# Patient Record
Sex: FEMALE | Race: WHITE | Employment: OTHER | URBAN - NONMETROPOLITAN AREA
[De-identification: names, ages, dates, MRNs, and addresses within clinical notes are randomized per-mention and may not be internally consistent; named-entity substitution may affect disease eponyms.]

---

## 2017-02-01 ENCOUNTER — TRANSFERRED RECORDS (OUTPATIENT)
Dept: HEALTH INFORMATION MANAGEMENT | Facility: OTHER | Age: 67
End: 2017-02-01

## 2017-11-16 ENCOUNTER — TRANSFERRED RECORDS (OUTPATIENT)
Dept: HEALTH INFORMATION MANAGEMENT | Facility: OTHER | Age: 67
End: 2017-11-16

## 2018-10-31 ENCOUNTER — TRANSFERRED RECORDS (OUTPATIENT)
Dept: HEALTH INFORMATION MANAGEMENT | Facility: OTHER | Age: 68
End: 2018-10-31

## 2018-11-12 ENCOUNTER — TRANSFERRED RECORDS (OUTPATIENT)
Dept: HEALTH INFORMATION MANAGEMENT | Facility: CLINIC | Age: 68
End: 2018-11-12

## 2018-11-12 LAB
ALT SERPL-CCNC: 35 U/L (ref 19–47)
AST SERPL-CCNC: 29 U/L (ref 9–34)
CREAT SERPL-MCNC: 0.9 MG/DL (ref 0.7–1.4)
GFR SERPL CREATININE-BSD FRML MDRD: 66 ML/MIN/1.73M2
GLUCOSE SERPL-MCNC: 105 MG/DL (ref 64–112)
POTASSIUM SERPL-SCNC: 4.5 MEQ/L (ref 3.5–5.3)
TSH SERPL-ACNC: 5.18 UIU/ML (ref 0.4–4)

## 2018-11-15 ENCOUNTER — HOSPITAL ENCOUNTER (OUTPATIENT)
Dept: MRI IMAGING | Facility: HOSPITAL | Age: 68
Discharge: HOME | End: 2018-11-15
Attending: PHYSICIAN ASSISTANT | Admitting: PHYSICIAN ASSISTANT
Payer: MEDICARE

## 2018-11-15 ENCOUNTER — TRANSFERRED RECORDS (OUTPATIENT)
Dept: HEALTH INFORMATION MANAGEMENT | Facility: OTHER | Age: 68
End: 2018-11-15

## 2018-11-15 DIAGNOSIS — K76.9 HEPATIC LESION: ICD-10-CM

## 2018-11-15 PROCEDURE — A9585 GADOBUTROL INJECTION: HCPCS | Performed by: RADIOLOGY

## 2018-11-15 PROCEDURE — 74183 MRI ABD W/O CNTR FLWD CNTR: CPT | Mod: TC

## 2018-11-15 PROCEDURE — 25500064 ZZH RX 255 OP 636: Performed by: RADIOLOGY

## 2018-11-15 RX ORDER — GADOBUTROL 604.72 MG/ML
7.5 INJECTION INTRAVENOUS ONCE
Status: COMPLETED | OUTPATIENT
Start: 2018-11-15 | End: 2018-11-15

## 2018-11-15 RX ADMIN — GADOBUTROL 7.5 ML: 604.72 INJECTION INTRAVENOUS at 18:15

## 2018-11-28 ENCOUNTER — TRANSFERRED RECORDS (OUTPATIENT)
Dept: HEALTH INFORMATION MANAGEMENT | Facility: OTHER | Age: 68
End: 2018-11-28

## 2018-12-07 ENCOUNTER — TRANSFERRED RECORDS (OUTPATIENT)
Dept: HEALTH INFORMATION MANAGEMENT | Facility: OTHER | Age: 68
End: 2018-12-07

## 2018-12-14 ENCOUNTER — TRANSFERRED RECORDS (OUTPATIENT)
Dept: HEALTH INFORMATION MANAGEMENT | Facility: OTHER | Age: 68
End: 2018-12-14

## 2018-12-14 ENCOUNTER — HOSPITAL ENCOUNTER (INPATIENT)
Facility: OTHER | Age: 68
LOS: 8 days | Discharge: HOME | DRG: 331 | End: 2018-12-22
Attending: INTERNAL MEDICINE | Admitting: SURGERY
Payer: MEDICARE

## 2018-12-14 DIAGNOSIS — Z98.890 POSTOPERATIVE STATE: Primary | ICD-10-CM

## 2018-12-14 PROBLEM — K56.609 SMALL BOWEL OBSTRUCTION (H): Status: ACTIVE | Noted: 2018-12-14

## 2018-12-14 PROCEDURE — 12000000 ZZH R&B MED SURG/OB

## 2018-12-14 PROCEDURE — 99223 1ST HOSP IP/OBS HIGH 75: CPT | Performed by: SURGERY

## 2018-12-14 PROCEDURE — 25800025 ZZH RX 258: Performed by: SURGERY

## 2018-12-14 PROCEDURE — 25000128 H RX IP 250 OP 636: Performed by: SURGERY

## 2018-12-14 RX ORDER — DEXTROSE MONOHYDRATE, SODIUM CHLORIDE, AND POTASSIUM CHLORIDE 50; 1.49; 4.5 G/1000ML; G/1000ML; G/1000ML
INJECTION, SOLUTION INTRAVENOUS CONTINUOUS
Status: DISCONTINUED | OUTPATIENT
Start: 2018-12-14 | End: 2018-12-19

## 2018-12-14 RX ORDER — ONDANSETRON 4 MG/1
4 TABLET, ORALLY DISINTEGRATING ORAL EVERY 6 HOURS PRN
Status: DISCONTINUED | OUTPATIENT
Start: 2018-12-14 | End: 2018-12-22

## 2018-12-14 RX ORDER — HYDROMORPHONE HYDROCHLORIDE 1 MG/ML
0.2 INJECTION, SOLUTION INTRAMUSCULAR; INTRAVENOUS; SUBCUTANEOUS
Status: DISCONTINUED | OUTPATIENT
Start: 2018-12-14 | End: 2018-12-22

## 2018-12-14 RX ORDER — NALOXONE HYDROCHLORIDE 0.4 MG/ML
.1-.4 INJECTION, SOLUTION INTRAMUSCULAR; INTRAVENOUS; SUBCUTANEOUS
Status: DISCONTINUED | OUTPATIENT
Start: 2018-12-14 | End: 2018-12-18

## 2018-12-14 RX ORDER — ONDANSETRON 2 MG/ML
4 INJECTION INTRAMUSCULAR; INTRAVENOUS EVERY 6 HOURS PRN
Status: DISCONTINUED | OUTPATIENT
Start: 2018-12-14 | End: 2018-12-22

## 2018-12-14 RX ADMIN — FAMOTIDINE 20 MG: 20 INJECTION, SOLUTION INTRAVENOUS at 20:08

## 2018-12-14 RX ADMIN — HYDROMORPHONE HYDROCHLORIDE 0.2 MG: 1 INJECTION, SOLUTION INTRAMUSCULAR; INTRAVENOUS; SUBCUTANEOUS at 23:00

## 2018-12-14 RX ADMIN — DEXTROSE MONOHYDRATE, SODIUM CHLORIDE, AND POTASSIUM CHLORIDE: 50; 4.5; 1.49 INJECTION, SOLUTION INTRAVENOUS at 20:08

## 2018-12-14 ASSESSMENT — MIFFLIN-ST. JEOR: SCORE: 1089.88

## 2018-12-14 ASSESSMENT — ACTIVITIES OF DAILY LIVING (ADL): ADLS_ACUITY_SCORE: 11

## 2018-12-15 ENCOUNTER — APPOINTMENT (OUTPATIENT)
Dept: GENERAL RADIOLOGY | Facility: OTHER | Age: 68
DRG: 331 | End: 2018-12-15
Attending: SURGERY
Payer: MEDICARE

## 2018-12-15 LAB
ALBUMIN SERPL-MCNC: 3.4 G/DL (ref 3.5–5.7)
ALP SERPL-CCNC: 45 U/L (ref 34–104)
ALT SERPL W P-5'-P-CCNC: 41 U/L (ref 7–52)
ANION GAP SERPL CALCULATED.3IONS-SCNC: 6 MMOL/L (ref 3–14)
AST SERPL W P-5'-P-CCNC: 25 U/L (ref 13–39)
BILIRUB SERPL-MCNC: 0.5 MG/DL (ref 0.3–1)
BUN SERPL-MCNC: 11 MG/DL (ref 7–25)
CALCIUM SERPL-MCNC: 9.1 MG/DL (ref 8.6–10.3)
CHLORIDE SERPL-SCNC: 107 MMOL/L (ref 98–107)
CO2 SERPL-SCNC: 25 MMOL/L (ref 21–31)
CREAT SERPL-MCNC: 0.79 MG/DL (ref 0.6–1.2)
ERYTHROCYTE [DISTWIDTH] IN BLOOD BY AUTOMATED COUNT: 12.8 % (ref 10–15)
GFR SERPL CREATININE-BSD FRML MDRD: 72 ML/MIN/1.7M2
GLUCOSE SERPL-MCNC: 121 MG/DL (ref 70–105)
HCT VFR BLD AUTO: 37.8 % (ref 35–47)
HGB BLD-MCNC: 12.5 G/DL (ref 11.7–15.7)
MCH RBC QN AUTO: 30 PG (ref 26.5–33)
MCHC RBC AUTO-ENTMCNC: 33.1 G/DL (ref 31.5–36.5)
MCV RBC AUTO: 91 FL (ref 78–100)
PLATELET # BLD AUTO: 354 10E9/L (ref 150–450)
POTASSIUM SERPL-SCNC: 3.9 MMOL/L (ref 3.5–5.1)
PROT SERPL-MCNC: 5.8 G/DL (ref 6.4–8.9)
RBC # BLD AUTO: 4.16 10E12/L (ref 3.8–5.2)
SODIUM SERPL-SCNC: 138 MMOL/L (ref 134–144)
WBC # BLD AUTO: 8.2 10E9/L (ref 4–11)

## 2018-12-15 PROCEDURE — 80053 COMPREHEN METABOLIC PANEL: CPT | Performed by: SURGERY

## 2018-12-15 PROCEDURE — 85027 COMPLETE CBC AUTOMATED: CPT | Performed by: SURGERY

## 2018-12-15 PROCEDURE — 25000131 ZZH RX MED GY IP 250 OP 636 PS 637: Mod: GY | Performed by: SURGERY

## 2018-12-15 PROCEDURE — 25800025 ZZH RX 258: Performed by: SURGERY

## 2018-12-15 PROCEDURE — 74019 RADEX ABDOMEN 2 VIEWS: CPT

## 2018-12-15 PROCEDURE — 12000000 ZZH R&B MED SURG/OB

## 2018-12-15 PROCEDURE — 25000128 H RX IP 250 OP 636: Performed by: SURGERY

## 2018-12-15 PROCEDURE — 36415 COLL VENOUS BLD VENIPUNCTURE: CPT | Performed by: SURGERY

## 2018-12-15 PROCEDURE — 99231 SBSQ HOSP IP/OBS SF/LOW 25: CPT | Performed by: SURGERY

## 2018-12-15 RX ADMIN — HYDROMORPHONE HYDROCHLORIDE 0.2 MG: 1 INJECTION, SOLUTION INTRAMUSCULAR; INTRAVENOUS; SUBCUTANEOUS at 05:16

## 2018-12-15 RX ADMIN — FAMOTIDINE 20 MG: 20 INJECTION, SOLUTION INTRAVENOUS at 07:36

## 2018-12-15 RX ADMIN — HYDROMORPHONE HYDROCHLORIDE 0.2 MG: 1 INJECTION, SOLUTION INTRAMUSCULAR; INTRAVENOUS; SUBCUTANEOUS at 16:47

## 2018-12-15 RX ADMIN — DEXTROSE MONOHYDRATE, SODIUM CHLORIDE, AND POTASSIUM CHLORIDE: 50; 4.5; 1.49 INJECTION, SOLUTION INTRAVENOUS at 06:49

## 2018-12-15 RX ADMIN — ONDANSETRON 4 MG: 4 TABLET, ORALLY DISINTEGRATING ORAL at 14:53

## 2018-12-15 RX ADMIN — HYDROMORPHONE HYDROCHLORIDE 0.2 MG: 1 INJECTION, SOLUTION INTRAMUSCULAR; INTRAVENOUS; SUBCUTANEOUS at 11:24

## 2018-12-15 RX ADMIN — FAMOTIDINE 20 MG: 20 INJECTION, SOLUTION INTRAVENOUS at 19:24

## 2018-12-15 RX ADMIN — DEXTROSE MONOHYDRATE, SODIUM CHLORIDE, AND POTASSIUM CHLORIDE: 50; 4.5; 1.49 INJECTION, SOLUTION INTRAVENOUS at 16:45

## 2018-12-15 RX ADMIN — HYDROMORPHONE HYDROCHLORIDE 0.2 MG: 1 INJECTION, SOLUTION INTRAMUSCULAR; INTRAVENOUS; SUBCUTANEOUS at 22:54

## 2018-12-15 RX ADMIN — HYDROMORPHONE HYDROCHLORIDE 0.2 MG: 1 INJECTION, SOLUTION INTRAMUSCULAR; INTRAVENOUS; SUBCUTANEOUS at 14:53

## 2018-12-15 ASSESSMENT — ACTIVITIES OF DAILY LIVING (ADL)
ADLS_ACUITY_SCORE: 11

## 2018-12-15 ASSESSMENT — MIFFLIN-ST. JEOR: SCORE: 1096.06

## 2018-12-15 NOTE — PROGRESS NOTES
"[place] [facility] ADMISSION NOTE    Patient admitted to room 314 at approximately 1820 via cart from transfer from [facility]. Patient was accompanied by other:ambulance crew.     Verbal SBAR report received from [facility] nurse prior to patient arrival.     Patient ambulated to bed with stand-by assist. Patient alert and oriented X 3. The patient is not having any pain.  . Admission vital signs: Blood pressure 127/69, pulse 83, temperature 98.6  F (37  C), temperature source Tympanic, resp. rate 16, height 1.651 m (5' 5\"), weight 55.9 kg (123 lb 3.8 oz), SpO2 95 %. Patient was oriented to plan of care, call light, bed controls, tv, telephone, bathroom and visiting hours.     Risk Assessment    The following safety risks were identified during admission: none. Yellow risk band applied: NO.     Skin Initial Assessment    This writer admitted this patient and completed a full skin assessment and Miko score in the Adult PCS flowsheet. Appropriate interventions initiated as needed.     Secondary skin check completed by Marleen.    Skin  Inspection of bony prominences: Full         Sylvia Celestin"

## 2018-12-15 NOTE — H&P
[facility]    Surgical Admission    Date of Admission:  12/14/2018    Assessment & Plan   Nadya Flanagan is a 68 year old female who was admitted on 12/14/2018. I was asked to see the patient by Dr. Dent in [place] for abdominal pain.    Active Problems:    Small bowel obstruction (H)    Assessment: partial    Plan: nasogastric decompression,       DVT Prophylaxis: Pneumatic Compression Devices  Code Status: Full Code    Tresa Evangelista    Primary Care Physician   Isaura Allison    Chief Complaint   Vomiting and abdominal pain    History is obtained from the patient and chart review    History of Present Illness   aNdya Flanagan is a 68 year old female who presents with vomiting and abdominal pain. This all started October 12. She has been having good and bad days as far as the vomiting and pain but usually only one or two days of good then bad again. She has been having bowel movements. She hasn't noted blood or black stools. She has been bloated on and off. She had an EGD by Dr. Gaytan that showed some gastritis. She hasn't had a colonoscopy before. She has lost about 28 pounds since this started. She finally was having horrible cramping pain today so went to the ED in [place].  She was noted on CT to have a small bowel obstruction. She had a mildly elevated WBC. She has had improvement in her pain with minimal Dilaudid. She hasn't had vomiting since the nasogastric tube went in.  No fevers during this illness.  Past Medical History    I have reviewed this patient's medical history and updated it with pertinent information if needed.   Past Medical History:   Diagnosis Date     Thyroid disease        Past Surgical History   I have reviewed this patient's surgical history and updated it with pertinent information if needed.  Past Surgical History:   Procedure Laterality Date     ABDOMEN SURGERY-tubal ligation         Prior to Admission Medications   None     Allergies   Allergies   Allergen  Reactions     Contrast Dye      Had contrast dye. Patient reports she woke up in ICU.       Social History   I have reviewed this patient's social history and updated it with pertinent information if needed. Nadya Flanagan  Non-smoker. .    Family History   I have reviewed this patient's family history and updated it with pertinent information if needed.   History reviewed. No pertinent family history.    REVIEW OF SYSTEMS  GENERAL: No fevers or chills. Denies fatigue, has had recent weight loss more than 20 pounds  HEENT: No sinus drainage. No changes with vision or hearing. No difficulty swallowing.   LYMPHATICS:  No swollen nodes in axilla, neck or groin.  CARDIOVASCULAR: Denies chest pain, palpitations and dyspnea on exertion.  PULMONARY: No shortness of breath or cough. No increase in sputum production.  GI:Denies melena, bright red blood in stools. No hematemesis. No constipation or diarrhea.  : No dysuria or hematuria.  SKIN: No recent rashes or ulcers.   HEMATOLOGY:  No history of easy bruising or bleeding.  ENDOCRINE:  No history of diabetes.  NEUROLOGY:  No history of seizures or headaches. No motor or sensory changes.    Physical Exam   Temp: 98.6  F (37  C) Temp src: Tympanic BP: 127/69 Pulse: 83   Resp: 16 SpO2: 95 % O2 Device: None (Room air)    Vital Signs with Ranges  Temp:  [98.6  F (37  C)] 98.6  F (37  C)  Pulse:  [83] 83  Resp:  [16] 16  BP: (127)/(69) 127/69  SpO2:  [95 %] 95 %  123 lbs 3.79 oz    Constitutional: NAD, pleasant and cooperative  HEENT: normocephalic, no icterus, no nasal drainage, no oral lesions, mucus membranes pale and moist  Respiratory: lungs clear to ausculation bilaterally, no respiratory distress  Cardiovascular: heart regular rate and rhythm, no murmur  Abdomen: bowel sounds +, soft and distended, mild tenderness, no peritoneal signs  Lymph/Hematologic: No axillary or cervical adenopathy  Skin: pale, warm and dry. No rash or  ulceration  Musculoskeletal: calves soft and non-tender  Neurologic: grossly intact, AAO x 3  Psychiatric: appropriate affect    Data   -Data reviewed today: All pertinent laboratory and imaging results from this encounter were reviewed. I personally reviewed the abdominal CT image(s) showing SBO with right sided apparent transition point-no free air.  Labs from [place] reviewed- wbc 11. 9, hgb 15.4, plt 360, ast and alt are slightly elevated crp < 0.5  No lab results found in last 7 days.    No results found for this or any previous visit (from the past 24 hour(s)).

## 2018-12-15 NOTE — PROGRESS NOTES
[facility]  Surgical Progress Note    Assessment & Plan   Nadya Flanagan is a 68 year old female who was admitted on 12/14/2018.     Active Problems:    Small bowel obstruction (H)    Assessment: stable    Plan: continue nasogastric tube, advance 6 inches, showed tip at GEJ on XR. Improved labs. Pain controlled. Encourage ambulation. Will continue to monitor. Discussed with patient. She is motivated to be up and moving. Labs and XR in am.     # Pain Assessment:  Current Pain Score 12/15/2018   Patient currently in pain? yes   Pain score (0-10) 2   Pain location Abdomen   - Nadya is experiencing pain due to sbo. Pain management was discussed and the plan was created in a collaborative fashion.  Nadya's response to the current recommendations: engaged  - Opioid regimen: dialudid  - Response to opioid medications: Reduction of symptoms   - Bowel regimen: not needed        DVT Prophylaxis: Pneumatic Compression Devices  Code Status: Full Code    Tresa Evangelista    Interval History   Pain controlled. No nausea, no flatus or bm. Has been up and walking.   -Data reviewed today: I reviewed all new labs and imaging results over the last 24 hours.    Physical Exam   Temp: 98.3  F (36.8  C) Temp src: Tympanic BP: 119/64 Pulse: 67   Resp: 16 SpO2: 97 % O2 Device: None (Room air)    Vitals:    12/14/18 1851 12/15/18 0600   Weight: 55.9 kg (123 lb 3.8 oz) 56.5 kg (124 lb 9.6 oz)     Vital Signs with Ranges  Temp:  [98.3  F (36.8  C)-98.8  F (37.1  C)] 98.3  F (36.8  C)  Pulse:  [67-83] 67  Resp:  [14-18] 16  BP: (119-135)/(64-80) 119/64  SpO2:  [94 %-98 %] 97 %  I/O last 3 completed shifts:  In: 1208 [I.V.:1118; NG/GT:90]  Out: 880 [Urine:620; Emesis/NG output:260]    Constitutional: No acute distress, pleasant and cooperative  Respiratory: Clear lungs, no respiratory distress  Cardiovascular: regular rate and rhythm, no murmur  GI: abdomen soft, mild lower abdomen tenderness, hypo bowel  sounds  Skin/Integument: pink, warm and dry, no rash    Medications     dextrose 5% and 0.45% NaCl + KCl 20 mEq/L 100 mL/hr at 12/15/18 0649       famotidine  20 mg Intravenous Q12H       Data   Recent Labs   Lab 12/15/18  0433   WBC 8.2   HGB 12.5   MCV 91         POTASSIUM 3.9   CHLORIDE 107   CO2 25   BUN 11   CR 0.79   ANIONGAP 6   STELLA 9.1   *   ALBUMIN 3.4*   PROTTOTAL 5.8*   BILITOTAL 0.5   ALKPHOS 45   ALT 41   AST 25       Recent Results (from the past 24 hour(s))   XR Abdomen 2 Views    Narrative    PROCEDURE: XR ABDOMEN 2 VW 12/15/2018 6:35 AM    HISTORY: sbo    COMPARISONS: None.    TECHNIQUE: Supine and upright views.    FINDINGS: There is a nasogastric tube. Tip is probably in the gastric  fundus. It should be advanced densities near the EG junction.    No free air is seen. There are multiple gas-filled small bowel loops  with gas fluid levels. The bowel loops are mildly dilated. Findings  are consistent with a small bowel obstruction. There are punctate  radiodensities throughout the abdomen and pelvis. It is uncertain if  this relates to bowel contents or if there is another etiology. There  aren't 3 screws transfixing the proximal right femur. No mass is seen  and there are no suspicious calcifications.         Impression    IMPRESSION: Abnormal bowel gas pattern with findings consistent with  small bowel obstruction. Nasogastric tube tip near the EG junction.    OH SILVEIRA MD

## 2018-12-15 NOTE — PLAN OF CARE
VSS, patient able to get some sleep. Walked in hallway. NG tube flushed but unable to aspirate - patient repositioned, suction reconnected and draining dark green fluid with clumps. No complaints of nausea, PRN Dilaudid given once for abdominal pain. Patient voiding adequately. Gordon Kim RN on 12/15/2018 at 3:23 AM    NG tube and IV placed at [place] - LDA's added to flowsheets by writer but unsure of exact time of placement. NG tube has no cm markings on it - exterior portion of tube measure, approximately 71cm to nares. Gordon Kim RN on 12/15/2018 at 3:29 AM

## 2018-12-15 NOTE — PROGRESS NOTES
COLUMBIA-SUICIDE SEVERITY RATING SCALE   Screen with Triage Points for Emergency Department      Ask questions that are bolded and underlined.   Past  month   Ask Questions 1 and 2 YES NO   1)  Have you wished you were dead or wished you could go to sleep and not wake up?   no   2)  Have you actually had any thoughts of killing yourself?   no   If YES to 2, ask questions 3, 4, 5, and 6.  If NO to 2, go directly to question 6.   no     4)  Have you had these thoughts and had some intention of acting on them?   As opposed to  I have the thoughts but I definitely will not do anything about them.       5)  Have you started to work out or worked out the details of how to kill yourself? Do you intend to carry out this plan?      6)  Have you ever done anything, started to do anything, or prepared to do anything to end your life?  Examples: Collected pills, obtained a gun, gave away valuables, wrote a will or suicide note, took out pills but didn t swallow any, held a gun but changed your mind or it was grabbed from your hand, went to the roof but didn t jump; or actually took pills, tried to shoot yourself, cut yourself, tried to hang yourself, etc.    If YES, ask: Was this within the past three months?  Lifetime         Past 3 Months        Item 1:  Behavioral Health Referral at Discharge  Item 2:  Behavioral Health Referral at Discharge   Item 3:  Behavioral Health Consult (Psychiatric Nurse/) and consider Patient Safety Precautions  Item 4:  Immediate Notification of Physician and/or Behavioral Health and Patient Safety Precautions   Item 5:  Immediate Notification of Physician and/or Behavioral Health and Patient Safety Precautions  Item 6:  Over 3 months ago: Behavioral Health Consult (Psychiatric Nurse/) and consider Patient Safety Precautions  OR  Item 6:  3 months ago or less: Immediate Notification of Physician and/or Behavioral Health and Patient Safety Precautions

## 2018-12-16 ENCOUNTER — APPOINTMENT (OUTPATIENT)
Dept: GENERAL RADIOLOGY | Facility: OTHER | Age: 68
DRG: 331 | End: 2018-12-16
Attending: SURGERY
Payer: MEDICARE

## 2018-12-16 LAB
ALBUMIN SERPL-MCNC: 3.4 G/DL (ref 3.5–5.7)
ALP SERPL-CCNC: 43 U/L (ref 34–104)
ALT SERPL W P-5'-P-CCNC: 32 U/L (ref 7–52)
ANION GAP SERPL CALCULATED.3IONS-SCNC: 3 MMOL/L (ref 3–14)
AST SERPL W P-5'-P-CCNC: 21 U/L (ref 13–39)
BILIRUB SERPL-MCNC: 0.5 MG/DL (ref 0.3–1)
BUN SERPL-MCNC: 3 MG/DL (ref 7–25)
CALCIUM SERPL-MCNC: 9.1 MG/DL (ref 8.6–10.3)
CHLORIDE SERPL-SCNC: 106 MMOL/L (ref 98–107)
CO2 SERPL-SCNC: 29 MMOL/L (ref 21–31)
CREAT SERPL-MCNC: 0.7 MG/DL (ref 0.6–1.2)
ERYTHROCYTE [DISTWIDTH] IN BLOOD BY AUTOMATED COUNT: 12.8 % (ref 10–15)
GFR SERPL CREATININE-BSD FRML MDRD: 83 ML/MIN/1.7M2
GLUCOSE SERPL-MCNC: 132 MG/DL (ref 70–105)
HCT VFR BLD AUTO: 37.4 % (ref 35–47)
HGB BLD-MCNC: 12.3 G/DL (ref 11.7–15.7)
LIPASE SERPL-CCNC: 21 U/L (ref 11–82)
MCH RBC QN AUTO: 29.9 PG (ref 26.5–33)
MCHC RBC AUTO-ENTMCNC: 32.9 G/DL (ref 31.5–36.5)
MCV RBC AUTO: 91 FL (ref 78–100)
PLATELET # BLD AUTO: 320 10E9/L (ref 150–450)
POTASSIUM SERPL-SCNC: 4.2 MMOL/L (ref 3.5–5.1)
PROT SERPL-MCNC: 5.9 G/DL (ref 6.4–8.9)
RBC # BLD AUTO: 4.12 10E12/L (ref 3.8–5.2)
SODIUM SERPL-SCNC: 138 MMOL/L (ref 134–144)
WBC # BLD AUTO: 7.5 10E9/L (ref 4–11)

## 2018-12-16 PROCEDURE — 12000000 ZZH R&B MED SURG/OB

## 2018-12-16 PROCEDURE — 74019 RADEX ABDOMEN 2 VIEWS: CPT

## 2018-12-16 PROCEDURE — 36415 COLL VENOUS BLD VENIPUNCTURE: CPT | Performed by: SURGERY

## 2018-12-16 PROCEDURE — 99232 SBSQ HOSP IP/OBS MODERATE 35: CPT | Performed by: SURGERY

## 2018-12-16 PROCEDURE — 85027 COMPLETE CBC AUTOMATED: CPT | Performed by: SURGERY

## 2018-12-16 PROCEDURE — 83690 ASSAY OF LIPASE: CPT | Performed by: SURGERY

## 2018-12-16 PROCEDURE — 25000128 H RX IP 250 OP 636: Performed by: SURGERY

## 2018-12-16 PROCEDURE — 80053 COMPREHEN METABOLIC PANEL: CPT | Performed by: SURGERY

## 2018-12-16 PROCEDURE — 25800025 ZZH RX 258: Performed by: SURGERY

## 2018-12-16 RX ORDER — METRONIDAZOLE 250 MG/1
250 TABLET ORAL 4 TIMES DAILY
Status: ON HOLD | COMMUNITY
End: 2018-12-22

## 2018-12-16 RX ORDER — IBUPROFEN 200 MG
200 TABLET ORAL EVERY 4 HOURS PRN
Status: ON HOLD | COMMUNITY
End: 2018-12-22

## 2018-12-16 RX ORDER — DOXYCYCLINE HYCLATE 100 MG
100 TABLET ORAL 2 TIMES DAILY
Status: ON HOLD | COMMUNITY
End: 2018-12-22

## 2018-12-16 RX ORDER — FAMOTIDINE 20 MG/1
10 TABLET, FILM COATED ORAL
Status: ON HOLD | COMMUNITY
End: 2018-12-22

## 2018-12-16 RX ORDER — PANTOPRAZOLE SODIUM 20 MG/1
40 TABLET, DELAYED RELEASE ORAL 2 TIMES DAILY
Status: ON HOLD | COMMUNITY
End: 2018-12-22

## 2018-12-16 RX ADMIN — DEXTROSE MONOHYDRATE, SODIUM CHLORIDE, AND POTASSIUM CHLORIDE: 50; 4.5; 1.49 INJECTION, SOLUTION INTRAVENOUS at 03:20

## 2018-12-16 RX ADMIN — HYDROMORPHONE HYDROCHLORIDE 0.2 MG: 1 INJECTION, SOLUTION INTRAMUSCULAR; INTRAVENOUS; SUBCUTANEOUS at 14:24

## 2018-12-16 RX ADMIN — DEXTROSE MONOHYDRATE, SODIUM CHLORIDE, AND POTASSIUM CHLORIDE: 50; 4.5; 1.49 INJECTION, SOLUTION INTRAVENOUS at 14:23

## 2018-12-16 RX ADMIN — FAMOTIDINE 20 MG: 20 INJECTION, SOLUTION INTRAVENOUS at 21:00

## 2018-12-16 RX ADMIN — FAMOTIDINE 20 MG: 20 INJECTION, SOLUTION INTRAVENOUS at 08:58

## 2018-12-16 ASSESSMENT — ACTIVITIES OF DAILY LIVING (ADL)
ADLS_ACUITY_SCORE: 11

## 2018-12-16 ASSESSMENT — MIFFLIN-ST. JEOR: SCORE: 1130.88

## 2018-12-16 NOTE — PHARMACY-ADMISSION MEDICATION HISTORY
Pharmacy -- Admission Medication Reconciliation IN PROGRESS    Prior to admission (PTA) medications were reviewed and the patient's PTA medication list was updated.    Sources Consulted: [facility] (nothing found), Care Everywhere  Faxed [place], [facility]    Will attempt to complete in AM.    Selin Beatty [facility], 12/15/2018,  6:51 PM

## 2018-12-16 NOTE — PROGRESS NOTES
[facility]  Surgical Progress Note    Assessment & Plan   Nadya Flanagan is a 68 year old female who was admitted on 12/14/2018.     Active Problems:    Small bowel obstruction (H)    Assessment: stable    Plan: WBC normal, continue with nasogastric tube and bowel rest, continue with ambulation. Continue with dilaudid as needed for pain. Improving XR with gas in colon but still with air fluid levels. Discussed if not improved tomorrow will plan on surgery on Tuesday. If worsens acutely -surgery sooner.    # Pain Assessment:  Current Pain Score 12/16/2018   Patient currently in pain? yes   Pain score (0-10) 1   Pain location Abdomen   - Nadya is experiencing pain due to sbo. Pain management was discussed and the plan was created in a collaborative fashion.  Nadya's response to the current recommendations: engaged  - Please see the plan for pain management as documented above    DVT Prophylaxis: Pneumatic Compression Devices  Code Status: Full Code    Tresa Evangelista    Interval History   Still doing ok. Pain is less. Not passing flatus. No bm, voiding ok.  -Data reviewed today: I reviewed all new labs and imaging results over the last 24 hours.    Physical Exam   Temp: 97.8  F (36.6  C) Temp src: Tympanic BP: 132/68 Pulse: 74   Resp: 12 SpO2: 95 % O2 Device: None (Room air)    Vitals:    12/14/18 1851 12/15/18 0600 12/16/18 0531   Weight: 55.9 kg (123 lb 3.8 oz) 56.5 kg (124 lb 9.6 oz) 60 kg (132 lb 4.4 oz)     Vital Signs with Ranges  Temp:  [97.8  F (36.6  C)-99.1  F (37.3  C)] 97.8  F (36.6  C)  Pulse:  [72-81] 74  Resp:  [12-16] 12  BP: (114-132)/(64-78) 132/68  SpO2:  [94 %-97 %] 95 %  I/O last 3 completed shifts:  In: 2623 [P.O.:100; I.V.:2478; NG/GT:45]  Out: 2375 [Urine:2175; Emesis/NG output:200]    Constitutional: No acute distress, pleasant and cooperative  Respiratory: Clear lungs, no respiratory distress  Cardiovascular: regular rate and rhythm, no murmur  GI: abdomen soft,  minimal lower abdomen tenderness, positive bowel sounds  Skin/Integument: pink warm and dry, no rash    Medications     dextrose 5% and 0.45% NaCl + KCl 20 mEq/L 100 mL/hr at 12/16/18 0620       famotidine  20 mg Intravenous Q12H       Data   Recent Labs   Lab 12/16/18  0540 12/15/18  0433   WBC 7.5 8.2   HGB 12.3 12.5   MCV 91 91    354    138   POTASSIUM 4.2 3.9   CHLORIDE 106 107   CO2 29 25   BUN 3* 11   CR 0.70 0.79   ANIONGAP 3 6   STELLA 9.1 9.1   * 121*   ALBUMIN 3.4* 3.4*   PROTTOTAL 5.9* 5.8*   BILITOTAL 0.5 0.5   ALKPHOS 43 45   ALT 32 41   AST 21 25   LIPASE 21  --        Recent Results (from the past 24 hour(s))   XR Abdomen 2 Views    Narrative    XR ABDOMEN 2 VW   12/16/2018 6:32 AM    History:Female,age  68 years, sbo    Comparison: 12/15/2018    FINDINGS: Two views are submitted.   Nasogastric tube. Numerous dilated loops of small bowel are similar in  appearance with numerous air-fluid levels.. No evidence of free air.       Impression    IMPRESSION:  1.   Nasogastric tube is satisfactorily positioned.     2.  Bowel gas pattern is similar in appearance consistent with small  bowel obstruction.    NORMA CASTELLON MD

## 2018-12-16 NOTE — PLAN OF CARE
Patient uneventful this shift. VSS. NG tube draining green/chunky fluid, ~100ml out, able to irrigate and aspirate. Heat packs in use for abdominal discomfort. Gordon Kim RN on 12/16/2018 at 3:44 AM

## 2018-12-16 NOTE — PLAN OF CARE
NG still pulling thick green secretions. Pt up to walk indep in halls. NG irrigated several times to restore patency after disconnected for walking. Pt states some discomfort and increased distention when off suction for a while.

## 2018-12-16 NOTE — PHARMACY-ADMISSION MEDICATION HISTORY
Pharmacy -- Admission Medication Reconciliation    Prior to admission (PTA) medications were reviewed and the patient's PTA medication list was updated.    Sources Consulted: [facility], [facility] [place], Care Everywhere    The reliability of this Medication Reconciliation is: Reliability: Borderline reliable    The following significant changes were made:  Last taken 12/13/18 1700:  Added metronidazole  Added doxycycline  Added pantoprazole  Added pepto bismol    Last taken in fall:  Added famotidine    Last taken 10/23/18 PM:  Added ibuprofen    Patient reports taking probiotic daily for 5 or 6 years and stopping this summer. She reports weaning off by September.    In addition, the patient's allergies were reviewed with the patient and updated as follows:   Allergies: Contrast dye    The pharmacist has reviewed with the patient that all personal medications should be removed from the building or locked in the belongings safe.  Patient shall only take medications ordered by the physician and administered by the nursing staff.       Medication barriers identified: multiple medication changes, stopped most medications 10/23/18   Medication adherence concerns: concerns related to cost, prefers [facility] but due to costs uses [facility] on occassion   Understanding of emergency medications: n/a    Selin Beatty [facility], 12/16/2018,  10:13 AM

## 2018-12-17 ENCOUNTER — APPOINTMENT (OUTPATIENT)
Dept: GENERAL RADIOLOGY | Facility: OTHER | Age: 68
DRG: 331 | End: 2018-12-17
Attending: SURGERY
Payer: MEDICARE

## 2018-12-17 PROCEDURE — 12000000 ZZH R&B MED SURG/OB

## 2018-12-17 PROCEDURE — 74019 RADEX ABDOMEN 2 VIEWS: CPT

## 2018-12-17 PROCEDURE — 25800025 ZZH RX 258: Performed by: SURGERY

## 2018-12-17 PROCEDURE — 25000128 H RX IP 250 OP 636: Performed by: SURGERY

## 2018-12-17 PROCEDURE — 99232 SBSQ HOSP IP/OBS MODERATE 35: CPT | Mod: 24 | Performed by: SURGERY

## 2018-12-17 RX ADMIN — DEXTROSE MONOHYDRATE, SODIUM CHLORIDE, AND POTASSIUM CHLORIDE: 50; 4.5; 1.49 INJECTION, SOLUTION INTRAVENOUS at 00:37

## 2018-12-17 RX ADMIN — HYDROMORPHONE HYDROCHLORIDE 0.2 MG: 1 INJECTION, SOLUTION INTRAMUSCULAR; INTRAVENOUS; SUBCUTANEOUS at 23:34

## 2018-12-17 RX ADMIN — DEXTROSE MONOHYDRATE, SODIUM CHLORIDE, AND POTASSIUM CHLORIDE: 50; 4.5; 1.49 INJECTION, SOLUTION INTRAVENOUS at 12:58

## 2018-12-17 RX ADMIN — FAMOTIDINE 20 MG: 20 INJECTION, SOLUTION INTRAVENOUS at 07:44

## 2018-12-17 RX ADMIN — DEXTROSE MONOHYDRATE, SODIUM CHLORIDE, AND POTASSIUM CHLORIDE: 50; 4.5; 1.49 INJECTION, SOLUTION INTRAVENOUS at 23:19

## 2018-12-17 RX ADMIN — FAMOTIDINE 20 MG: 20 INJECTION, SOLUTION INTRAVENOUS at 20:49

## 2018-12-17 RX ADMIN — HYDROMORPHONE HYDROCHLORIDE 0.2 MG: 1 INJECTION, SOLUTION INTRAMUSCULAR; INTRAVENOUS; SUBCUTANEOUS at 16:32

## 2018-12-17 RX ADMIN — HYDROMORPHONE HYDROCHLORIDE 0.2 MG: 1 INJECTION, SOLUTION INTRAMUSCULAR; INTRAVENOUS; SUBCUTANEOUS at 07:14

## 2018-12-17 ASSESSMENT — ACTIVITIES OF DAILY LIVING (ADL)
ADLS_ACUITY_SCORE: 11

## 2018-12-17 ASSESSMENT — MIFFLIN-ST. JEOR: SCORE: 1112.39

## 2018-12-17 NOTE — PROGRESS NOTES
"Clinical Nutrition / Initial Assessment     Reason for Assessment:  Screened at nutritional risk due to:  Recent weight loss without trying    Assessment:   Client History:  Pt with NG in. Appetite has been poor since mid October. She has had minimal intakes over past few weeks, not able to keep food down usually on the 3rd day. Would be ok for 2 days and violent vomiting followed by a bowel movement on the 3rd day. For several years, she has followed a carb controlled diet and has improved her A1C from 6.2% to 5.8%. She is concerned with the foods she will be allowed to eat when her bowel obstruction either passes or has surgery tomorrow am. Will visit with again on Wednesday to discuss the plan. Provided with phone number incase she discharges prior to Wednesday.   Diet Order:  NPO  Oral Intake:  Minimal over past several weeks  Weight:   Wt Readings from Last 10 Encounters:   12/17/18 58.2 kg (128 lb 3.2 oz)   reported ~15# unintentional wt loss in this time frame.   Height: 5' 5\"  BMI: Body mass index is 21.33 kg/m .    Estimated nutritional needs based on:  current body weight  58 kg / 128 lbs   Estimated energy needs:  1022-6262 kcal/day (30-35 kcal/kg)  Estimated protein needs:  58-70 gm/day (1-1.2 g/kg)  Estimated fluid needs:  1947-5671 ml/day (1 ml/kcal)    Malnutrition Criteria:  (Need to have 2 indicators to qualify recommendation)  Energy Intake:  Chronic Severe: < 75% of estimated energy requirement for >/= 1 month  Interpretation of Weight Loss:  Acute Severe:   > 7.5% in 3 months  Physical Findings:  Chronic Body Fat Loss:  mild and Chronic Muscle Mass Loss:  mild  Reduced  Strength:  Not Measured    Recommended Nutrition Diagnosis:   Severe Malnutrition in the context of acute illness or injury - based on AND/ASPEN Clinical Characterstics of Malnutrition May 2012      Nutrition Education: Nutrition education will be provided as appropriate.    Nutrition Diagnosis: Weight:  weight loss related to " decreased oral intakes as evidenced by vomiting after 2 days and reported wt loss with poor intakes since mid October.    Intervention:  Nutrition Prescription: NPO    Nutrition Intervention(s): will address when able to tolerate PO, currently NPO for bowel rest    Nutrition Goal(s):  1. Will not have unplanned wt loss during hospitalization.   2. Will tolerate diet advancement per MD orders    Monitoring and Evaluation:   Diet advancement  Weight  Labs     Discharge Recommendation:   Nutrition Discharge Planning  Will address closer to discharge    RD will reassess in within 1-5 days or sooner.    Barbi Lopez RD on 12/17/2018 at 4:27 PM

## 2018-12-17 NOTE — PLAN OF CARE
Patient has had 100 mL out via NG, patient has denied any nausea.  Patient up ambulating in halls 4 times today.  Had dilaudid for pain twice for abdominal cramping/discomfort which was relieved with medication and warm pack.  Patient has not passed any flatus, has active bowel sounds.  VSS.

## 2018-12-17 NOTE — PROGRESS NOTES
[facility]  Surgical Progress Note    Assessment & Plan   Nadya Flanagan is a 68 year old female who was admitted on 12/14/2018.     Active Problems:    Small bowel obstruction (H)    Assessment: slowly improving    Plan: continue with nasogastric decompression-XR slowly improving with gas into left colon today. Continue with ivf and pain medications as needed. Encourage ambulation. Check XR in am. Discussed with patient.    # Pain Assessment:  Current Pain Score 12/17/2018   Patient currently in pain? yes   Pain score (0-10) 2   Pain location Abdomen   - Nadya is experiencing pain due to sbo. Pain management was discussed and the plan was created in a collaborative fashion.  Nadya's response to the current recommendations: engaged  - Please see the plan for pain management as documented above    DVT Prophylaxis: Pneumatic Compression Devices  Code Status: Full Code    Tresa Evangelista    Interval History   Still doing ok, no flatus but having more of a feeling of rumbling. Pain is controlled. Walking a lot.   -Data reviewed today: I reviewed all new labs and imaging results over the last 24 hours.    Physical Exam   Temp: 97.9  F (36.6  C) Temp src: Tympanic BP: 114/70 Pulse: 67   Resp: 16 SpO2: 94 % O2 Device: None (Room air)    Vitals:    12/15/18 0600 12/16/18 0531 12/17/18 0500   Weight: 56.5 kg (124 lb 9.6 oz) 60 kg (132 lb 4.4 oz) 58.2 kg (128 lb 3.2 oz)     Vital Signs with Ranges  Temp:  [97.9  F (36.6  C)-98.7  F (37.1  C)] 97.9  F (36.6  C)  Pulse:  [67-82] 67  Resp:  [12-18] 16  BP: (114-135)/(70-83) 114/70  SpO2:  [94 %-97 %] 94 %  I/O last 3 completed shifts:  In: 2462 [I.V.:2342; NG/GT:120]  Out: 1780 [Urine:1400; Emesis/NG output:380]    Constitutional: No acute distress, pleasant and cooperative  Respiratory: Clear lungs, no respiratory distress  Cardiovascular: regular rate and rhythm, no murmur  GI: abdomen soft, minimal tenderness, positive bowel sounds but  hypoactive  Skin/Integument: pink warm and dry, no rash    Medications     dextrose 5% and 0.45% NaCl + KCl 20 mEq/L 100 mL/hr at 12/17/18 0621       famotidine  20 mg Intravenous Q12H       Data   Recent Labs   Lab 12/16/18  0540 12/15/18  0433   WBC 7.5 8.2   HGB 12.3 12.5   MCV 91 91    354    138   POTASSIUM 4.2 3.9   CHLORIDE 106 107   CO2 29 25   BUN 3* 11   CR 0.70 0.79   ANIONGAP 3 6   STELLA 9.1 9.1   * 121*   ALBUMIN 3.4* 3.4*   PROTTOTAL 5.9* 5.8*   BILITOTAL 0.5 0.5   ALKPHOS 43 45   ALT 32 41   AST 21 25   LIPASE 21  --        Recent Results (from the past 24 hour(s))   XR Abdomen 2 Views    Narrative    PROCEDURE:  XR ABDOMEN 2 VW    HISTORY:  Small bowel obstruction.    TECHNIQUE:  AP and supine radiographs of the abdomen.    COMPARISON:  12/16/2018, 12/15/2018, 11/12/2018    FINDINGS:     The lung bases are clear. No subdiaphragmatic air is seen.    An enteric tube is coiled within the stomach. Multiple dilated loops  of small bowel with scattered flocculent oral contrast are  redemonstrated. The degree of bowel distention is slightly improved  when compared to 12/16/2018.      Impression    IMPRESSION:    Persistent but slightly improved distention of numerous small bowel  loops compatible with a small bowel obstruction.    BRYANNA DIXON MD

## 2018-12-17 NOTE — PLAN OF CARE
Patient ambulated quiroz. NG draining green liquid with particles, no complaints of nausea, minimal pain - heat pack applied. Bowel sounds hypoactive. VSS. Pleasant and attentive to care. Gordon Kim RN on 12/17/2018 at 2:57 AM

## 2018-12-18 ENCOUNTER — TRANSFERRED RECORDS (OUTPATIENT)
Dept: HEALTH INFORMATION MANAGEMENT | Facility: OTHER | Age: 68
End: 2018-12-18

## 2018-12-18 ENCOUNTER — APPOINTMENT (OUTPATIENT)
Dept: GENERAL RADIOLOGY | Facility: OTHER | Age: 68
DRG: 331 | End: 2018-12-18
Attending: SURGERY
Payer: MEDICARE

## 2018-12-18 ENCOUNTER — ANESTHESIA (OUTPATIENT)
Dept: SURGERY | Facility: OTHER | Age: 68
DRG: 331 | End: 2018-12-18
Payer: MEDICARE

## 2018-12-18 ENCOUNTER — ANESTHESIA EVENT (OUTPATIENT)
Dept: SURGERY | Facility: OTHER | Age: 68
DRG: 331 | End: 2018-12-18
Payer: MEDICARE

## 2018-12-18 PROBLEM — Z90.49 S/P RIGHT COLECTOMY: Status: ACTIVE | Noted: 2018-12-18

## 2018-12-18 PROCEDURE — 25000128 H RX IP 250 OP 636: Performed by: NURSE ANESTHETIST, CERTIFIED REGISTERED

## 2018-12-18 PROCEDURE — 25800025 ZZH RX 258: Performed by: SURGERY

## 2018-12-18 PROCEDURE — 88342 IMHCHEM/IMCYTCHM 1ST ANTB: CPT | Performed by: SURGERY

## 2018-12-18 PROCEDURE — 25000128 H RX IP 250 OP 636: Performed by: SURGERY

## 2018-12-18 PROCEDURE — 40000306 ZZH STATISTIC PRE PROC ASSESS II: Performed by: SURGERY

## 2018-12-18 PROCEDURE — 88341 IMHCHEM/IMCYTCHM EA ADD ANTB: CPT

## 2018-12-18 PROCEDURE — 36000058 ZZH SURGERY LEVEL 3 EA 15 ADDTL MIN: Performed by: SURGERY

## 2018-12-18 PROCEDURE — 0DTF0ZZ RESECTION OF RIGHT LARGE INTESTINE, OPEN APPROACH: ICD-10-PCS | Performed by: SURGERY

## 2018-12-18 PROCEDURE — 88341 IMHCHEM/IMCYTCHM EA ADD ANTB: CPT | Performed by: SURGERY

## 2018-12-18 PROCEDURE — 88342 IMHCHEM/IMCYTCHM 1ST ANTB: CPT

## 2018-12-18 PROCEDURE — 37000009 ZZH ANESTHESIA TECHNICAL FEE, EACH ADDTL 15 MIN: Performed by: SURGERY

## 2018-12-18 PROCEDURE — 25000125 ZZHC RX 250: Performed by: SURGERY

## 2018-12-18 PROCEDURE — 12000000 ZZH R&B MED SURG/OB

## 2018-12-18 PROCEDURE — 37000008 ZZH ANESTHESIA TECHNICAL FEE, 1ST 30 MIN: Performed by: SURGERY

## 2018-12-18 PROCEDURE — 27210794 ZZH OR GENERAL SUPPLY STERILE: Performed by: SURGERY

## 2018-12-18 PROCEDURE — 71000014 ZZH RECOVERY PHASE 1 LEVEL 2 FIRST HR: Performed by: SURGERY

## 2018-12-18 PROCEDURE — 36000056 ZZH SURGERY LEVEL 3 1ST 30 MIN: Performed by: SURGERY

## 2018-12-18 PROCEDURE — 74019 RADEX ABDOMEN 2 VIEWS: CPT

## 2018-12-18 PROCEDURE — 25000125 ZZHC RX 250: Performed by: NURSE ANESTHETIST, CERTIFIED REGISTERED

## 2018-12-18 PROCEDURE — 88305 TISSUE EXAM BY PATHOLOGIST: CPT | Performed by: SURGERY

## 2018-12-18 PROCEDURE — 88112 CYTOPATH CELL ENHANCE TECH: CPT | Performed by: SURGERY

## 2018-12-18 PROCEDURE — 88309 TISSUE EXAM BY PATHOLOGIST: CPT

## 2018-12-18 PROCEDURE — 44160 REMOVAL OF COLON: CPT | Performed by: SURGERY

## 2018-12-18 PROCEDURE — 99232 SBSQ HOSP IP/OBS MODERATE 35: CPT | Mod: 25 | Performed by: SURGERY

## 2018-12-18 RX ORDER — LIDOCAINE 40 MG/G
CREAM TOPICAL
Status: DISCONTINUED | OUTPATIENT
Start: 2018-12-18 | End: 2018-12-22

## 2018-12-18 RX ORDER — MAGNESIUM HYDROXIDE 1200 MG/15ML
LIQUID ORAL PRN
Status: DISCONTINUED | OUTPATIENT
Start: 2018-12-18 | End: 2018-12-18 | Stop reason: HOSPADM

## 2018-12-18 RX ORDER — CEFOXITIN 2 G/1
2 INJECTION, POWDER, FOR SOLUTION INTRAVENOUS EVERY 6 HOURS
Status: COMPLETED | OUTPATIENT
Start: 2018-12-19 | End: 2018-12-19

## 2018-12-18 RX ORDER — LIDOCAINE 40 MG/G
CREAM TOPICAL
Status: DISCONTINUED | OUTPATIENT
Start: 2018-12-18 | End: 2018-12-18 | Stop reason: HOSPADM

## 2018-12-18 RX ORDER — NALOXONE HYDROCHLORIDE 0.4 MG/ML
.1-.4 INJECTION, SOLUTION INTRAMUSCULAR; INTRAVENOUS; SUBCUTANEOUS
Status: DISCONTINUED | OUTPATIENT
Start: 2018-12-18 | End: 2018-12-22

## 2018-12-18 RX ORDER — KETOROLAC TROMETHAMINE 15 MG/ML
15 INJECTION, SOLUTION INTRAMUSCULAR; INTRAVENOUS EVERY 6 HOURS
Status: COMPLETED | OUTPATIENT
Start: 2018-12-18 | End: 2018-12-19

## 2018-12-18 RX ORDER — NALOXONE HYDROCHLORIDE 0.4 MG/ML
.1-.4 INJECTION, SOLUTION INTRAMUSCULAR; INTRAVENOUS; SUBCUTANEOUS
Status: DISCONTINUED | OUTPATIENT
Start: 2018-12-18 | End: 2018-12-18

## 2018-12-18 RX ORDER — FENTANYL CITRATE 50 UG/ML
25-50 INJECTION, SOLUTION INTRAMUSCULAR; INTRAVENOUS
Status: DISCONTINUED | OUTPATIENT
Start: 2018-12-18 | End: 2018-12-18 | Stop reason: HOSPADM

## 2018-12-18 RX ORDER — PROPOFOL 10 MG/ML
INJECTION, EMULSION INTRAVENOUS PRN
Status: DISCONTINUED | OUTPATIENT
Start: 2018-12-18 | End: 2018-12-18

## 2018-12-18 RX ORDER — ACETAMINOPHEN 10 MG/ML
INJECTION, SOLUTION INTRAVENOUS PRN
Status: DISCONTINUED | OUTPATIENT
Start: 2018-12-18 | End: 2018-12-18

## 2018-12-18 RX ORDER — FENTANYL CITRATE 50 UG/ML
INJECTION, SOLUTION INTRAMUSCULAR; INTRAVENOUS PRN
Status: DISCONTINUED | OUTPATIENT
Start: 2018-12-18 | End: 2018-12-18

## 2018-12-18 RX ORDER — SODIUM CHLORIDE, SODIUM LACTATE, POTASSIUM CHLORIDE, CALCIUM CHLORIDE 600; 310; 30; 20 MG/100ML; MG/100ML; MG/100ML; MG/100ML
INJECTION, SOLUTION INTRAVENOUS CONTINUOUS
Status: DISCONTINUED | OUTPATIENT
Start: 2018-12-18 | End: 2018-12-18 | Stop reason: HOSPADM

## 2018-12-18 RX ORDER — DEXAMETHASONE SODIUM PHOSPHATE 4 MG/ML
INJECTION, SOLUTION INTRA-ARTICULAR; INTRALESIONAL; INTRAMUSCULAR; INTRAVENOUS; SOFT TISSUE PRN
Status: DISCONTINUED | OUTPATIENT
Start: 2018-12-18 | End: 2018-12-18

## 2018-12-18 RX ORDER — LIDOCAINE HYDROCHLORIDE 20 MG/ML
INJECTION, SOLUTION INFILTRATION; PERINEURAL PRN
Status: DISCONTINUED | OUTPATIENT
Start: 2018-12-18 | End: 2018-12-18

## 2018-12-18 RX ORDER — KETOROLAC TROMETHAMINE 30 MG/ML
INJECTION, SOLUTION INTRAMUSCULAR; INTRAVENOUS PRN
Status: DISCONTINUED | OUTPATIENT
Start: 2018-12-18 | End: 2018-12-18

## 2018-12-18 RX ORDER — NEOSTIGMINE METHYLSULFATE 1 MG/ML
VIAL (ML) INJECTION PRN
Status: DISCONTINUED | OUTPATIENT
Start: 2018-12-18 | End: 2018-12-18

## 2018-12-18 RX ORDER — ONDANSETRON 4 MG/1
4 TABLET, ORALLY DISINTEGRATING ORAL EVERY 30 MIN PRN
Status: DISCONTINUED | OUTPATIENT
Start: 2018-12-18 | End: 2018-12-18 | Stop reason: HOSPADM

## 2018-12-18 RX ORDER — PROPOFOL 10 MG/ML
INJECTION, EMULSION INTRAVENOUS CONTINUOUS PRN
Status: DISCONTINUED | OUTPATIENT
Start: 2018-12-18 | End: 2018-12-18

## 2018-12-18 RX ORDER — GLYCOPYRROLATE 0.2 MG/ML
INJECTION, SOLUTION INTRAMUSCULAR; INTRAVENOUS PRN
Status: DISCONTINUED | OUTPATIENT
Start: 2018-12-18 | End: 2018-12-18

## 2018-12-18 RX ORDER — DEXTROSE MONOHYDRATE, SODIUM CHLORIDE, AND POTASSIUM CHLORIDE 50; 1.49; 4.5 G/1000ML; G/1000ML; G/1000ML
INJECTION, SOLUTION INTRAVENOUS CONTINUOUS
Status: DISCONTINUED | OUTPATIENT
Start: 2018-12-18 | End: 2018-12-21

## 2018-12-18 RX ORDER — CEFOXITIN 2 G/1
2 INJECTION, POWDER, FOR SOLUTION INTRAVENOUS
Status: COMPLETED | OUTPATIENT
Start: 2018-12-18 | End: 2018-12-18

## 2018-12-18 RX ORDER — ONDANSETRON 2 MG/ML
4 INJECTION INTRAMUSCULAR; INTRAVENOUS EVERY 30 MIN PRN
Status: DISCONTINUED | OUTPATIENT
Start: 2018-12-18 | End: 2018-12-18 | Stop reason: HOSPADM

## 2018-12-18 RX ORDER — VECURONIUM BROMIDE 1 MG/ML
INJECTION, POWDER, LYOPHILIZED, FOR SOLUTION INTRAVENOUS PRN
Status: DISCONTINUED | OUTPATIENT
Start: 2018-12-18 | End: 2018-12-18

## 2018-12-18 RX ORDER — KETAMINE HYDROCHLORIDE 50 MG/ML
INJECTION, SOLUTION INTRAMUSCULAR; INTRAVENOUS PRN
Status: DISCONTINUED | OUTPATIENT
Start: 2018-12-18 | End: 2018-12-18

## 2018-12-18 RX ADMIN — FENTANYL CITRATE 25 MCG: 50 INJECTION, SOLUTION INTRAMUSCULAR; INTRAVENOUS at 14:27

## 2018-12-18 RX ADMIN — LIDOCAINE HYDROCHLORIDE 60 MG: 20 INJECTION, SOLUTION INFILTRATION; PERINEURAL at 13:26

## 2018-12-18 RX ADMIN — HYDROMORPHONE HYDROCHLORIDE 0.5 MG: 1 INJECTION, SOLUTION INTRAMUSCULAR; INTRAVENOUS; SUBCUTANEOUS at 15:29

## 2018-12-18 RX ADMIN — ROCURONIUM BROMIDE 25 MG: 10 INJECTION INTRAVENOUS at 13:40

## 2018-12-18 RX ADMIN — FENTANYL CITRATE 50 MCG: 50 INJECTION, SOLUTION INTRAMUSCULAR; INTRAVENOUS at 13:26

## 2018-12-18 RX ADMIN — SODIUM CHLORIDE, SODIUM LACTATE, POTASSIUM CHLORIDE, AND CALCIUM CHLORIDE: 600; 310; 30; 20 INJECTION, SOLUTION INTRAVENOUS at 13:14

## 2018-12-18 RX ADMIN — FENTANYL CITRATE 25 MCG: 50 INJECTION, SOLUTION INTRAMUSCULAR; INTRAVENOUS at 15:31

## 2018-12-18 RX ADMIN — ACETAMINOPHEN 1000 MG: 10 INJECTION, SOLUTION INTRAVENOUS at 13:47

## 2018-12-18 RX ADMIN — PHENYLEPHRINE HYDROCHLORIDE 100 MCG: 10 INJECTION, SOLUTION INTRAMUSCULAR; INTRAVENOUS; SUBCUTANEOUS at 14:07

## 2018-12-18 RX ADMIN — FENTANYL CITRATE 25 MCG: 50 INJECTION, SOLUTION INTRAMUSCULAR; INTRAVENOUS at 14:17

## 2018-12-18 RX ADMIN — KETOROLAC TROMETHAMINE 15 MG: 15 INJECTION, SOLUTION INTRAMUSCULAR; INTRAVENOUS at 22:39

## 2018-12-18 RX ADMIN — NEOSTIGMINE METHYLSULFATE 3 MG: 1 INJECTION INTRAVENOUS at 15:25

## 2018-12-18 RX ADMIN — KETOROLAC TROMETHAMINE 30 MG: 30 INJECTION, SOLUTION INTRAMUSCULAR at 15:14

## 2018-12-18 RX ADMIN — CEFOXITIN 2 G: 2 INJECTION, POWDER, FOR SOLUTION INTRAVENOUS at 13:26

## 2018-12-18 RX ADMIN — GLYCOPYRROLATE 0.4 MG: 0.2 INJECTION, SOLUTION INTRAMUSCULAR; INTRAVENOUS at 15:24

## 2018-12-18 RX ADMIN — ONDANSETRON 4 MG: 2 INJECTION INTRAMUSCULAR; INTRAVENOUS at 13:26

## 2018-12-18 RX ADMIN — DEXTROSE MONOHYDRATE, SODIUM CHLORIDE, AND POTASSIUM CHLORIDE: 50; 4.5; 1.49 INJECTION, SOLUTION INTRAVENOUS at 10:50

## 2018-12-18 RX ADMIN — FAMOTIDINE 20 MG: 20 INJECTION, SOLUTION INTRAVENOUS at 07:34

## 2018-12-18 RX ADMIN — ROCURONIUM BROMIDE 10 MG: 10 INJECTION INTRAVENOUS at 14:27

## 2018-12-18 RX ADMIN — FENTANYL CITRATE 25 MCG: 50 INJECTION, SOLUTION INTRAMUSCULAR; INTRAVENOUS at 15:29

## 2018-12-18 RX ADMIN — MIDAZOLAM 2 MG: 1 INJECTION INTRAMUSCULAR; INTRAVENOUS at 13:26

## 2018-12-18 RX ADMIN — VECURONIUM BROMIDE 2 MG: 1 INJECTION, POWDER, LYOPHILIZED, FOR SOLUTION INTRAVENOUS at 14:47

## 2018-12-18 RX ADMIN — DEXAMETHASONE SODIUM PHOSPHATE 4 MG: 4 INJECTION, SOLUTION INTRA-ARTICULAR; INTRALESIONAL; INTRAMUSCULAR; INTRAVENOUS; SOFT TISSUE at 13:26

## 2018-12-18 RX ADMIN — PROPOFOL 200 MG: 10 INJECTION, EMULSION INTRAVENOUS at 13:26

## 2018-12-18 RX ADMIN — PHENYLEPHRINE HYDROCHLORIDE 100 MCG: 10 INJECTION, SOLUTION INTRAMUSCULAR; INTRAVENOUS; SUBCUTANEOUS at 14:00

## 2018-12-18 RX ADMIN — HYDROMORPHONE HYDROCHLORIDE 0.5 MG: 1 INJECTION, SOLUTION INTRAMUSCULAR; INTRAVENOUS; SUBCUTANEOUS at 14:44

## 2018-12-18 RX ADMIN — KETAMINE HYDROCHLORIDE 30 MG: 50 INJECTION, SOLUTION INTRAMUSCULAR; INTRAVENOUS at 13:26

## 2018-12-18 RX ADMIN — DEXAMETHASONE SODIUM PHOSPHATE 8 MG: 4 INJECTION, SOLUTION INTRA-ARTICULAR; INTRALESIONAL; INTRAMUSCULAR; INTRAVENOUS; SOFT TISSUE at 15:17

## 2018-12-18 RX ADMIN — HYDROMORPHONE HYDROCHLORIDE 0.2 MG: 1 INJECTION, SOLUTION INTRAMUSCULAR; INTRAVENOUS; SUBCUTANEOUS at 10:51

## 2018-12-18 RX ADMIN — HYDROMORPHONE HYDROCHLORIDE 0.5 MG: 1 INJECTION, SOLUTION INTRAMUSCULAR; INTRAVENOUS; SUBCUTANEOUS at 14:59

## 2018-12-18 RX ADMIN — SUCCINYLCHOLINE CHLORIDE 120 MG: 20 INJECTION, SOLUTION INTRAMUSCULAR; INTRAVENOUS; PARENTERAL at 13:27

## 2018-12-18 RX ADMIN — SODIUM CHLORIDE, SODIUM LACTATE, POTASSIUM CHLORIDE, AND CALCIUM CHLORIDE: 600; 310; 30; 20 INJECTION, SOLUTION INTRAVENOUS at 14:28

## 2018-12-18 RX ADMIN — ROCURONIUM BROMIDE 5 MG: 10 INJECTION INTRAVENOUS at 13:26

## 2018-12-18 RX ADMIN — ROCURONIUM BROMIDE 10 MG: 10 INJECTION INTRAVENOUS at 14:10

## 2018-12-18 RX ADMIN — FAMOTIDINE 20 MG: 20 INJECTION, SOLUTION INTRAVENOUS at 19:53

## 2018-12-18 RX ADMIN — PROPOFOL 200 MCG/KG/MIN: 10 INJECTION, EMULSION INTRAVENOUS at 13:29

## 2018-12-18 ASSESSMENT — ACTIVITIES OF DAILY LIVING (ADL)
ADLS_ACUITY_SCORE: 11

## 2018-12-18 ASSESSMENT — MIFFLIN-ST. JEOR: SCORE: 1062.5

## 2018-12-18 NOTE — OR NURSING
PACU Respiratory Event Documentation     1) Episodes of Apnea greater than or equal to 10 seconds: no    2) Bradypnea - less than 8 breaths per minute: no    3) Pain score on 0 to 10 scale: denies    4) Pain-sedation mismatch (yes or no): no    5) Repeated 02 desaturation less than 90% (yes or no): no    Anesthesia notified? (yes or no): no    Any of the above events occuring repeatedly in separate 30 minute intervals may be considered recurrent PACU respiratory events.    Maribel Ken RN

## 2018-12-18 NOTE — ANESTHESIA PREPROCEDURE EVALUATION
Anesthesia Pre-Procedure Evaluation    Patient: Nadya Flanagan   MRN: [id number] : 1950          Preoperative Diagnosis: bowel obstruction    Procedure(s):  Exploratory Laparotomy    Past Medical History:   Diagnosis Date     Thyroid disease      Past Surgical History:   Procedure Laterality Date     ABDOMEN SURGERY         Anesthesia Evaluation     . Pt has had prior anesthetic. Type: General           ROS/MED HX    ENT/Pulmonary:  - neg pulmonary ROS     Neurologic:  - neg neurologic ROS     Cardiovascular: Comment: Mitral valve prolapse, tricuspid regurgitation    (+) ----. : . . . :. . Previous cardiac testing Echodate:results:date: results: date: results: date: results:          METS/Exercise Tolerance:  >4 METS   Hematologic:  - neg hematologic  ROS       Musculoskeletal:  - neg musculoskeletal ROS       GI/Hepatic:  - neg GI/hepatic ROS       Renal/Genitourinary:  - ROS Renal section negative       Endo:         Psychiatric:  - neg psychiatric ROS       Infectious Disease:  - neg infectious disease ROS       Malignancy:      - no malignancy   Other:    - neg other ROS                      Physical Exam  Normal systems: dental    Airway   Mallampati: I  TM distance: >3 FB  Neck ROM: full    Dental     Cardiovascular   Rhythm and rate: regular and normal      Pulmonary    breath sounds clear to auscultation            Lab Results   Component Value Date    WBC 7.5 2018    HGB 12.3 2018    HCT 37.4 2018     2018     2018    POTASSIUM 4.2 2018    CHLORIDE 106 2018    CO2 29 2018    BUN 3 (L) 2018    CR 0.70 2018     (H) 2018    STELLA 9.1 2018    ALBUMIN 3.4 (L) 2018    PROTTOTAL 5.9 (L) 2018    ALT 32 2018    AST 21 2018    ALKPHOS 43 2018    BILITOTAL 0.5 2018    LIPASE 21 2018    TSH 5.18 (H) 2018       Preop Vitals  BP Readings from Last 3 Encounters:  "  12/18/18 140/82    Pulse Readings from Last 3 Encounters:   12/17/18 66      Resp Readings from Last 3 Encounters:   12/18/18 18    SpO2 Readings from Last 3 Encounters:   12/18/18 97%      Temp Readings from Last 1 Encounters:   12/18/18 99  F (37.2  C) (Temporal)    Ht Readings from Last 1 Encounters:   12/14/18 1.651 m (5' 5\")      Wt Readings from Last 1 Encounters:   12/18/18 53.2 kg (117 lb 3.2 oz)    Estimated body mass index is 19.5 kg/m  as calculated from the following:    Height as of this encounter: 1.651 m (5' 5\").    Weight as of this encounter: 53.2 kg (117 lb 3.2 oz).       Anesthesia Plan      History & Physical Review      ASA Status:  2 emergent.    NPO Status:  Full stomach (bowel obstruction)    Plan for General, RSI and ETT with Intravenous induction. Maintenance will be Balanced.    PONV prophylaxis:  Ondansetron (or other 5HT-3) and Dexamethasone or Solumedrol       Postoperative Care  Postoperative pain management:  IV analgesics, Oral pain medications and Multi-modal analgesia.      Consents  Anesthetic plan, risks, benefits and alternatives discussed with:  Spouse and Patient..                 ALBERT ZULETA CRNA  "

## 2018-12-18 NOTE — PROGRESS NOTES
Patient returned to Medical Surgical room #314 from PACU. Patient settled into bed, oriented to room and call light.  at bedside. NG placed to low intermittent suction. Post op vital signs initiated per protocol. Pt denies pain. Elza King RN on 12/18/2018 at 5:17 PM

## 2018-12-18 NOTE — OP NOTE
Preoperative Diagnosis: small bowel obstruction    Postoperative Diagnosis:  cecal mass    Planned Procedure:  Exploratory laparotomy    Procedure Performed:  Right colon resection with ileocolonic anastomosis    Surgeon:  Tresa Evangelista MD  Circulator: Hina Castro RN  Relief Circulator: Kendy Stauffer RN  Scrub Person: ForestKendy; Cee Nur  2nd Scrub: Zainab Kim  Pre-Op Nurse: Rosalinda Aguila RN    Anesthesia:  General endotracheal     Estimated Blood Loss:  50 ml      INDICATIONS  Please see the H & P. Patient with persistent small bowel obstruction. I explained the risks, benefits and alternatives to exploration to determine the cause. I specifically explained the risks bleeding, infection, possible ostomy, or the need for other procedures.  The patient expressed understanding and wishes to proceed. Informed consent paperwork was completed.    DESCRIPTION OF PROCEDURE  The patient was brought to the operating room and placed in a supine position on the operating table. Appropriate monitors were placed.  After general anesthesia was induced, the patient was positioned, prepped and draped in the usual sterile fashion.Timeout was performed confirming the patient's identity and procedure to be performed.   Midline incision was made and dissection was carried downthrough subcutaneous tissues using electrocautery to maintain excellent hemostasis. The fascia was identified and opened, and the peritoneum was entered. The fascia was opened to the length of the incision. The small bowel was run from the ligament of Treitz all of the way to the terminal ileum. Distally the bowel was distended but viable. Proximally, the small bowel was decompressed. There was a mass densely adherent to the right ovary and tube. The mass appeared to be arising from the cecum. The adhesions were taken down. Dr. Fercho Flanagan was consulted and agreed that the ovary appeared atrophic but normal.The cecum was mobilized  from the lateral abdominal wall along the peritoneal reflection, and the hepatic flexure was also mobilized using blunt dissection and electrocautery. The terminal ileum was mobilized away from the retroperitoneum also and the colon was brought up to the midline. The duodenum was identified and remained intact and away from the procedure. The right colic and middle colic vessels were identified, and the transverse colon was transected just proximal to the right branch of the middle colic artery with a OLAMIDE 55. The mesentery was taken down,divided, and ligated with ligasure. The terminal ileum was transected about 10 cm proximal to the ileocecal valve with a OLAMIDE 55. The mesentery taken down, divided, and ligated with Ligasure. The specimen was opened, the mass identified at the ileocecal valve. Adequate margins were noted. The specimen was then passed off the table for pathology. The peritoneal cavity was then irrigated with warm sterile water. The irrigation was suctioned and noted to return clear. Hemostasiswas noted to be appropriate. The terminal ileum and the transverse colon were then anastomosed in a standard side-to-side functional end-to-end anastomosis with OLAMIDE 55 stapler. A 3-0 crotch stitch was placed. The mesentery was closed with 3-0 Vicryl suture. The anastomosis  was returned back to the peritoneal cavity. The liver was palpated as was the remainder of the colon. No other masses or signs of metastasis were grossly identified within the peritoneal cavity. Hemostasis again was again assured.  The omentum was returned over the anastomosis which was again checked. It was widely patent, and there was no sign of tension or leak.  Closing protocol was followed. The fascial edges were then approximated with a running looped PDS suture. The soft tissues were irrigated with saline, and hemostasis was assured.  Skin edges were approximated absorbable staples. The wound was cleaned, and steri-strips were applied.  The wound was dressed. The patient was taken in a stable condition to the post anesthesia care unit. Sponge, needle, and instrument counts were correct at the end of the case. There were no immediately apparent complications.

## 2018-12-18 NOTE — PROGRESS NOTES
[facility]  Surgical Progress Note    Assessment & Plan   Nadya Flanagna is a 68 year old female who was admitted on 12/14/2018.     Active Problems:    Small bowel obstruction (H)    Assessment: persistent    Plan: discussed OR for exploratory laparotomy and risks and benefits. Specifically discussed risks of bleeding, infection, injury to abdominal organs, possible ostomy and possible need for further surgery. Questions were answered. Patient and her  expressed understanding. Informed consent paperwork was completed.  OR notified.    # Pain Assessment:  Current Pain Score 12/18/2018   Patient currently in pain? -   Pain score (0-10) 6   Pain location -   Pain descriptors -   - Nadya is experiencing pain due to sbo. Pain management was discussed with Nadya and her spouse and the plan was created in a collaborative fashion.  Nadya's response to the current recommendations: engaged  - Please see the plan for pain management as documented above      DVT Prophylaxis: Pneumatic Compression Devices  Code Status: Full Code    Tresa Evangelista    Interval History   Not passing flatus or stool. Cramping pain continues. Nasogastric is working ok with less green but still bilious drainage.  -Data reviewed today: I reviewed all new labs and imaging results over the last 24 hours.    Physical Exam   Temp: 98.3  F (36.8  C) Temp src: Tympanic BP: 140/74 Pulse: 66 Heart Rate: 73 Resp: 18 SpO2: 97 % O2 Device: None (Room air)    Vitals:    12/16/18 0531 12/17/18 0500 12/18/18 0640   Weight: 60 kg (132 lb 4.4 oz) 58.2 kg (128 lb 3.2 oz) 53.2 kg (117 lb 3.2 oz)     Vital Signs with Ranges  Temp:  [98.3  F (36.8  C)-99.4  F (37.4  C)] 98.3  F (36.8  C)  Pulse:  [66-81] 66  Heart Rate:  [73-79] 73  Resp:  [12-18] 18  BP: (118-140)/(68-83) 140/74  SpO2:  [94 %-97 %] 97 %  I/O last 3 completed shifts:  In: 2135 [I.V.:2075; NG/GT:60]  Out: 2625 [Urine:2400; Emesis/NG output:225]    Constitutional: No acute  distress, pleasant and cooperative  Respiratory: Clear lungs, no respiratory distress  Cardiovascular: regular rate and rhythm, no murmur  GI: abdomen soft, mild lower abdominal tenderness, positive bowel sounds  Skin/Integumen: pink warm and dry, no rash    Medications     dextrose 5% and 0.45% NaCl + KCl 20 mEq/L 100 mL/hr at 12/18/18 1050       famotidine  20 mg Intravenous Q12H       Data   Recent Labs   Lab 12/16/18  0540 12/15/18  0433   WBC 7.5 8.2   HGB 12.3 12.5   MCV 91 91    354    138   POTASSIUM 4.2 3.9   CHLORIDE 106 107   CO2 29 25   BUN 3* 11   CR 0.70 0.79   ANIONGAP 3 6   STELLA 9.1 9.1   * 121*   ALBUMIN 3.4* 3.4*   PROTTOTAL 5.9* 5.8*   BILITOTAL 0.5 0.5   ALKPHOS 43 45   ALT 32 41   AST 21 25   LIPASE 21  --        Recent Results (from the past 24 hour(s))   XR Abdomen 2 Views    Narrative    PROCEDURE:  XR ABDOMEN 2 VW    HISTORY:  sbo.     TECHNIQUE:  Upright and supine views of the abdomen were obtained.    COMPARISON:  12/17/2018    FINDINGS:     Nasogastric tube is looped overlying the stomach. Small bowel loops  remain dilated, similar to the prior study. There is no free air. High  density material is again noted in the colon.      Impression    IMPRESSION:    Small bowel obstruction, unchanged.    JEFFY EDMOND MD

## 2018-12-18 NOTE — ANESTHESIA CARE TRANSFER NOTE
Patient: Nadya Flanagan    Procedure(s):  Exploratory Laparotomy with right hemicolectomy    Diagnosis: bowel obstruction  Diagnosis Additional Information: No value filed.    Anesthesia Type:   General, RSI, ETT     Note:  Airway :Face Mask  Patient transferred to:PACU  Handoff Report: Identifed the Patient, Identified the Reponsible Provider, Reviewed the pertinent medical history, Discussed the surgical course, Reviewed Intra-OP anesthesia mangement and issues during anesthesia, Set expectations for post-procedure period and Allowed opportunity for questions and acknowledgement of understanding      Vitals: (Last set prior to Anesthesia Care Transfer)    CRNA VITALS  12/18/2018 1510 - 12/18/2018 1543      12/18/2018             Pulse:  82    Ht Rate:  82    SpO2:  100 %    Resp Rate (set):  10                Electronically Signed By: ALBERT SHEIKH CRNA  December 18, 2018  3:43 PM

## 2018-12-19 LAB
ALBUMIN SERPL-MCNC: 3.1 G/DL (ref 3.5–5.7)
ALP SERPL-CCNC: 65 U/L (ref 34–104)
ALT SERPL W P-5'-P-CCNC: 91 U/L (ref 7–52)
ANION GAP SERPL CALCULATED.3IONS-SCNC: 7 MMOL/L (ref 3–14)
AST SERPL W P-5'-P-CCNC: 78 U/L (ref 13–39)
BILIRUB SERPL-MCNC: 1 MG/DL (ref 0.3–1)
BUN SERPL-MCNC: 6 MG/DL (ref 7–25)
CALCIUM SERPL-MCNC: 9 MG/DL (ref 8.6–10.3)
CEA SERPL-MCNC: <3 NG/ML
CHLORIDE SERPL-SCNC: 104 MMOL/L (ref 98–107)
CO2 SERPL-SCNC: 27 MMOL/L (ref 21–31)
CREAT SERPL-MCNC: 0.77 MG/DL (ref 0.6–1.2)
ERYTHROCYTE [DISTWIDTH] IN BLOOD BY AUTOMATED COUNT: 12.5 % (ref 10–15)
GFR SERPL CREATININE-BSD FRML MDRD: 75 ML/MIN/{1.73_M2}
GLUCOSE SERPL-MCNC: 162 MG/DL (ref 70–105)
HCT VFR BLD AUTO: 34.5 % (ref 35–47)
HGB BLD-MCNC: 11.5 G/DL (ref 11.7–15.7)
MCH RBC QN AUTO: 30.2 PG (ref 26.5–33)
MCHC RBC AUTO-ENTMCNC: 33.3 G/DL (ref 31.5–36.5)
MCV RBC AUTO: 91 FL (ref 78–100)
PLATELET # BLD AUTO: 315 10E9/L (ref 150–450)
POTASSIUM SERPL-SCNC: 4.7 MMOL/L (ref 3.5–5.1)
PROT SERPL-MCNC: 5 G/DL (ref 6.4–8.9)
RBC # BLD AUTO: 3.81 10E12/L (ref 3.8–5.2)
SODIUM SERPL-SCNC: 138 MMOL/L (ref 134–144)
WBC # BLD AUTO: 16.3 10E9/L (ref 4–11)

## 2018-12-19 PROCEDURE — 99024 POSTOP FOLLOW-UP VISIT: CPT | Performed by: SURGERY

## 2018-12-19 PROCEDURE — 25000128 H RX IP 250 OP 636: Performed by: SURGERY

## 2018-12-19 PROCEDURE — 25000132 ZZH RX MED GY IP 250 OP 250 PS 637: Mod: GY | Performed by: SURGERY

## 2018-12-19 PROCEDURE — A9270 NON-COVERED ITEM OR SERVICE: HCPCS | Mod: GY | Performed by: SURGERY

## 2018-12-19 PROCEDURE — 12000000 ZZH R&B MED SURG/OB

## 2018-12-19 PROCEDURE — 82378 CARCINOEMBRYONIC ANTIGEN: CPT | Performed by: SURGERY

## 2018-12-19 PROCEDURE — 36415 COLL VENOUS BLD VENIPUNCTURE: CPT | Performed by: SURGERY

## 2018-12-19 PROCEDURE — 85027 COMPLETE CBC AUTOMATED: CPT | Performed by: SURGERY

## 2018-12-19 PROCEDURE — 25800025 ZZH RX 258: Performed by: SURGERY

## 2018-12-19 PROCEDURE — 80053 COMPREHEN METABOLIC PANEL: CPT | Performed by: SURGERY

## 2018-12-19 RX ORDER — KETOROLAC TROMETHAMINE 15 MG/ML
15 INJECTION, SOLUTION INTRAMUSCULAR; INTRAVENOUS EVERY 6 HOURS
Status: COMPLETED | OUTPATIENT
Start: 2018-12-19 | End: 2018-12-21

## 2018-12-19 RX ORDER — ANALGESIC BALM 1.74; 4.06 G/29G; G/29G
OINTMENT TOPICAL EVERY 6 HOURS PRN
Status: DISCONTINUED | OUTPATIENT
Start: 2018-12-19 | End: 2018-12-22

## 2018-12-19 RX ADMIN — CEFOXITIN 2 G: 2 INJECTION, POWDER, FOR SOLUTION INTRAVENOUS at 00:58

## 2018-12-19 RX ADMIN — FAMOTIDINE 20 MG: 20 INJECTION, SOLUTION INTRAVENOUS at 08:13

## 2018-12-19 RX ADMIN — FAMOTIDINE 20 MG: 20 INJECTION, SOLUTION INTRAVENOUS at 19:51

## 2018-12-19 RX ADMIN — DEXTROSE MONOHYDRATE, SODIUM CHLORIDE, AND POTASSIUM CHLORIDE: 50; 4.5; 1.49 INJECTION, SOLUTION INTRAVENOUS at 13:56

## 2018-12-19 RX ADMIN — CEFOXITIN 2 G: 2 INJECTION, POWDER, FOR SOLUTION INTRAVENOUS at 06:03

## 2018-12-19 RX ADMIN — DEXTROSE MONOHYDRATE, SODIUM CHLORIDE, AND POTASSIUM CHLORIDE: 50; 4.5; 1.49 INJECTION, SOLUTION INTRAVENOUS at 03:13

## 2018-12-19 RX ADMIN — KETOROLAC TROMETHAMINE 15 MG: 15 INJECTION, SOLUTION INTRAMUSCULAR; INTRAVENOUS at 15:42

## 2018-12-19 RX ADMIN — ANALGESIC BALM: 1.74; 4.06 OINTMENT TOPICAL at 17:18

## 2018-12-19 RX ADMIN — CEFOXITIN 2 G: 2 INJECTION, POWDER, FOR SOLUTION INTRAVENOUS at 17:21

## 2018-12-19 RX ADMIN — KETOROLAC TROMETHAMINE 15 MG: 15 INJECTION, SOLUTION INTRAMUSCULAR; INTRAVENOUS at 09:27

## 2018-12-19 RX ADMIN — CEFOXITIN 2 G: 2 INJECTION, POWDER, FOR SOLUTION INTRAVENOUS at 11:07

## 2018-12-19 RX ADMIN — HYDROMORPHONE HYDROCHLORIDE 0.2 MG: 1 INJECTION, SOLUTION INTRAMUSCULAR; INTRAVENOUS; SUBCUTANEOUS at 07:42

## 2018-12-19 RX ADMIN — ENOXAPARIN SODIUM 40 MG: 40 INJECTION SUBCUTANEOUS at 09:26

## 2018-12-19 RX ADMIN — KETOROLAC TROMETHAMINE 15 MG: 15 INJECTION, SOLUTION INTRAMUSCULAR; INTRAVENOUS at 22:37

## 2018-12-19 RX ADMIN — KETOROLAC TROMETHAMINE 15 MG: 15 INJECTION, SOLUTION INTRAMUSCULAR; INTRAVENOUS at 04:29

## 2018-12-19 ASSESSMENT — ACTIVITIES OF DAILY LIVING (ADL)
ADLS_ACUITY_SCORE: 11

## 2018-12-19 NOTE — PROGRESS NOTES
[facility]    Surgical Progress Note  Post Operative Day: 1    Assessment & Plan   Nadya Flanagan is a 68 year old female who was admitted on 12/14/2018.     Active Problems:    Small bowel obstruction (H)    Assessment: malignant    Plan: treated surgically    S/P right colectomy    Assessment: pod 1    Plan: Await GI function, continue nasogastric tube and monitor output. On pepcid. Doing well with pain control. Doing IS and ambulating. Decrease IVF rate. Await pathology report. OK to shower. Discussed surgery and findings with patient.     # Pain Assessment:  Current Pain Score 12/19/2018   Patient currently in pain? -   Pain score (0-10) 2   Pain location -   Pain descriptors -   - Nadya is experiencing pain due to surgery. Pain management was discussed and the plan was created in a collaborative fashion.  Nadya's response to the current recommendations: engaged  - Opioid regimen: dilaudid as needed-low dose,also using toradol, ice and bengay.  - Response to opioid medications: Reduction of symptoms   - Bowel regimen: not needed        DVT Prophylaxis: Enoxaparin (Lovenox) SQ and Pneumatic Compression Devices  Code Status: Full Code    Tresa Evangelista    Interval History   Feels pretty good-mostly just sore when she is moving around. Voiding a lot. Pain is well controlled. No flatus, No nausea. Had some coffee today and it was great.   -Data reviewed today: I reviewed all new labs and imaging results over the last 24 hours.    Physical Exam   Temp: 98.6  F (37  C) Temp src: Tympanic BP: 120/52 Pulse: 65 Heart Rate: 65 Resp: 18 SpO2: 97 % O2 Device: None (Room air) Oxygen Delivery: 10 LPM  Vitals:    12/16/18 0531 12/17/18 0500 12/18/18 0640   Weight: 60 kg (132 lb 4.4 oz) 58.2 kg (128 lb 3.2 oz) 53.2 kg (117 lb 3.2 oz)     Vital Signs with Ranges  Temp:  [97  F (36.1  C)-99.4  F (37.4  C)] 98.6  F (37  C)  Pulse:  [63-77] 65  Heart Rate:  [63-83] 65  Resp:  [16-18] 18  BP:  (109-140)/(52-82) 120/52  SpO2:  [93 %-100 %] 97 %  I/O last 3 completed shifts:  In: 2141 [I.V.:2111; NG/GT:30]  Out: 2775 [Urine:2550; Emesis/NG output:175; Blood:50]    Constitutional: No acute distress, pleasant and cooperative  Respiratory: Clear lungs, no respiratory distress  Cardiovascular: regular rate and rhythm, no murmur  GI: abdomen soft, appropriate tenderness, incision clean dry and intact, dressing changed  Skin/Integumen: pink warm and dry, no rash    Medications     dextrose 5% and 0.45% NaCl + KCl 20 mEq/L 100 mL/hr at 12/19/18 0313     dextrose 5% and 0.45% NaCl + KCl 20 mEq/L 100 mL/hr at 12/18/18 1722     HYDROmorphone Stopped (12/18/18 2209)       cefOXitin  2 g Intravenous Q6H     enoxaparin  40 mg Subcutaneous Q24H     famotidine  20 mg Intravenous Q12H     ketorolac  15 mg Intravenous Q6H     sodium chloride (PF)  3 mL Intracatheter Q8H       Data   Recent Labs   Lab 12/19/18  0420 12/16/18  0540 12/15/18  0433   WBC 16.3* 7.5 8.2   HGB 11.5* 12.3 12.5   MCV 91 91 91    320 354    138 138   POTASSIUM 4.7 4.2 3.9   CHLORIDE 104 106 107   CO2 27 29 25   BUN 6* 3* 11   CR 0.77 0.70 0.79   ANIONGAP 7 3 6   STELLA 9.0 9.1 9.1   * 132* 121*   ALBUMIN 3.1* 3.4* 3.4*   PROTTOTAL 5.0* 5.9* 5.8*   BILITOTAL 1.0 0.5 0.5   ALKPHOS 65 43 45   ALT 91* 32 41   AST 78* 21 25   LIPASE  --  21  --        No results found for this or any previous visit (from the past 24 hour(s)).

## 2018-12-19 NOTE — PROGRESS NOTES
Patient has been able to ambulate in the room with an assist of one staff. Patient has been voiding without difficulty. Bowel sounds are active in all quadrants. Patient unable to pass gas at this time. See flowsheets for I&O totals for NG output. Patient has complained of minimal pain rated 1/10 that she describes as sore on incisional area. Patient has received scheduled Toradol and ice pack for area. Patient using the IS independently. SCD's are on in bed. Patient has been able to verbalize needs and use call light appropriately. Will continue to monitor.     Alyssa Nice RN on 12/19/2018 at 2:51 AM

## 2018-12-19 NOTE — PROGRESS NOTES
Nutrition follow up:  Spoke briefly with pt today, had surgery and removed some bowel. She is feeling pretty good, still not hungry. Did provide with low fiber nutrition information and will visit with again on Friday to review further details for the plan. Pt agreed.     Barbi Lopez RD on 12/19/2018 at 4:31 PM

## 2018-12-19 NOTE — PLAN OF CARE
Adult Inpatient Plan of Care  Plan of Care Review  Patient has maintained Sp02 >92% on room air this shift.  Expiratory wheezes, diminished lung sounds and fine crackles in bases.  Patient has had toradol for headache, received cough medication and ativan for mild anxiety for frequent coughing and afterwards feeling short of breath.  Patient has had a good appetite, received novolog per sliding scale for high blood glucose readings.  Patient has remained alert and oriented.    12/18/2018 1836 - Improving by Callie Sanders RN

## 2018-12-20 LAB
ALBUMIN SERPL-MCNC: 3 G/DL (ref 3.5–5.7)
ALP SERPL-CCNC: 62 U/L (ref 34–104)
ALT SERPL W P-5'-P-CCNC: 66 U/L (ref 7–52)
ANION GAP SERPL CALCULATED.3IONS-SCNC: 6 MMOL/L (ref 6–17)
AST SERPL W P-5'-P-CCNC: 42 U/L (ref 13–39)
BILIRUB SERPL-MCNC: 0.5 MG/DL (ref 0.3–1)
BUN SERPL-MCNC: 8 MG/DL (ref 7–25)
CALCIUM SERPL-MCNC: 8.7 MG/DL (ref 8.6–10.3)
CHLORIDE SERPL-SCNC: 107 MMOL/L (ref 98–107)
CO2 SERPL-SCNC: 24 MMOL/L (ref 21–31)
CREAT SERPL-MCNC: 0.8 MG/DL (ref 0.6–1.2)
ERYTHROCYTE [DISTWIDTH] IN BLOOD BY AUTOMATED COUNT: 13 % (ref 10–15)
GFR SERPL CREATININE-BSD FRML MDRD: 71 ML/MIN/{1.73_M2}
GLUCOSE SERPL-MCNC: 111 MG/DL (ref 70–105)
HCT VFR BLD AUTO: 31.2 % (ref 35–47)
HGB BLD-MCNC: 10.1 G/DL (ref 11.7–15.7)
MCH RBC QN AUTO: 30.1 PG (ref 26.5–33)
MCHC RBC AUTO-ENTMCNC: 32.4 G/DL (ref 31.5–36.5)
MCV RBC AUTO: 93 FL (ref 78–100)
PLATELET # BLD AUTO: 283 10E9/L (ref 150–450)
POTASSIUM SERPL-SCNC: 4 MMOL/L (ref 3.5–5.1)
PROT SERPL-MCNC: 5.2 G/DL (ref 6.4–8.9)
RBC # BLD AUTO: 3.35 10E12/L (ref 3.8–5.2)
SODIUM SERPL-SCNC: 137 MMOL/L (ref 134–144)
WBC # BLD AUTO: 10.2 10E9/L (ref 4–11)

## 2018-12-20 PROCEDURE — 80053 COMPREHEN METABOLIC PANEL: CPT | Performed by: SURGERY

## 2018-12-20 PROCEDURE — 25000128 H RX IP 250 OP 636: Performed by: SURGERY

## 2018-12-20 PROCEDURE — 99024 POSTOP FOLLOW-UP VISIT: CPT | Performed by: SURGERY

## 2018-12-20 PROCEDURE — 12000000 ZZH R&B MED SURG/OB

## 2018-12-20 PROCEDURE — 36415 COLL VENOUS BLD VENIPUNCTURE: CPT | Performed by: SURGERY

## 2018-12-20 PROCEDURE — 85027 COMPLETE CBC AUTOMATED: CPT | Performed by: SURGERY

## 2018-12-20 PROCEDURE — 25000132 ZZH RX MED GY IP 250 OP 250 PS 637: Mod: GY | Performed by: SURGERY

## 2018-12-20 PROCEDURE — A9270 NON-COVERED ITEM OR SERVICE: HCPCS | Mod: GY | Performed by: SURGERY

## 2018-12-20 PROCEDURE — 25800025 ZZH RX 258: Performed by: SURGERY

## 2018-12-20 RX ORDER — BISACODYL 10 MG
10 SUPPOSITORY, RECTAL RECTAL ONCE
Status: COMPLETED | OUTPATIENT
Start: 2018-12-20 | End: 2018-12-20

## 2018-12-20 RX ADMIN — KETOROLAC TROMETHAMINE 15 MG: 15 INJECTION, SOLUTION INTRAMUSCULAR; INTRAVENOUS at 04:16

## 2018-12-20 RX ADMIN — FAMOTIDINE 20 MG: 20 INJECTION, SOLUTION INTRAVENOUS at 08:37

## 2018-12-20 RX ADMIN — KETOROLAC TROMETHAMINE 15 MG: 15 INJECTION, SOLUTION INTRAMUSCULAR; INTRAVENOUS at 15:27

## 2018-12-20 RX ADMIN — ANALGESIC BALM: 1.74; 4.06 OINTMENT TOPICAL at 01:36

## 2018-12-20 RX ADMIN — FAMOTIDINE 20 MG: 20 INJECTION, SOLUTION INTRAVENOUS at 19:39

## 2018-12-20 RX ADMIN — ENOXAPARIN SODIUM 40 MG: 40 INJECTION SUBCUTANEOUS at 09:54

## 2018-12-20 RX ADMIN — KETOROLAC TROMETHAMINE 15 MG: 15 INJECTION, SOLUTION INTRAMUSCULAR; INTRAVENOUS at 21:40

## 2018-12-20 RX ADMIN — DEXTROSE MONOHYDRATE, SODIUM CHLORIDE, AND POTASSIUM CHLORIDE: 50; 4.5; 1.49 INJECTION, SOLUTION INTRAVENOUS at 09:54

## 2018-12-20 RX ADMIN — BISACODYL 10 MG: 10 SUPPOSITORY RECTAL at 08:59

## 2018-12-20 RX ADMIN — KETOROLAC TROMETHAMINE 15 MG: 15 INJECTION, SOLUTION INTRAMUSCULAR; INTRAVENOUS at 09:54

## 2018-12-20 ASSESSMENT — ACTIVITIES OF DAILY LIVING (ADL)
ADLS_ACUITY_SCORE: 13
ADLS_ACUITY_SCORE: 11
ADLS_ACUITY_SCORE: 13
ADLS_ACUITY_SCORE: 13
ADLS_ACUITY_SCORE: 11
ADLS_ACUITY_SCORE: 13

## 2018-12-20 ASSESSMENT — MIFFLIN-ST. JEOR: SCORE: 1091.53

## 2018-12-20 NOTE — PLAN OF CARE
Patient has ambulated in the quiroz x1 this shift. She reports feeling pain control with the scheduled Toradol. Patient has been restless, unable to sleep much between cares. Lung sounds are clear and bowel sounds are active in all quadrants. Small amount of sanguinous drainage noted to the abdominal dressing. Patient has tolerated clear liquids, see I&O flowsheets. NG tube has no changes at this time and is putting out brown drainage with the low-intermittent suction. Patient has been voiding without difficulty. Highest temperature over this shift was 99.8 degrees. Will continue to monitor.     Alyssa Nice RN on 12/20/2018 at 5:12 AM

## 2018-12-20 NOTE — PLAN OF CARE
Afebrile. Pain controlled with scheduled Toradol, rated 3/10 at max. Surgeon changed abdominal dressing this afternoon, no drainage since. NG put out 575 mls today of brown fluid. Pt has taken in 360 ml of coffee plus 360 mls (720 mls total) of chicken broth, tolerated. No nausea, no vomiting. BS hypoactive x4, no flatus. Pt ambulated 4x in hallway, tolerated, in good spirits visiting with her  at bedside. Voiding adequately without difficulty.     GLORY REYNOLDS RN on 12/19/2018 at 6:16 PM

## 2018-12-20 NOTE — PROGRESS NOTES
DATE:  12/20/2018   TIME OF RECEIPT FROM LAB: 0550  LAB TEST:  Glucose  LAB VALUE:  514  RESULTS GIVEN WITH READ-BACK TO (PROVIDER):  Evangelista  TIME LAB VALUE REPORTED TO PROVIDER:   0575    Alyssa Nice RN on 12/20/2018 at 5:58 AM    Dr. Evangelista notified of lab value, instructed to do a bedside fingerstick and notify lab. Fingerstick glucose was 100, notified lab of findings and of updated value. Lab will look into the draw.     Alyssa Nice RN on 12/20/2018 at 6:08 AM    Lab will redraw patient.    Alyssa Nice RN on 12/20/2018 at 6:29 AM

## 2018-12-20 NOTE — PROGRESS NOTES
[facility]    Surgical Progress Note  Post Operative Day:2    Assessment & Plan   Nadya Flanagan is a 68 year old female who was admitted on 12/14/2018.     Active Problems:    Small bowel obstruction (H)    Assessment: resolved with surgery      S/P right colectomy    Assessment: pod 2    Plan: bowel movements and flatus-remove nasogastric tube. Slow advance of diet. Continue with lovenox. Awaiting pathology. Decrease ivf to saline lock. Will transition to oral medications in am if doing well.    # Pain Assessment:  Current Pain Score 12/20/2018   Patient currently in pain? yes   Pain score (0-10) 3   Pain location -   Pain descriptors Sore   - Nadya is experiencing pain due to surgery. Pain management was discussed and the plan was created in a collaborative fashion.  Nadya's response to the current recommendations: engaged  - Please see the plan for pain management as documented above        DVT Prophylaxis: Enoxaparin (Lovenox) SQ and Pneumatic Compression Devices  Code Status: Full Code    Tresa Evangelista    Interval History   Passing stools and flatus. Feels good. No nausea.   -Data reviewed today: I reviewed all new labs and imaging results over the last 24 hours.    Physical Exam   Temp: 99.2  F (37.3  C) Temp src: Tympanic BP: 134/77 Pulse: 69 Heart Rate: 69 Resp: 16 SpO2: 97 % O2 Device: None (Room air)    Vitals:    12/17/18 0500 12/18/18 0640 12/20/18 0452   Weight: 58.2 kg (128 lb 3.2 oz) 53.2 kg (117 lb 3.2 oz) 56.1 kg (123 lb 9.6 oz)     Vital Signs with Ranges  Temp:  [99.2  F (37.3  C)-99.8  F (37.7  C)] 99.2  F (37.3  C)  Pulse:  [69-86] 69  Heart Rate:  [66-86] 69  Resp:  [16-18] 16  BP: (118-146)/(65-77) 134/77  SpO2:  [95 %-97 %] 97 %  I/O last 3 completed shifts:  In: 3727 [P.O.:835; I.V.:2112; NG/GT:780]  Out: 2675 [Urine:1800; Emesis/NG output:875]    Constitutional: No acute distress, pleasant and cooperative  Respiratory: Clear lungs, no respiratory  distress  Cardiovascular: regular rate and rhythm, no murmur  GI: abdomen soft, appropriate tenderness, incision clean dry and intact  Skin/Integumen: pink warm and dry, no rash    Medications     dextrose 5% and 0.45% NaCl + KCl 20 mEq/L 50 mL/hr at 12/19/18 1617       enoxaparin  40 mg Subcutaneous Q24H     famotidine  20 mg Intravenous Q12H     ketorolac  15 mg Intravenous Q6H     sodium chloride (PF)  3 mL Intracatheter Q8H       Data   Recent Labs   Lab 12/20/18  0405 12/19/18  0420 12/16/18  0540   WBC 10.2 16.3* 7.5   HGB 10.1* 11.5* 12.3   MCV 93 91 91    315 320    138 138   POTASSIUM 4.0 4.7 4.2   CHLORIDE 107 104 106   CO2 24 27 29   BUN 8 6* 3*   CR 0.80 0.77 0.70   ANIONGAP 6 7 3   STELLA 8.7 9.0 9.1   * 162* 132*   ALBUMIN 3.0* 3.1* 3.4*   PROTTOTAL 5.2* 5.0* 5.9*   BILITOTAL 0.5 1.0 0.5   ALKPHOS 62 65 43   ALT 66* 91* 32   AST 42* 78* 21   LIPASE  --   --  21       No results found for this or any previous visit (from the past 24 hour(s)).

## 2018-12-21 LAB — PLATELET # BLD AUTO: 358 10E9/L (ref 150–450)

## 2018-12-21 PROCEDURE — 12000000 ZZH R&B MED SURG/OB

## 2018-12-21 PROCEDURE — 25800025 ZZH RX 258: Performed by: SURGERY

## 2018-12-21 PROCEDURE — 99024 POSTOP FOLLOW-UP VISIT: CPT | Performed by: SURGERY

## 2018-12-21 PROCEDURE — 25000128 H RX IP 250 OP 636: Performed by: SURGERY

## 2018-12-21 PROCEDURE — A9270 NON-COVERED ITEM OR SERVICE: HCPCS | Mod: GY | Performed by: SURGERY

## 2018-12-21 PROCEDURE — 36415 COLL VENOUS BLD VENIPUNCTURE: CPT | Performed by: SURGERY

## 2018-12-21 PROCEDURE — 25000132 ZZH RX MED GY IP 250 OP 250 PS 637: Mod: GY | Performed by: SURGERY

## 2018-12-21 PROCEDURE — 85049 AUTOMATED PLATELET COUNT: CPT | Performed by: SURGERY

## 2018-12-21 RX ORDER — ACETAMINOPHEN 325 MG/1
650 TABLET ORAL EVERY 6 HOURS PRN
Status: DISCONTINUED | OUTPATIENT
Start: 2018-12-21 | End: 2018-12-22 | Stop reason: HOSPADM

## 2018-12-21 RX ORDER — FAMOTIDINE 20 MG/1
20 TABLET, FILM COATED ORAL 2 TIMES DAILY
Status: DISCONTINUED | OUTPATIENT
Start: 2018-12-21 | End: 2018-12-22 | Stop reason: HOSPADM

## 2018-12-21 RX ORDER — SACCHAROMYCES BOULARDII 250 MG
250 CAPSULE ORAL 2 TIMES DAILY
Status: DISCONTINUED | OUTPATIENT
Start: 2018-12-21 | End: 2018-12-22 | Stop reason: HOSPADM

## 2018-12-21 RX ADMIN — KETOROLAC TROMETHAMINE 15 MG: 15 INJECTION, SOLUTION INTRAMUSCULAR; INTRAVENOUS at 16:36

## 2018-12-21 RX ADMIN — ACETAMINOPHEN 650 MG: 325 TABLET, FILM COATED ORAL at 20:37

## 2018-12-21 RX ADMIN — FAMOTIDINE 20 MG: 20 TABLET, FILM COATED ORAL at 20:38

## 2018-12-21 RX ADMIN — ENOXAPARIN SODIUM 40 MG: 40 INJECTION SUBCUTANEOUS at 10:20

## 2018-12-21 RX ADMIN — DEXTROSE MONOHYDRATE, SODIUM CHLORIDE, AND POTASSIUM CHLORIDE: 50; 4.5; 1.49 INJECTION, SOLUTION INTRAVENOUS at 07:47

## 2018-12-21 RX ADMIN — Medication 250 MG: at 20:37

## 2018-12-21 RX ADMIN — KETOROLAC TROMETHAMINE 15 MG: 15 INJECTION, SOLUTION INTRAMUSCULAR; INTRAVENOUS at 03:48

## 2018-12-21 RX ADMIN — KETOROLAC TROMETHAMINE 15 MG: 15 INJECTION, SOLUTION INTRAMUSCULAR; INTRAVENOUS at 10:20

## 2018-12-21 RX ADMIN — FAMOTIDINE 20 MG: 20 INJECTION, SOLUTION INTRAVENOUS at 07:36

## 2018-12-21 RX ADMIN — Medication 250 MG: at 10:20

## 2018-12-21 ASSESSMENT — ACTIVITIES OF DAILY LIVING (ADL)
ADLS_ACUITY_SCORE: 13

## 2018-12-21 ASSESSMENT — MIFFLIN-ST. JEOR: SCORE: 1081.55

## 2018-12-21 NOTE — PLAN OF CARE
Patient has denied nausea or vomiting since removal of NG tube. Pain is also at a minimal of 1/10. Pain is controlled with scheduled Toradol. Incision is covered with an ABD. Scant amount of drainage, marked. VSS, afebrile. Lamar Faustin RN 12/21/2018 4:36 AM

## 2018-12-21 NOTE — PROGRESS NOTES
Clinical Nutrition / Reassessment     Diet instruction: discussed low fiber nutrition therapy with pt today. Provided with handouts earlier this week and discussed her usual meals and what to change. Encouraged to ask surgeon how long to be on low fiber diet. Provided with list of fiber content and protein content of foods. Pt seemed to have a good understanding and answered questions to best of my ability. Expect excellent compliance with recommendations.     Assessment:   Client History: feeling better every day, was up making her bed. Stated she tolerated the clear liquid diet and was advanced to full now. Discussed plan for diet on discharge.   Diet Order:  Full   Oral Intake:  Slowly improving  Weight:     Vitals:    12/16/18 0531 12/17/18 0500 12/18/18 0640 12/20/18 0452   Weight: 60 kg (132 lb 4.4 oz) 58.2 kg (128 lb 3.2 oz) 53.2 kg (117 lb 3.2 oz) 56.1 kg (123 lb 9.6 oz)    12/21/18 0500   Weight: 55.1 kg (121 lb 6.4 oz)   reported ~15# unintentional wt loss since October     Estimated nutritional needs based on:  current body weight  58 kg / 128 lbs   Estimated energy needs:  7977-0466 kcal/day (30-35 kcal/kg)  Estimated protein needs:  58-70 gm/day (1-1.2 g/kg)  Estimated fluid needs:  9911-2391 ml/day (1 ml/kcal)    Malnutrition Criteria:  (Need to have 2 indicators to qualify recommendation)  Energy Intake:  Chronic Severe: < 75% of estimated energy requirement for >/= 1 month  Interpretation of Weight Loss:  Acute Severe:   > 7.5% in 3 months  Physical Findings:  Chronic Body Fat Loss:  mild and Chronic Muscle Mass Loss:  mild  Reduced  Strength:  Not Measured    Recommended Nutrition Diagnosis:   Severe Malnutrition in the context of acute illness or injury - based on AND/ASPEN Clinical Characterstics of Malnutrition May 2012      Nutrition Education: Nutrition education will be provided as appropriate.    Nutrition Diagnosis: Weight:  weight loss related to decreased oral intakes as evidenced by  vomiting after 2 days and reported wt loss with poor intakes since mid October.    Intervention:  Nutrition Prescription: now up to full liquid    Nutrition Intervention(s): full liquid    Nutrition Goal(s):  1. Will not have unplanned wt loss during hospitalization.   2. Will tolerate diet advancement per MD orders  3. Will consume 50% or more at meals/snacks     Monitoring and Evaluation:   Diet advancement  Weight  Labs     Discharge Recommendation:   Nutrition Discharge Planning  Low fiber diet on discharge, information has been provided    RD will reassess in within 1-5 days or sooner.    Barbi Lopez RD on 12/21/2018 at 10:22 AM

## 2018-12-21 NOTE — PROGRESS NOTES
[facility]    Surgical Progress Note  Post Operative Day: 3    Assessment & Plan   Nadya Flanagan is a 68 year old female who was admitted on 12/14/2018.     Active Problems:    Small bowel obstruction (H)    Assessment: resolved with surgery    S/P right colectomy    Assessment: improving    Plan: advance diet, transition to oral medication, saline lock, awaiting pathology report. Discussion of possible discharge Saturday 12.22. Will ask Dr. Chung to cover. Follow up in about 2 weeks.    # Pain Assessment:  Current Pain Score 12/21/2018   Patient currently in pain? -   Pain score (0-10) 1   Pain location -   Pain descriptors -   - Nadya is experiencing pain due to surgery. Pain management was discussed and the plan was created in a collaborative fashion.  Nadya's response to the current recommendations: engaged  - Please see the plan for pain management as documented above        DVT Prophylaxis: Enoxaparin (Lovenox) SQ and Pneumatic Compression Devices  Code Status: Full Code    Tresa Evangelista    Interval History   Feeling better every day. Less pain. No nausea or vomiting. Worried about advancing diet too quickly. Ambulating in halls.  -Data reviewed today: I reviewed all new labs and imaging results over the last 24 hours.    Physical Exam   Temp: 98.3  F (36.8  C) Temp src: Tympanic BP: 125/76 Pulse: 71 Heart Rate: 71 Resp: 16 SpO2: 96 % O2 Device: None (Room air)    Vitals:    12/18/18 0640 12/20/18 0452 12/21/18 0500   Weight: 53.2 kg (117 lb 3.2 oz) 56.1 kg (123 lb 9.6 oz) 55.1 kg (121 lb 6.4 oz)     Vital Signs with Ranges  Temp:  [98.3  F (36.8  C)-99.1  F (37.3  C)] 98.3  F (36.8  C)  Pulse:  [71-72] 71  Heart Rate:  [71-81] 71  Resp:  [16] 16  BP: (120-147)/(65-80) 125/76  SpO2:  [94 %-98 %] 96 %  I/O last 3 completed shifts:  In: 1444 [P.O.:120; I.V.:1324]  Out: 1875 [Urine:1325; Emesis/NG output:550]    Constitutional: No acute distress, pleasant and  cooperative  Respiratory: Clear lungs, no respiratory distress  Cardiovascular: regular rate and rhythm, no murmur  GI: abdomen soft, appropriate tenderness, incision clean dry and intact  Skin/Integumen: pink warm and dry, no rash    Medications       enoxaparin  40 mg Subcutaneous Q24H     famotidine  20 mg Oral BID     ketorolac  15 mg Intravenous Q6H     saccharomyces boulardii  250 mg Oral BID     sodium chloride (PF)  3 mL Intracatheter Q8H       Data   Recent Labs   Lab 12/21/18  0440 12/20/18  0405 12/19/18  0420 12/16/18  0540   WBC  --  10.2 16.3* 7.5   HGB  --  10.1* 11.5* 12.3   MCV  --  93 91 91    283 315 320   NA  --  137 138 138   POTASSIUM  --  4.0 4.7 4.2   CHLORIDE  --  107 104 106   CO2  --  24 27 29   BUN  --  8 6* 3*   CR  --  0.80 0.77 0.70   ANIONGAP  --  6 7 3   STELLA  --  8.7 9.0 9.1   GLC  --  111* 162* 132*   ALBUMIN  --  3.0* 3.1* 3.4*   PROTTOTAL  --  5.2* 5.0* 5.9*   BILITOTAL  --  0.5 1.0 0.5   ALKPHOS  --  62 65 43   ALT  --  66* 91* 32   AST  --  42* 78* 21   LIPASE  --   --   --  21       No results found for this or any previous visit (from the past 24 hour(s)).

## 2018-12-21 NOTE — PLAN OF CARE
Patient has had 5 small, loose BMs since she received suppository this AM. Denies nausea. Has been passing flatus. BS active x4 quadrants, abdomen slightly rounded. Incision mildly reddened, but no drainage this afternoon. Dressing changed. Tolatered ambulation on nursing unit 4x today and shower. NG tube removed. Total 600 mls NG output today, intake close to output. IV Toradol managing pain per patient, no request for PRN pain medication. VSS. Pt in good spirits.     GLORY REYNOLDS RN on 12/20/2018 at 6:37 PM

## 2018-12-22 VITALS
TEMPERATURE: 97.3 F | RESPIRATION RATE: 16 BRPM | BODY MASS INDEX: 20.64 KG/M2 | OXYGEN SATURATION: 95 % | WEIGHT: 123.9 LBS | HEART RATE: 71 BPM | SYSTOLIC BLOOD PRESSURE: 138 MMHG | DIASTOLIC BLOOD PRESSURE: 77 MMHG | HEIGHT: 65 IN

## 2018-12-22 PROBLEM — Z98.890 POSTOPERATIVE STATE: Status: ACTIVE | Noted: 2018-12-22

## 2018-12-22 PROCEDURE — 99024 POSTOP FOLLOW-UP VISIT: CPT | Performed by: SURGERY

## 2018-12-22 PROCEDURE — A9270 NON-COVERED ITEM OR SERVICE: HCPCS | Mod: GY | Performed by: SURGERY

## 2018-12-22 PROCEDURE — 25000132 ZZH RX MED GY IP 250 OP 250 PS 637: Mod: GY | Performed by: SURGERY

## 2018-12-22 RX ORDER — IBUPROFEN 200 MG
600 TABLET ORAL 4 TIMES DAILY
Qty: 36 TABLET | Refills: 0 | COMMUNITY
Start: 2018-12-22 | End: 2019-04-16

## 2018-12-22 RX ORDER — ACETAMINOPHEN 325 MG/1
650 TABLET ORAL 4 TIMES DAILY
Qty: 240 TABLET | Refills: 0 | Status: SHIPPED | OUTPATIENT
Start: 2018-12-22 | End: 2019-04-16

## 2018-12-22 RX ORDER — ACETAMINOPHEN AND CODEINE PHOSPHATE 300; 30 MG/1; MG/1
1 TABLET ORAL EVERY 6 HOURS PRN
Qty: 12 TABLET | Refills: 0 | Status: SHIPPED | OUTPATIENT
Start: 2018-12-22 | End: 2019-01-03

## 2018-12-22 RX ORDER — IBUPROFEN 600 MG/1
600 TABLET, FILM COATED ORAL ONCE
Status: COMPLETED | OUTPATIENT
Start: 2018-12-22 | End: 2018-12-22

## 2018-12-22 RX ORDER — ACETAMINOPHEN 325 MG/1
650 TABLET ORAL 4 TIMES DAILY
Qty: 24 TABLET | Refills: 0 | COMMUNITY
Start: 2018-12-22 | End: 2018-12-22

## 2018-12-22 RX ADMIN — ACETAMINOPHEN 650 MG: 325 TABLET, FILM COATED ORAL at 02:15

## 2018-12-22 RX ADMIN — Medication 250 MG: at 09:17

## 2018-12-22 RX ADMIN — IBUPROFEN 600 MG: 600 TABLET ORAL at 07:27

## 2018-12-22 RX ADMIN — FAMOTIDINE 20 MG: 20 TABLET, FILM COATED ORAL at 09:17

## 2018-12-22 RX ADMIN — ACETAMINOPHEN 650 MG: 325 TABLET, FILM COATED ORAL at 09:17

## 2018-12-22 ASSESSMENT — ACTIVITIES OF DAILY LIVING (ADL)
ADLS_ACUITY_SCORE: 13

## 2018-12-22 ASSESSMENT — MIFFLIN-ST. JEOR: SCORE: 1092.88

## 2018-12-22 NOTE — PROGRESS NOTES
[facility] DISCHARGE NOTE    Patient discharged to home at 10:34 AM via wheel chair. Accompanied by spouse and staff. Discharge instructions reviewed with patient, opportunity offered to ask questions. Prescriptions sent with patient to fill . All belongings sent with patient.    Elza King

## 2018-12-22 NOTE — DISCHARGE SUMMARY
[facility] Discharge Summary    Nadya Flanagan MRN# [id number]   Age: 68 year old YOB: 1950     Date of Admission:  12/14/2018  Date of Discharge::  12/22/2018  Admitting Physician:  Tresa Evangelista MD  Discharge Physician:  Lg Chung MD     Home clinic: [facility]          Admission Diagnoses:   Small bowel obstruction          Discharge Diagnosis:   Obstructing mass of ileo-cecal valve          Procedures:   Procedure(s): Right stuart-colectomy                  Medications Prior to Admission:     Medications Prior to Admission   Medication Sig Dispense Refill Last Dose     [DISCONTINUED] bismuth subsalicylate (PEPTO-BISMOL) 262 MG TABS Take 1 tablet by mouth 4 times daily   12/13/2018 at 1700     [DISCONTINUED] doxycycline hyclate (VIBRA-TABS) 100 MG tablet Take 100 mg by mouth 2 times daily   12/13/2018 at 1700     [DISCONTINUED] famotidine (PEPCID) 20 MG tablet Take 10 mg by mouth 4 times daily (with meals and nightly)   More than a month at Unknown time     [DISCONTINUED] ibuprofen (ADVIL/MOTRIN) 200 MG tablet Take 200 mg by mouth every 4 hours as needed for mild pain   10/23/2018 at pm     [DISCONTINUED] metroNIDAZOLE (FLAGYL) 250 MG tablet Take 250 mg by mouth 4 times daily   12/13/2018 at 1700     [DISCONTINUED] pantoprazole (PROTONIX) 20 MG EC tablet Take 40 mg by mouth 2 times daily   12/13/2018 at 1700             Discharge Medications:     Current Discharge Medication List      START taking these medications    Details   acetaminophen (TYLENOL) 325 MG tablet Take 2 tablets (650 mg) by mouth 4 times daily for 3 days Then as needed  Qty: 24 tablet, Refills: 0    Associated Diagnoses: Postoperative state         CONTINUE these medications which have CHANGED    Details   ibuprofen (ADVIL/MOTRIN) 200 MG tablet Take 3 tablets (600 mg) by mouth 4 times daily for 3 days Then as needed  Qty: 36 tablet, Refills: 0    Associated Diagnoses: Postoperative state         STOP taking these  medications       bismuth subsalicylate (PEPTO-BISMOL) 262 MG TABS Comments:   Reason for Stopping:         doxycycline hyclate (VIBRA-TABS) 100 MG tablet Comments:   Reason for Stopping:         famotidine (PEPCID) 20 MG tablet Comments:   Reason for Stopping:         metroNIDAZOLE (FLAGYL) 250 MG tablet Comments:   Reason for Stopping:         pantoprazole (PROTONIX) 20 MG EC tablet Comments:   Reason for Stopping:                     Consultations:   none          Brief History of Illness:   Nadya Flanagan is a 68 year old female who presents with vomiting and abdominal pain. This all started October 12. She has been having good and bad days as far as the vomiting and pain but usually only one or two days of good then bad again. She has been having bowel movements. She hasn't noted blood or black stools. She has been bloated on and off. She had an EGD by Dr. Gaytan that showed some gastritis. She hasn't had a colonoscopy before. She has lost about 28 pounds since this started. She finally was having horrible cramping pain today so went to the ED in [place].                 Hospital Course:   The patient's hospital course was unremarkable.  She recovered as anticipated and experienced no post-operative complications. The patient's small bowel obstruction did not improve and patient was taken to OR for Right stuart-colectomy on 12/18/18 for obstructing mass at ileo-cecal valve. Post-operatively patient did well. Had return of bowel function and tolerating Regular diet. Did not need narcotics. Incision is healing well without erythema or drainage.          Discharge Instructions and Follow-Up:   Discharge diet: Regular   Discharge activity: Lifting restricted to 20 pounds   Discharge follow-up: Follow up with Dr. Evangelista on 1/3/18 at 8:40 am   Wound care:  You may  shower with wounds open. Proxi-strips will remain intact for 7-10 days . Once loose they may be removed.             Discharge Disposition:   Discharged to  home      Attestation:  I have reviewed today's vital signs, notes, medications, labs and imaging.    Lg Chung MD

## 2018-12-22 NOTE — PROGRESS NOTES
Patient complaining of intense abdominal pain. Requesting medication to help but does not want narcotics. Too early to get Tylenol. Ice applied to abdomen. Contacted Dr. Chung. New orders for ibuprofen 600mg.   Henna Lincoln RN on 12/22/2018 at 7:18 AM

## 2018-12-22 NOTE — PHARMACY - DISCHARGE MEDICATION RECONCILIATION AND EDUCATION
Pharmacy:  Discharge Counseling and Medication Reconciliation    Nadya Flanagan  [address]   [facility] [address]  [phone] (home)   68 year old female  PCP: Isaura Allison    Allergies: Contrast dye    Discharge Counseling:    Pharmacist met with patient (and/or family) today to review the medication portion of the After Visit Summary (with an emphasis on NEW medications) and to address patient's questions/concerns.    Summary of Education: Provided education on pain management for discharge: Tylenol 3 prn, scheduled Tylenol x4 days then prn, Ibuprofen scheduled for 3 days then prn. Discussed maximum Tylenol per day. Also discussed stopping doxycycline, famotidine, metronidazole, pantoprazole, and pept-Bismol    Materials Provided:  MedCounselor sheets printed from Clinical Pharmacology on: Tylenol #3    Discharge Medication Reconciliation:    Arun Rod [facility] has reviewed the patient's discharge medication orders and has compared them to the inpatient medication administration record and to what the patient was taking prior to admission - any discrepancies have been resolved.    It has been determined that the patient has an adequate supply of medications available or which can be obtained from the patient's preferred pharmacy.    Thank you for the consult.    Arun Rod RPH........December 22, 2018 10:15 AM

## 2018-12-22 NOTE — PLAN OF CARE
"Patient has been rating pain at 3-4/10, tylenol administered twice and patient states that it helps. Has refused narcotics and states \"I will not go home on narcotics\". Ice and heat being utilized for incisional pain. /67   Pulse 71   Temp 97.4  F (36.3  C) (Tympanic)   Resp 16   Ht 1.651 m (5' 5\")   Wt 55.1 kg (121 lb 6.4 oz)   SpO2 97%   BMI 20.20 kg/m      "

## 2018-12-22 NOTE — PROGRESS NOTES
"Surgical Progress Note     POD# 4 s/p Right stuart-colectomy    Subjective: Tolerating diet. Wants to go home. Passing stool and flatus.     Objective:  /77   Pulse 71   Temp 97.3  F (36.3  C) (Tympanic)   Resp 16   Ht 1.651 m (5' 5\")   Wt 56.2 kg (123 lb 14.4 oz)   SpO2 95%   BMI 20.62 kg/m          ABDOMEN: Incision without erythema or drainage. Soft and non-tender.    LABS  Recent Labs   Lab Test 12/21/18  0440 12/20/18  0405 12/19/18  0420   WBC  --  10.2 16.3*   RBC  --  3.35* 3.81   HGB  --  10.1* 11.5*   HCT  --  31.2* 34.5*   MCV  --  93 91   MCH  --  30.1 30.2   MCHC  --  32.4 33.3    283 315       Recent Labs   Lab Test 12/20/18  0405 12/19/18  0420   POTASSIUM 4.0 4.7   CHLORIDE 107 104   BUN 8 6*       Recent Labs   Lab Test 12/20/18  0405 12/19/18  0420   BILITOTAL 0.5 1.0   AST 42* 78*   ALT 66* 91*         ASSESSMENT  Doing well after right stuart-colectomy    PLAN  Discharge home      Lg Chung      "

## 2018-12-26 ENCOUNTER — TRANSFERRED RECORDS (OUTPATIENT)
Dept: HEALTH INFORMATION MANAGEMENT | Facility: OTHER | Age: 68
End: 2018-12-26

## 2019-01-03 ENCOUNTER — OFFICE VISIT (OUTPATIENT)
Dept: SURGERY | Facility: OTHER | Age: 69
End: 2019-01-03
Attending: SURGERY
Payer: MEDICARE

## 2019-01-03 VITALS
BODY MASS INDEX: 20.14 KG/M2 | HEART RATE: 76 BPM | RESPIRATION RATE: 16 BRPM | HEIGHT: 64 IN | TEMPERATURE: 97.3 F | DIASTOLIC BLOOD PRESSURE: 78 MMHG | SYSTOLIC BLOOD PRESSURE: 110 MMHG | WEIGHT: 118 LBS

## 2019-01-03 DIAGNOSIS — Z08 ENCOUNTER FOR FOLLOW-UP EXAMINATION AFTER SURGERY FOR MALIGNANT NEOPLASM: Primary | ICD-10-CM

## 2019-01-03 DIAGNOSIS — C18.1 MALIGNANT NEOPLASM OF APPENDIX (H): ICD-10-CM

## 2019-01-03 PROBLEM — K56.609 SMALL BOWEL OBSTRUCTION (H): Status: RESOLVED | Noted: 2018-12-14 | Resolved: 2019-01-03

## 2019-01-03 PROBLEM — Z98.890 POSTOPERATIVE STATE: Status: RESOLVED | Noted: 2018-12-22 | Resolved: 2019-01-03

## 2019-01-03 PROCEDURE — 99024 POSTOP FOLLOW-UP VISIT: CPT | Performed by: SURGERY

## 2019-01-03 PROCEDURE — G0463 HOSPITAL OUTPT CLINIC VISIT: HCPCS

## 2019-01-03 RX ORDER — FLUOCINONIDE GEL 0.5 MG/G
GEL TOPICAL
COMMUNITY
Start: 2018-01-23 | End: 2022-01-01

## 2019-01-03 RX ORDER — ASPIRIN 325 MG
325 TABLET ORAL DAILY
COMMUNITY
Start: 2013-03-17 | End: 2019-01-24

## 2019-01-03 RX ORDER — WITCH HAZEL 50 %
1 PADS, MEDICATED (EA) TOPICAL DAILY
COMMUNITY
Start: 2013-02-15 | End: 2019-02-12

## 2019-01-03 RX ORDER — DIPHENOXYLATE HYDROCHLORIDE AND ATROPINE SULFATE 2.5; .025 MG/1; MG/1
1 TABLET ORAL DAILY
COMMUNITY
Start: 2013-02-15 | End: 2021-05-20

## 2019-01-03 RX ORDER — NAPROXEN 250 MG/1
250 TABLET ORAL 2 TIMES DAILY WITH MEALS
COMMUNITY
End: 2019-02-12

## 2019-01-03 RX ORDER — FAMOTIDINE 20 MG/1
10 TABLET, FILM COATED ORAL DAILY PRN
COMMUNITY
Start: 2018-11-27 | End: 2020-06-25

## 2019-01-03 ASSESSMENT — PAIN SCALES - GENERAL: PAINLEVEL: NO PAIN (1)

## 2019-01-03 ASSESSMENT — MIFFLIN-ST. JEOR: SCORE: 1050.24

## 2019-01-03 NOTE — PROGRESS NOTES
"Patient presents for post surgical visit after right stuart colectomy on 12/18. Patient has done well. No problems with incisions. She was having diarrhea but that has resolved. She is actually a bit constipated. She has been following a low residue diet. She isn't having pain. She is getting some energy back.    /78   Pulse 76   Temp 97.3  F (36.3  C) (Tympanic)   Resp 16   Ht 1.626 m (5' 4\")   Wt 53.5 kg (118 lb)   Breastfeeding? No   BMI 20.25 kg/m    General: NAD, pleasant and cooperative with exam and interview.  Abdomen: healing incision. No sign of infection. No pain with palpation.  Psychiatry: awake, alert and oriented. Appropriate affect.    Assessment/Plan:  Discussed surgery and pathology results. Patient would prefer an Oncology consult at [place] and I would agree due to the type of cancer and rarity of that type. She can start adding fiber back into her diet. Patient will call with questions or concerns. I spoke with [place] oncology patient Mari -. Will fax the pathology report from surgery and the cytology of the abdominal fluid. They will call patient to set up appointment if they are able to offer an appointment.     "

## 2019-01-03 NOTE — PATIENT INSTRUCTIONS
Mari will call you. Keep adjusting the miralax. Ok add fiber back in to diet. Ok to drive. Will eventually follow with oncology in [place]. Call or MyChart if you have concerns before follow up.

## 2019-01-03 NOTE — LETTER
January 3, 2019      Nadya Flanagan  [address]   [facility] [address]        To Whom It May Concern:    Nadya Flanagan  was seen on 1/3/19-she had surgery 12/18/18.  Please excuse her  until 1/21/19 due to surgery.        Sincerely,        Tresa Evangelista MD

## 2019-01-03 NOTE — NURSING NOTE
"Chief Complaint   Patient presents with     Surgical Followup     post laparotomy dos- 12/18/18       Initial /78   Pulse 76   Temp 97.3  F (36.3  C) (Tympanic)   Resp 16   Ht 1.626 m (5' 4\")   Wt 53.5 kg (118 lb)   Breastfeeding? No   BMI 20.25 kg/m   Estimated body mass index is 20.25 kg/m  as calculated from the following:    Height as of this encounter: 1.626 m (5' 4\").    Weight as of this encounter: 53.5 kg (118 lb).  Medication Reconciliation: complete    Arlet Hallman LPN   "

## 2019-01-04 NOTE — PROGRESS NOTES
12/15/2018 1451 Witnessed waste of 0.3 mg of dilaudid with primary RN  Katharine Higginbotham RN.............................1/4/2019 3:47 PM

## 2019-01-09 ENCOUNTER — TRANSFERRED RECORDS (OUTPATIENT)
Dept: HEALTH INFORMATION MANAGEMENT | Facility: OTHER | Age: 69
End: 2019-01-09

## 2019-01-15 ENCOUNTER — TRANSFERRED RECORDS (OUTPATIENT)
Dept: HEALTH INFORMATION MANAGEMENT | Facility: OTHER | Age: 69
End: 2019-01-15

## 2019-01-16 ENCOUNTER — TRANSFERRED RECORDS (OUTPATIENT)
Dept: HEALTH INFORMATION MANAGEMENT | Facility: OTHER | Age: 69
End: 2019-01-16

## 2019-01-24 ENCOUNTER — OFFICE VISIT (OUTPATIENT)
Dept: SURGERY | Facility: OTHER | Age: 69
End: 2019-01-24
Attending: SURGERY
Payer: MEDICARE

## 2019-01-24 VITALS
SYSTOLIC BLOOD PRESSURE: 110 MMHG | HEART RATE: 76 BPM | RESPIRATION RATE: 16 BRPM | BODY MASS INDEX: 20.08 KG/M2 | TEMPERATURE: 97.6 F | WEIGHT: 117 LBS | DIASTOLIC BLOOD PRESSURE: 76 MMHG

## 2019-01-24 DIAGNOSIS — C18.1 CANCER OF APPENDIX METASTATIC TO INTRA-ABDOMINAL LYMPH NODE (H): ICD-10-CM

## 2019-01-24 DIAGNOSIS — Z08 ENCOUNTER FOR FOLLOW-UP EXAMINATION AFTER SURGERY FOR MALIGNANT NEOPLASM: Primary | ICD-10-CM

## 2019-01-24 DIAGNOSIS — C77.2 CANCER OF APPENDIX METASTATIC TO INTRA-ABDOMINAL LYMPH NODE (H): ICD-10-CM

## 2019-01-24 PROCEDURE — G0463 HOSPITAL OUTPT CLINIC VISIT: HCPCS

## 2019-01-24 PROCEDURE — 99024 POSTOP FOLLOW-UP VISIT: CPT | Performed by: SURGERY

## 2019-01-24 ASSESSMENT — PAIN SCALES - GENERAL: PAINLEVEL: NO PAIN (1)

## 2019-01-24 NOTE — NURSING NOTE
"Chief Complaint   Patient presents with     RECHECK     s/p right stuart colectomy - dos- 12/18       Initial /76 (BP Location: Right arm, Patient Position: Sitting, Cuff Size: Adult Regular)   Pulse 76   Temp 97.6  F (36.4  C) (Tympanic)   Resp 16   Wt 53.1 kg (117 lb)   BMI 20.08 kg/m   Estimated body mass index is 20.08 kg/m  as calculated from the following:    Height as of 1/3/19: 1.626 m (5' 4\").    Weight as of this encounter: 53.1 kg (117 lb).  Medication Reconciliation: complete    Arlet Hallman LPN    "

## 2019-01-24 NOTE — PROGRESS NOTES
Patient presents for post surgical visit after right hemicolectomy on 12/18/18. Patient has done well. No problems with incision. She is feeling better than she did before surgery. Eating is coming along. Not having diarrhea. 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 They recommended it. She is going to meet with Dr. Thakkar, but thinks she will give the chemotherapy a try. Today we discussed a port as well as the post surgery review.    /76 (BP Location: Right arm, Patient Position: Sitting, Cuff Size: Adult Regular)   Pulse 76   Temp 97.6  F (36.4  C) (Tympanic)   Resp 16   Wt 53.1 kg (117 lb)   BMI 20.08 kg/m    General: NAD, pleasant and cooperative with exam and interview.  Abdomen: healing incision. No sign of infection. No pain with palpation.  Psychiatry: awake, alert and oriented. Appropriate affect.    Assessment/Plan:   Patient can return to normal activities. Patient will call with questions or concerns.  Discussed the risks, benefits and alternatives to port placement for chemotherapy. We specifically discussed the risks of infection, bleeding, injury to blood vessels and lung, blood clot, port malfunction and the possible need for further procedures. The patient expressed understanding and questions were answered. Informed consent paperwork was completed. This will be scheduled at a time that is convenient for the patient.

## 2019-01-24 NOTE — H&P (VIEW-ONLY)
Patient presents for post surgical visit after right hemicolectomy on 12/18/18. Patient has done well. No problems with incision. She is feeling better than she did before surgery. Eating is coming along. Not having diarrhea. 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 They recommended it. She is going to meet with Dr. Thakkar, but thinks she will give the chemotherapy a try. Today we discussed a port as well as the post surgery review.    /76 (BP Location: Right arm, Patient Position: Sitting, Cuff Size: Adult Regular)   Pulse 76   Temp 97.6  F (36.4  C) (Tympanic)   Resp 16   Wt 53.1 kg (117 lb)   BMI 20.08 kg/m    General: NAD, pleasant and cooperative with exam and interview.  Abdomen: healing incision. No sign of infection. No pain with palpation.  Psychiatry: awake, alert and oriented. Appropriate affect.    Assessment/Plan:   Patient can return to normal activities. Patient will call with questions or concerns.  Discussed the risks, benefits and alternatives to port placement for chemotherapy. We specifically discussed the risks of infection, bleeding, injury to blood vessels and lung, blood clot, port malfunction and the possible need for further procedures. The patient expressed understanding and questions were answered. Informed consent paperwork was completed. This will be scheduled at a time that is convenient for the patient.

## 2019-01-26 ENCOUNTER — MYC MEDICAL ADVICE (OUTPATIENT)
Dept: ONCOLOGY | Facility: OTHER | Age: 69
End: 2019-01-26

## 2019-01-28 ENCOUNTER — MYC MEDICAL ADVICE (OUTPATIENT)
Dept: ONCOLOGY | Facility: OTHER | Age: 69
End: 2019-01-28

## 2019-01-28 NOTE — ANESTHESIA POSTPROCEDURE EVALUATION
Patient: Nadya Flanagan    Procedure(s):  Exploratory Laparotomy with right hemicolectomy    Diagnosis:bowel obstruction  Diagnosis Additional Information: No value filed.    Anesthesia Type:  General, RSI, ETT    Note:  Anesthesia Post Evaluation    Patient location during evaluation: Phase 2  Patient participation: Able to fully participate in evaluation  Level of consciousness: awake and alert  Pain management: adequate  Airway patency: patent  Cardiovascular status: acceptable  Respiratory status: acceptable  Hydration status: acceptable  PONV: none             Last vitals:  Vitals:    12/21/18 2020 12/22/18 0319 12/22/18 0725   BP: 131/76 128/67 138/77   Pulse:      Resp: 16 16 16   Temp: 99.3  F (37.4  C) 97.4  F (36.3  C) 97.3  F (36.3  C)   SpO2: 95% 97% 95%         Electronically Signed By: ALBERT ZULETA CRNA  January 28, 2019  9:46 AM

## 2019-01-28 NOTE — TELEPHONE ENCOUNTER
Attempted to reach patient by phone. No answer. Medical message sent.  Selena Solorio RN...........1/28/2019 1:03 PM

## 2019-01-29 ENCOUNTER — MYC MEDICAL ADVICE (OUTPATIENT)
Dept: SURGERY | Facility: OTHER | Age: 69
End: 2019-01-29

## 2019-01-29 ENCOUNTER — TELEPHONE (OUTPATIENT)
Dept: ONCOLOGY | Facility: OTHER | Age: 69
End: 2019-01-29

## 2019-01-29 NOTE — TELEPHONE ENCOUNTER
Discussed in great detail the process of port placement and chemotherapy. Patient states understanding and is appreciative of phone call.  Selena Solorio RN...........1/29/2019 9:35 AM

## 2019-01-30 PROBLEM — C77.2: Status: ACTIVE | Noted: 2019-01-30

## 2019-01-30 PROBLEM — C18.1: Status: ACTIVE | Noted: 2019-01-30

## 2019-02-06 ENCOUNTER — ANESTHESIA EVENT (OUTPATIENT)
Dept: SURGERY | Facility: OTHER | Age: 69
End: 2019-02-06
Payer: MEDICARE

## 2019-02-06 RX ORDER — FENTANYL CITRATE 50 UG/ML
25-50 INJECTION, SOLUTION INTRAMUSCULAR; INTRAVENOUS
Status: CANCELLED | OUTPATIENT
Start: 2019-02-06

## 2019-02-07 ENCOUNTER — ANESTHESIA (OUTPATIENT)
Dept: SURGERY | Facility: OTHER | Age: 69
End: 2019-02-07
Payer: MEDICARE

## 2019-02-07 ENCOUNTER — APPOINTMENT (OUTPATIENT)
Dept: GENERAL RADIOLOGY | Facility: OTHER | Age: 69
End: 2019-02-07
Attending: SURGERY
Payer: MEDICARE

## 2019-02-07 ENCOUNTER — HOSPITAL ENCOUNTER (OUTPATIENT)
Facility: OTHER | Age: 69
Discharge: HOME | End: 2019-02-07
Attending: SURGERY | Admitting: SURGERY
Payer: MEDICARE

## 2019-02-07 ENCOUNTER — ONCOLOGY VISIT (OUTPATIENT)
Dept: ONCOLOGY | Facility: OTHER | Age: 69
End: 2019-02-07
Attending: INTERNAL MEDICINE
Payer: MEDICARE

## 2019-02-07 ENCOUNTER — HOSPITAL ENCOUNTER (OUTPATIENT)
Dept: GENERAL RADIOLOGY | Facility: OTHER | Age: 69
End: 2019-02-07
Attending: SURGERY | Admitting: SURGERY
Payer: MEDICARE

## 2019-02-07 VITALS
SYSTOLIC BLOOD PRESSURE: 125 MMHG | HEART RATE: 66 BPM | TEMPERATURE: 98.6 F | HEIGHT: 64 IN | RESPIRATION RATE: 16 BRPM | DIASTOLIC BLOOD PRESSURE: 72 MMHG | WEIGHT: 119.4 LBS | OXYGEN SATURATION: 98 % | BODY MASS INDEX: 20.38 KG/M2

## 2019-02-07 VITALS
TEMPERATURE: 96.5 F | DIASTOLIC BLOOD PRESSURE: 72 MMHG | SYSTOLIC BLOOD PRESSURE: 125 MMHG | HEIGHT: 64 IN | BODY MASS INDEX: 20.55 KG/M2 | WEIGHT: 120.4 LBS | HEART RATE: 68 BPM | OXYGEN SATURATION: 95 %

## 2019-02-07 DIAGNOSIS — C18.1 CANCER OF APPENDIX METASTATIC TO INTRA-ABDOMINAL LYMPH NODE (H): Primary | ICD-10-CM

## 2019-02-07 DIAGNOSIS — C77.2 CANCER OF APPENDIX METASTATIC TO INTRA-ABDOMINAL LYMPH NODE (H): Primary | ICD-10-CM

## 2019-02-07 DIAGNOSIS — Z95.828 PORT-A-CATH IN PLACE: ICD-10-CM

## 2019-02-07 PROCEDURE — 25000125 ZZHC RX 250: Performed by: NURSE ANESTHETIST, CERTIFIED REGISTERED

## 2019-02-07 PROCEDURE — G0463 HOSPITAL OUTPT CLINIC VISIT: HCPCS

## 2019-02-07 PROCEDURE — 25000128 H RX IP 250 OP 636: Performed by: NURSE ANESTHETIST, CERTIFIED REGISTERED

## 2019-02-07 PROCEDURE — 71000027 ZZH RECOVERY PHASE 2 EACH 15 MINS: Performed by: SURGERY

## 2019-02-07 PROCEDURE — 37000009 ZZH ANESTHESIA TECHNICAL FEE, EACH ADDTL 15 MIN: Performed by: SURGERY

## 2019-02-07 PROCEDURE — 27210794 ZZH OR GENERAL SUPPLY STERILE: Performed by: SURGERY

## 2019-02-07 PROCEDURE — 36000052 ZZH SURGERY LEVEL 2 EA 15 ADDTL MIN: Performed by: SURGERY

## 2019-02-07 PROCEDURE — 25000125 ZZHC RX 250: Performed by: SURGERY

## 2019-02-07 PROCEDURE — 25000128 H RX IP 250 OP 636: Performed by: SURGERY

## 2019-02-07 PROCEDURE — 71000014 ZZH RECOVERY PHASE 1 LEVEL 2 FIRST HR: Performed by: SURGERY

## 2019-02-07 PROCEDURE — 36000054 ZZH SURGERY LEVEL 2 W FLUORO 1ST 30 MIN: Performed by: SURGERY

## 2019-02-07 PROCEDURE — 37000008 ZZH ANESTHESIA TECHNICAL FEE, 1ST 30 MIN: Performed by: SURGERY

## 2019-02-07 PROCEDURE — 40000278 XR SURGERY CARM FLUORO LESS THAN 5 MIN

## 2019-02-07 PROCEDURE — C1788 PORT, INDWELLING, IMP: HCPCS | Performed by: SURGERY

## 2019-02-07 PROCEDURE — 36561 INSERT TUNNELED CV CATH: CPT | Mod: 58 | Performed by: SURGERY

## 2019-02-07 PROCEDURE — 99205 OFFICE O/P NEW HI 60 MIN: CPT | Performed by: INTERNAL MEDICINE

## 2019-02-07 PROCEDURE — 40000306 ZZH STATISTIC PRE PROC ASSESS II: Performed by: SURGERY

## 2019-02-07 PROCEDURE — 40000986 XR CHEST PORT 1 VW

## 2019-02-07 PROCEDURE — 36561 INSERT TUNNELED CV CATH: CPT | Performed by: NURSE ANESTHETIST, CERTIFIED REGISTERED

## 2019-02-07 DEVICE — CATH PORT MRI POWERPORT 8FR 1808060
Type: IMPLANTABLE DEVICE | Site: CHEST | Status: NON-FUNCTIONAL
Removed: 2019-10-09

## 2019-02-07 RX ORDER — LORAZEPAM 2 MG/ML
0.5 INJECTION INTRAMUSCULAR EVERY 4 HOURS PRN
Status: CANCELLED
Start: 2019-02-28

## 2019-02-07 RX ORDER — EPINEPHRINE 0.3 MG/.3ML
0.3 INJECTION SUBCUTANEOUS EVERY 5 MIN PRN
Status: CANCELLED | OUTPATIENT
Start: 2019-02-28

## 2019-02-07 RX ORDER — ONDANSETRON 8 MG/1
8 TABLET, FILM COATED ORAL EVERY 8 HOURS PRN
Qty: 10 TABLET | Refills: 2 | Status: SHIPPED | OUTPATIENT
Start: 2019-02-07 | End: 2019-08-06

## 2019-02-07 RX ORDER — KETOROLAC TROMETHAMINE 30 MG/ML
INJECTION, SOLUTION INTRAMUSCULAR; INTRAVENOUS PRN
Status: DISCONTINUED | OUTPATIENT
Start: 2019-02-07 | End: 2019-02-07

## 2019-02-07 RX ORDER — CEFAZOLIN SODIUM 1 G/50ML
1 INJECTION, SOLUTION INTRAVENOUS SEE ADMIN INSTRUCTIONS
Status: CANCELLED | OUTPATIENT
Start: 2019-02-07

## 2019-02-07 RX ORDER — LORAZEPAM 0.5 MG/1
0.5 TABLET ORAL EVERY 4 HOURS PRN
Qty: 30 TABLET | Refills: 2 | Status: SHIPPED | OUTPATIENT
Start: 2019-02-07 | End: 2019-08-06

## 2019-02-07 RX ORDER — METHYLPREDNISOLONE SODIUM SUCCINATE 125 MG/2ML
125 INJECTION, POWDER, LYOPHILIZED, FOR SOLUTION INTRAMUSCULAR; INTRAVENOUS
Status: CANCELLED
Start: 2019-02-28

## 2019-02-07 RX ORDER — SODIUM CHLORIDE 9 MG/ML
1000 INJECTION, SOLUTION INTRAVENOUS CONTINUOUS PRN
Status: CANCELLED
Start: 2019-02-28

## 2019-02-07 RX ORDER — PROCHLORPERAZINE MALEATE 10 MG
10 TABLET ORAL EVERY 6 HOURS PRN
Qty: 30 TABLET | Refills: 2 | Status: SHIPPED | OUTPATIENT
Start: 2019-02-07 | End: 2019-08-06

## 2019-02-07 RX ORDER — PROPOFOL 10 MG/ML
INJECTION, EMULSION INTRAVENOUS PRN
Status: DISCONTINUED | OUTPATIENT
Start: 2019-02-07 | End: 2019-02-07

## 2019-02-07 RX ORDER — MEPERIDINE HYDROCHLORIDE 50 MG/ML
12.5 INJECTION INTRAMUSCULAR; INTRAVENOUS; SUBCUTANEOUS
Status: DISCONTINUED | OUTPATIENT
Start: 2019-02-07 | End: 2019-02-07 | Stop reason: HOSPADM

## 2019-02-07 RX ORDER — FLUOROURACIL 50 MG/ML
400 INJECTION, SOLUTION INTRAVENOUS ONCE
Status: CANCELLED | OUTPATIENT
Start: 2019-02-28

## 2019-02-07 RX ORDER — BUPIVACAINE HYDROCHLORIDE AND EPINEPHRINE 5; 5 MG/ML; UG/ML
INJECTION, SOLUTION EPIDURAL; INTRACAUDAL; PERINEURAL PRN
Status: DISCONTINUED | OUTPATIENT
Start: 2019-02-07 | End: 2019-02-07 | Stop reason: HOSPADM

## 2019-02-07 RX ORDER — ONDANSETRON 2 MG/ML
4 INJECTION INTRAMUSCULAR; INTRAVENOUS EVERY 30 MIN PRN
Status: DISCONTINUED | OUTPATIENT
Start: 2019-02-07 | End: 2019-02-07 | Stop reason: HOSPADM

## 2019-02-07 RX ORDER — ONDANSETRON 8 MG/1
8 TABLET, FILM COATED ORAL EVERY 8 HOURS PRN
Qty: 10 TABLET | Refills: 2 | Status: CANCELLED | OUTPATIENT
Start: 2019-02-13

## 2019-02-07 RX ORDER — LIDOCAINE/PRILOCAINE 2.5 %-2.5%
CREAM (GRAM) TOPICAL
Qty: 30 G | Refills: 3 | Status: SHIPPED | OUTPATIENT
Start: 2019-02-07 | End: 2021-01-01

## 2019-02-07 RX ORDER — METHYLPREDNISOLONE SODIUM SUCCINATE 125 MG/2ML
125 INJECTION, POWDER, LYOPHILIZED, FOR SOLUTION INTRAMUSCULAR; INTRAVENOUS
Status: CANCELLED
Start: 2019-02-14

## 2019-02-07 RX ORDER — EPINEPHRINE 1 MG/ML
0.3 INJECTION, SOLUTION, CONCENTRATE INTRAVENOUS EVERY 5 MIN PRN
Status: CANCELLED | OUTPATIENT
Start: 2019-02-28

## 2019-02-07 RX ORDER — ALBUTEROL SULFATE 90 UG/1
1-2 AEROSOL, METERED RESPIRATORY (INHALATION)
Status: CANCELLED
Start: 2019-02-14

## 2019-02-07 RX ORDER — ONDANSETRON 2 MG/ML
INJECTION INTRAMUSCULAR; INTRAVENOUS PRN
Status: DISCONTINUED | OUTPATIENT
Start: 2019-02-07 | End: 2019-02-07

## 2019-02-07 RX ORDER — PROCHLORPERAZINE MALEATE 10 MG
10 TABLET ORAL EVERY 6 HOURS PRN
Qty: 30 TABLET | Refills: 2 | Status: CANCELLED | OUTPATIENT
Start: 2019-02-13

## 2019-02-07 RX ORDER — DIPHENHYDRAMINE HYDROCHLORIDE 50 MG/ML
50 INJECTION INTRAMUSCULAR; INTRAVENOUS
Status: CANCELLED
Start: 2019-02-28

## 2019-02-07 RX ORDER — CEFAZOLIN SODIUM 2 G/100ML
2 INJECTION, SOLUTION INTRAVENOUS
Status: CANCELLED | OUTPATIENT
Start: 2019-02-07

## 2019-02-07 RX ORDER — SODIUM CHLORIDE 9 MG/ML
1000 INJECTION, SOLUTION INTRAVENOUS CONTINUOUS PRN
Status: CANCELLED
Start: 2019-02-14

## 2019-02-07 RX ORDER — CEFAZOLIN SODIUM 1 G/3ML
INJECTION, POWDER, FOR SOLUTION INTRAMUSCULAR; INTRAVENOUS PRN
Status: DISCONTINUED | OUTPATIENT
Start: 2019-02-07 | End: 2019-02-07

## 2019-02-07 RX ORDER — ALBUTEROL SULFATE 0.83 MG/ML
2.5 SOLUTION RESPIRATORY (INHALATION)
Status: CANCELLED | OUTPATIENT
Start: 2019-02-28

## 2019-02-07 RX ORDER — LORAZEPAM 2 MG/ML
0.5 INJECTION INTRAMUSCULAR EVERY 4 HOURS PRN
Status: CANCELLED
Start: 2019-02-14

## 2019-02-07 RX ORDER — FLUOROURACIL 50 MG/ML
400 INJECTION, SOLUTION INTRAVENOUS ONCE
Status: CANCELLED | OUTPATIENT
Start: 2019-02-14

## 2019-02-07 RX ORDER — FENTANYL CITRATE 50 UG/ML
INJECTION, SOLUTION INTRAMUSCULAR; INTRAVENOUS PRN
Status: DISCONTINUED | OUTPATIENT
Start: 2019-02-07 | End: 2019-02-07

## 2019-02-07 RX ORDER — DIPHENHYDRAMINE HYDROCHLORIDE 50 MG/ML
50 INJECTION INTRAMUSCULAR; INTRAVENOUS
Status: CANCELLED
Start: 2019-02-14

## 2019-02-07 RX ORDER — ALBUTEROL SULFATE 0.83 MG/ML
2.5 SOLUTION RESPIRATORY (INHALATION)
Status: CANCELLED | OUTPATIENT
Start: 2019-02-14

## 2019-02-07 RX ORDER — DEXAMETHASONE SODIUM PHOSPHATE 4 MG/ML
INJECTION, SOLUTION INTRA-ARTICULAR; INTRALESIONAL; INTRAMUSCULAR; INTRAVENOUS; SOFT TISSUE PRN
Status: DISCONTINUED | OUTPATIENT
Start: 2019-02-07 | End: 2019-02-07

## 2019-02-07 RX ORDER — MEPERIDINE HYDROCHLORIDE 50 MG/ML
25 INJECTION INTRAMUSCULAR; INTRAVENOUS; SUBCUTANEOUS EVERY 30 MIN PRN
Status: CANCELLED | OUTPATIENT
Start: 2019-02-14

## 2019-02-07 RX ORDER — FENTANYL CITRATE 50 UG/ML
25-50 INJECTION, SOLUTION INTRAMUSCULAR; INTRAVENOUS
Status: DISCONTINUED | OUTPATIENT
Start: 2019-02-07 | End: 2019-02-07 | Stop reason: HOSPADM

## 2019-02-07 RX ORDER — NAPROXEN 250 MG/1
250 TABLET ORAL EVERY 12 HOURS PRN
Qty: 10 TABLET | Refills: 0 | COMMUNITY
Start: 2019-02-07 | End: 2019-04-16

## 2019-02-07 RX ORDER — LIDOCAINE 40 MG/G
CREAM TOPICAL
Status: DISCONTINUED | OUTPATIENT
Start: 2019-02-07 | End: 2019-02-07 | Stop reason: HOSPADM

## 2019-02-07 RX ORDER — PROPOFOL 10 MG/ML
INJECTION, EMULSION INTRAVENOUS CONTINUOUS PRN
Status: DISCONTINUED | OUTPATIENT
Start: 2019-02-07 | End: 2019-02-07

## 2019-02-07 RX ORDER — SODIUM CHLORIDE, SODIUM LACTATE, POTASSIUM CHLORIDE, CALCIUM CHLORIDE 600; 310; 30; 20 MG/100ML; MG/100ML; MG/100ML; MG/100ML
INJECTION, SOLUTION INTRAVENOUS CONTINUOUS
Status: DISCONTINUED | OUTPATIENT
Start: 2019-02-07 | End: 2019-02-07 | Stop reason: HOSPADM

## 2019-02-07 RX ORDER — ONDANSETRON 4 MG/1
4 TABLET, ORALLY DISINTEGRATING ORAL EVERY 30 MIN PRN
Status: DISCONTINUED | OUTPATIENT
Start: 2019-02-07 | End: 2019-02-07 | Stop reason: HOSPADM

## 2019-02-07 RX ORDER — LIDOCAINE HYDROCHLORIDE 20 MG/ML
INJECTION, SOLUTION INFILTRATION; PERINEURAL PRN
Status: DISCONTINUED | OUTPATIENT
Start: 2019-02-07 | End: 2019-02-07

## 2019-02-07 RX ORDER — EPINEPHRINE 1 MG/ML
0.3 INJECTION, SOLUTION, CONCENTRATE INTRAVENOUS EVERY 5 MIN PRN
Status: CANCELLED | OUTPATIENT
Start: 2019-02-14

## 2019-02-07 RX ORDER — MEPERIDINE HYDROCHLORIDE 50 MG/ML
25 INJECTION INTRAMUSCULAR; INTRAVENOUS; SUBCUTANEOUS EVERY 30 MIN PRN
Status: CANCELLED | OUTPATIENT
Start: 2019-02-28

## 2019-02-07 RX ORDER — ACETAMINOPHEN 325 MG/1
650 TABLET ORAL
Status: CANCELLED | OUTPATIENT
Start: 2019-02-07

## 2019-02-07 RX ORDER — ALBUTEROL SULFATE 90 UG/1
1-2 AEROSOL, METERED RESPIRATORY (INHALATION)
Status: CANCELLED
Start: 2019-02-28

## 2019-02-07 RX ORDER — NALOXONE HYDROCHLORIDE 0.4 MG/ML
.1-.4 INJECTION, SOLUTION INTRAMUSCULAR; INTRAVENOUS; SUBCUTANEOUS
Status: DISCONTINUED | OUTPATIENT
Start: 2019-02-07 | End: 2019-02-07 | Stop reason: HOSPADM

## 2019-02-07 RX ORDER — LORAZEPAM 0.5 MG/1
0.5 TABLET ORAL EVERY 4 HOURS PRN
Qty: 30 TABLET | Refills: 2 | Status: CANCELLED | OUTPATIENT
Start: 2019-02-13

## 2019-02-07 RX ORDER — EPINEPHRINE 0.3 MG/.3ML
0.3 INJECTION SUBCUTANEOUS EVERY 5 MIN PRN
Status: CANCELLED | OUTPATIENT
Start: 2019-02-14

## 2019-02-07 RX ADMIN — MIDAZOLAM 2 MG: 1 INJECTION INTRAMUSCULAR; INTRAVENOUS at 11:32

## 2019-02-07 RX ADMIN — SODIUM CHLORIDE, SODIUM LACTATE, POTASSIUM CHLORIDE, AND CALCIUM CHLORIDE: 600; 310; 30; 20 INJECTION, SOLUTION INTRAVENOUS at 11:00

## 2019-02-07 RX ADMIN — ONDANSETRON 4 MG: 2 INJECTION INTRAMUSCULAR; INTRAVENOUS at 11:36

## 2019-02-07 RX ADMIN — PHENYLEPHRINE HYDROCHLORIDE 200 MCG: 10 INJECTION, SOLUTION INTRAMUSCULAR; INTRAVENOUS; SUBCUTANEOUS at 12:04

## 2019-02-07 RX ADMIN — PROPOFOL 200 MCG/KG/MIN: 10 INJECTION, EMULSION INTRAVENOUS at 11:37

## 2019-02-07 RX ADMIN — PHENYLEPHRINE HYDROCHLORIDE 100 MCG: 10 INJECTION, SOLUTION INTRAMUSCULAR; INTRAVENOUS; SUBCUTANEOUS at 12:14

## 2019-02-07 RX ADMIN — DEXAMETHASONE SODIUM PHOSPHATE 8 MG: 4 INJECTION, SOLUTION INTRA-ARTICULAR; INTRALESIONAL; INTRAMUSCULAR; INTRAVENOUS; SOFT TISSUE at 11:48

## 2019-02-07 RX ADMIN — LIDOCAINE HYDROCHLORIDE 80 MG: 20 INJECTION, SOLUTION INFILTRATION; PERINEURAL at 11:36

## 2019-02-07 RX ADMIN — PROPOFOL 150 MG: 10 INJECTION, EMULSION INTRAVENOUS at 11:36

## 2019-02-07 RX ADMIN — PHENYLEPHRINE HYDROCHLORIDE 200 MCG: 10 INJECTION, SOLUTION INTRAMUSCULAR; INTRAVENOUS; SUBCUTANEOUS at 11:54

## 2019-02-07 RX ADMIN — KETOROLAC TROMETHAMINE 30 MG: 30 INJECTION, SOLUTION INTRAMUSCULAR at 12:10

## 2019-02-07 RX ADMIN — FENTANYL CITRATE 25 MCG: 50 INJECTION, SOLUTION INTRAMUSCULAR; INTRAVENOUS at 11:45

## 2019-02-07 RX ADMIN — CEFAZOLIN 2 G: 1 INJECTION, POWDER, FOR SOLUTION INTRAMUSCULAR; INTRAVENOUS at 11:50

## 2019-02-07 SDOH — HEALTH STABILITY: MENTAL HEALTH: HOW OFTEN DO YOU HAVE A DRINK CONTAINING ALCOHOL?: NEVER

## 2019-02-07 ASSESSMENT — PAIN SCALES - GENERAL: PAINLEVEL: NO PAIN (1)

## 2019-02-07 ASSESSMENT — MIFFLIN-ST. JEOR: SCORE: 1061.38

## 2019-02-07 NOTE — INTERVAL H&P NOTE
History and Physical Update    The history and physical has been reviewed and the patient has been examined.  There are no changes to the patient's history or physical condition.  I discussed port placement with the patient. I examined her SK on her left posterior chest wall-It has partially peeled off and we won't excise it today. Informed consent paperwork was completed.

## 2019-02-07 NOTE — ANESTHESIA CARE TRANSFER NOTE
Patient: Nadya Flanagan    Procedure(s):  INSERT PORT VASCULAR ACCESS    Diagnosis: appendix cancer  Diagnosis Additional Information: No value filed.    Anesthesia Type:   General, LMA     Note:  Airway :Face Mask  Patient transferred to:PACU  Handoff Report: Identifed the Patient, Identified the Reponsible Provider, Reviewed the pertinent medical history, Discussed the surgical course, Reviewed Intra-OP anesthesia mangement and issues during anesthesia, Set expectations for post-procedure period and Allowed opportunity for questions and acknowledgement of understanding      Vitals: (Last set prior to Anesthesia Care Transfer)    CRNA VITALS  2/7/2019 1150 - 2/7/2019 1223      2/7/2019             Pulse:  73    Ht Rate:  73    SpO2:  100 %    Resp Rate (set):  10                Electronically Signed By: ALBERT SHEIKH CRNA  February 7, 2019  12:23 PM

## 2019-02-07 NOTE — OR NURSING
Central Line Insertion Note/Procedural Checklist      2/7/2019   11:55 AM  Type of Catheter:  Power Port Left     confirms understanding of procedure. Yes   consent was obtained: Yes    Medical/ Surgical/ Allergies History reviewed: Yes..    Preprocedure Verification: Yes.  1) Patient identity verified; 2) side/site/procedure confirmed; 3) relevant information/documentation available, reviewed and properly matched to the patient; 4) consent accurate and complete; 5) equipment and supplies available   Site Marking:  N/A  Site marked if not in continuous attendance with the patient  Time Out:  Yes.  Time out was conducted just prior to starting procedure to verify the four required elements: 1) patient name and date of birth 2) confirmation that the correct side/site are marked if applicable, including visualization of the site hernandez 3) name of procedure including laterality if applicable, and 4) essential imaging and results are properly labeled and appropriately displayed, if applicable.    Central line was placed using the following Central Line Insertion Checklist:    Hand Hygiene: Yes or NO: Yes.   Maximal Barrier Precautions including Sterile Gown, Hat and Mask:Yes.   Full Body Drape: Yes.    Site cleansed with:  Prep? Yes.   Sterile field maintained: Yes.    During procedure, did the provider:  Wear sterile gloves during catheter insertion? Yes.    Wear hat, mask, and sterile gown? Yes.  Maintain sterile field? yes  Did all staff and patient in the room wear a mask? Yes.    After the procedure:  Was sterile technique maintained when applying dressing? Yes.  Was dressing dated?Yes.    2/7/2019  Name of Procedure MD Evangelista  Name of RN () Elvia Castro RN

## 2019-02-07 NOTE — DISCHARGE INSTRUCTIONS
[facility]  Adult Discharge Orders & Instructions      For 24 hours after surgery:  1. Get plenty of rest.  A responsible adult must stay with you for at least 24 hours after you leave the hospital.   2. You may feel lightheaded.  IF so, sit for a few minutes before standing.  Have someone help you get up.   3. You may have a slight fever. Call the doctor if your fever is over 101 F (38.3 C) (taken under the tongue) or lasts longer than 24 hours.  4. You may have a dry mouth, a sore throat, muscle aches or trouble sleeping.  These should go away after 24 hours.  5. Do not make important or legal decisions.                                                                                                                                                                           To contact a doctor, call    [phone]______________Rlfgcxlra you had done: port placement  Your health care provider is:  Isaura Allison  Your surgeon is Dr. Tresa Evangelista.   Please call your health care provider or surgeon at [phone] if:    - you feel you are getting worse or having an increase in problems    - fever greater than 101 degrees  - increasing shortness of breath or chest pain  - any signs of infection (increasing redness, swelling, tenderness, warmth, change in appearance, or  increased drainage)  - nausea (upset stomach) and vomiting and/or diarrhea that will not stop  - severe pain that is not relieved by medicine, rest or ice    Home care :  You will be discharged when you are safe to go home. Anesthesia can change judgment, reaction time and coordination for several hours after you seem back to normal. Therefore, do not operate any motorized vehicles or power tools for 24 hours after discharge.     Activity:  You should rest or do quiet activities for the rest of the day. The day after surgery you may be as active as you feel able. You may find that you require more rest than usual the first 3-4 days as your  body heals.     Diet:  Eat small amounts frequently after arriving home. Avoid foods that are hard to digest such as heavy, sweet, spicy, or fried foods until you are feeling well.   Vomiting can occur after general anesthesia. If vomiting lasts more than 5-7 times after arriving home, or if you have other difficulties, call your doctor.     Other:  1. Problems urinating can happen after surgery.  Please call your physician if you have any problems.  2. Some people have constipation after surgery-you can use over the counter stool softener or laxative as needed.  3. You can use ice on the area of the incision to help with pain and swelling. Apply an ice pack wrapped in a towel to the area for 10-15 minutes once an hour.   Dressing:  Your incision was covered with Steri-strips and a bandage. The bandage can be removed and you can shower 24 hours or more after the surgery. After you shower dry your incision gently. You may apply a clean bandage if you want to. The Steri-strips will stay on for 5-7 days.   No soaking in a bath, hot tub, pool or lake for 5 days.      Drainage:   Bleeding or drainage should be minimal.  1. If bleeding soaks the dressings, cover with another sterile dressing.  2. If bleeding continues, apply gentle steady pressure over the incision for 5 minutes.  3. If bleeding persists or there is an increased swelling of the area, call your surgeon or go to the Emergency Room.

## 2019-02-07 NOTE — ANESTHESIA POSTPROCEDURE EVALUATION
Patient: Nadya Flanagna    Procedure(s):  INSERT PORT VASCULAR ACCESS    Diagnosis:appendix cancer  Diagnosis Additional Information: No value filed.    Anesthesia Type:  General, LMA    Note:  Anesthesia Post Evaluation    Patient location during evaluation: PACU  Patient participation: Able to fully participate in evaluation  Level of consciousness: awake and alert  Pain management: adequate  Airway patency: patent  Cardiovascular status: acceptable  Respiratory status: acceptable  Hydration status: acceptable  PONV: none     Anesthetic complications: None          Last vitals:  Vitals:    02/07/19 1225 02/07/19 1230 02/07/19 1235   BP: 97/58 94/65 117/71   Pulse: 77 76 70   Resp:      Temp:   96.9  F (36.1  C)   SpO2: 99% 99% 99%         Electronically Signed By: ALBERT HAIRSTON CRNA  February 7, 2019  12:40 PM

## 2019-02-07 NOTE — ANESTHESIA PREPROCEDURE EVALUATION
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 Pt has had prior anesthetic. Type: General    No history of anesthetic complications          ROS/MED HX    ENT/Pulmonary:  - neg pulmonary ROS     Neurologic:  - neg neurologic ROS     Cardiovascular:  - neg cardiovascular ROS       METS/Exercise Tolerance:  >4 METS   Hematologic:  - neg hematologic  ROS       Musculoskeletal:  - neg musculoskeletal ROS       GI/Hepatic:  - neg GI/hepatic ROS       Renal/Genitourinary:  - ROS Renal section negative       Endo:     (+) thyroid problem .      Psychiatric:  - neg psychiatric ROS       Infectious Disease:  - neg infectious disease ROS       Malignancy:   (+) Malignancy History of GI  GI CA status post Surgery and Chemo,         Other:    - neg other ROS                      Physical Exam  Normal systems: cardiovascular, pulmonary and dental    Airway   Mallampati: I  TM distance: >3 FB  Neck ROM: full    Dental     Cardiovascular   Rhythm and rate: regular and normal      Pulmonary    breath sounds clear to auscultation            Lab Results   Component Value Date    WBC 10.2 2018    HGB 10.1 (L) 2018    HCT 31.2 (L) 2018     2018     2018    POTASSIUM 4.0 2018    CHLORIDE 107 2018    CO2 24 2018    BUN 8 2018    CR 0.80 2018     (H)  "12/20/2018    STELLA 8.7 12/20/2018    ALBUMIN 3.0 (L) 12/20/2018    PROTTOTAL 5.2 (L) 12/20/2018    ALT 66 (H) 12/20/2018    AST 42 (H) 12/20/2018    ALKPHOS 62 12/20/2018    BILITOTAL 0.5 12/20/2018    LIPASE 21 12/16/2018    TSH 5.18 (H) 11/12/2018       Preop Vitals  BP Readings from Last 3 Encounters:   02/07/19 128/74   01/24/19 110/76   01/03/19 110/78    Pulse Readings from Last 3 Encounters:   01/24/19 76   01/03/19 76   12/21/18 71      Resp Readings from Last 3 Encounters:   01/24/19 16   01/03/19 16   12/22/18 16    SpO2 Readings from Last 3 Encounters:   02/07/19 98%   12/22/18 95%      Temp Readings from Last 1 Encounters:   01/24/19 97.6  F (36.4  C) (Tympanic)    Ht Readings from Last 1 Encounters:   02/07/19 1.626 m (5' 4\")      Wt Readings from Last 1 Encounters:   02/07/19 54.2 kg (119 lb 6.4 oz)    Estimated body mass index is 20.49 kg/m  as calculated from the following:    Height as of this encounter: 1.626 m (5' 4\").    Weight as of this encounter: 54.2 kg (119 lb 6.4 oz).       Anesthesia Plan      History & Physical Review      ASA Status:  2 .    NPO Status:  > 6 hours    Plan for General and LMA with Propofol induction. Maintenance will be Balanced.    PONV prophylaxis:  Ondansetron (or other 5HT-3) and Dexamethasone or Solumedrol       Postoperative Care      Consents  Anesthetic plan, risks, benefits and alternatives discussed with:  Patient..                 ALBERT HAIRSTON CRNA  "

## 2019-02-07 NOTE — OP NOTE
Preoperative Diagnosis: appendiceal cancer    Postoperative Diagnosis: same    Procedure planned: Port placement     Procedure performed: left subclavian port placement with US guidance    Surgeon: Tresa Evangelista MD   Circulator: Hina Castro RN  Scrub Person: Richie Tricia  First Assistant: Rosalee Kendall RN  Pre-Op Nurse: Angel Beach RN    Anesthesia: Monitored anesthesia care, local     Specimen: none     Estimated Blood Loss: Less than 10 ml     INDICATIONS     Please see the H&P.The patient has appendiceal cancer. The risks, benefits and alternatives to implanted port for chemotherapy were discussed with the patient. We specifically discussed the risks of infection, bleeding, injury to blood vessels and lung, blood clot,port malfunction and the possible need for further procedures. The patient expressed understanding and questions were answered. Informed consent paperwork was completed.     DESCRIPTION OF PROCEDURE     Thepatient was brought to the operating room and placed in a supine position on the operating table. Appropriate monitors were attached. The patient received IV antibiotics preoperatively. After sedation was administered, thepatient was positioned, prepped with Chloraprep and draped in the standard fashion. Time out was performed confirming the patient's identity and procedure to be performed.   Local anesthetic was infiltrated in the skinand subcutaneous tissue in the area below the left clavicle. Cook needle was used to access the left axillary vein under US guidance. There was return of non-pulsatile blood. The guide wire was advanced through the needle andplacement confirmed using flouroscopy. The needle was removed and the guide wire was secured. Local anesthetic was infiltrated in the area of planned pocket formation. Skin incision was made sharply and carried down to thesubcutaneous tissue. Hemostasis was excellent. The pocket was checked for size and the port fit without  difficulty. The dilator and sheath were advanced over the wire under flouroscopy. The dilator and wire were removed. Thecatheter was flushed with heparinized saline and advanced through the sheath under fluoroscopy. The sheath was then removed with no difficulty. The catheter tip was confirmed in the superior vena cava with fluoroscopy. Thecatheter was cut at 23 cm and attached to the port and locked in position. The port was positioned in the pocket and secured using Vicryl sutures. The port was accessed and withdrew blood and flushed easily. Deep tissue wasapproximated using Monocryl suture. Further local anesthetic was infiltrated for post operative pain control. Skin edges were approximated with Monocryl suture.   Sterile dressings were applied. The patient was thenawakened from sedation and taken to recovery for chest XR in stable condition. All needle, sponge and instrument counts were reported as correct at the conclusion of the case. The patient tolerated the procedure with noimmediately apparent complications.

## 2019-02-07 NOTE — NURSING NOTE
"Chief Complaint   Patient presents with     Consult     Goblet cell cancer   No current complaints, but has a few questions.     Initial /72   Pulse 68   Temp 96.5  F (35.8  C) (Tympanic)   Ht 1.626 m (5' 4.02\")   Wt 54.6 kg (120 lb 6.4 oz)   SpO2 95%   BMI 20.66 kg/m   Estimated body mass index is 20.66 kg/m  as calculated from the following:    Height as of this encounter: 1.626 m (5' 4.02\").    Weight as of this encounter: 54.6 kg (120 lb 6.4 oz).  Medication Reconciliation: complete    Tiffanie Pastor CMA (AAMA)................ 2/7/2019 2:16 PM   "

## 2019-02-08 ENCOUNTER — HOME INFUSION (PRE-WILLOW HOME INFUSION) (OUTPATIENT)
Dept: PHARMACY | Facility: CLINIC | Age: 69
End: 2019-02-08

## 2019-02-10 NOTE — PROGRESS NOTES
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      REQUESTING PHYSICIAN:  Dr. Speedy Menezes, Dr. Tresa Evangelista and MARY Nelson of [facility].      HISTORY OF PRESENT ILLNESS:  Mrs. Flanagan is a 68-year-old white female with a history of migraine headaches.  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  She apparently presented with abdominal discomfort in mid-October associated with nausea.  This progressed to the point where she was seen by Isaura Allison, her primary care provider, in October.  At that time she has had periods of nausea and vomiting with associated epigastric pain and left lower quadrant abdominal pain.  MRI of the liver was obtained and there were findings of hemangioma.  Symptoms persisted and the patient underwent EGD on 12/7/2018 and there were no significant findings.  Apparently, her symptoms progressed to the point where she was having significant projectile vomiting in 12/14 and she was seen at [facility] and then transferred to [facility] where she was admitted for the small-bowel obstruction.  She was treated initially conservatively with NG suction, but then subsequently underwent a right hemicolectomy, which revealed a mass in the terminal ileum.  The mass was adherent to the right ovary.  Pathology was read as adenocarcinoma, ex goblet cell carcinoid of the appendix.  It was staged T4b N1 initially.  Tumors were present focally in the posterior retroperitoneal margin and the 2 out of 21 lymph nodes were involved with disease.  Peritoneal fluid was sent for cytology and was initially read as rare malignant cells.  The patient subsequently was referred to the  [facility] and was seen by Dr. Speedy Menezes, medical oncologist at the [facility].  On 01/16/2019 he had the slides reviewed by the [facility] Pathologist and their findings confirm that the patient had peritoneal washings that were consistent with involvement by adenocarcinoma ex goblet cell carcinoid, also terminal ileum, appendix, right colon from the right hemicolectomy specimen revealed adenocarcinoma ex goblet cell carcinoid forming an indurated mass diffusely involving the appendix and invading the visceral peritoneum.  Angiolymphatic invasion was present.  The retroperitoneal soft tissue margin was positive.  There were 3 out of 21 lymph nodes that were involved.  Pathologic stage was pathologic T4a pN1.  Immunohistochemical stain for DNA mismatch repair enzymes were intact.  Also, a CT abdomen and pelvis was performed at the [facility].  Findings were there were interval postoperative changes of the right hemicolectomy with resolution of previously seen small bowel dilatation obstruction.  There was mild soft tissue edema within the resection bed and along the midline incision, favored to be postoperative.  No definite peritoneal metastasis identified.  No free fluid.  There was abnormal geographic perfusion with hepatic segments 5 and 8 without evidence of focal hepatic mass.  There was indeterminate mild thickening of the left adrenal gland, unchanged since 11/15/2018.  He recommended FOLFOX chemotherapy given the fact that she has positive cytology.  He recommended 3 months of FOLFOX chemotherapy followed by state imaging at the [facility].  The patient has a port placed today.  She says she is doing relatively well.  She has some pain along the incision site.  Otherwise, she has no problems currently with nausea or vomiting.  She does have constipation.  She has had a 40-pound weight loss since October.  She denies any fevers, night sweats, bright red blood per rectum, hematemesis.      PAST MEDICAL  HISTORY:   1.  History of migraine headaches, mitral valve prolapse, osteopenia, hip fracture after a fall status post repair.   2.  Questionable history of thyroid disease, question Hashimoto's thyroiditis, currently in remission.        ALLERGIES:  SHE IS ALLERGIC TO CONTRAST DYE, METRIZAMIDE, BEE VENOM, POLLEN EXTRACT.      CURRENT MEDICATIONS:  Include Naprosyn 250 mg q.2 hours p.r.n., Tylenol 650 mg q.4 hours p.r.n., ibuprofen 600 mg q.i.d., Pepcid 10 mg daily, lactobacillus tablets 1 tablet daily, multivitamin 1 tablet daily, aspirin 81 mg daily, Naprosyn 250 mg b.i.d. p.r.n.      SOCIAL HISTORY:  Tobacco is negative.  Alcohol is negative.  She has worked in various occupations.  Most recently she has been employed as a .  She was a principal in the past.  She grew up in [place], lived in [place] and now is living in [facility].      FAMILY HISTORY:  Significant for a sister with breast cancer, aunt with eye cancer, uncle with unknown type of cancer.  Multiple cousins with different types of cancer including sinus cancer.      REVIEW OF SYSTEMS:     CNS:  She gets occasional migraines.     ENT:  Negative for hearing loss, sore throat, nasal congestion.   RESPIRATORY:  Negative for shortness of breath, cough, hemoptysis.   CARDIAC:  Negative for chest pain, palpitations.     GI:  Significant for constipation, occasional abdominal pain.  No bright red blood per rectum, hematemesis, diarrhea.   MUSCULOSKELETAL:  Unremarkable.   GENITOURINARY:  Negative for dysuria, frequency, hematuria.   CONSTITUTIONAL:  Negative for fevers, night sweats.  She has had a 40-pound weight loss.   HEME:  Negative for easy bruisability, gingival bleeding.      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  The patient is a candidate for chemotherapy.  She has  stage IV disease as per Dr. Menezes.  We will start the patient on FOLFOX, which is 5-FU given 400 mg IV bolus with leucovorin and oxaliplatin 85 mg/m2 to be given on day 1 and continuous IV 5-FU 2400 continuous IV over 46 hours.  We will start as soon as possible.  The patient will restage at the [facility].  We will see the patient otherwise with her third cycle of chemotherapy.  She will be seen with the first cycle and third cycle.  Will obtain CBC, CMP, LDH, CEA prior to chemotherapy.      Seventy minutes were with the patient, half the time was spent in counseling.  We discussed her disease status, the fact that she did not have a curable malignancy, but this regimen should improve her disease-free survival.  We discussed other side effects of chemotherapy including diarrhea, cold-induced neuropathy stomatitis risk of cytopenias.         DEEPIKA BARRIOS MD             D: 2019   T: 2019   MT: FRANCISCO      Name:     ALMA CORTES   MRN:      [id number]        Account:      [id number]   :      1950           Visit Date:   2019      Document: [id number]       cc: Isaura Menezes MD

## 2019-02-12 ENCOUNTER — ONCOLOGY VISIT (OUTPATIENT)
Dept: ONCOLOGY | Facility: OTHER | Age: 69
End: 2019-02-12
Attending: NURSE PRACTITIONER
Payer: MEDICARE

## 2019-02-12 ENCOUNTER — HOSPITAL ENCOUNTER (OUTPATIENT)
Dept: INFUSION THERAPY | Facility: OTHER | Age: 69
Discharge: HOME | End: 2019-02-12
Attending: INTERNAL MEDICINE | Admitting: INTERNAL MEDICINE
Payer: MEDICARE

## 2019-02-12 VITALS
DIASTOLIC BLOOD PRESSURE: 71 MMHG | TEMPERATURE: 98.6 F | WEIGHT: 118 LBS | SYSTOLIC BLOOD PRESSURE: 138 MMHG | HEART RATE: 64 BPM | RESPIRATION RATE: 16 BRPM | BODY MASS INDEX: 20.24 KG/M2

## 2019-02-12 VITALS
RESPIRATION RATE: 16 BRPM | SYSTOLIC BLOOD PRESSURE: 124 MMHG | TEMPERATURE: 98.6 F | DIASTOLIC BLOOD PRESSURE: 77 MMHG | HEIGHT: 64 IN | BODY MASS INDEX: 20.14 KG/M2 | WEIGHT: 117.95 LBS

## 2019-02-12 DIAGNOSIS — C18.1 CANCER OF APPENDIX METASTATIC TO INTRA-ABDOMINAL LYMPH NODE (H): Primary | ICD-10-CM

## 2019-02-12 DIAGNOSIS — C77.2 CANCER OF APPENDIX METASTATIC TO INTRA-ABDOMINAL LYMPH NODE (H): Primary | ICD-10-CM

## 2019-02-12 PROBLEM — C78.6 MALIGNANT NEOPLASM METASTATIC TO PERITONEUM (H): Status: ACTIVE | Noted: 2019-01-16

## 2019-02-12 PROBLEM — R73.03 PRE-DIABETES: Status: ACTIVE | Noted: 2019-02-12

## 2019-02-12 PROBLEM — Z90.89 S/P TONSILLECTOMY AND ADENOIDECTOMY: Status: ACTIVE | Noted: 2019-02-12

## 2019-02-12 LAB
ALBUMIN SERPL-MCNC: 4.2 G/DL (ref 3.5–5.7)
ALP SERPL-CCNC: 67 U/L (ref 34–104)
ALT SERPL W P-5'-P-CCNC: 19 U/L (ref 7–52)
ANION GAP SERPL CALCULATED.3IONS-SCNC: 5 MMOL/L (ref 3–14)
AST SERPL W P-5'-P-CCNC: 21 U/L (ref 13–39)
BASOPHILS # BLD AUTO: 0.1 10E9/L (ref 0–0.2)
BASOPHILS NFR BLD AUTO: 0.9 %
BILIRUB SERPL-MCNC: 0.5 MG/DL (ref 0.3–1)
BUN SERPL-MCNC: 18 MG/DL (ref 7–25)
CALCIUM SERPL-MCNC: 9.7 MG/DL (ref 8.6–10.3)
CEA SERPL-MCNC: <3 NG/ML
CHLORIDE SERPL-SCNC: 104 MMOL/L (ref 98–107)
CO2 SERPL-SCNC: 29 MMOL/L (ref 21–31)
CREAT SERPL-MCNC: 0.85 MG/DL (ref 0.6–1.2)
DIFFERENTIAL METHOD BLD: NORMAL
EOSINOPHIL # BLD AUTO: 0.2 10E9/L (ref 0–0.7)
EOSINOPHIL NFR BLD AUTO: 3.3 %
ERYTHROCYTE [DISTWIDTH] IN BLOOD BY AUTOMATED COUNT: 14.3 % (ref 10–15)
GFR SERPL CREATININE-BSD FRML MDRD: 67 ML/MIN/{1.73_M2}
GLUCOSE SERPL-MCNC: 102 MG/DL (ref 70–105)
HCT VFR BLD AUTO: 35.9 % (ref 35–47)
HGB BLD-MCNC: 11.7 G/DL (ref 11.7–15.7)
IMM GRANULOCYTES # BLD: 0 10E9/L (ref 0–0.4)
IMM GRANULOCYTES NFR BLD: 0.3 %
LDH SERPL L TO P-CCNC: 201 U/L (ref 140–271)
LYMPHOCYTES # BLD AUTO: 1.4 10E9/L (ref 0.8–5.3)
LYMPHOCYTES NFR BLD AUTO: 21.7 %
MCH RBC QN AUTO: 30.5 PG (ref 26.5–33)
MCHC RBC AUTO-ENTMCNC: 32.6 G/DL (ref 31.5–36.5)
MCV RBC AUTO: 94 FL (ref 78–100)
MONOCYTES # BLD AUTO: 0.8 10E9/L (ref 0–1.3)
MONOCYTES NFR BLD AUTO: 12.2 %
NEUTROPHILS # BLD AUTO: 4 10E9/L (ref 1.6–8.3)
NEUTROPHILS NFR BLD AUTO: 61.6 %
PLATELET # BLD AUTO: 301 10E9/L (ref 150–450)
POTASSIUM SERPL-SCNC: 4.4 MMOL/L (ref 3.5–5.1)
PROT SERPL-MCNC: 6.8 G/DL (ref 6.4–8.9)
RBC # BLD AUTO: 3.83 10E12/L (ref 3.8–5.2)
SODIUM SERPL-SCNC: 138 MMOL/L (ref 134–144)
WBC # BLD AUTO: 6.4 10E9/L (ref 4–11)

## 2019-02-12 PROCEDURE — 96416 CHEMO PROLONG INFUSE W/PUMP: CPT

## 2019-02-12 PROCEDURE — 82378 CARCINOEMBRYONIC ANTIGEN: CPT | Performed by: INTERNAL MEDICINE

## 2019-02-12 PROCEDURE — 80053 COMPREHEN METABOLIC PANEL: CPT | Performed by: INTERNAL MEDICINE

## 2019-02-12 PROCEDURE — 83615 LACTATE (LD) (LDH) ENZYME: CPT | Performed by: INTERNAL MEDICINE

## 2019-02-12 PROCEDURE — G0463 HOSPITAL OUTPT CLINIC VISIT: HCPCS

## 2019-02-12 PROCEDURE — 96411 CHEMO IV PUSH ADDL DRUG: CPT

## 2019-02-12 PROCEDURE — 96367 TX/PROPH/DG ADDL SEQ IV INF: CPT

## 2019-02-12 PROCEDURE — 96415 CHEMO IV INFUSION ADDL HR: CPT

## 2019-02-12 PROCEDURE — G0463 HOSPITAL OUTPT CLINIC VISIT: HCPCS | Mod: 25

## 2019-02-12 PROCEDURE — 99207 ZZC NO CHARGE LOS: CPT | Performed by: NURSE PRACTITIONER

## 2019-02-12 PROCEDURE — 25800030 ZZH RX IP 258 OP 636: Performed by: INTERNAL MEDICINE

## 2019-02-12 PROCEDURE — 85025 COMPLETE CBC W/AUTO DIFF WBC: CPT | Performed by: INTERNAL MEDICINE

## 2019-02-12 PROCEDURE — 96368 THER/DIAG CONCURRENT INF: CPT

## 2019-02-12 PROCEDURE — 25000128 H RX IP 250 OP 636: Performed by: INTERNAL MEDICINE

## 2019-02-12 PROCEDURE — 96413 CHEMO IV INFUSION 1 HR: CPT

## 2019-02-12 RX ORDER — FLUOROURACIL 50 MG/ML
400 INJECTION, SOLUTION INTRAVENOUS ONCE
Status: COMPLETED | OUTPATIENT
Start: 2019-02-12 | End: 2019-02-12

## 2019-02-12 RX ORDER — LACTOBACILLUS ACIDOPHILUS 20B CELL
1 CAPSULE ORAL AT BEDTIME
COMMUNITY
Start: 2019-02-07

## 2019-02-12 RX ORDER — HEPARIN SODIUM (PORCINE) LOCK FLUSH IV SOLN 100 UNIT/ML 100 UNIT/ML
500 SOLUTION INTRAVENOUS
Status: CANCELLED
Start: 2019-02-14

## 2019-02-12 RX ADMIN — DEXTROSE MONOHYDRATE 250 ML: 5 INJECTION, SOLUTION INTRAVENOUS at 10:01

## 2019-02-12 RX ADMIN — DEXAMETHASONE SODIUM PHOSPHATE: 10 INJECTION, SOLUTION INTRAMUSCULAR; INTRAVENOUS at 10:01

## 2019-02-12 RX ADMIN — LEUCOVORIN CALCIUM 550 MG: 500 INJECTION, POWDER, LYOPHILIZED, FOR SOLUTION INTRAMUSCULAR; INTRAVENOUS at 10:44

## 2019-02-12 RX ADMIN — OXALIPLATIN 133 MG: 5 INJECTION, SOLUTION INTRAVENOUS at 10:39

## 2019-02-12 RX ADMIN — FLUOROURACIL 630 MG: 50 INJECTION, SOLUTION INTRAVENOUS at 12:56

## 2019-02-12 ASSESSMENT — MIFFLIN-ST. JEOR: SCORE: 1050.25

## 2019-02-12 NOTE — PROGRESS NOTES
Infusion Nursing Note:  Nadya Flanagan presents today for First FOLFOX treatment.    Patient seen by provider today: Yes: NP Diana Alvarado   present during visit today: Not Applicable.    Note: patient tolerated infusion with out concerns and all questions answered and phone number to unit given to patient and .    Intravenous Access:  Labs drawn without difficulty.  Implanted Port.    Treatment Conditions:  Lab Results   Component Value Date    HGB 11.7 02/12/2019     Lab Results   Component Value Date    WBC 6.4 02/12/2019      Lab Results   Component Value Date    ANEU 4.0 02/12/2019     Lab Results   Component Value Date     02/12/2019      Lab Results   Component Value Date     02/12/2019                   Lab Results   Component Value Date    POTASSIUM 4.4 02/12/2019           No results found for: MAG         Lab Results   Component Value Date    CR 0.85 02/12/2019                   Lab Results   Component Value Date    STELLA 9.7 02/12/2019                Lab Results   Component Value Date    BILITOTAL 0.5 02/12/2019           Lab Results   Component Value Date    ALBUMIN 4.2 02/12/2019                    Lab Results   Component Value Date    ALT 19 02/12/2019           Lab Results   Component Value Date    AST 21 02/12/2019       Results reviewed, labs MET treatment parameters, ok to proceed with treatment.      Post Infusion Assessment:  Patient tolerated infusion without incident.  Blood return noted pre and post infusion.  Blood return noted during administration every 10 cc.  Site patent and intact, free from redness, edema or discomfort.  No evidence of extravasations.    Discharge Plan:   Discharge instructions reviewed with: Patient.  Patient and/or family verbalized understanding of discharge instructions and all questions answered.  Patient discharged in stable condition accompanied by: .  Departure Mode: Ambulatory.  Prior to discharge: Port is secured in  "place with tegaderm and flushed with 10cc NS with positive blood return noted.  Continuous home infusion CADD pump connected.    All connectors secured in place and clamps taped open.    Pump started, \"running\" noted on display (CADD): YES.  Patient instructed to call our clinic or [facility] with any questions or concerns at home.  Patient verbalized understanding.    Patient set up for pump disconnect at our clinic on Thursday.      Jean S. Hammann, RN                        "

## 2019-02-12 NOTE — NURSING NOTE
"Patient is being seen by the provider in infusion where all rooming assessments, screenings, and vital signs were done by infusion RN.   Selena Solorio RN...........2/12/2019 9:25 AM    Chief Complaint   Patient presents with     chemotherapy     first treatment visit       Initial There were no vitals taken for this visit. Estimated body mass index is 20.24 kg/m  as calculated from the following:    Height as of 2/7/19: 1.626 m (5' 4.02\").    Weight as of an earlier encounter on 2/12/19: 53.5 kg (118 lb).  Medication Reconciliation: complete by infusion RN    Selena Solorio, RN    "

## 2019-02-12 NOTE — PROGRESS NOTES
"CHEMOTHERAPY EDUCATION    Nadya Flanagan is a 68 year old female here today for chemotherapy education, accompanied by . 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 Patient's oncologist is Dr. Thakkar and primary provider is Isaura Allison.    Reviewed the following with the patient and their support person:    General chemotherapy information, including ways it is excreted from the body and cleaning containment of vomitus or other bodily fluid, use of thebathroom, sexual health and intimacy, what to do if needing to miss a treatment, when to call the provider and the need for staff to wear protective equipment.  Importance of central line care (port) or IV site care.  Proper use of the home infusion pump, troubleshooting and who to call if there is a malfunction.    Treatment regimen; FOLFOX and rationale for strict adherence, specific medication names including pre-treatment medications and at home scheduled or as needed medications, delivery methods, and side effects and managementincluding; skin changes/hand-foot syndrome, anemia, neutropenia, thrombocytopenia, diarrhea/constipation, hair loss syndrome, memory changes/\"chemobrain\", mouth sores, taste changes, neuropathy, fatigue, myelosuppression,and risk of extravasation or infiltration.  Infection prevention, and monitoring of lab values, what lab tests and what changes of these values meant, along with the possibility of hydration or blood product transfusion,or the need to defer or hold treatment.    General orientation to the Medical Oncology department, InfusionServices department, HUCs/scheduling, bathrooms and usual flow of the treatment day provided as well as introduction to the Infusion nurses.  Patient received written information including Guide to Cancer Services folder, specific drug information guides, approved Internet sources, available community " resources, and side effectspecific management pamphlets. Business card with contact information given as well as Patient Navigator contact information.    Barriers to learning noted: no   Patient and  verbalized understanding of all written and verbal information.    Type of treatment: HomePump infuser: Patient education sheet given to the patient regarding how to use, care for, and monitor the infuser pump. Patient also instructed to check to ensure balloon is deflating throughout the day. Model of infusershown to patient to allow for questions and for the patient to become familiar with infuser pump.    Other concerns: Patient instructed to call with further questions or concerns. Patient states understanding and is in agreement with this plan. Copy of appointments given to patient.  labs done today and treatment given parameters are met.  60 minutes spent educating patient and family member/support person, coordinating infusion and answering questions.    Jean S. Hammann .............2/12/2019  2:48 PM

## 2019-02-12 NOTE — PROGRESS NOTES
Patient here for first chemotherapy treatment. Met with patient for a first chemo visit in infusion therapy. Patient is feeling well. Discussed some of the possible side effects from treatment. Discussed the use of essential oils as patient had some questions regarding this. All questions answered. Patient will be having repeat staging studies done at [place] after cycle 3. She will see Dr Thakkar for follow up in March. Patient was encouraged to call us with any questions or concerns.

## 2019-02-13 ENCOUNTER — TRANSFERRED RECORDS (OUTPATIENT)
Dept: HEALTH INFORMATION MANAGEMENT | Facility: OTHER | Age: 69
End: 2019-02-13

## 2019-02-14 ENCOUNTER — ONCOLOGY VISIT (OUTPATIENT)
Dept: ONCOLOGY | Facility: OTHER | Age: 69
End: 2019-02-14
Attending: NURSE PRACTITIONER
Payer: MEDICARE

## 2019-02-14 ENCOUNTER — HOSPITAL ENCOUNTER (OUTPATIENT)
Dept: INFUSION THERAPY | Facility: OTHER | Age: 69
Discharge: HOME | End: 2019-02-14
Attending: INTERNAL MEDICINE | Admitting: INTERNAL MEDICINE
Payer: MEDICARE

## 2019-02-14 DIAGNOSIS — C18.1 CANCER OF APPENDIX METASTATIC TO INTRA-ABDOMINAL LYMPH NODE (H): Primary | ICD-10-CM

## 2019-02-14 DIAGNOSIS — C78.6 MALIGNANT NEOPLASM METASTATIC TO PERITONEUM (H): Primary | ICD-10-CM

## 2019-02-14 DIAGNOSIS — C18.1 MALIGNANT NEOPLASM OF APPENDIX (H): ICD-10-CM

## 2019-02-14 DIAGNOSIS — C77.2 CANCER OF APPENDIX METASTATIC TO INTRA-ABDOMINAL LYMPH NODE (H): Primary | ICD-10-CM

## 2019-02-14 PROCEDURE — 99207 ZZC NO CHARGE LOS: CPT | Performed by: NURSE PRACTITIONER

## 2019-02-14 PROCEDURE — 96523 IRRIG DRUG DELIVERY DEVICE: CPT

## 2019-02-14 PROCEDURE — 25000128 H RX IP 250 OP 636: Performed by: INTERNAL MEDICINE

## 2019-02-14 RX ORDER — HEPARIN SODIUM (PORCINE) LOCK FLUSH IV SOLN 100 UNIT/ML 100 UNIT/ML
500 SOLUTION INTRAVENOUS
Status: COMPLETED | OUTPATIENT
Start: 2019-02-14 | End: 2019-02-14

## 2019-02-14 RX ORDER — HEPARIN SODIUM (PORCINE) LOCK FLUSH IV SOLN 100 UNIT/ML 100 UNIT/ML
500 SOLUTION INTRAVENOUS
Status: CANCELLED
Start: 2019-02-14

## 2019-02-14 RX ADMIN — SODIUM CHLORIDE, PRESERVATIVE FREE 500 UNITS: 5 INJECTION INTRAVENOUS at 11:32

## 2019-02-14 NOTE — PROGRESS NOTES
Patient returns after CADD pump running  for 46 hours, pump reads empty and patient tolerated treatment. Patient did go into [place] emergency room because she felt the pump pulling at the tape and there was blood under the dressing, no leaking from site. Blood was from the steri strips, MD cleaned area and redressed leaving needle intact.  Good brisk blood return post infusion and Port heparin locked and de-accessed. Patient returns in two weeks for next infusion.

## 2019-02-14 NOTE — ADDENDUM NOTE
Encounter addended by: Hammann, Jean S., RN on: 2/14/2019 8:36 AM   Actions taken: MAR administration accepted

## 2019-02-14 NOTE — PROGRESS NOTES
Patient here in fusion therapy for chemotherapy pump removal. Patient had some questions about some of the side effects related to chemotherapy. She would also like an explanation of some of the labs we do on a routine basis. All questions answered. Will meet with patient at her next chemotherapy visit to discuss labs.

## 2019-02-25 ENCOUNTER — MYC MEDICAL ADVICE (OUTPATIENT)
Dept: SURGERY | Facility: OTHER | Age: 69
End: 2019-02-25

## 2019-02-26 ENCOUNTER — HOSPITAL ENCOUNTER (OUTPATIENT)
Dept: INFUSION THERAPY | Facility: OTHER | Age: 69
Discharge: HOME | End: 2019-02-26
Attending: INTERNAL MEDICINE | Admitting: INTERNAL MEDICINE
Payer: MEDICARE

## 2019-02-26 VITALS
DIASTOLIC BLOOD PRESSURE: 59 MMHG | TEMPERATURE: 98 F | RESPIRATION RATE: 16 BRPM | BODY MASS INDEX: 20.21 KG/M2 | HEART RATE: 72 BPM | SYSTOLIC BLOOD PRESSURE: 113 MMHG | WEIGHT: 117.8 LBS

## 2019-02-26 DIAGNOSIS — C18.1 CANCER OF APPENDIX METASTATIC TO INTRA-ABDOMINAL LYMPH NODE (H): Primary | ICD-10-CM

## 2019-02-26 DIAGNOSIS — C77.2 CANCER OF APPENDIX METASTATIC TO INTRA-ABDOMINAL LYMPH NODE (H): Primary | ICD-10-CM

## 2019-02-26 LAB
ALBUMIN SERPL-MCNC: 4 G/DL (ref 3.5–5.7)
ALP SERPL-CCNC: 61 U/L (ref 34–104)
ALT SERPL W P-5'-P-CCNC: 31 U/L (ref 7–52)
ANION GAP SERPL CALCULATED.3IONS-SCNC: 8 MMOL/L (ref 3–14)
AST SERPL W P-5'-P-CCNC: 35 U/L (ref 13–39)
BASOPHILS # BLD AUTO: 0.1 10E9/L (ref 0–0.2)
BASOPHILS NFR BLD AUTO: 1.5 %
BILIRUB SERPL-MCNC: 0.3 MG/DL (ref 0.3–1)
BUN SERPL-MCNC: 16 MG/DL (ref 7–25)
CALCIUM SERPL-MCNC: 9.2 MG/DL (ref 8.6–10.3)
CHLORIDE SERPL-SCNC: 107 MMOL/L (ref 98–107)
CO2 SERPL-SCNC: 25 MMOL/L (ref 21–31)
CREAT SERPL-MCNC: 0.71 MG/DL (ref 0.6–1.2)
DIFFERENTIAL METHOD BLD: ABNORMAL
EOSINOPHIL # BLD AUTO: 0.2 10E9/L (ref 0–0.7)
EOSINOPHIL NFR BLD AUTO: 6.2 %
ERYTHROCYTE [DISTWIDTH] IN BLOOD BY AUTOMATED COUNT: 13.9 % (ref 10–15)
GFR SERPL CREATININE-BSD FRML MDRD: 82 ML/MIN/{1.73_M2}
GLUCOSE SERPL-MCNC: 90 MG/DL (ref 70–105)
HCT VFR BLD AUTO: 34.1 % (ref 35–47)
HGB BLD-MCNC: 11.2 G/DL (ref 11.7–15.7)
IMM GRANULOCYTES # BLD: 0 10E9/L (ref 0–0.4)
IMM GRANULOCYTES NFR BLD: 0 %
LDH SERPL L TO P-CCNC: 185 U/L (ref 140–271)
LYMPHOCYTES # BLD AUTO: 1.3 10E9/L (ref 0.8–5.3)
LYMPHOCYTES NFR BLD AUTO: 38.3 %
MCH RBC QN AUTO: 30.9 PG (ref 26.5–33)
MCHC RBC AUTO-ENTMCNC: 32.8 G/DL (ref 31.5–36.5)
MCV RBC AUTO: 94 FL (ref 78–100)
MONOCYTES # BLD AUTO: 0.7 10E9/L (ref 0–1.3)
MONOCYTES NFR BLD AUTO: 19.6 %
NEUTROPHILS # BLD AUTO: 1.2 10E9/L (ref 1.6–8.3)
NEUTROPHILS NFR BLD AUTO: 34.4 %
PLATELET # BLD AUTO: 260 10E9/L (ref 150–450)
POTASSIUM SERPL-SCNC: 4.5 MMOL/L (ref 3.5–5.1)
PROT SERPL-MCNC: 6.9 G/DL (ref 6.4–8.9)
RBC # BLD AUTO: 3.63 10E12/L (ref 3.8–5.2)
SODIUM SERPL-SCNC: 140 MMOL/L (ref 134–144)
WBC # BLD AUTO: 3.4 10E9/L (ref 4–11)

## 2019-02-26 PROCEDURE — 96413 CHEMO IV INFUSION 1 HR: CPT

## 2019-02-26 PROCEDURE — 85025 COMPLETE CBC W/AUTO DIFF WBC: CPT | Performed by: INTERNAL MEDICINE

## 2019-02-26 PROCEDURE — 80053 COMPREHEN METABOLIC PANEL: CPT | Performed by: INTERNAL MEDICINE

## 2019-02-26 PROCEDURE — 25000128 H RX IP 250 OP 636: Performed by: INTERNAL MEDICINE

## 2019-02-26 PROCEDURE — 83615 LACTATE (LD) (LDH) ENZYME: CPT | Performed by: INTERNAL MEDICINE

## 2019-02-26 PROCEDURE — 96368 THER/DIAG CONCURRENT INF: CPT

## 2019-02-26 PROCEDURE — 96367 TX/PROPH/DG ADDL SEQ IV INF: CPT

## 2019-02-26 PROCEDURE — 96415 CHEMO IV INFUSION ADDL HR: CPT

## 2019-02-26 PROCEDURE — 96411 CHEMO IV PUSH ADDL DRUG: CPT

## 2019-02-26 PROCEDURE — 96416 CHEMO PROLONG INFUSE W/PUMP: CPT

## 2019-02-26 PROCEDURE — 25800030 ZZH RX IP 258 OP 636: Performed by: INTERNAL MEDICINE

## 2019-02-26 RX ORDER — FLUOROURACIL 50 MG/ML
400 INJECTION, SOLUTION INTRAVENOUS ONCE
Status: COMPLETED | OUTPATIENT
Start: 2019-02-26 | End: 2019-02-26

## 2019-02-26 RX ADMIN — FLUOROURACIL 630 MG: 50 INJECTION, SOLUTION INTRAVENOUS at 12:57

## 2019-02-26 RX ADMIN — LEUCOVORIN CALCIUM 550 MG: 500 INJECTION, POWDER, LYOPHILIZED, FOR SOLUTION INTRAMUSCULAR; INTRAVENOUS at 10:40

## 2019-02-26 RX ADMIN — DEXAMETHASONE SODIUM PHOSPHATE: 10 INJECTION, SOLUTION INTRAMUSCULAR; INTRAVENOUS at 10:01

## 2019-02-26 RX ADMIN — OXALIPLATIN 133 MG: 5 INJECTION, SOLUTION INTRAVENOUS at 10:39

## 2019-02-26 RX ADMIN — DEXTROSE MONOHYDRATE 250 ML: 5 INJECTION, SOLUTION INTRAVENOUS at 10:01

## 2019-02-26 NOTE — PROGRESS NOTES
"Infusion Nursing Note:  Nadya Flanagan presents today for FOLFOX cycle 2 .    Patient seen by provider today: No   present during visit today: Not Applicable.    Note: Dr. Evangelista did walk over to check on patient and allow for questions..    Intravenous Access:  Labs drawn without difficulty.  Implanted Port.    Treatment Conditions:  Lab Results   Component Value Date    HGB 11.2 02/26/2019     Lab Results   Component Value Date    WBC 3.4 02/26/2019      Lab Results   Component Value Date    ANEU 1.2 02/26/2019     Lab Results   Component Value Date     02/26/2019      Lab Results   Component Value Date     02/26/2019                   Lab Results   Component Value Date    POTASSIUM 4.5 02/26/2019           No results found for: MAG         Lab Results   Component Value Date    CR 0.71 02/26/2019                   Lab Results   Component Value Date    STELLA 9.2 02/26/2019                Lab Results   Component Value Date    BILITOTAL 0.3 02/26/2019           Lab Results   Component Value Date    ALBUMIN 4.0 02/26/2019                    Lab Results   Component Value Date    ALT 31 02/26/2019           Lab Results   Component Value Date    AST 35 02/26/2019       Results reviewed, labs MET treatment parameters, ok to proceed with treatment.      Post Infusion Assessment:  Patient tolerated infusion without incident.  Blood return noted pre and post infusion.  Site patent and intact, free from redness, edema or discomfort.  No evidence of extravasations.  Prior to discharge: Port is secured in place with tegaderm and flushed with 10cc NS with positive blood return noted.  Continuous home infusion CADD pump connected.    All connectors secured in place and clamps taped open.    Pump started, \"running\" noted on display (CADD): YES.  Patient instructed to call our clinic or [facility] with any questions or concerns at home.  Patient verbalized understanding.    Patient set up for " pump disconnect at our clinic on Thursday.      Discharge Plan:   Patient and/or family verbalized understanding of discharge instructions and all questions answered.  Patient discharged in stable condition accompanied by: .  Departure Mode: Ambulatory.    Jean S. Hammann, RN

## 2019-02-28 ENCOUNTER — HOSPITAL ENCOUNTER (OUTPATIENT)
Dept: INFUSION THERAPY | Facility: OTHER | Age: 69
Discharge: HOME | End: 2019-02-28
Attending: INTERNAL MEDICINE | Admitting: INTERNAL MEDICINE
Payer: MEDICARE

## 2019-02-28 DIAGNOSIS — C77.2 CANCER OF APPENDIX METASTATIC TO INTRA-ABDOMINAL LYMPH NODE (H): ICD-10-CM

## 2019-02-28 DIAGNOSIS — C18.1 CANCER OF APPENDIX METASTATIC TO INTRA-ABDOMINAL LYMPH NODE (H): Primary | ICD-10-CM

## 2019-02-28 DIAGNOSIS — C77.2 CANCER OF APPENDIX METASTATIC TO INTRA-ABDOMINAL LYMPH NODE (H): Primary | ICD-10-CM

## 2019-02-28 DIAGNOSIS — C18.1 CANCER OF APPENDIX METASTATIC TO INTRA-ABDOMINAL LYMPH NODE (H): ICD-10-CM

## 2019-02-28 PROCEDURE — 96523 IRRIG DRUG DELIVERY DEVICE: CPT

## 2019-02-28 PROCEDURE — 25000128 H RX IP 250 OP 636: Performed by: INTERNAL MEDICINE

## 2019-02-28 RX ORDER — FLUOROURACIL 50 MG/ML
400 INJECTION, SOLUTION INTRAVENOUS ONCE
Status: CANCELLED | OUTPATIENT
Start: 2019-04-04

## 2019-02-28 RX ORDER — METHYLPREDNISOLONE SODIUM SUCCINATE 125 MG/2ML
125 INJECTION, POWDER, LYOPHILIZED, FOR SOLUTION INTRAMUSCULAR; INTRAVENOUS
Status: CANCELLED
Start: 2019-03-14

## 2019-02-28 RX ORDER — EPINEPHRINE 1 MG/ML
0.3 INJECTION, SOLUTION, CONCENTRATE INTRAVENOUS EVERY 5 MIN PRN
Status: CANCELLED | OUTPATIENT
Start: 2019-04-02

## 2019-02-28 RX ORDER — DIPHENHYDRAMINE HYDROCHLORIDE 50 MG/ML
50 INJECTION INTRAMUSCULAR; INTRAVENOUS
Status: CANCELLED
Start: 2019-04-02

## 2019-02-28 RX ORDER — ALBUTEROL SULFATE 90 UG/1
1-2 AEROSOL, METERED RESPIRATORY (INHALATION)
Status: CANCELLED
Start: 2019-04-02

## 2019-02-28 RX ORDER — HEPARIN SODIUM (PORCINE) LOCK FLUSH IV SOLN 100 UNIT/ML 100 UNIT/ML
500 SOLUTION INTRAVENOUS ONCE
Status: CANCELLED
Start: 2019-03-23 | End: 2019-03-16

## 2019-02-28 RX ORDER — LORAZEPAM 2 MG/ML
0.5 INJECTION INTRAMUSCULAR EVERY 4 HOURS PRN
Status: CANCELLED
Start: 2019-03-14

## 2019-02-28 RX ORDER — METHYLPREDNISOLONE SODIUM SUCCINATE 125 MG/2ML
125 INJECTION, POWDER, LYOPHILIZED, FOR SOLUTION INTRAMUSCULAR; INTRAVENOUS
Status: CANCELLED
Start: 2019-04-02

## 2019-02-28 RX ORDER — EPINEPHRINE 1 MG/ML
0.3 INJECTION, SOLUTION, CONCENTRATE INTRAVENOUS EVERY 5 MIN PRN
Status: CANCELLED | OUTPATIENT
Start: 2019-03-14

## 2019-02-28 RX ORDER — HEPARIN SODIUM (PORCINE) LOCK FLUSH IV SOLN 100 UNIT/ML 100 UNIT/ML
500 SOLUTION INTRAVENOUS ONCE
Status: CANCELLED
Start: 2019-04-04 | End: 2019-03-30

## 2019-02-28 RX ORDER — EPINEPHRINE 0.3 MG/.3ML
0.3 INJECTION SUBCUTANEOUS EVERY 5 MIN PRN
Status: CANCELLED | OUTPATIENT
Start: 2019-03-14

## 2019-02-28 RX ORDER — MEPERIDINE HYDROCHLORIDE 25 MG/ML
25 INJECTION INTRAMUSCULAR; INTRAVENOUS; SUBCUTANEOUS EVERY 30 MIN PRN
Status: CANCELLED | OUTPATIENT
Start: 2019-04-02

## 2019-02-28 RX ORDER — MEPERIDINE HYDROCHLORIDE 25 MG/ML
25 INJECTION INTRAMUSCULAR; INTRAVENOUS; SUBCUTANEOUS EVERY 30 MIN PRN
Status: CANCELLED | OUTPATIENT
Start: 2019-03-14

## 2019-02-28 RX ORDER — ALBUTEROL SULFATE 0.83 MG/ML
2.5 SOLUTION RESPIRATORY (INHALATION)
Status: CANCELLED | OUTPATIENT
Start: 2019-03-14

## 2019-02-28 RX ORDER — ALBUTEROL SULFATE 0.83 MG/ML
2.5 SOLUTION RESPIRATORY (INHALATION)
Status: CANCELLED | OUTPATIENT
Start: 2019-04-02

## 2019-02-28 RX ORDER — SODIUM CHLORIDE 9 MG/ML
1000 INJECTION, SOLUTION INTRAVENOUS CONTINUOUS PRN
Status: CANCELLED
Start: 2019-04-02

## 2019-02-28 RX ORDER — FLUOROURACIL 50 MG/ML
400 INJECTION, SOLUTION INTRAVENOUS ONCE
Status: CANCELLED | OUTPATIENT
Start: 2019-03-14

## 2019-02-28 RX ORDER — EPINEPHRINE 0.3 MG/.3ML
0.3 INJECTION SUBCUTANEOUS EVERY 5 MIN PRN
Status: CANCELLED | OUTPATIENT
Start: 2019-04-02

## 2019-02-28 RX ORDER — DIPHENHYDRAMINE HYDROCHLORIDE 50 MG/ML
50 INJECTION INTRAMUSCULAR; INTRAVENOUS
Status: CANCELLED
Start: 2019-03-14

## 2019-02-28 RX ORDER — ALBUTEROL SULFATE 90 UG/1
1-2 AEROSOL, METERED RESPIRATORY (INHALATION)
Status: CANCELLED
Start: 2019-03-14

## 2019-02-28 RX ORDER — LORAZEPAM 2 MG/ML
0.5 INJECTION INTRAMUSCULAR EVERY 4 HOURS PRN
Status: CANCELLED
Start: 2019-04-02

## 2019-02-28 RX ORDER — HEPARIN SODIUM (PORCINE) LOCK FLUSH IV SOLN 100 UNIT/ML 100 UNIT/ML
500 SOLUTION INTRAVENOUS ONCE
Status: COMPLETED | OUTPATIENT
Start: 2019-02-28 | End: 2019-02-28

## 2019-02-28 RX ORDER — SODIUM CHLORIDE 9 MG/ML
1000 INJECTION, SOLUTION INTRAVENOUS CONTINUOUS PRN
Status: CANCELLED
Start: 2019-03-14

## 2019-02-28 RX ADMIN — Medication 500 UNITS: at 11:14

## 2019-02-28 NOTE — ADDENDUM NOTE
Encounter addended by: Hammann, Jean S., RN on: 2/28/2019 1:06 PM   Actions taken: Flowsheet accepted

## 2019-03-12 ENCOUNTER — HOSPITAL ENCOUNTER (OUTPATIENT)
Dept: INFUSION THERAPY | Facility: OTHER | Age: 69
Discharge: HOME | End: 2019-03-12
Attending: INTERNAL MEDICINE | Admitting: INTERNAL MEDICINE
Payer: MEDICARE

## 2019-03-12 VITALS
BODY MASS INDEX: 20.18 KG/M2 | HEART RATE: 71 BPM | TEMPERATURE: 95.7 F | DIASTOLIC BLOOD PRESSURE: 70 MMHG | SYSTOLIC BLOOD PRESSURE: 130 MMHG | RESPIRATION RATE: 16 BRPM | WEIGHT: 117.6 LBS

## 2019-03-12 DIAGNOSIS — C77.2 CANCER OF APPENDIX METASTATIC TO INTRA-ABDOMINAL LYMPH NODE (H): Primary | ICD-10-CM

## 2019-03-12 DIAGNOSIS — C78.6 MALIGNANT NEOPLASM METASTATIC TO PERITONEUM (H): ICD-10-CM

## 2019-03-12 DIAGNOSIS — C18.1 CANCER OF APPENDIX METASTATIC TO INTRA-ABDOMINAL LYMPH NODE (H): Primary | ICD-10-CM

## 2019-03-12 DIAGNOSIS — C18.1 MALIGNANT NEOPLASM OF APPENDIX (H): ICD-10-CM

## 2019-03-12 LAB
ALBUMIN SERPL-MCNC: 3.8 G/DL (ref 3.5–5.7)
ALP SERPL-CCNC: 77 U/L (ref 34–104)
ALT SERPL W P-5'-P-CCNC: 34 U/L (ref 7–52)
ANION GAP SERPL CALCULATED.3IONS-SCNC: 8 MMOL/L (ref 3–14)
AST SERPL W P-5'-P-CCNC: 27 U/L (ref 13–39)
BASOPHILS # BLD AUTO: 0 10E9/L (ref 0–0.2)
BASOPHILS NFR BLD AUTO: 0.4 %
BILIRUB SERPL-MCNC: 0.3 MG/DL (ref 0.3–1)
BUN SERPL-MCNC: 16 MG/DL (ref 7–25)
CALCIUM SERPL-MCNC: 9.1 MG/DL (ref 8.6–10.3)
CEA SERPL-MCNC: 3.2 NG/ML
CHLORIDE SERPL-SCNC: 105 MMOL/L (ref 98–107)
CO2 SERPL-SCNC: 25 MMOL/L (ref 21–31)
CREAT SERPL-MCNC: 0.79 MG/DL (ref 0.6–1.2)
DIFFERENTIAL METHOD BLD: ABNORMAL
EOSINOPHIL # BLD AUTO: 0 10E9/L (ref 0–0.7)
EOSINOPHIL NFR BLD AUTO: 1.1 %
ERYTHROCYTE [DISTWIDTH] IN BLOOD BY AUTOMATED COUNT: 13.7 % (ref 10–15)
GFR SERPL CREATININE-BSD FRML MDRD: 72 ML/MIN/{1.73_M2}
GLUCOSE SERPL-MCNC: 90 MG/DL (ref 70–105)
HCT VFR BLD AUTO: 33.9 % (ref 35–47)
HGB BLD-MCNC: 11 G/DL (ref 11.7–15.7)
IMM GRANULOCYTES # BLD: 0 10E9/L (ref 0–0.4)
IMM GRANULOCYTES NFR BLD: 0 %
LDH SERPL L TO P-CCNC: 179 U/L (ref 140–271)
LYMPHOCYTES # BLD AUTO: 1.4 10E9/L (ref 0.8–5.3)
LYMPHOCYTES NFR BLD AUTO: 48.9 %
MCH RBC QN AUTO: 30.6 PG (ref 26.5–33)
MCHC RBC AUTO-ENTMCNC: 32.4 G/DL (ref 31.5–36.5)
MCV RBC AUTO: 94 FL (ref 78–100)
MONOCYTES # BLD AUTO: 0.7 10E9/L (ref 0–1.3)
MONOCYTES NFR BLD AUTO: 25 %
NEUTROPHILS # BLD AUTO: 0.7 10E9/L (ref 1.6–8.3)
NEUTROPHILS NFR BLD AUTO: 24.6 %
PLATELET # BLD AUTO: 166 10E9/L (ref 150–450)
PLATELET # BLD EST: ABNORMAL 10*3/UL
POTASSIUM SERPL-SCNC: 4.6 MMOL/L (ref 3.5–5.1)
PROT SERPL-MCNC: 6.6 G/DL (ref 6.4–8.9)
RBC # BLD AUTO: 3.6 10E12/L (ref 3.8–5.2)
RBC MORPH BLD: ABNORMAL
SODIUM SERPL-SCNC: 138 MMOL/L (ref 134–144)
WBC # BLD AUTO: 2.9 10E9/L (ref 4–11)

## 2019-03-12 PROCEDURE — 36591 DRAW BLOOD OFF VENOUS DEVICE: CPT

## 2019-03-12 PROCEDURE — 85025 COMPLETE CBC W/AUTO DIFF WBC: CPT | Performed by: INTERNAL MEDICINE

## 2019-03-12 PROCEDURE — 80053 COMPREHEN METABOLIC PANEL: CPT | Performed by: INTERNAL MEDICINE

## 2019-03-12 PROCEDURE — 82378 CARCINOEMBRYONIC ANTIGEN: CPT | Performed by: INTERNAL MEDICINE

## 2019-03-12 PROCEDURE — 25000128 H RX IP 250 OP 636: Performed by: INTERNAL MEDICINE

## 2019-03-12 PROCEDURE — 83615 LACTATE (LD) (LDH) ENZYME: CPT | Performed by: INTERNAL MEDICINE

## 2019-03-12 RX ORDER — HEPARIN SODIUM (PORCINE) LOCK FLUSH IV SOLN 100 UNIT/ML 100 UNIT/ML
500 SOLUTION INTRAVENOUS
Status: CANCELLED
Start: 2019-03-12

## 2019-03-12 RX ORDER — HEPARIN SODIUM (PORCINE) LOCK FLUSH IV SOLN 100 UNIT/ML 100 UNIT/ML
500 SOLUTION INTRAVENOUS
Status: COMPLETED | OUTPATIENT
Start: 2019-03-12 | End: 2019-03-12

## 2019-03-12 RX ADMIN — Medication 500 UNITS: at 10:08

## 2019-03-12 NOTE — PROGRESS NOTES
Infusion Nursing Note:  Nadya Flanagan presents today for folfox.    Patient seen by provider today: No   present during visit today: Not Applicable.    Note: N/A.    Intravenous Access:  Labs drawn without difficulty.  Implanted Port.    Treatment Conditions:  Lab Results   Component Value Date    HGB 11.0 03/12/2019     Lab Results   Component Value Date    WBC 2.9 03/12/2019      Lab Results   Component Value Date    ANEU 0.7 03/12/2019     Lab Results   Component Value Date     03/12/2019      Lab Results   Component Value Date     03/12/2019                   Lab Results   Component Value Date    POTASSIUM 4.6 03/12/2019           No results found for: MAG         Lab Results   Component Value Date    CR 0.79 03/12/2019                   Lab Results   Component Value Date    STELLA 9.1 03/12/2019                Lab Results   Component Value Date    BILITOTAL 0.3 03/12/2019           Lab Results   Component Value Date    ALBUMIN 3.8 03/12/2019                    Lab Results   Component Value Date    ALT 34 03/12/2019           Lab Results   Component Value Date    AST 27 03/12/2019       Results reviewed, labs did NOT meet treatment parameters: ANC 0.7.      Post Infusion Assessment:  No evidence of extravasations.  Access discontinued per protocol.    Discharge Plan:   Discharge instructions reviewed with: Patient.  Patient discharged in stable condition accompanied by: self.  Departure Mode: Ambulatory.    Elizabeth Chaidez RN

## 2019-03-14 DIAGNOSIS — C77.2 CANCER OF APPENDIX METASTATIC TO INTRA-ABDOMINAL LYMPH NODE (H): ICD-10-CM

## 2019-03-14 DIAGNOSIS — C18.1 CANCER OF APPENDIX METASTATIC TO INTRA-ABDOMINAL LYMPH NODE (H): ICD-10-CM

## 2019-03-14 RX ORDER — METHYLPREDNISOLONE SODIUM SUCCINATE 125 MG/2ML
125 INJECTION, POWDER, LYOPHILIZED, FOR SOLUTION INTRAMUSCULAR; INTRAVENOUS
Status: CANCELLED
Start: 2019-03-19

## 2019-03-14 RX ORDER — FLUOROURACIL 50 MG/ML
320 INJECTION, SOLUTION INTRAVENOUS ONCE
Status: CANCELLED | OUTPATIENT
Start: 2019-03-19

## 2019-03-14 RX ORDER — MEPERIDINE HYDROCHLORIDE 50 MG/ML
25 INJECTION INTRAMUSCULAR; INTRAVENOUS; SUBCUTANEOUS EVERY 30 MIN PRN
Status: CANCELLED | OUTPATIENT
Start: 2019-03-19

## 2019-03-14 RX ORDER — DIPHENHYDRAMINE HYDROCHLORIDE 50 MG/ML
50 INJECTION INTRAMUSCULAR; INTRAVENOUS
Status: CANCELLED
Start: 2019-03-19

## 2019-03-14 RX ORDER — ALBUTEROL SULFATE 0.83 MG/ML
2.5 SOLUTION RESPIRATORY (INHALATION)
Status: CANCELLED | OUTPATIENT
Start: 2019-03-19

## 2019-03-14 RX ORDER — EPINEPHRINE 1 MG/ML
0.3 INJECTION, SOLUTION, CONCENTRATE INTRAVENOUS EVERY 5 MIN PRN
Status: CANCELLED | OUTPATIENT
Start: 2019-03-19

## 2019-03-14 RX ORDER — EPINEPHRINE 0.3 MG/.3ML
0.3 INJECTION SUBCUTANEOUS EVERY 5 MIN PRN
Status: CANCELLED | OUTPATIENT
Start: 2019-03-19

## 2019-03-14 RX ORDER — SODIUM CHLORIDE 9 MG/ML
1000 INJECTION, SOLUTION INTRAVENOUS CONTINUOUS PRN
Status: CANCELLED
Start: 2019-03-19

## 2019-03-14 RX ORDER — ALBUTEROL SULFATE 90 UG/1
1-2 AEROSOL, METERED RESPIRATORY (INHALATION)
Status: CANCELLED
Start: 2019-03-19

## 2019-03-14 RX ORDER — LORAZEPAM 2 MG/ML
0.5 INJECTION INTRAMUSCULAR EVERY 4 HOURS PRN
Status: CANCELLED
Start: 2019-03-19

## 2019-03-19 ENCOUNTER — HOSPITAL ENCOUNTER (OUTPATIENT)
Dept: INFUSION THERAPY | Facility: OTHER | Age: 69
Discharge: HOME | End: 2019-03-19
Attending: INTERNAL MEDICINE | Admitting: INTERNAL MEDICINE
Payer: MEDICARE

## 2019-03-19 ENCOUNTER — ONCOLOGY VISIT (OUTPATIENT)
Dept: ONCOLOGY | Facility: OTHER | Age: 69
End: 2019-03-19
Attending: INTERNAL MEDICINE
Payer: MEDICARE

## 2019-03-19 DIAGNOSIS — Z91.041 ALLERGIC TO IV CONTRAST: Primary | ICD-10-CM

## 2019-03-19 DIAGNOSIS — C18.1 CANCER OF APPENDIX METASTATIC TO INTRA-ABDOMINAL LYMPH NODE (H): ICD-10-CM

## 2019-03-19 DIAGNOSIS — C77.2 CANCER OF APPENDIX METASTATIC TO INTRA-ABDOMINAL LYMPH NODE (H): Primary | ICD-10-CM

## 2019-03-19 DIAGNOSIS — C77.2 CANCER OF APPENDIX METASTATIC TO INTRA-ABDOMINAL LYMPH NODE (H): ICD-10-CM

## 2019-03-19 DIAGNOSIS — C18.1 CANCER OF APPENDIX METASTATIC TO INTRA-ABDOMINAL LYMPH NODE (H): Primary | ICD-10-CM

## 2019-03-19 LAB
ALBUMIN SERPL-MCNC: 4 G/DL (ref 3.5–5.7)
ALP SERPL-CCNC: 85 U/L (ref 34–104)
ALT SERPL W P-5'-P-CCNC: 81 U/L (ref 7–52)
ANION GAP SERPL CALCULATED.3IONS-SCNC: 7 MMOL/L (ref 3–14)
AST SERPL W P-5'-P-CCNC: 61 U/L (ref 13–39)
BASOPHILS # BLD AUTO: 0.1 10E9/L (ref 0–0.2)
BASOPHILS NFR BLD AUTO: 1.9 %
BILIRUB SERPL-MCNC: 0.4 MG/DL (ref 0.3–1)
BUN SERPL-MCNC: 17 MG/DL (ref 7–25)
CALCIUM SERPL-MCNC: 9.5 MG/DL (ref 8.6–10.3)
CEA SERPL-MCNC: <3 NG/ML
CHLORIDE SERPL-SCNC: 104 MMOL/L (ref 98–107)
CO2 SERPL-SCNC: 27 MMOL/L (ref 21–31)
CREAT SERPL-MCNC: 0.82 MG/DL (ref 0.6–1.2)
DIFFERENTIAL METHOD BLD: ABNORMAL
EOSINOPHIL # BLD AUTO: 0 10E9/L (ref 0–0.7)
EOSINOPHIL NFR BLD AUTO: 1.1 %
ERYTHROCYTE [DISTWIDTH] IN BLOOD BY AUTOMATED COUNT: 14.6 % (ref 10–15)
GFR SERPL CREATININE-BSD FRML MDRD: 69 ML/MIN/{1.73_M2}
GLUCOSE SERPL-MCNC: 96 MG/DL (ref 70–105)
HCT VFR BLD AUTO: 33.3 % (ref 35–47)
HGB BLD-MCNC: 11.3 G/DL (ref 11.7–15.7)
IMM GRANULOCYTES # BLD: 0 10E9/L (ref 0–0.4)
IMM GRANULOCYTES NFR BLD: 0.8 %
LDH SERPL L TO P-CCNC: 225 U/L (ref 140–271)
LYMPHOCYTES # BLD AUTO: 1.4 10E9/L (ref 0.8–5.3)
LYMPHOCYTES NFR BLD AUTO: 37.5 %
MCH RBC QN AUTO: 31.1 PG (ref 26.5–33)
MCHC RBC AUTO-ENTMCNC: 33.9 G/DL (ref 31.5–36.5)
MCV RBC AUTO: 92 FL (ref 78–100)
MONOCYTES # BLD AUTO: 1 10E9/L (ref 0–1.3)
MONOCYTES NFR BLD AUTO: 26.2 %
NEUTROPHILS # BLD AUTO: 1.2 10E9/L (ref 1.6–8.3)
NEUTROPHILS NFR BLD AUTO: 32.5 %
PLATELET # BLD AUTO: 223 10E9/L (ref 150–450)
POTASSIUM SERPL-SCNC: 4.5 MMOL/L (ref 3.5–5.1)
PROT SERPL-MCNC: 6.7 G/DL (ref 6.4–8.9)
RBC # BLD AUTO: 3.63 10E12/L (ref 3.8–5.2)
SODIUM SERPL-SCNC: 138 MMOL/L (ref 134–144)
WBC # BLD AUTO: 3.6 10E9/L (ref 4–11)

## 2019-03-19 PROCEDURE — G0463 HOSPITAL OUTPT CLINIC VISIT: HCPCS | Mod: 25

## 2019-03-19 PROCEDURE — 96415 CHEMO IV INFUSION ADDL HR: CPT

## 2019-03-19 PROCEDURE — 25000128 H RX IP 250 OP 636: Performed by: INTERNAL MEDICINE

## 2019-03-19 PROCEDURE — 25800030 ZZH RX IP 258 OP 636: Performed by: INTERNAL MEDICINE

## 2019-03-19 PROCEDURE — 85025 COMPLETE CBC W/AUTO DIFF WBC: CPT | Performed by: INTERNAL MEDICINE

## 2019-03-19 PROCEDURE — 96413 CHEMO IV INFUSION 1 HR: CPT

## 2019-03-19 PROCEDURE — 82378 CARCINOEMBRYONIC ANTIGEN: CPT | Performed by: INTERNAL MEDICINE

## 2019-03-19 PROCEDURE — 96416 CHEMO PROLONG INFUSE W/PUMP: CPT

## 2019-03-19 PROCEDURE — 99215 OFFICE O/P EST HI 40 MIN: CPT | Performed by: INTERNAL MEDICINE

## 2019-03-19 PROCEDURE — 96411 CHEMO IV PUSH ADDL DRUG: CPT

## 2019-03-19 PROCEDURE — 83615 LACTATE (LD) (LDH) ENZYME: CPT | Performed by: INTERNAL MEDICINE

## 2019-03-19 PROCEDURE — 96367 TX/PROPH/DG ADDL SEQ IV INF: CPT

## 2019-03-19 PROCEDURE — G0463 HOSPITAL OUTPT CLINIC VISIT: HCPCS

## 2019-03-19 PROCEDURE — 80053 COMPREHEN METABOLIC PANEL: CPT | Performed by: INTERNAL MEDICINE

## 2019-03-19 PROCEDURE — 96368 THER/DIAG CONCURRENT INF: CPT

## 2019-03-19 RX ORDER — DIPHENHYDRAMINE HCL 50 MG
CAPSULE ORAL
Qty: 1 CAPSULE | Refills: 0 | Status: SHIPPED | OUTPATIENT
Start: 2019-03-19 | End: 2019-04-18

## 2019-03-19 RX ORDER — FLUOROURACIL 50 MG/ML
320 INJECTION, SOLUTION INTRAVENOUS ONCE
Status: COMPLETED | OUTPATIENT
Start: 2019-03-19 | End: 2019-03-19

## 2019-03-19 RX ORDER — METHYLPREDNISOLONE 32 MG/1
TABLET ORAL
Qty: 2 TABLET | Refills: 0 | Status: SHIPPED | OUTPATIENT
Start: 2019-03-19 | End: 2019-04-18

## 2019-03-19 RX ORDER — LORAZEPAM 2 MG/ML
0.5 INJECTION INTRAMUSCULAR EVERY 4 HOURS PRN
Status: DISCONTINUED | OUTPATIENT
Start: 2019-03-19 | End: 2019-03-19 | Stop reason: CLARIF

## 2019-03-19 RX ADMIN — DEXAMETHASONE SODIUM PHOSPHATE: 10 INJECTION, SOLUTION INTRAMUSCULAR; INTRAVENOUS at 10:12

## 2019-03-19 RX ADMIN — FLUOROURACIL 500 MG: 50 INJECTION, SOLUTION INTRAVENOUS at 13:15

## 2019-03-19 RX ADMIN — DEXTROSE MONOHYDRATE 250 ML: 5 INJECTION, SOLUTION INTRAVENOUS at 10:11

## 2019-03-19 RX ADMIN — LEUCOVORIN CALCIUM 440 MG: 100 INJECTION, POWDER, LYOPHILIZED, FOR SOLUTION INTRAMUSCULAR; INTRAVENOUS at 11:01

## 2019-03-19 RX ADMIN — OXALIPLATIN 105 MG: 5 INJECTION, SOLUTION INTRAVENOUS at 10:57

## 2019-03-19 NOTE — NURSING NOTE
Patient is being seen by provider in infusion where all rooming assessments and vitals were done by the infusion RN.    Tiffanie Pastor CMA ([facility])................ 3/19/2019 8:53 AM

## 2019-03-19 NOTE — PROGRESS NOTES
"Infusion Nursing Note:  Nadya Flanagan presents today for FOLFOX.    Patient seen by provider today: No   present during visit today: Not Applicable.    Note: N/A.    Intravenous Access:  Labs drawn without difficulty.  Implanted Port.    Treatment Conditions:  Lab Results   Component Value Date    HGB 11.3 03/19/2019     Lab Results   Component Value Date    WBC 3.6 03/19/2019      Lab Results   Component Value Date    ANEU 1.2 03/19/2019     Lab Results   Component Value Date     03/19/2019      Lab Results   Component Value Date     03/19/2019                   Lab Results   Component Value Date    POTASSIUM 4.5 03/19/2019           No results found for: MAG         Lab Results   Component Value Date    CR 0.82 03/19/2019                   Lab Results   Component Value Date    STELLA 9.5 03/19/2019                Lab Results   Component Value Date    BILITOTAL 0.4 03/19/2019           Lab Results   Component Value Date    ALBUMIN 4.0 03/19/2019                    Lab Results   Component Value Date    ALT 81 03/19/2019           Lab Results   Component Value Date    AST 61 03/19/2019       Results reviewed, labs MET treatment parameters, ok to proceed with treatment.      Post Infusion Assessment:  Patient tolerated infusion without incident.  Blood return noted pre and post infusion.  Site patent and intact, free from redness, edema or discomfort.  No evidence of extravasations.   Prior to discharge: Port is secured in place with tegaderm and flushed with 10cc NS with positive blood return noted.  Continuous home infusion CADD pump connected.    All connectors secured in place and clamps taped open.    Pump started, \"running\" noted on display (CADD): YES.  Patient instructed to call our clinic or [facility] with any questions or concerns at home.  Patient verbalized understanding.    Patient set up for pump disconnect at our clinic on Thursday .        Discharge Plan: "   Discharge instructions reviewed with: Patient.  Patient discharged in stable condition accompanied by: wife.  Departure Mode: Ambulatory.    Jean S. Hammann, RN

## 2019-03-20 NOTE — PROGRESS NOTES
Visit Date:   03/19/2019      HISTORY OF PRESENT ILLNESS:  Mrs. 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  We had seen the patient in consultation at the request of Dr. Tresa Evangelista and MARY Nelson of [facility] and Dr. Speedy Menezes at the [facility].  We had seen the patient on 02/07/2019.  At that time, she was a 68-year-old white female with a history of migraine headaches.  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  She apparently presented with abdominal discomfort in mid October associated with nausea.  This progressed to the point where she was seen by Isaura Allison, her primary care provider, in October.  At that time, she had periods of nausea, vomiting and associated epigastric pain and left lower quadrant abdominal pain.  An MRI of the liver was obtained and there were findings of hemangioma.  The symptoms persisted and the patient underwent EGD on 12/07/2018 and there were no significant findings.  Apparently, her symptoms progressed to the point where she was having significant projectile vomiting.  On 12/14/2018, she was seen at [facility] and then transferred to [facility] where she was admitted for a small-bowel obstruction.  She was treated initially conservatively with NG suction and subsequently underwent a right hemicolectomy, which revealed a mass at the terminal ileum.  The mass was adherent to the right ovary.  Pathology was read as adenocarcinoma, ex-goblet cell carcinoid, of the appendix, was staged T4b N1 initially.  Tumors were present focally in the posterior retroperitoneal margin and 2 out of 21 lymph nodes were involved with disease.  The perineal fluid was sent for  cytology.  It was initially read as rare malignant cells.  The patient subsequently was referred to the [facility], was seen by Dr. Speedy Menezes, a medical oncologist, at the [facility] on 01/16/2019.  He had the slides reviewed by the [facility] pathologist and the findings were that the patient had peritoneal washings that were consistent with involvement by adenocarcinoma ex-goblet cell carcinoid and also terminal ileum, appendix AND right colon from the right hemicolectomy specimen revealed adenocarcinoma ex-goblet cell carcinoid forming an indurated mass diffusely involving the appendix and invading the visceral peritoneum.  Angiolymphatic invasion was present.  The retroperitoneal soft tissue margin was positive, 3 out of 21 lymph nodes were involved, pathologic stage was pathologic T4a pN1.  Immunohistochemical staining for DNA mismatch repair enzymes was intact.  Also, a CT abdomen and pelvis was performed at the [facility].  The findings were there were interval postoperative changes of the right hemicolectomy with resolution of previously seen small bowel dilatation/obstruction.  There was mild soft tissue edema within the resection bed and along the midline incision, favored to be postoperative.  No definite peritoneal metastasis identified.  No free fluid.  There was abnormal geographic perfusion with hepatic segments 5 and 8 without evidence of focal hepatic mass.  There indeterminate mild thickening of the left adrenal gland, unchanged since 11/15/2018.  Dr. Menezes recommended FOLFOX chemotherapy given the fact she had positive cytology and recommended 3 months of FOLFOX chemotherapy, followed by imaging at the [facility].  The patient had a port placed.  When we saw her on 02/07/2019, the plan was to proceed with FOLFOX with 5-FU given at 400 mg IV bolus with a leucovorin bolus and oxaliplatin 85 mg/m2 to be given on day 1 and continuous IV 5-FU 2400 mg/m2 IV over 46 hours continuously.  Otherwise,  the plan was to stage restage after 3 months of therapy.  The patient is here for cycle 3, day 1.  This was delayed due to neutropenia with a neutrophil count of 0.7 and this required dose reduction of 20% of all chemotherapeutic agents including 5-FU, leucovorin and oxaliplatin.  The patient says she is tolerating chemotherapy well without significant nausea, vomiting, neuropathy or diarrhea.  She does note what she describes as discomfort in the lower abdomen.  She is also concerned about her CEA rising to 3.2.  Otherwise, she offers no other complaints of fevers, night sweats or weight loss.      PHYSICAL EXAMINATION:   GENERAL:  She is a middle-aged white female in no acute distress.   VITAL SIGNS:  Reveal the blood pressure is 119/71, pulse 80, temperature 97.6.   HEENT:  Atraumatic, normocephalic.  Oropharynx nonerythematous.   NECK:  Supple.   LUNGS:  Clear to auscultation and percussion.   HEART:  Regular rhythm, S1, S2 normal.   ABDOMEN:  Soft.  There is some minimal tenderness in the lower abdomen.  No rebound.   LYMPHATICS:  No cervical, supraclavicular, axillary or inguinal nodes.   EXTREMITIES:  No edema.   NEUROLOGIC:  Nonfocal.      LABORATORY DATA:  CBC:  White count 3.6 with absolute neutrophil count of 1.2, H and H 10.3 and 33.3, platelet count 223.  BUN 17 and creatinine 0.82.  ALT is 81, AST 61 and total bilirubin 0.4.      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  The patient was deemed a candidate for chemotherapy for stage IV disease as per Dr. Speedy Menezes at the [facility] who started the patient on FOLFOX.  She has received 2 cycles thus far.  The course was complicated by neutropenia, and now with rising CEA in the setting of elevated LFTs.  The plan is to proceed with this cycle of FOLFOX at 20% dose reduction and restage after 4 cycles of  FOLFOX.  We will obtain CBC, CMP, LDH and CEA.  The patient would like to have scans done here instead of the [facility].  If there is evidence of progression, we will contact Dr. Menezes for his opinion.      Otherwise, 40 minutes was spent with patient, with greater than half the time spent on counseling.         DEEPIKA BARRIOS MD             D: 2019   T: 2019   MT: VH      Name:     ALMA CORTES   MRN:      [id number]        Account:      [id number]   :      1950           Visit Date:   2019      Document: [id number]       cc: Isaura Menezes MD

## 2019-03-21 ENCOUNTER — HOSPITAL ENCOUNTER (OUTPATIENT)
Dept: INFUSION THERAPY | Facility: OTHER | Age: 69
Discharge: HOME | End: 2019-03-21
Attending: INTERNAL MEDICINE | Admitting: INTERNAL MEDICINE
Payer: MEDICARE

## 2019-03-21 VITALS
SYSTOLIC BLOOD PRESSURE: 118 MMHG | DIASTOLIC BLOOD PRESSURE: 58 MMHG | TEMPERATURE: 97.6 F | RESPIRATION RATE: 16 BRPM | WEIGHT: 117.6 LBS | HEART RATE: 83 BPM | BODY MASS INDEX: 20.18 KG/M2

## 2019-03-21 DIAGNOSIS — C78.6 MALIGNANT NEOPLASM METASTATIC TO PERITONEUM (H): Primary | ICD-10-CM

## 2019-03-21 DIAGNOSIS — C18.1 MALIGNANT NEOPLASM OF APPENDIX (H): ICD-10-CM

## 2019-03-21 PROCEDURE — 25000128 H RX IP 250 OP 636: Performed by: INTERNAL MEDICINE

## 2019-03-21 PROCEDURE — 96523 IRRIG DRUG DELIVERY DEVICE: CPT

## 2019-03-21 RX ORDER — HEPARIN SODIUM (PORCINE) LOCK FLUSH IV SOLN 100 UNIT/ML 100 UNIT/ML
500 SOLUTION INTRAVENOUS
Status: COMPLETED | OUTPATIENT
Start: 2019-03-21 | End: 2019-03-21

## 2019-03-21 RX ORDER — HEPARIN SODIUM (PORCINE) LOCK FLUSH IV SOLN 100 UNIT/ML 100 UNIT/ML
500 SOLUTION INTRAVENOUS
Status: CANCELLED
Start: 2019-03-21

## 2019-03-21 RX ADMIN — Medication 500 UNITS: at 11:37

## 2019-03-21 NOTE — ADDENDUM NOTE
Encounter addended by: Hammann, Jean S., RN on: 3/21/2019 1:27 PM   Actions taken: Flowsheet accepted

## 2019-03-21 NOTE — ADDENDUM NOTE
Encounter addended by: Hammann, Jean S., RN on: 3/21/2019 1:17 PM   Actions taken: Flowsheet accepted

## 2019-03-29 ENCOUNTER — MYC MEDICAL ADVICE (OUTPATIENT)
Dept: SURGERY | Facility: OTHER | Age: 69
End: 2019-03-29

## 2019-03-29 NOTE — TELEPHONE ENCOUNTER
I'll try to stop by! I sure didn't want to disturb your nap last time :). Don't wait on me for your lunch, I do have an appointment but will try to get over before or after. Have a good weekend!   Tresa :)

## 2019-04-01 DIAGNOSIS — C18.1 CANCER OF APPENDIX METASTATIC TO INTRA-ABDOMINAL LYMPH NODE (H): Primary | ICD-10-CM

## 2019-04-01 DIAGNOSIS — C77.2 CANCER OF APPENDIX METASTATIC TO INTRA-ABDOMINAL LYMPH NODE (H): Primary | ICD-10-CM

## 2019-04-01 RX ORDER — LORAZEPAM 2 MG/ML
0.5 INJECTION INTRAMUSCULAR EVERY 4 HOURS PRN
Status: CANCELLED
Start: 2019-04-16

## 2019-04-01 RX ORDER — EPINEPHRINE 1 MG/ML
0.3 INJECTION, SOLUTION, CONCENTRATE INTRAVENOUS EVERY 5 MIN PRN
Status: CANCELLED | OUTPATIENT
Start: 2019-04-16

## 2019-04-01 RX ORDER — ALBUTEROL SULFATE 90 UG/1
1-2 AEROSOL, METERED RESPIRATORY (INHALATION)
Status: CANCELLED
Start: 2019-04-16

## 2019-04-01 RX ORDER — METHYLPREDNISOLONE SODIUM SUCCINATE 125 MG/2ML
125 INJECTION, POWDER, LYOPHILIZED, FOR SOLUTION INTRAMUSCULAR; INTRAVENOUS
Status: CANCELLED
Start: 2019-04-16

## 2019-04-01 RX ORDER — FLUOROURACIL 50 MG/ML
320 INJECTION, SOLUTION INTRAVENOUS ONCE
Status: CANCELLED | OUTPATIENT
Start: 2019-04-16

## 2019-04-01 RX ORDER — EPINEPHRINE 0.3 MG/.3ML
0.3 INJECTION SUBCUTANEOUS EVERY 5 MIN PRN
Status: CANCELLED | OUTPATIENT
Start: 2019-04-16

## 2019-04-01 RX ORDER — MEPERIDINE HYDROCHLORIDE 50 MG/ML
25 INJECTION INTRAMUSCULAR; INTRAVENOUS; SUBCUTANEOUS EVERY 30 MIN PRN
Status: CANCELLED | OUTPATIENT
Start: 2019-04-16

## 2019-04-01 RX ORDER — SODIUM CHLORIDE 9 MG/ML
1000 INJECTION, SOLUTION INTRAVENOUS CONTINUOUS PRN
Status: CANCELLED
Start: 2019-04-16

## 2019-04-01 RX ORDER — HEPARIN SODIUM (PORCINE) LOCK FLUSH IV SOLN 100 UNIT/ML 100 UNIT/ML
500 SOLUTION INTRAVENOUS ONCE
Status: CANCELLED
Start: 2019-04-18 | End: 2019-04-18

## 2019-04-01 RX ORDER — FLUOROURACIL 50 MG/ML
320 INJECTION, SOLUTION INTRAVENOUS ONCE
Status: CANCELLED | OUTPATIENT
Start: 2019-04-02

## 2019-04-01 RX ORDER — DIPHENHYDRAMINE HYDROCHLORIDE 50 MG/ML
50 INJECTION INTRAMUSCULAR; INTRAVENOUS
Status: CANCELLED
Start: 2019-04-16

## 2019-04-01 RX ORDER — ALBUTEROL SULFATE 0.83 MG/ML
2.5 SOLUTION RESPIRATORY (INHALATION)
Status: CANCELLED | OUTPATIENT
Start: 2019-04-16

## 2019-04-02 ENCOUNTER — HOSPITAL ENCOUNTER (OUTPATIENT)
Dept: INFUSION THERAPY | Facility: OTHER | Age: 69
Discharge: HOME | End: 2019-04-02
Attending: INTERNAL MEDICINE | Admitting: INTERNAL MEDICINE
Payer: MEDICARE

## 2019-04-02 VITALS
BODY MASS INDEX: 20 KG/M2 | WEIGHT: 116.6 LBS | HEART RATE: 72 BPM | TEMPERATURE: 96.5 F | RESPIRATION RATE: 16 BRPM | DIASTOLIC BLOOD PRESSURE: 60 MMHG | SYSTOLIC BLOOD PRESSURE: 103 MMHG

## 2019-04-02 DIAGNOSIS — C77.2 CANCER OF APPENDIX METASTATIC TO INTRA-ABDOMINAL LYMPH NODE (H): Primary | ICD-10-CM

## 2019-04-02 DIAGNOSIS — C18.1 CANCER OF APPENDIX METASTATIC TO INTRA-ABDOMINAL LYMPH NODE (H): Primary | ICD-10-CM

## 2019-04-02 LAB
ALBUMIN SERPL-MCNC: 4 G/DL (ref 3.5–5.7)
ALP SERPL-CCNC: 76 U/L (ref 34–104)
ALT SERPL W P-5'-P-CCNC: 37 U/L (ref 7–52)
ANION GAP SERPL CALCULATED.3IONS-SCNC: 10 MMOL/L (ref 3–14)
AST SERPL W P-5'-P-CCNC: 35 U/L (ref 13–39)
BASOPHILS # BLD AUTO: 0 10E9/L (ref 0–0.2)
BASOPHILS NFR BLD AUTO: 0 %
BILIRUB SERPL-MCNC: 0.3 MG/DL (ref 0.3–1)
BUN SERPL-MCNC: 17 MG/DL (ref 7–25)
CALCIUM SERPL-MCNC: 9.1 MG/DL (ref 8.6–10.3)
CEA SERPL-MCNC: <3 NG/ML
CHLORIDE SERPL-SCNC: 104 MMOL/L (ref 98–107)
CO2 SERPL-SCNC: 23 MMOL/L (ref 21–31)
CREAT SERPL-MCNC: 0.86 MG/DL (ref 0.6–1.2)
DIFFERENTIAL METHOD BLD: ABNORMAL
EOSINOPHIL # BLD AUTO: 0.1 10E9/L (ref 0–0.7)
EOSINOPHIL NFR BLD AUTO: 4 %
ERYTHROCYTE [DISTWIDTH] IN BLOOD BY AUTOMATED COUNT: 14.8 % (ref 10–15)
GFR SERPL CREATININE-BSD FRML MDRD: 66 ML/MIN/{1.73_M2}
GLUCOSE SERPL-MCNC: 87 MG/DL (ref 70–105)
HCT VFR BLD AUTO: 35.3 % (ref 35–47)
HGB BLD-MCNC: 11.6 G/DL (ref 11.7–15.7)
LDH SERPL L TO P-CCNC: 207 U/L (ref 140–271)
LYMPHOCYTES # BLD AUTO: 1 10E9/L (ref 0.8–5.3)
LYMPHOCYTES NFR BLD AUTO: 32 %
MCH RBC QN AUTO: 31.6 PG (ref 26.5–33)
MCHC RBC AUTO-ENTMCNC: 32.9 G/DL (ref 31.5–36.5)
MCV RBC AUTO: 96 FL (ref 78–100)
MONOCYTES # BLD AUTO: 0.9 10E9/L (ref 0–1.3)
MONOCYTES NFR BLD AUTO: 28 %
NEUTROPHILS # BLD AUTO: 1.2 10E9/L (ref 1.6–8.3)
NEUTROPHILS NFR BLD AUTO: 36 %
PLATELET # BLD AUTO: 148 10E9/L (ref 150–450)
POTASSIUM SERPL-SCNC: 4.7 MMOL/L (ref 3.5–5.1)
PROT SERPL-MCNC: 7 G/DL (ref 6.4–8.9)
RBC # BLD AUTO: 3.67 10E12/L (ref 3.8–5.2)
SODIUM SERPL-SCNC: 137 MMOL/L (ref 134–144)
WBC # BLD AUTO: 3.2 10E9/L (ref 4–11)

## 2019-04-02 PROCEDURE — 96368 THER/DIAG CONCURRENT INF: CPT

## 2019-04-02 PROCEDURE — 83615 LACTATE (LD) (LDH) ENZYME: CPT | Performed by: INTERNAL MEDICINE

## 2019-04-02 PROCEDURE — 25000128 H RX IP 250 OP 636: Performed by: INTERNAL MEDICINE

## 2019-04-02 PROCEDURE — 96411 CHEMO IV PUSH ADDL DRUG: CPT

## 2019-04-02 PROCEDURE — 82378 CARCINOEMBRYONIC ANTIGEN: CPT | Performed by: INTERNAL MEDICINE

## 2019-04-02 PROCEDURE — 96415 CHEMO IV INFUSION ADDL HR: CPT

## 2019-04-02 PROCEDURE — 85025 COMPLETE CBC W/AUTO DIFF WBC: CPT | Performed by: INTERNAL MEDICINE

## 2019-04-02 PROCEDURE — 80053 COMPREHEN METABOLIC PANEL: CPT | Performed by: INTERNAL MEDICINE

## 2019-04-02 PROCEDURE — 96367 TX/PROPH/DG ADDL SEQ IV INF: CPT

## 2019-04-02 PROCEDURE — 25800030 ZZH RX IP 258 OP 636: Performed by: INTERNAL MEDICINE

## 2019-04-02 PROCEDURE — 96413 CHEMO IV INFUSION 1 HR: CPT

## 2019-04-02 PROCEDURE — 25000128 H RX IP 250 OP 636: Performed by: NURSE PRACTITIONER

## 2019-04-02 PROCEDURE — 25800030 ZZH RX IP 258 OP 636: Performed by: NURSE PRACTITIONER

## 2019-04-02 PROCEDURE — 96416 CHEMO PROLONG INFUSE W/PUMP: CPT

## 2019-04-02 RX ORDER — FLUOROURACIL 50 MG/ML
320 INJECTION, SOLUTION INTRAVENOUS ONCE
Status: COMPLETED | OUTPATIENT
Start: 2019-04-02 | End: 2019-04-02

## 2019-04-02 RX ADMIN — FLUOROURACIL 500 MG: 50 INJECTION, SOLUTION INTRAVENOUS at 13:00

## 2019-04-02 RX ADMIN — OXALIPLATIN 105 MG: 5 INJECTION, SOLUTION INTRAVENOUS at 10:42

## 2019-04-02 RX ADMIN — DEXTROSE MONOHYDRATE 250 ML: 5 INJECTION, SOLUTION INTRAVENOUS at 10:07

## 2019-04-02 RX ADMIN — DEXAMETHASONE SODIUM PHOSPHATE: 10 INJECTION, SOLUTION INTRAMUSCULAR; INTRAVENOUS at 10:09

## 2019-04-02 RX ADMIN — LEUCOVORIN CALCIUM 440 MG: 200 INJECTION, POWDER, LYOPHILIZED, FOR SOLUTION INTRAMUSCULAR; INTRAVENOUS at 10:41

## 2019-04-02 NOTE — PROGRESS NOTES
"Infusion Nursing Note:  Nadya Flanagan presents today for FOLFOX.    Patient seen by provider today: No   present during visit today: Not Applicable.    Note: N/A.    Intravenous Access:  Labs drawn without difficulty.  Implanted Port.    Treatment Conditions:  Lab Results   Component Value Date    HGB 11.6 04/02/2019     Lab Results   Component Value Date    WBC 3.2 04/02/2019      Lab Results   Component Value Date    ANEU 1.2 04/02/2019     Lab Results   Component Value Date     04/02/2019      Lab Results   Component Value Date     04/02/2019                   Lab Results   Component Value Date    POTASSIUM 4.7 04/02/2019           No results found for: MAG         Lab Results   Component Value Date    CR 0.86 04/02/2019                   Lab Results   Component Value Date    STELLA 9.1 04/02/2019                Lab Results   Component Value Date    BILITOTAL 0.3 04/02/2019           Lab Results   Component Value Date    ALBUMIN 4.0 04/02/2019                    Lab Results   Component Value Date    ALT 37 04/02/2019           Lab Results   Component Value Date    AST 35 04/02/2019       Results reviewed, labs MET treatment parameters, ok to proceed with treatment.      Post Infusion Assessment:  Patient tolerated infusion without incident.  Blood return noted pre and post infusion.  Site patent and intact, free from redness, edema or discomfort.  No evidence of extravasations.   Prior to discharge: Port is secured in place with tegaderm and flushed with 10cc NS with positive blood return noted.  Continuous home infusion CADD pump connected.    All connectors secured in place and clamps taped open.    Pump started, \"running\" noted on display (CADD): YES.  Patient instructed to call our clinic or [facility] with any questions or concerns at home.  Patient verbalized understanding.    Patient set up for pump disconnect at our clinic on Thursday.      Discharge Plan:   Patient " discharged in stable condition accompanied by: self.  Departure Mode: Ambulatory.    Jean S. Hammann, RN

## 2019-04-04 ENCOUNTER — TELEPHONE (OUTPATIENT)
Dept: ONCOLOGY | Facility: OTHER | Age: 69
End: 2019-04-04

## 2019-04-04 ENCOUNTER — HOSPITAL ENCOUNTER (OUTPATIENT)
Dept: INFUSION THERAPY | Facility: OTHER | Age: 69
Discharge: HOME | End: 2019-04-04
Attending: INTERNAL MEDICINE | Admitting: INTERNAL MEDICINE
Payer: MEDICARE

## 2019-04-04 DIAGNOSIS — C77.2 CANCER OF APPENDIX METASTATIC TO INTRA-ABDOMINAL LYMPH NODE (H): Primary | ICD-10-CM

## 2019-04-04 DIAGNOSIS — C18.1 CANCER OF APPENDIX METASTATIC TO INTRA-ABDOMINAL LYMPH NODE (H): Primary | ICD-10-CM

## 2019-04-04 PROCEDURE — 96523 IRRIG DRUG DELIVERY DEVICE: CPT

## 2019-04-04 PROCEDURE — 25000128 H RX IP 250 OP 636: Performed by: INTERNAL MEDICINE

## 2019-04-04 RX ORDER — HEPARIN SODIUM (PORCINE) LOCK FLUSH IV SOLN 100 UNIT/ML 100 UNIT/ML
500 SOLUTION INTRAVENOUS ONCE
Status: COMPLETED | OUTPATIENT
Start: 2019-04-04 | End: 2019-04-04

## 2019-04-04 RX ADMIN — Medication 500 UNITS: at 11:26

## 2019-04-04 NOTE — TELEPHONE ENCOUNTER
Call received to send in pre-meds due to contrast allergy.  When [facility] was contacted, the medications were already sent in on 3/19/19.  Notified Infusion RN of this.  Tiffanie Pastor CMA ([facility])................ 4/4/2019 11:24 AM

## 2019-04-04 NOTE — PROGRESS NOTES
Patient returns after CADD pump running with out difficulty for 46 hours, pump reads empty and patient tolerated treatment. Good brisk blood return post infusion and Port heparin locked and de-accessed. Patient returns in two weeks for next infusion.

## 2019-04-09 ENCOUNTER — HOSPITAL ENCOUNTER (OUTPATIENT)
Dept: CT IMAGING | Facility: OTHER | Age: 69
Discharge: HOME | End: 2019-04-09
Attending: INTERNAL MEDICINE | Admitting: INTERNAL MEDICINE
Payer: MEDICARE

## 2019-04-09 ENCOUNTER — HOSPITAL ENCOUNTER (OUTPATIENT)
Dept: CT IMAGING | Facility: OTHER | Age: 69
End: 2019-04-09
Attending: INTERNAL MEDICINE
Payer: MEDICARE

## 2019-04-09 DIAGNOSIS — C18.1 CANCER OF APPENDIX METASTATIC TO INTRA-ABDOMINAL LYMPH NODE (H): ICD-10-CM

## 2019-04-09 DIAGNOSIS — C77.2 CANCER OF APPENDIX METASTATIC TO INTRA-ABDOMINAL LYMPH NODE (H): ICD-10-CM

## 2019-04-09 PROCEDURE — 25500064 ZZH RX 255 OP 636: Performed by: INTERNAL MEDICINE

## 2019-04-09 PROCEDURE — 74177 CT ABD & PELVIS W/CONTRAST: CPT

## 2019-04-09 PROCEDURE — 25000128 H RX IP 250 OP 636: Performed by: INTERNAL MEDICINE

## 2019-04-09 PROCEDURE — 71260 CT THORAX DX C+: CPT

## 2019-04-09 RX ADMIN — IOHEXOL 100 ML: 350 INJECTION, SOLUTION INTRAVENOUS at 11:20

## 2019-04-09 RX ADMIN — SODIUM CHLORIDE, PRESERVATIVE FREE 500 UNITS: 5 INJECTION INTRAVENOUS at 11:29

## 2019-04-09 NOTE — PROGRESS NOTES
1.  Has the patient had a previous reaction to IV contrast? Yes, pt is premedicated    2.  Does the patient have kidney disease? n    3.  Is the patient on dialysis? n    If YES to any of these questions, exam will be reviewed with a Radiologist before administering contrast.

## 2019-04-16 ENCOUNTER — HOSPITAL ENCOUNTER (OUTPATIENT)
Dept: INFUSION THERAPY | Facility: OTHER | Age: 69
Discharge: HOME | End: 2019-04-16
Attending: INTERNAL MEDICINE | Admitting: INTERNAL MEDICINE
Payer: MEDICARE

## 2019-04-16 ENCOUNTER — MYC MEDICAL ADVICE (OUTPATIENT)
Dept: SURGERY | Facility: OTHER | Age: 69
End: 2019-04-16

## 2019-04-16 VITALS
WEIGHT: 119.4 LBS | BODY MASS INDEX: 20.48 KG/M2 | DIASTOLIC BLOOD PRESSURE: 65 MMHG | RESPIRATION RATE: 18 BRPM | OXYGEN SATURATION: 98 % | TEMPERATURE: 97.6 F | SYSTOLIC BLOOD PRESSURE: 117 MMHG

## 2019-04-16 DIAGNOSIS — C77.2 CANCER OF APPENDIX METASTATIC TO INTRA-ABDOMINAL LYMPH NODE (H): Primary | ICD-10-CM

## 2019-04-16 DIAGNOSIS — C18.1 CANCER OF APPENDIX METASTATIC TO INTRA-ABDOMINAL LYMPH NODE (H): Primary | ICD-10-CM

## 2019-04-16 LAB
ALBUMIN SERPL-MCNC: 3.6 G/DL (ref 3.5–5.7)
ALP SERPL-CCNC: 70 U/L (ref 34–104)
ALT SERPL W P-5'-P-CCNC: 33 U/L (ref 7–52)
ANION GAP SERPL CALCULATED.3IONS-SCNC: 7 MMOL/L (ref 3–14)
AST SERPL W P-5'-P-CCNC: 35 U/L (ref 13–39)
BASOPHILS # BLD AUTO: 0 10E9/L (ref 0–0.2)
BASOPHILS NFR BLD AUTO: 0.7 %
BILIRUB SERPL-MCNC: 0.3 MG/DL (ref 0.3–1)
BUN SERPL-MCNC: 16 MG/DL (ref 7–25)
CALCIUM SERPL-MCNC: 9 MG/DL (ref 8.6–10.3)
CEA SERPL-MCNC: 4.7 NG/ML
CHLORIDE SERPL-SCNC: 107 MMOL/L (ref 98–107)
CO2 SERPL-SCNC: 23 MMOL/L (ref 21–31)
CREAT SERPL-MCNC: 0.74 MG/DL (ref 0.6–1.2)
DIFFERENTIAL METHOD BLD: ABNORMAL
EOSINOPHIL # BLD AUTO: 0.1 10E9/L (ref 0–0.7)
EOSINOPHIL NFR BLD AUTO: 2.2 %
ERYTHROCYTE [DISTWIDTH] IN BLOOD BY AUTOMATED COUNT: 15.1 % (ref 10–15)
GFR SERPL CREATININE-BSD FRML MDRD: 78 ML/MIN/{1.73_M2}
GLUCOSE SERPL-MCNC: 87 MG/DL (ref 70–105)
HCT VFR BLD AUTO: 33.4 % (ref 35–47)
HGB BLD-MCNC: 11.1 G/DL (ref 11.7–15.7)
IMM GRANULOCYTES # BLD: 0 10E9/L (ref 0–0.4)
IMM GRANULOCYTES NFR BLD: 0.5 %
LDH SERPL L TO P-CCNC: 227 U/L (ref 140–271)
LYMPHOCYTES # BLD AUTO: 1.6 10E9/L (ref 0.8–5.3)
LYMPHOCYTES NFR BLD AUTO: 39 %
MCH RBC QN AUTO: 31.9 PG (ref 26.5–33)
MCHC RBC AUTO-ENTMCNC: 33.2 G/DL (ref 31.5–36.5)
MCV RBC AUTO: 96 FL (ref 78–100)
MONOCYTES # BLD AUTO: 1 10E9/L (ref 0–1.3)
MONOCYTES NFR BLD AUTO: 25.4 %
NEUTROPHILS # BLD AUTO: 1.3 10E9/L (ref 1.6–8.3)
NEUTROPHILS NFR BLD AUTO: 32.2 %
PLATELET # BLD AUTO: 180 10E9/L (ref 150–450)
POTASSIUM SERPL-SCNC: 4.7 MMOL/L (ref 3.5–5.1)
PROT SERPL-MCNC: 6.4 G/DL (ref 6.4–8.9)
RBC # BLD AUTO: 3.48 10E12/L (ref 3.8–5.2)
SODIUM SERPL-SCNC: 137 MMOL/L (ref 134–144)
WBC # BLD AUTO: 4.1 10E9/L (ref 4–11)

## 2019-04-16 PROCEDURE — 96368 THER/DIAG CONCURRENT INF: CPT

## 2019-04-16 PROCEDURE — 85025 COMPLETE CBC W/AUTO DIFF WBC: CPT | Performed by: NURSE PRACTITIONER

## 2019-04-16 PROCEDURE — 96411 CHEMO IV PUSH ADDL DRUG: CPT

## 2019-04-16 PROCEDURE — 82378 CARCINOEMBRYONIC ANTIGEN: CPT | Performed by: NURSE PRACTITIONER

## 2019-04-16 PROCEDURE — 80053 COMPREHEN METABOLIC PANEL: CPT | Performed by: NURSE PRACTITIONER

## 2019-04-16 PROCEDURE — 96413 CHEMO IV INFUSION 1 HR: CPT

## 2019-04-16 PROCEDURE — 83615 LACTATE (LD) (LDH) ENZYME: CPT | Performed by: NURSE PRACTITIONER

## 2019-04-16 PROCEDURE — 25800030 ZZH RX IP 258 OP 636: Performed by: NURSE PRACTITIONER

## 2019-04-16 PROCEDURE — 96375 TX/PRO/DX INJ NEW DRUG ADDON: CPT

## 2019-04-16 PROCEDURE — 96416 CHEMO PROLONG INFUSE W/PUMP: CPT

## 2019-04-16 PROCEDURE — 25000128 H RX IP 250 OP 636: Performed by: NURSE PRACTITIONER

## 2019-04-16 PROCEDURE — 96415 CHEMO IV INFUSION ADDL HR: CPT

## 2019-04-16 RX ORDER — ACETAMINOPHEN 500 MG
500-1000 TABLET ORAL EVERY 6 HOURS PRN
COMMUNITY
End: 2022-01-01

## 2019-04-16 RX ORDER — CETIRIZINE HYDROCHLORIDE 10 MG/1
10 TABLET ORAL DAILY
COMMUNITY
End: 2021-05-07

## 2019-04-16 RX ORDER — IBUPROFEN 200 MG
400 TABLET ORAL EVERY 4 HOURS PRN
COMMUNITY
End: 2019-04-18 | Stop reason: ALTCHOICE

## 2019-04-16 RX ORDER — FLUOROURACIL 50 MG/ML
320 INJECTION, SOLUTION INTRAVENOUS ONCE
Status: COMPLETED | OUTPATIENT
Start: 2019-04-16 | End: 2019-04-16

## 2019-04-16 RX ADMIN — FLUOROURACIL 500 MG: 50 INJECTION, SOLUTION INTRAVENOUS at 12:32

## 2019-04-16 RX ADMIN — OXALIPLATIN 105 MG: 5 INJECTION, SOLUTION INTRAVENOUS at 10:13

## 2019-04-16 RX ADMIN — DEXAMETHASONE SODIUM PHOSPHATE: 10 INJECTION, SOLUTION INTRAMUSCULAR; INTRAVENOUS at 09:39

## 2019-04-16 RX ADMIN — DEXTROSE MONOHYDRATE 250 ML: 5 INJECTION, SOLUTION INTRAVENOUS at 09:39

## 2019-04-16 RX ADMIN — LEUCOVORIN CALCIUM 440 MG: 350 INJECTION, POWDER, LYOPHILIZED, FOR SOLUTION INTRAMUSCULAR; INTRAVENOUS at 10:15

## 2019-04-16 NOTE — PATIENT INSTRUCTIONS
EDUCATION POST BIOLOGICAL/CHEMOTHERAPY INFUSION  Call the triage nurse at your clinic or seek medical attention if you have chills and/or temperature greater than or equal to 100.5, uncontrolled nausea/vomiting, diarrhea, constipation, dizziness, shortness of breath, chest pain, heart palpitations, weakness or any other new or concerning symptoms, questions or concerns.  You can not have any live virus vaccines prior to or during treatment or up to 6 months post infusion.  If you have an upcoming surgery, medical procedure or dental procedure during treatment, this should be discussed with your ordering physician and your surgeon/dentist.  If you are having any concerning symptom, if you are unsure if you should get your next infusion or wish to speak to a provider before your next infusion, please call your care coordinator or triage nurse at your clinic to notify them so we can adequately serve you.

## 2019-04-18 ENCOUNTER — ONCOLOGY VISIT (OUTPATIENT)
Dept: ONCOLOGY | Facility: OTHER | Age: 69
End: 2019-04-18
Attending: INTERNAL MEDICINE
Payer: MEDICARE

## 2019-04-18 ENCOUNTER — HOSPITAL ENCOUNTER (OUTPATIENT)
Dept: INFUSION THERAPY | Facility: OTHER | Age: 69
Discharge: HOME | End: 2019-04-18
Attending: INTERNAL MEDICINE | Admitting: INTERNAL MEDICINE
Payer: MEDICARE

## 2019-04-18 VITALS
BODY MASS INDEX: 20.66 KG/M2 | SYSTOLIC BLOOD PRESSURE: 110 MMHG | TEMPERATURE: 97.9 F | HEART RATE: 84 BPM | OXYGEN SATURATION: 97 % | DIASTOLIC BLOOD PRESSURE: 62 MMHG | RESPIRATION RATE: 18 BRPM | WEIGHT: 121 LBS | HEIGHT: 64 IN

## 2019-04-18 DIAGNOSIS — C18.1 CANCER OF APPENDIX METASTATIC TO INTRA-ABDOMINAL LYMPH NODE (H): Primary | ICD-10-CM

## 2019-04-18 DIAGNOSIS — C77.2 CANCER OF APPENDIX METASTATIC TO INTRA-ABDOMINAL LYMPH NODE (H): Primary | ICD-10-CM

## 2019-04-18 PROCEDURE — G0463 HOSPITAL OUTPT CLINIC VISIT: HCPCS

## 2019-04-18 PROCEDURE — 99215 OFFICE O/P EST HI 40 MIN: CPT | Performed by: INTERNAL MEDICINE

## 2019-04-18 PROCEDURE — 25000128 H RX IP 250 OP 636: Performed by: NURSE PRACTITIONER

## 2019-04-18 RX ORDER — SODIUM CHLORIDE 9 MG/ML
1000 INJECTION, SOLUTION INTRAVENOUS CONTINUOUS PRN
Status: CANCELLED
Start: 2019-04-30

## 2019-04-18 RX ORDER — FLUOROURACIL 50 MG/ML
320 INJECTION, SOLUTION INTRAVENOUS ONCE
Status: CANCELLED | OUTPATIENT
Start: 2019-04-30

## 2019-04-18 RX ORDER — DIPHENHYDRAMINE HYDROCHLORIDE 50 MG/ML
50 INJECTION INTRAMUSCULAR; INTRAVENOUS
Status: CANCELLED
Start: 2019-04-30

## 2019-04-18 RX ORDER — ALBUTEROL SULFATE 0.83 MG/ML
2.5 SOLUTION RESPIRATORY (INHALATION)
Status: CANCELLED | OUTPATIENT
Start: 2019-04-30

## 2019-04-18 RX ORDER — ALBUTEROL SULFATE 90 UG/1
1-2 AEROSOL, METERED RESPIRATORY (INHALATION)
Status: CANCELLED
Start: 2019-04-30

## 2019-04-18 RX ORDER — EPINEPHRINE 1 MG/ML
0.3 INJECTION, SOLUTION, CONCENTRATE INTRAVENOUS EVERY 5 MIN PRN
Status: CANCELLED | OUTPATIENT
Start: 2019-04-30

## 2019-04-18 RX ORDER — DIPHENHYDRAMINE HCL 50 MG
CAPSULE ORAL
Qty: 1 CAPSULE | Refills: 0 | Status: SHIPPED | OUTPATIENT
Start: 2019-04-18 | End: 2019-06-18

## 2019-04-18 RX ORDER — METHYLPREDNISOLONE SODIUM SUCCINATE 125 MG/2ML
125 INJECTION, POWDER, LYOPHILIZED, FOR SOLUTION INTRAMUSCULAR; INTRAVENOUS
Status: CANCELLED
Start: 2019-04-30

## 2019-04-18 RX ORDER — METHYLPREDNISOLONE 32 MG/1
TABLET ORAL
Qty: 2 TABLET | Refills: 0 | Status: SHIPPED | OUTPATIENT
Start: 2019-04-18 | End: 2019-06-18

## 2019-04-18 RX ORDER — MEPERIDINE HYDROCHLORIDE 50 MG/ML
25 INJECTION INTRAMUSCULAR; INTRAVENOUS; SUBCUTANEOUS EVERY 30 MIN PRN
Status: CANCELLED | OUTPATIENT
Start: 2019-04-30

## 2019-04-18 RX ORDER — HEPARIN SODIUM (PORCINE) LOCK FLUSH IV SOLN 100 UNIT/ML 100 UNIT/ML
500 SOLUTION INTRAVENOUS ONCE
Status: COMPLETED | OUTPATIENT
Start: 2019-04-18 | End: 2019-04-18

## 2019-04-18 RX ORDER — LORAZEPAM 2 MG/ML
0.5 INJECTION INTRAMUSCULAR EVERY 4 HOURS PRN
Status: CANCELLED
Start: 2019-04-30

## 2019-04-18 RX ORDER — EPINEPHRINE 0.3 MG/.3ML
0.3 INJECTION SUBCUTANEOUS EVERY 5 MIN PRN
Status: CANCELLED | OUTPATIENT
Start: 2019-04-30

## 2019-04-18 RX ORDER — HEPARIN SODIUM (PORCINE) LOCK FLUSH IV SOLN 100 UNIT/ML 100 UNIT/ML
500 SOLUTION INTRAVENOUS ONCE
Status: CANCELLED
Start: 2019-05-02

## 2019-04-18 RX ADMIN — Medication 500 UNITS: at 11:29

## 2019-04-18 ASSESSMENT — PAIN SCALES - GENERAL: PAINLEVEL: NO PAIN (0)

## 2019-04-18 ASSESSMENT — MIFFLIN-ST. JEOR: SCORE: 1064.1

## 2019-04-18 NOTE — NURSING NOTE
"Chief Complaint   Patient presents with     RECHECK     Appendix mets to abd lymph node   Patient has questions regarding results and medications.    Initial /62   Pulse 84   Temp 97.9  F (36.6  C) (Oral)   Resp 18   Ht 1.626 m (5' 4.02\")   Wt 54.9 kg (121 lb)   SpO2 97%   BMI 20.76 kg/m   Estimated body mass index is 20.76 kg/m  as calculated from the following:    Height as of this encounter: 1.626 m (5' 4.02\").    Weight as of this encounter: 54.9 kg (121 lb).  Medication Reconciliation: complete    Tiffanie Pastor CMA ([facility])................ 4/18/2019 10:34 AM   "

## 2019-04-19 NOTE — PROGRESS NOTES
Visit Date:   04/18/2019      HISTORY OF PRESENT ILLNESS:  Mrs. 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  We had seen the patient in consultation at the request of Dr. Tresa Evangelista and MARY Nelson of [facility], and Dr. Speedy Menezes at the [facility].  We had seen the patient 02/07/2019.  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  She apparently presented with abdominal discomfort in mid-October, associated with nausea.  This progressed to the point where she was seen by MARY Nelson, her primary care provider, in October.  At that time she had periods of nausea, vomiting associated epigastric pain and left lower quadrant abdominal pain.  An MRI of the liver was obtained.  There were findings of hemangioma. Symptoms persisted and the patient underwent EGD on 12/07/2018.  There were no significant findings.  Apparently her symptoms progressed to the point where she was having significant projectile vomiting.  On 12/14/2018 she was seen at [facility] and then transferred to [facility] where she was admitted for small-bowel obstruction.  She was treated initially conservatively with NG suction and subsequently underwent a right hemicolectomy, which revealed a mass in the terminal ileum.  The mass was adherent to the right ovary.  Pathology was read as adenocarcinoma ex-goblet cell carcinoid of the appendix, and was staged T4b N1 initially.  Tumor was present focally in the posterior retroperitoneal region; 2/21 lymph nodes were involved with disease.  The peritoneal fluid was sent for cytology; it was initially read  as rare malignant cells.  The patient subsequently was referred to the [facility] and was seen by Dr. Speedy Menezes, Medical Oncologist at [place], on 01/16/2019.  He had the slides reviewed by the [facility] Pathologist and the findings were the patient had peritoneal washings that were consistent with involvement by adenocarcinoma ex-goblet cell carcinoid and also terminal ileum, appendix and right colon from the right hemicolectomy specimen revealed adenocarcinoma ex-goblet cell carcinoid forming an indurated mass diffusely involving the appendix and invading the visceral peritoneum.  Angiolymphatic invasion was present.  The retroperitoneal soft tissue margin was positive; 3/21 lymph nodes were involved.  Pathologic stage was pathologic T4a pN1.  Immunohistochemical staining for DNA mismatch repair enzymes were intact.  Also, CT of the abdomen and pelvis was performed at the [facility].  Findings were there was interval postoperative change of the right hemicolectomy with resolution of previously seen small bowel dilatation obstruction.  There was mild soft tissue edema within the resection bed and along the midline incision, favored to be postop.  No definite peritoneal metastases identified.  No free fluid.  There was abnormal geographic perfusion with hepatic segments 5 and 8 without evidence of focal hepatic mass.  There was indeterminate mild thickening of the left adrenal gland, unchanged.  On 11/15/2018 Dr. Menezes recommended FOLFOX chemotherapy given the fact she had positive cytology and recommended 3 months of FOLFOX chemotherapy followed by imaging at the [facility].  The patient had port placement 02/17/2019; the plan was to proceed with FOLFOX with 5-FU given at 400 mg IV bolus with leucovorin bolus and oxaliplatin 85 mg/meter squared to be given on day 1 and continuous IV 5-FU 2400 mg/meter squared IV over 46 hours continuously.  Otherwise, the plan was to restage after 3 months of therapy.  The  patient was last seen by us on 03/19/2019.  At that time she had completed 4 cycles of FOLFOX at 20%, with the last 2 cycles dose reduced by 20% due to neutropenia.  She had staging studies after 4 cycles on 04/09/2019.  The findings were there was no evidence of metastatic disease.  There was evolution of postoperative change in the abdomen, no evidence of microscopic peritoneal implant disease or ascites.  There were nodular opacities in the lung which were consistent with endobronchial debris, likely due to bronchitis.  The patient otherwise is tolerating chemotherapy relatively well.  She says she gets fatigued but denies any diarrhea or significant neuropathy.  She denies any mucositis or abdominal distention.  She feels reluctant to go back to the [facility] for imaging as she does not want to pursue any kind of HIPEC type chemotherapy.  Therefore, she is willing to continue chemotherapy with the idea of completing 8 cycles, then restaging, and then we would contact Dr. Menezes for further recommendations as to what should be done next.  The patient otherwise is doing relatively well.      PHYSICAL EXAMINATION:   GENERAL:  She is a middle-aged white female in no acute distress.   VITAL SIGNS:  Blood pressure is 110/62, pulse 84, respirations 18, temperature 97.9.   HEENT:  Atraumatic, normocephalic.  Oropharynx nonerythematous.   NECK:  Supple.   LUNGS:  Clear to auscultation and percussion.   HEART:  Regular rhythm, S1, S2 normal.   ABDOMEN:  Soft, normoactive bowel sounds.  No mass.  Nontender.   LYMPHATICS:  No cervical, supraclavicular, axillary or inguinal nodes.   EXTREMITIES:  No edema.   NEUROLOGIC:  Nonfocal.      LABORATORY DATA:  Laboratories reveal CBC:  White count 4.1, H and H 11.1 and 33.4, platelet count is 180.  BUN 16, creatinine 0.74.  LFTs are normal.  Calcium are normal.  LDH is 227.  CEA is 4.7.      IMPRESSION:  Newly diagnosed metastatic adenocarcinoma ex-goblet cell carcinoid of the  appendix with metastases to abdominal lymph nodes as well as peritoneum with positive cytology.  The patient was deemed a candidate for chemotherapy for stage IV disease as per Dr. Speedy Menezes at the [facility], who recommended FOLFOX chemotherapy.  The patient completed 2 cycles, then course complicated by neutropenia, now has completed 4 cycles 20% dose reduction with staging studies indicating no evidence of progression of disease.  The plan is to continue with 4 more cycles total of FOLFOX for a total of 8 cycles and then restage.  If the patient has no evidence of disease, we will contact Dr. Menezes.  Patient is reluctant to have any HIPEC intervention.      Forty minutes was spent with patient, greater than half of time spent in counseling and coordination of care.         DEEPIKA BARRIOS MD             D: 2019   T: 2019   MT: BRENNEN      Name:     ALMA CORTES   MRN:      [id number]        Account:      [id number]   :      1950           Visit Date:   2019      Document: [id number]       cc: Isaura Menezes MD

## 2019-04-22 ENCOUNTER — MYC MEDICAL ADVICE (OUTPATIENT)
Dept: ONCOLOGY | Facility: OTHER | Age: 69
End: 2019-04-22

## 2019-04-22 ENCOUNTER — TELEPHONE (OUTPATIENT)
Dept: ONCOLOGY | Facility: OTHER | Age: 69
End: 2019-04-22

## 2019-04-22 NOTE — TELEPHONE ENCOUNTER
This was already called in. Patient also wondering if she can start her May treatment 1 day early.   Selena Solorio RN...........4/22/2019 3:42 PM

## 2019-04-22 NOTE — TELEPHONE ENCOUNTER
Nadya needs an RX called in [facility] because of allergy to dye used in scan per Kerwin in Radiology.

## 2019-04-30 ENCOUNTER — HOSPITAL ENCOUNTER (OUTPATIENT)
Dept: INFUSION THERAPY | Facility: OTHER | Age: 69
Discharge: HOME | End: 2019-04-30
Attending: INTERNAL MEDICINE | Admitting: INTERNAL MEDICINE
Payer: MEDICARE

## 2019-04-30 ENCOUNTER — ONCOLOGY VISIT (OUTPATIENT)
Dept: ONCOLOGY | Facility: OTHER | Age: 69
End: 2019-04-30
Attending: NURSE PRACTITIONER
Payer: MEDICARE

## 2019-04-30 VITALS
RESPIRATION RATE: 18 BRPM | WEIGHT: 122.4 LBS | SYSTOLIC BLOOD PRESSURE: 123 MMHG | BODY MASS INDEX: 21 KG/M2 | HEART RATE: 79 BPM | TEMPERATURE: 97.9 F | DIASTOLIC BLOOD PRESSURE: 67 MMHG

## 2019-04-30 DIAGNOSIS — C18.1 CANCER OF APPENDIX METASTATIC TO INTRA-ABDOMINAL LYMPH NODE (H): Primary | ICD-10-CM

## 2019-04-30 DIAGNOSIS — Z86.39 HISTORY OF THYROID DISORDER: Primary | ICD-10-CM

## 2019-04-30 DIAGNOSIS — C77.2 CANCER OF APPENDIX METASTATIC TO INTRA-ABDOMINAL LYMPH NODE (H): Primary | ICD-10-CM

## 2019-04-30 LAB
ALBUMIN SERPL-MCNC: 3.8 G/DL (ref 3.5–5.7)
ALP SERPL-CCNC: 66 U/L (ref 34–104)
ALT SERPL W P-5'-P-CCNC: 29 U/L (ref 7–52)
ANION GAP SERPL CALCULATED.3IONS-SCNC: 7 MMOL/L (ref 3–14)
AST SERPL W P-5'-P-CCNC: 27 U/L (ref 13–39)
BASOPHILS # BLD AUTO: 0 10E9/L (ref 0–0.2)
BASOPHILS NFR BLD AUTO: 0.3 %
BILIRUB SERPL-MCNC: 0.4 MG/DL (ref 0.3–1)
BUN SERPL-MCNC: 13 MG/DL (ref 7–25)
CALCIUM SERPL-MCNC: 9 MG/DL (ref 8.6–10.3)
CEA SERPL-MCNC: 3.9 NG/ML
CHLORIDE SERPL-SCNC: 107 MMOL/L (ref 98–107)
CO2 SERPL-SCNC: 24 MMOL/L (ref 21–31)
CREAT SERPL-MCNC: 0.77 MG/DL (ref 0.6–1.2)
DIFFERENTIAL METHOD BLD: ABNORMAL
EOSINOPHIL # BLD AUTO: 0.1 10E9/L (ref 0–0.7)
EOSINOPHIL NFR BLD AUTO: 3.3 %
ERYTHROCYTE [DISTWIDTH] IN BLOOD BY AUTOMATED COUNT: 16.4 % (ref 10–15)
GFR SERPL CREATININE-BSD FRML MDRD: 75 ML/MIN/{1.73_M2}
GLUCOSE SERPL-MCNC: 102 MG/DL (ref 70–105)
HCT VFR BLD AUTO: 31.8 % (ref 35–47)
HGB BLD-MCNC: 10.6 G/DL (ref 11.7–15.7)
IMM GRANULOCYTES # BLD: 0 10E9/L (ref 0–0.4)
IMM GRANULOCYTES NFR BLD: 0.3 %
LDH SERPL L TO P-CCNC: 201 U/L (ref 140–271)
LYMPHOCYTES # BLD AUTO: 1.3 10E9/L (ref 0.8–5.3)
LYMPHOCYTES NFR BLD AUTO: 34.1 %
MCH RBC QN AUTO: 32.4 PG (ref 26.5–33)
MCHC RBC AUTO-ENTMCNC: 33.3 G/DL (ref 31.5–36.5)
MCV RBC AUTO: 97 FL (ref 78–100)
MONOCYTES # BLD AUTO: 0.7 10E9/L (ref 0–1.3)
MONOCYTES NFR BLD AUTO: 19.3 %
NEUTROPHILS # BLD AUTO: 1.6 10E9/L (ref 1.6–8.3)
NEUTROPHILS NFR BLD AUTO: 42.7 %
PLATELET # BLD AUTO: 153 10E9/L (ref 150–450)
POTASSIUM SERPL-SCNC: 4.6 MMOL/L (ref 3.5–5.1)
PROT SERPL-MCNC: 6.5 G/DL (ref 6.4–8.9)
RBC # BLD AUTO: 3.27 10E12/L (ref 3.8–5.2)
SODIUM SERPL-SCNC: 138 MMOL/L (ref 134–144)
WBC # BLD AUTO: 3.7 10E9/L (ref 4–11)

## 2019-04-30 PROCEDURE — 83615 LACTATE (LD) (LDH) ENZYME: CPT | Performed by: INTERNAL MEDICINE

## 2019-04-30 PROCEDURE — 96416 CHEMO PROLONG INFUSE W/PUMP: CPT

## 2019-04-30 PROCEDURE — 36415 COLL VENOUS BLD VENIPUNCTURE: CPT | Performed by: INTERNAL MEDICINE

## 2019-04-30 PROCEDURE — 25800030 ZZH RX IP 258 OP 636: Performed by: INTERNAL MEDICINE

## 2019-04-30 PROCEDURE — 25000128 H RX IP 250 OP 636: Performed by: INTERNAL MEDICINE

## 2019-04-30 PROCEDURE — 96411 CHEMO IV PUSH ADDL DRUG: CPT

## 2019-04-30 PROCEDURE — 99207 ZZC NO CHARGE LOS: CPT | Performed by: NURSE PRACTITIONER

## 2019-04-30 PROCEDURE — 96413 CHEMO IV INFUSION 1 HR: CPT

## 2019-04-30 PROCEDURE — 96367 TX/PROPH/DG ADDL SEQ IV INF: CPT

## 2019-04-30 PROCEDURE — 82378 CARCINOEMBRYONIC ANTIGEN: CPT | Performed by: INTERNAL MEDICINE

## 2019-04-30 PROCEDURE — 96415 CHEMO IV INFUSION ADDL HR: CPT

## 2019-04-30 PROCEDURE — 80053 COMPREHEN METABOLIC PANEL: CPT | Performed by: INTERNAL MEDICINE

## 2019-04-30 PROCEDURE — 96368 THER/DIAG CONCURRENT INF: CPT

## 2019-04-30 PROCEDURE — 85025 COMPLETE CBC W/AUTO DIFF WBC: CPT | Performed by: INTERNAL MEDICINE

## 2019-04-30 RX ORDER — FLUOROURACIL 50 MG/ML
320 INJECTION, SOLUTION INTRAVENOUS ONCE
Status: COMPLETED | OUTPATIENT
Start: 2019-04-30 | End: 2019-04-30

## 2019-04-30 RX ADMIN — LEUCOVORIN CALCIUM 440 MG: 350 INJECTION, POWDER, LYOPHILIZED, FOR SOLUTION INTRAMUSCULAR; INTRAVENOUS at 10:41

## 2019-04-30 RX ADMIN — FLUOROURACIL 500 MG: 50 INJECTION, SOLUTION INTRAVENOUS at 12:57

## 2019-04-30 RX ADMIN — DEXTROSE MONOHYDRATE 250 ML: 5 INJECTION, SOLUTION INTRAVENOUS at 09:50

## 2019-04-30 RX ADMIN — OXALIPLATIN 105 MG: 5 INJECTION, SOLUTION, CONCENTRATE INTRAVENOUS at 10:41

## 2019-04-30 RX ADMIN — DEXAMETHASONE SODIUM PHOSPHATE: 10 INJECTION, SOLUTION INTRAMUSCULAR; INTRAVENOUS at 09:53

## 2019-04-30 NOTE — PROGRESS NOTES
Met with patient in infusion therapy. 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 Patient was recently seen by Dr Thakkar on 4/18/19 and it felt that patient would require a total of 8 cycles of FOLFOX and then restage. Patient reports that she is feeling well. She has started to notice some neuropathy in her fingers and some thinning of her hair. Patient  Has had a rash on her cheeks and states this happened when she had issues with her thyroid in the past. She would like to have her thyroid checked at her next visit and she will then follow up with her primary provider with results. Will order TSH to be drawn at next visit. We did have a long discussion regarding her wishes in the future regarding further treatment. All questions answered. Patient will follow up as planned.

## 2019-04-30 NOTE — PROGRESS NOTES
"Infusion Nursing Note:  Nadya Flanagan presents today for cycle 6 day 1 FOLFOX.    Patient seen by provider today: Diana Marisela stopped in to see.   present during visit today: Not Applicable.    Note: Reports numbness and tingling starting this last cycle.  Recommendation of vitamin B 6 per oncologist recommendation.    Intravenous Access:  Labs drawn without difficulty with brisk blood return.  Implanted Port.    Treatment Conditions:  Results reviewed, labs MET treatment parameters, ok to proceed with treatment.      Post Infusion Assessment:  Patient tolerated infusion without incident.  Blood return noted pre and post infusion.  Site patent and intact, free from redness, edema or discomfort.  No evidence of extravasations.   Prior to discharge: Port is secured in place with tegaderm and flushed with 10cc NS with positive blood return noted.  Continuous home infusion CADD connected.    All connectors secured in place and clamps taped open.    Pump started, \"running\" noted on display (CADD): YES.  Patient instructed to call our clinic or [facility] with any questions or concerns at home.  Patient verbalized understanding.    Patient set up for pump disconnect at our clinic on 5/2/2019.        Discharge Plan:   Patient discharged in stable condition accompanied by: self and will return on Wednesday for disconnect.    Steffany Urrutia RN                        "

## 2019-05-02 ENCOUNTER — HOSPITAL ENCOUNTER (OUTPATIENT)
Dept: INFUSION THERAPY | Facility: OTHER | Age: 69
Discharge: HOME | End: 2019-05-02
Attending: INTERNAL MEDICINE | Admitting: INTERNAL MEDICINE
Payer: MEDICARE

## 2019-05-02 DIAGNOSIS — C77.2 CANCER OF APPENDIX METASTATIC TO INTRA-ABDOMINAL LYMPH NODE (H): Primary | ICD-10-CM

## 2019-05-02 DIAGNOSIS — C18.1 CANCER OF APPENDIX METASTATIC TO INTRA-ABDOMINAL LYMPH NODE (H): Primary | ICD-10-CM

## 2019-05-02 DIAGNOSIS — C18.1 CANCER OF APPENDIX METASTATIC TO INTRA-ABDOMINAL LYMPH NODE (H): ICD-10-CM

## 2019-05-02 DIAGNOSIS — C77.2 CANCER OF APPENDIX METASTATIC TO INTRA-ABDOMINAL LYMPH NODE (H): ICD-10-CM

## 2019-05-02 PROCEDURE — 96523 IRRIG DRUG DELIVERY DEVICE: CPT

## 2019-05-02 PROCEDURE — 25000128 H RX IP 250 OP 636: Performed by: INTERNAL MEDICINE

## 2019-05-02 RX ORDER — EPINEPHRINE 0.3 MG/.3ML
0.3 INJECTION SUBCUTANEOUS EVERY 5 MIN PRN
Status: CANCELLED | OUTPATIENT
Start: 2019-05-28

## 2019-05-02 RX ORDER — FLUOROURACIL 50 MG/ML
320 INJECTION, SOLUTION INTRAVENOUS ONCE
Status: CANCELLED | OUTPATIENT
Start: 2019-05-28

## 2019-05-02 RX ORDER — DIPHENHYDRAMINE HYDROCHLORIDE 50 MG/ML
50 INJECTION INTRAMUSCULAR; INTRAVENOUS
Status: CANCELLED
Start: 2019-05-14

## 2019-05-02 RX ORDER — MEPERIDINE HYDROCHLORIDE 50 MG/ML
25 INJECTION INTRAMUSCULAR; INTRAVENOUS; SUBCUTANEOUS EVERY 30 MIN PRN
Status: CANCELLED | OUTPATIENT
Start: 2019-05-14

## 2019-05-02 RX ORDER — SODIUM CHLORIDE 9 MG/ML
1000 INJECTION, SOLUTION INTRAVENOUS CONTINUOUS PRN
Status: CANCELLED
Start: 2019-05-14

## 2019-05-02 RX ORDER — ALBUTEROL SULFATE 0.83 MG/ML
2.5 SOLUTION RESPIRATORY (INHALATION)
Status: CANCELLED | OUTPATIENT
Start: 2019-05-14

## 2019-05-02 RX ORDER — SODIUM CHLORIDE 9 MG/ML
1000 INJECTION, SOLUTION INTRAVENOUS CONTINUOUS PRN
Status: CANCELLED
Start: 2019-05-28

## 2019-05-02 RX ORDER — MEPERIDINE HYDROCHLORIDE 50 MG/ML
25 INJECTION INTRAMUSCULAR; INTRAVENOUS; SUBCUTANEOUS EVERY 30 MIN PRN
Status: CANCELLED | OUTPATIENT
Start: 2019-05-28

## 2019-05-02 RX ORDER — ALBUTEROL SULFATE 90 UG/1
1-2 AEROSOL, METERED RESPIRATORY (INHALATION)
Status: CANCELLED
Start: 2019-05-28

## 2019-05-02 RX ORDER — EPINEPHRINE 0.3 MG/.3ML
0.3 INJECTION SUBCUTANEOUS EVERY 5 MIN PRN
Status: CANCELLED | OUTPATIENT
Start: 2019-05-14

## 2019-05-02 RX ORDER — ALBUTEROL SULFATE 0.83 MG/ML
2.5 SOLUTION RESPIRATORY (INHALATION)
Status: CANCELLED | OUTPATIENT
Start: 2019-05-28

## 2019-05-02 RX ORDER — ALBUTEROL SULFATE 90 UG/1
1-2 AEROSOL, METERED RESPIRATORY (INHALATION)
Status: CANCELLED
Start: 2019-05-14

## 2019-05-02 RX ORDER — HEPARIN SODIUM (PORCINE) LOCK FLUSH IV SOLN 100 UNIT/ML 100 UNIT/ML
500 SOLUTION INTRAVENOUS ONCE
Status: CANCELLED
Start: 2019-05-30

## 2019-05-02 RX ORDER — LORAZEPAM 2 MG/ML
0.5 INJECTION INTRAMUSCULAR EVERY 4 HOURS PRN
Status: CANCELLED
Start: 2019-05-14

## 2019-05-02 RX ORDER — METHYLPREDNISOLONE SODIUM SUCCINATE 125 MG/2ML
125 INJECTION, POWDER, LYOPHILIZED, FOR SOLUTION INTRAMUSCULAR; INTRAVENOUS
Status: CANCELLED
Start: 2019-05-14

## 2019-05-02 RX ORDER — EPINEPHRINE 1 MG/ML
0.3 INJECTION, SOLUTION, CONCENTRATE INTRAVENOUS EVERY 5 MIN PRN
Status: CANCELLED | OUTPATIENT
Start: 2019-05-28

## 2019-05-02 RX ORDER — LORAZEPAM 2 MG/ML
0.5 INJECTION INTRAMUSCULAR EVERY 4 HOURS PRN
Status: CANCELLED
Start: 2019-05-28

## 2019-05-02 RX ORDER — HEPARIN SODIUM (PORCINE) LOCK FLUSH IV SOLN 100 UNIT/ML 100 UNIT/ML
500 SOLUTION INTRAVENOUS ONCE
Status: COMPLETED | OUTPATIENT
Start: 2019-05-02 | End: 2019-05-02

## 2019-05-02 RX ORDER — EPINEPHRINE 1 MG/ML
0.3 INJECTION, SOLUTION, CONCENTRATE INTRAVENOUS EVERY 5 MIN PRN
Status: CANCELLED | OUTPATIENT
Start: 2019-05-14

## 2019-05-02 RX ORDER — METHYLPREDNISOLONE SODIUM SUCCINATE 125 MG/2ML
125 INJECTION, POWDER, LYOPHILIZED, FOR SOLUTION INTRAMUSCULAR; INTRAVENOUS
Status: CANCELLED
Start: 2019-05-28

## 2019-05-02 RX ORDER — FLUOROURACIL 50 MG/ML
320 INJECTION, SOLUTION INTRAVENOUS ONCE
Status: CANCELLED | OUTPATIENT
Start: 2019-05-14

## 2019-05-02 RX ORDER — DIPHENHYDRAMINE HYDROCHLORIDE 50 MG/ML
50 INJECTION INTRAMUSCULAR; INTRAVENOUS
Status: CANCELLED
Start: 2019-05-28

## 2019-05-02 RX ORDER — HEPARIN SODIUM (PORCINE) LOCK FLUSH IV SOLN 100 UNIT/ML 100 UNIT/ML
500 SOLUTION INTRAVENOUS ONCE
Status: CANCELLED
Start: 2019-05-16

## 2019-05-02 RX ADMIN — Medication 500 UNITS: at 11:13

## 2019-05-10 DIAGNOSIS — C18.1 MALIGNANT NEOPLASM OF APPENDIX (H): ICD-10-CM

## 2019-05-10 DIAGNOSIS — R53.83 FATIGUE, UNSPECIFIED TYPE: Primary | ICD-10-CM

## 2019-05-13 ENCOUNTER — HOSPITAL ENCOUNTER (OUTPATIENT)
Dept: INFUSION THERAPY | Facility: OTHER | Age: 69
Discharge: HOME | End: 2019-05-13
Attending: INTERNAL MEDICINE | Admitting: INTERNAL MEDICINE
Payer: MEDICARE

## 2019-05-13 ENCOUNTER — TELEPHONE (OUTPATIENT)
Dept: INTERNAL MEDICINE | Facility: OTHER | Age: 69
End: 2019-05-13

## 2019-05-13 VITALS
DIASTOLIC BLOOD PRESSURE: 73 MMHG | BODY MASS INDEX: 21.17 KG/M2 | RESPIRATION RATE: 18 BRPM | TEMPERATURE: 96.8 F | WEIGHT: 123.4 LBS | HEART RATE: 72 BPM | SYSTOLIC BLOOD PRESSURE: 122 MMHG

## 2019-05-13 DIAGNOSIS — E07.9 THYROID CONDITION: ICD-10-CM

## 2019-05-13 DIAGNOSIS — C18.1 CANCER OF APPENDIX METASTATIC TO INTRA-ABDOMINAL LYMPH NODE (H): Primary | ICD-10-CM

## 2019-05-13 DIAGNOSIS — C77.2 CANCER OF APPENDIX METASTATIC TO INTRA-ABDOMINAL LYMPH NODE (H): Primary | ICD-10-CM

## 2019-05-13 LAB
ALBUMIN SERPL-MCNC: 4.1 G/DL (ref 3.5–5.7)
ALP SERPL-CCNC: 78 U/L (ref 34–104)
ALT SERPL W P-5'-P-CCNC: 25 U/L (ref 7–52)
ANION GAP SERPL CALCULATED.3IONS-SCNC: 8 MMOL/L (ref 3–14)
AST SERPL W P-5'-P-CCNC: 28 U/L (ref 13–39)
BASOPHILS # BLD AUTO: 0 10E9/L (ref 0–0.2)
BASOPHILS NFR BLD AUTO: 0.7 %
BILIRUB SERPL-MCNC: 0.5 MG/DL (ref 0.3–1)
BUN SERPL-MCNC: 15 MG/DL (ref 7–25)
CALCIUM SERPL-MCNC: 9.2 MG/DL (ref 8.6–10.3)
CEA SERPL-MCNC: 3.3 NG/ML
CHLORIDE SERPL-SCNC: 106 MMOL/L (ref 98–107)
CO2 SERPL-SCNC: 24 MMOL/L (ref 21–31)
CREAT SERPL-MCNC: 0.85 MG/DL (ref 0.6–1.2)
DIFFERENTIAL METHOD BLD: ABNORMAL
EOSINOPHIL # BLD AUTO: 0.1 10E9/L (ref 0–0.7)
EOSINOPHIL NFR BLD AUTO: 1.3 %
ERYTHROCYTE [DISTWIDTH] IN BLOOD BY AUTOMATED COUNT: 17.3 % (ref 10–15)
GFR SERPL CREATININE-BSD FRML MDRD: 67 ML/MIN/{1.73_M2}
GLUCOSE SERPL-MCNC: 106 MG/DL (ref 70–105)
HCT VFR BLD AUTO: 31.1 % (ref 35–47)
HGB BLD-MCNC: 10.3 G/DL (ref 11.7–15.7)
IMM GRANULOCYTES # BLD: 0 10E9/L (ref 0–0.4)
IMM GRANULOCYTES NFR BLD: 0.2 %
LDH SERPL L TO P-CCNC: 241 U/L (ref 140–271)
LYMPHOCYTES # BLD AUTO: 1.3 10E9/L (ref 0.8–5.3)
LYMPHOCYTES NFR BLD AUTO: 28.5 %
MCH RBC QN AUTO: 32.2 PG (ref 26.5–33)
MCHC RBC AUTO-ENTMCNC: 33.1 G/DL (ref 31.5–36.5)
MCV RBC AUTO: 97 FL (ref 78–100)
MONOCYTES # BLD AUTO: 0.7 10E9/L (ref 0–1.3)
MONOCYTES NFR BLD AUTO: 15.8 %
NEUTROPHILS # BLD AUTO: 2.4 10E9/L (ref 1.6–8.3)
NEUTROPHILS NFR BLD AUTO: 53.5 %
PLATELET # BLD AUTO: 176 10E9/L (ref 150–450)
POTASSIUM SERPL-SCNC: 4 MMOL/L (ref 3.5–5.1)
PROT SERPL-MCNC: 6.8 G/DL (ref 6.4–8.9)
RBC # BLD AUTO: 3.2 10E12/L (ref 3.8–5.2)
SODIUM SERPL-SCNC: 138 MMOL/L (ref 134–144)
TSH SERPL DL<=0.05 MIU/L-ACNC: 5.64 IU/ML (ref 0.34–5.6)
WBC # BLD AUTO: 4.6 10E9/L (ref 4–11)

## 2019-05-13 PROCEDURE — 85025 COMPLETE CBC W/AUTO DIFF WBC: CPT | Performed by: INTERNAL MEDICINE

## 2019-05-13 PROCEDURE — 25000128 H RX IP 250 OP 636: Performed by: INTERNAL MEDICINE

## 2019-05-13 PROCEDURE — 96416 CHEMO PROLONG INFUSE W/PUMP: CPT

## 2019-05-13 PROCEDURE — 82378 CARCINOEMBRYONIC ANTIGEN: CPT | Performed by: INTERNAL MEDICINE

## 2019-05-13 PROCEDURE — 25800030 ZZH RX IP 258 OP 636: Performed by: INTERNAL MEDICINE

## 2019-05-13 PROCEDURE — 96368 THER/DIAG CONCURRENT INF: CPT

## 2019-05-13 PROCEDURE — 84443 ASSAY THYROID STIM HORMONE: CPT | Performed by: INTERNAL MEDICINE

## 2019-05-13 PROCEDURE — 96411 CHEMO IV PUSH ADDL DRUG: CPT

## 2019-05-13 PROCEDURE — 80053 COMPREHEN METABOLIC PANEL: CPT | Performed by: INTERNAL MEDICINE

## 2019-05-13 PROCEDURE — 96415 CHEMO IV INFUSION ADDL HR: CPT

## 2019-05-13 PROCEDURE — 96367 TX/PROPH/DG ADDL SEQ IV INF: CPT

## 2019-05-13 PROCEDURE — 83615 LACTATE (LD) (LDH) ENZYME: CPT | Performed by: INTERNAL MEDICINE

## 2019-05-13 PROCEDURE — 96413 CHEMO IV INFUSION 1 HR: CPT

## 2019-05-13 RX ORDER — FLUOROURACIL 50 MG/ML
320 INJECTION, SOLUTION INTRAVENOUS ONCE
Status: COMPLETED | OUTPATIENT
Start: 2019-05-13 | End: 2019-05-13

## 2019-05-13 RX ORDER — UBIDECARENONE 75 MG
100 CAPSULE ORAL DAILY
COMMUNITY
End: 2019-05-28

## 2019-05-13 RX ADMIN — OXALIPLATIN 105 MG: 5 INJECTION, SOLUTION INTRAVENOUS at 11:01

## 2019-05-13 RX ADMIN — FLUOROURACIL 500 MG: 50 INJECTION, SOLUTION INTRAVENOUS at 13:20

## 2019-05-13 RX ADMIN — DEXTROSE MONOHYDRATE 250 ML: 5 INJECTION, SOLUTION INTRAVENOUS at 10:17

## 2019-05-13 RX ADMIN — LEUCOVORIN CALCIUM 440 MG: 200 INJECTION, POWDER, LYOPHILIZED, FOR SOLUTION INTRAMUSCULAR; INTRAVENOUS at 10:59

## 2019-05-13 RX ADMIN — DEXAMETHASONE SODIUM PHOSPHATE: 10 INJECTION, SOLUTION INTRAMUSCULAR; INTRAVENOUS at 10:17

## 2019-05-13 NOTE — PROGRESS NOTES
Infusion Nursing Note:  Nadya Flanagan presents today for FOLFOX cycle 7 day 1.    Patient seen by provider today: No   present during visit today: Not Applicable.    Note: N/A.    Intravenous Access:  Implanted Port accessed with brisk blood return.  Labs obtained from port.    Treatment Conditions:  Results reviewed, labs MET treatment parameters, ok to proceed with treatment.  Thyrotropin checked per [facility]OR[facility] with last oncology visit.    Post Infusion Assessment:  Patient tolerated infusion without incident.  Blood return noted pre and post infusion.  Site patent and intact, free from redness, edema or discomfort.  No evidence of extravasations.       Discharge Plan:   Copy of AVS reviewed with patient and/or family.  Patient will return 5/15/2019  for next appointment to CADD pump disconnect.  Patient discharged in stable condition accompanied by: self.    Steffany Urrutia RN

## 2019-05-13 NOTE — TELEPHONE ENCOUNTER
Nadya is a patient of Dr Thakkar's who is needing an establish care here and would like to see Dr Pearce but will need sooner then 8/21/19.  Please advise.

## 2019-05-15 ENCOUNTER — MYC MEDICAL ADVICE (OUTPATIENT)
Dept: ONCOLOGY | Facility: OTHER | Age: 69
End: 2019-05-15

## 2019-05-15 ENCOUNTER — HOSPITAL ENCOUNTER (OUTPATIENT)
Dept: INFUSION THERAPY | Facility: OTHER | Age: 69
Discharge: HOME | End: 2019-05-15
Attending: INTERNAL MEDICINE | Admitting: INTERNAL MEDICINE
Payer: MEDICARE

## 2019-05-15 DIAGNOSIS — C78.6 MALIGNANT NEOPLASM METASTATIC TO PERITONEUM (H): ICD-10-CM

## 2019-05-15 DIAGNOSIS — C18.1 CANCER OF APPENDIX METASTATIC TO INTRA-ABDOMINAL LYMPH NODE (H): Primary | ICD-10-CM

## 2019-05-15 DIAGNOSIS — C18.1 MALIGNANT NEOPLASM OF APPENDIX (H): ICD-10-CM

## 2019-05-15 DIAGNOSIS — C77.2 CANCER OF APPENDIX METASTATIC TO INTRA-ABDOMINAL LYMPH NODE (H): Primary | ICD-10-CM

## 2019-05-15 PROCEDURE — 25000128 H RX IP 250 OP 636: Performed by: INTERNAL MEDICINE

## 2019-05-15 PROCEDURE — 96523 IRRIG DRUG DELIVERY DEVICE: CPT

## 2019-05-15 RX ORDER — HEPARIN SODIUM (PORCINE) LOCK FLUSH IV SOLN 100 UNIT/ML 100 UNIT/ML
500 SOLUTION INTRAVENOUS
Status: CANCELLED
Start: 2019-05-15

## 2019-05-15 RX ORDER — HEPARIN SODIUM (PORCINE) LOCK FLUSH IV SOLN 100 UNIT/ML 100 UNIT/ML
500 SOLUTION INTRAVENOUS
Status: COMPLETED | OUTPATIENT
Start: 2019-05-15 | End: 2019-05-15

## 2019-05-15 RX ADMIN — Medication 500 UNITS: at 11:41

## 2019-05-15 NOTE — TELEPHONE ENCOUNTER
Patient contacted and made aware of when she could see Dr. Pearce. Patient was then transferred to the  to schedule this appointment.  made aware of why and when patient needs to be seen.    No questions or concerns prior to transfer.    Marleni Schwartz LPN on 5/15/2019 at 9:54 AM

## 2019-05-15 NOTE — TELEPHONE ENCOUNTER
I can see her June 19 - ok to use same day spots.  If this is not soon enough she can see me for an acute visit sooner or establish with a more available provider.

## 2019-05-19 ENCOUNTER — MYC MEDICAL ADVICE (OUTPATIENT)
Dept: SURGERY | Facility: OTHER | Age: 69
End: 2019-05-19

## 2019-05-25 ENCOUNTER — MYC MEDICAL ADVICE (OUTPATIENT)
Dept: ONCOLOGY | Facility: OTHER | Age: 69
End: 2019-05-25

## 2019-05-28 ENCOUNTER — HOSPITAL ENCOUNTER (OUTPATIENT)
Dept: INFUSION THERAPY | Facility: OTHER | Age: 69
Discharge: HOME | End: 2019-05-28
Attending: INTERNAL MEDICINE | Admitting: INTERNAL MEDICINE
Payer: MEDICARE

## 2019-05-28 VITALS
WEIGHT: 126.2 LBS | BODY MASS INDEX: 21.65 KG/M2 | HEART RATE: 81 BPM | SYSTOLIC BLOOD PRESSURE: 129 MMHG | DIASTOLIC BLOOD PRESSURE: 65 MMHG

## 2019-05-28 DIAGNOSIS — C18.1 CANCER OF APPENDIX METASTATIC TO INTRA-ABDOMINAL LYMPH NODE (H): Primary | ICD-10-CM

## 2019-05-28 DIAGNOSIS — C77.2 CANCER OF APPENDIX METASTATIC TO INTRA-ABDOMINAL LYMPH NODE (H): Primary | ICD-10-CM

## 2019-05-28 LAB
ALBUMIN SERPL-MCNC: 3.9 G/DL (ref 3.5–5.7)
ALP SERPL-CCNC: 61 U/L (ref 34–104)
ALT SERPL W P-5'-P-CCNC: 27 U/L (ref 7–52)
ANION GAP SERPL CALCULATED.3IONS-SCNC: 5 MMOL/L (ref 3–14)
AST SERPL W P-5'-P-CCNC: 28 U/L (ref 13–39)
BASOPHILS # BLD AUTO: 0 10E9/L (ref 0–0.2)
BASOPHILS NFR BLD AUTO: 0.8 %
BILIRUB SERPL-MCNC: 0.6 MG/DL (ref 0.3–1)
BUN SERPL-MCNC: 14 MG/DL (ref 7–25)
CALCIUM SERPL-MCNC: 9.1 MG/DL (ref 8.6–10.3)
CEA SERPL-MCNC: 3.9 NG/ML
CHLORIDE SERPL-SCNC: 107 MMOL/L (ref 98–107)
CO2 SERPL-SCNC: 26 MMOL/L (ref 21–31)
CREAT SERPL-MCNC: 0.79 MG/DL (ref 0.6–1.2)
DIFFERENTIAL METHOD BLD: ABNORMAL
EOSINOPHIL # BLD AUTO: 0.1 10E9/L (ref 0–0.7)
EOSINOPHIL NFR BLD AUTO: 2.4 %
ERYTHROCYTE [DISTWIDTH] IN BLOOD BY AUTOMATED COUNT: 18.4 % (ref 10–15)
GFR SERPL CREATININE-BSD FRML MDRD: 72 ML/MIN/{1.73_M2}
GLUCOSE SERPL-MCNC: 100 MG/DL (ref 70–105)
HCT VFR BLD AUTO: 31.6 % (ref 35–47)
HGB BLD-MCNC: 10.5 G/DL (ref 11.7–15.7)
IMM GRANULOCYTES # BLD: 0 10E9/L (ref 0–0.4)
IMM GRANULOCYTES NFR BLD: 0.5 %
LDH SERPL L TO P-CCNC: 197 U/L (ref 140–271)
LYMPHOCYTES # BLD AUTO: 1.2 10E9/L (ref 0.8–5.3)
LYMPHOCYTES NFR BLD AUTO: 31.2 %
MCH RBC QN AUTO: 33.3 PG (ref 26.5–33)
MCHC RBC AUTO-ENTMCNC: 33.2 G/DL (ref 31.5–36.5)
MCV RBC AUTO: 100 FL (ref 78–100)
MONOCYTES # BLD AUTO: 0.9 10E9/L (ref 0–1.3)
MONOCYTES NFR BLD AUTO: 24.1 %
NEUTROPHILS # BLD AUTO: 1.6 10E9/L (ref 1.6–8.3)
NEUTROPHILS NFR BLD AUTO: 41 %
PLATELET # BLD AUTO: 175 10E9/L (ref 150–450)
POTASSIUM SERPL-SCNC: 4.5 MMOL/L (ref 3.5–5.1)
PROT SERPL-MCNC: 6.6 G/DL (ref 6.4–8.9)
RBC # BLD AUTO: 3.15 10E12/L (ref 3.8–5.2)
SODIUM SERPL-SCNC: 138 MMOL/L (ref 134–144)
WBC # BLD AUTO: 3.8 10E9/L (ref 4–11)

## 2019-05-28 PROCEDURE — 36415 COLL VENOUS BLD VENIPUNCTURE: CPT | Performed by: INTERNAL MEDICINE

## 2019-05-28 PROCEDURE — 96416 CHEMO PROLONG INFUSE W/PUMP: CPT

## 2019-05-28 PROCEDURE — 96411 CHEMO IV PUSH ADDL DRUG: CPT

## 2019-05-28 PROCEDURE — 82378 CARCINOEMBRYONIC ANTIGEN: CPT | Performed by: INTERNAL MEDICINE

## 2019-05-28 PROCEDURE — 96368 THER/DIAG CONCURRENT INF: CPT

## 2019-05-28 PROCEDURE — 85025 COMPLETE CBC W/AUTO DIFF WBC: CPT | Performed by: INTERNAL MEDICINE

## 2019-05-28 PROCEDURE — 96413 CHEMO IV INFUSION 1 HR: CPT

## 2019-05-28 PROCEDURE — 25000128 H RX IP 250 OP 636: Performed by: INTERNAL MEDICINE

## 2019-05-28 PROCEDURE — 96415 CHEMO IV INFUSION ADDL HR: CPT

## 2019-05-28 PROCEDURE — 83615 LACTATE (LD) (LDH) ENZYME: CPT | Performed by: INTERNAL MEDICINE

## 2019-05-28 PROCEDURE — 80053 COMPREHEN METABOLIC PANEL: CPT | Performed by: INTERNAL MEDICINE

## 2019-05-28 PROCEDURE — 25800030 ZZH RX IP 258 OP 636: Performed by: INTERNAL MEDICINE

## 2019-05-28 PROCEDURE — 96367 TX/PROPH/DG ADDL SEQ IV INF: CPT

## 2019-05-28 RX ORDER — FLUOROURACIL 50 MG/ML
320 INJECTION, SOLUTION INTRAVENOUS ONCE
Status: COMPLETED | OUTPATIENT
Start: 2019-05-28 | End: 2019-05-28

## 2019-05-28 RX ADMIN — OXALIPLATIN 105 MG: 5 INJECTION, SOLUTION, CONCENTRATE INTRAVENOUS at 11:18

## 2019-05-28 RX ADMIN — DEXAMETHASONE SODIUM PHOSPHATE: 10 INJECTION, SOLUTION INTRAMUSCULAR; INTRAVENOUS at 10:26

## 2019-05-28 RX ADMIN — FLUOROURACIL 500 MG: 50 INJECTION, SOLUTION INTRAVENOUS at 13:36

## 2019-05-28 RX ADMIN — LEUCOVORIN CALCIUM 440 MG: 350 INJECTION, POWDER, LYOPHILIZED, FOR SOLUTION INTRAMUSCULAR; INTRAVENOUS at 11:16

## 2019-05-28 RX ADMIN — DEXTROSE MONOHYDRATE 250 ML: 5 INJECTION, SOLUTION INTRAVENOUS at 10:25

## 2019-05-28 NOTE — PROGRESS NOTES
"Infusion Nursing Note:  Nadya Flanagan presents today for FOLFOX.    Patient seen by provider today: No   present during visit today: Not Applicable.    Note: N/A.    Intravenous Access:  Labs drawn without difficulty.  Implanted Port.    Treatment Conditions:  Lab Results   Component Value Date    HGB 10.5 05/28/2019     Lab Results   Component Value Date    WBC 3.8 05/28/2019      Lab Results   Component Value Date    ANEU 1.6 05/28/2019     Lab Results   Component Value Date     05/28/2019      Lab Results   Component Value Date     05/28/2019                   Lab Results   Component Value Date    POTASSIUM 4.5 05/28/2019           No results found for: MAG         Lab Results   Component Value Date    CR 0.79 05/28/2019                   Lab Results   Component Value Date    STELLA 9.1 05/28/2019                Lab Results   Component Value Date    BILITOTAL 0.6 05/28/2019           Lab Results   Component Value Date    ALBUMIN 3.9 05/28/2019                    Lab Results   Component Value Date    ALT 27 05/28/2019           Lab Results   Component Value Date    AST 28 05/28/2019       Results reviewed, labs MET treatment parameters, ok to proceed with treatment.      Post Infusion Assessment:  Patient tolerated infusion without incident.  Blood return noted pre and post infusion.  Site patent and intact, free from redness, edema or discomfort.  No evidence of extravasations.   Prior to discharge: Port is secured in place with tegaderm and flushed with 10cc NS with positive blood return noted.  Continuous home infusion CADD pump connected.    All connectors secured in place and clamps taped open.    Pump started, \"running\" noted on display (CADD): YES.  Patient instructed to call our clinic or [facility] with any questions or concerns at home.  Patient verbalized understanding.    Patient set up for pump disconnect at our clinic on Thursday.      Discharge Plan:   Patient " and/or family verbalized understanding of discharge instructions and all questions answered.  Declined copy of AVS reviewed with patient and/or family.  Patient will return Thursday for next disconnect.  Patient discharged in stable condition accompanied by: self.  Departure Mode: Ambulatory.    Jean S. Hammann, RN

## 2019-05-30 ENCOUNTER — HOSPITAL ENCOUNTER (OUTPATIENT)
Dept: INFUSION THERAPY | Facility: OTHER | Age: 69
Discharge: HOME | End: 2019-05-30
Attending: INTERNAL MEDICINE | Admitting: INTERNAL MEDICINE
Payer: MEDICARE

## 2019-05-30 DIAGNOSIS — C77.2 CANCER OF APPENDIX METASTATIC TO INTRA-ABDOMINAL LYMPH NODE (H): Primary | ICD-10-CM

## 2019-05-30 DIAGNOSIS — C18.1 CANCER OF APPENDIX METASTATIC TO INTRA-ABDOMINAL LYMPH NODE (H): Primary | ICD-10-CM

## 2019-05-30 PROCEDURE — 25000128 H RX IP 250 OP 636: Performed by: INTERNAL MEDICINE

## 2019-05-30 PROCEDURE — 96523 IRRIG DRUG DELIVERY DEVICE: CPT

## 2019-05-30 RX ORDER — HEPARIN SODIUM (PORCINE) LOCK FLUSH IV SOLN 100 UNIT/ML 100 UNIT/ML
500 SOLUTION INTRAVENOUS ONCE
Status: COMPLETED | OUTPATIENT
Start: 2019-05-30 | End: 2019-05-30

## 2019-05-30 RX ADMIN — Medication 500 UNITS: at 11:50

## 2019-05-30 NOTE — PROGRESS NOTES
Patient returns after CADD pump running with out difficulty for 46 hours, pump reads empty and patient tolerated treatment. Good brisk blood return post infusion and Port heparin locked and de-accessed. Patient has completed cycles per her choice, has scans and follow up planned with oncologist. Reminded to have port flushed every 4-6 weeks to maintain patency.

## 2019-06-06 ENCOUNTER — HOSPITAL ENCOUNTER (OUTPATIENT)
Dept: CT IMAGING | Facility: OTHER | Age: 69
Discharge: HOME | End: 2019-06-06
Attending: INTERNAL MEDICINE | Admitting: INTERNAL MEDICINE
Payer: MEDICARE

## 2019-06-06 ENCOUNTER — HOSPITAL ENCOUNTER (OUTPATIENT)
Dept: CT IMAGING | Facility: OTHER | Age: 69
End: 2019-06-06
Attending: INTERNAL MEDICINE
Payer: MEDICARE

## 2019-06-06 DIAGNOSIS — C77.2 CANCER OF APPENDIX METASTATIC TO INTRA-ABDOMINAL LYMPH NODE (H): ICD-10-CM

## 2019-06-06 DIAGNOSIS — C18.1 CANCER OF APPENDIX METASTATIC TO INTRA-ABDOMINAL LYMPH NODE (H): ICD-10-CM

## 2019-06-06 PROCEDURE — 74177 CT ABD & PELVIS W/CONTRAST: CPT

## 2019-06-06 PROCEDURE — 25500064 ZZH RX 255 OP 636: Performed by: INTERNAL MEDICINE

## 2019-06-06 PROCEDURE — 25000128 H RX IP 250 OP 636: Performed by: INTERNAL MEDICINE

## 2019-06-06 PROCEDURE — 71260 CT THORAX DX C+: CPT

## 2019-06-06 RX ORDER — HEPARIN SODIUM (PORCINE) LOCK FLUSH IV SOLN 100 UNIT/ML 100 UNIT/ML
500 SOLUTION INTRAVENOUS
Status: COMPLETED | OUTPATIENT
Start: 2019-06-06 | End: 2019-06-06

## 2019-06-06 RX ADMIN — Medication 500 UNITS: at 11:25

## 2019-06-06 RX ADMIN — IOHEXOL 100 ML: 350 INJECTION, SOLUTION INTRAVENOUS at 11:21

## 2019-06-06 NOTE — PROGRESS NOTES
1.  Has the patient had a previous reaction to IV contrast? Pt had reaction back in the 80's to some type of contrast used for a procedure, this is not the same type of contrast we use now. Pt has had omnipaque in the past with no problems, pt is premedicated as a precaution.     2.  Does the patient have kidney disease? n    3.  Is the patient on dialysis? n    If YES to any of these questions, exam will be reviewed with a Radiologist before administering contrast.

## 2019-06-06 NOTE — PROGRESS NOTES
Patient's implanted port was accessed by a trained RN per hospital policy/procedure. Blood return normal. Site noted to be Normal. Patient tolerated well.     Following imaging procedure, port was flushed with 10 mL sterile normal saline and 500 units (5 mLs) heparin. Site was deaccessed by a trained RN per hospital policy/procedure. Site noted to be Normal. Patient tolerated well.     Site covered with dry, sterile dressing.     See flowsheet and MAR for details.

## 2019-06-18 ENCOUNTER — ONCOLOGY VISIT (OUTPATIENT)
Dept: ONCOLOGY | Facility: OTHER | Age: 69
End: 2019-06-18
Attending: INTERNAL MEDICINE
Payer: MEDICARE

## 2019-06-18 VITALS
TEMPERATURE: 98.3 F | SYSTOLIC BLOOD PRESSURE: 116 MMHG | WEIGHT: 128 LBS | RESPIRATION RATE: 14 BRPM | OXYGEN SATURATION: 98 % | DIASTOLIC BLOOD PRESSURE: 80 MMHG | HEART RATE: 72 BPM | BODY MASS INDEX: 21.85 KG/M2 | HEIGHT: 64 IN

## 2019-06-18 DIAGNOSIS — C18.1 CANCER OF APPENDIX METASTATIC TO INTRA-ABDOMINAL LYMPH NODE (H): Primary | ICD-10-CM

## 2019-06-18 DIAGNOSIS — C77.2 CANCER OF APPENDIX METASTATIC TO INTRA-ABDOMINAL LYMPH NODE (H): Primary | ICD-10-CM

## 2019-06-18 DIAGNOSIS — E07.9 THYROID CONDITION: ICD-10-CM

## 2019-06-18 LAB — T4 FREE SERPL-MCNC: 0.57 NG/DL (ref 0.6–1.6)

## 2019-06-18 PROCEDURE — G0463 HOSPITAL OUTPT CLINIC VISIT: HCPCS

## 2019-06-18 PROCEDURE — 84439 ASSAY OF FREE THYROXINE: CPT | Mod: ZL | Performed by: INTERNAL MEDICINE

## 2019-06-18 PROCEDURE — 99215 OFFICE O/P EST HI 40 MIN: CPT | Performed by: INTERNAL MEDICINE

## 2019-06-18 PROCEDURE — 36415 COLL VENOUS BLD VENIPUNCTURE: CPT | Mod: ZL | Performed by: INTERNAL MEDICINE

## 2019-06-18 RX ORDER — METHYLPREDNISOLONE 32 MG/1
TABLET ORAL
Qty: 2 TABLET | Refills: 0 | Status: SHIPPED | OUTPATIENT
Start: 2019-06-18 | End: 2019-08-26

## 2019-06-18 RX ORDER — DIPHENHYDRAMINE HCL 50 MG
CAPSULE ORAL
Qty: 1 CAPSULE | Refills: 0 | Status: SHIPPED | OUTPATIENT
Start: 2019-06-18 | End: 2019-08-26

## 2019-06-18 RX ORDER — POLYETHYLENE GLYCOL 3350 17 G/17G
1 POWDER, FOR SOLUTION ORAL 2 TIMES DAILY PRN
Status: ON HOLD | COMMUNITY
Start: 2018-11-20 | End: 2022-01-01

## 2019-06-18 ASSESSMENT — MIFFLIN-ST. JEOR: SCORE: 1095.85

## 2019-06-18 ASSESSMENT — PAIN SCALES - GENERAL: PAINLEVEL: NO PAIN (1)

## 2019-06-18 NOTE — NURSING NOTE
"Chief Complaint   Patient presents with     RECHECK     Carcinoid of appendix   No current concerns or complaints.     Initial /80   Pulse 72   Temp 98.3  F (36.8  C) (Oral)   Resp 14   Ht 1.626 m (5' 4.02\")   Wt 58.1 kg (128 lb)   SpO2 98%   BMI 21.96 kg/m   Estimated body mass index is 21.96 kg/m  as calculated from the following:    Height as of this encounter: 1.626 m (5' 4.02\").    Weight as of this encounter: 58.1 kg (128 lb).  Medication Reconciliation: complete    Tiffanie Pastor, CMA  "

## 2019-06-18 NOTE — LETTER
To whom it may concern,     Nadya Flanagan is able to work on a regular basis. She can work without restrictions. Please call with any questions.    Sincerely,         Alicia Thakkar MD

## 2019-06-19 ENCOUNTER — OFFICE VISIT (OUTPATIENT)
Dept: INTERNAL MEDICINE | Facility: OTHER | Age: 69
End: 2019-06-19
Attending: INTERNAL MEDICINE
Payer: MEDICARE

## 2019-06-19 VITALS
SYSTOLIC BLOOD PRESSURE: 134 MMHG | RESPIRATION RATE: 18 BRPM | WEIGHT: 129.2 LBS | DIASTOLIC BLOOD PRESSURE: 70 MMHG | HEIGHT: 64 IN | HEART RATE: 74 BPM | TEMPERATURE: 97.1 F | OXYGEN SATURATION: 98 % | BODY MASS INDEX: 22.06 KG/M2

## 2019-06-19 DIAGNOSIS — K59.00 CONSTIPATION, UNSPECIFIED CONSTIPATION TYPE: ICD-10-CM

## 2019-06-19 DIAGNOSIS — R63.5 WEIGHT GAIN: ICD-10-CM

## 2019-06-19 DIAGNOSIS — E03.9 HYPOTHYROIDISM, UNSPECIFIED TYPE: Primary | ICD-10-CM

## 2019-06-19 DIAGNOSIS — R73.9 ELEVATED BLOOD SUGAR: ICD-10-CM

## 2019-06-19 PROCEDURE — 99204 OFFICE O/P NEW MOD 45 MIN: CPT | Performed by: INTERNAL MEDICINE

## 2019-06-19 PROCEDURE — G0463 HOSPITAL OUTPT CLINIC VISIT: HCPCS

## 2019-06-19 RX ORDER — LEVOTHYROXINE SODIUM 25 UG/1
25 TABLET ORAL DAILY
Qty: 90 TABLET | Refills: 0 | Status: SHIPPED | OUTPATIENT
Start: 2019-06-19 | End: 2019-08-26

## 2019-06-19 ASSESSMENT — PAIN SCALES - GENERAL: PAINLEVEL: NO PAIN (1)

## 2019-06-19 ASSESSMENT — MIFFLIN-ST. JEOR: SCORE: 1101.05

## 2019-06-19 NOTE — PROGRESS NOTES
Chief Complaint   Patient presents with     Thyroid Problem          Subjective:   Ms. Flanagan is a 68 year old female  seen for the acute concern today of thyroid issues.  She is new to clinic here.  She has been following with Dr. Thakkar for metastatic appendiceal cancer.    She has had some concerns recently about changes in her thyroid and her oncologist checked a TSH and T4 which were mildly abnormal indicating hypothyroidism.  Today she brings in several records from past with thyroid issues.  These are reviewed in depth.  It back in 1997 she was seen and diagnosed with hyperthyroid.  At that time her TSH was less than 0.03 and her T4 was just mildly elevated.  She underwent thyroid ultrasound that showed a left-sided mass 1.5 cm x 2 x 2.5 cm.  Uptake scan showed a cold nodule.  FNA was done that was negative.  Was put on PTU and took this for 3 to 4 months however due to significantly variable thyroid level she stopped medications and has not been in anything since.  She has not followed up for her thyroid since.    She has noted some symptoms of hyperthyroid including constipation, drying of the skin and thinning of the hair, weight gain, and extreme daytime somnolence.    She reports today that she has had some issues with elevated blood sugar in the past.  Her A1c last November was 6.1%.  Her blood sugars have been okay otherwise.  She is concerned however with her weight gain that this may be worse.    She  reports that she is a non-smoker but has been exposed to tobacco smoke. She has never used smokeless tobacco.    History is discussed and updated on 6/19/2019 with patient.  It is current to the best of my knowledge as below.    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     Thyroid disease      Tricuspid regurgitation         Past Surgical History:    Procedure Laterality Date     FRACTURE TX, HIP RT/LT Right 2013     INSERT PORT VASCULAR ACCESS N/A 2/7/2019    Procedure: INSERT PORT VASCULAR ACCESS;  Surgeon: Tresa Evangelista MD;  Location: [facility] OR     LAPAROTOMY EXPLORATORY N/A 12/18/2018    Procedure: Exploratory Laparotomy with right hemicolectomy;  Surgeon: Tresa Evangelista MD;  Location: [facility] OR     TONSILLECTOMY  1962     TUBAL LIGATION  1979         Current Outpatient Medications   Medication Sig Dispense Refill     acetaminophen (TYLENOL) 500 MG tablet Take 500-1,000 mg by mouth every 6 hours as needed for mild pain       cetirizine (ZYRTEC) 10 MG tablet Take 10 mg by mouth daily       diphenhydrAMINE (BENADRYL) 50 MG capsule 1 tab 1 hour prior to scan 1 capsule 0     famotidine (PEPCID) 20 MG tablet Take 10 mg by mouth daily as needed       fluocinonide (LIDEX) 0.05 % external gel        Lactobacillus (FLORAJEN ACIDOPHILUS) CAPS        levothyroxine (SYNTHROID/LEVOTHROID) 25 MCG tablet Take 1 tablet (25 mcg) by mouth daily 90 tablet 0     lidocaine-prilocaine (EMLA) 2.5-2.5 % external cream Apply to port site 60 minutes before needle and cover with occlusive dressing. 30 g 3     LORazepam (ATIVAN) 0.5 MG tablet Take 1 tablet (0.5 mg) by mouth every 4 hours as needed (Anxiety, Nausea/Vomiting or Sleep) 30 tablet 2     methylPREDNISolone (MEDROL) 32 MG tablet Take 32 mg 12 hours prior to scans and 32 mg 2 hours prior to scans 2 tablet 0     Multiple Vitamin (MULTI-VITAMINS) TABS Take 1 tablet by mouth daily       ondansetron (ZOFRAN) 8 MG tablet Take 1 tablet (8 mg) by mouth every 8 hours as needed (Nausea/Vomiting) 10 tablet 2     polyethylene glycol (MIRALAX/GLYCOLAX) powder        prochlorperazine (COMPAZINE) 10 MG tablet Take 1 tablet (10 mg) by mouth every 6 hours as needed (Nausea/Vomiting) 30 tablet 2     pyridOXINE (VITAMIN B6) 100 MG TABS          Allergies   Allergen Reactions     Metrizamide Anaphylaxis     Had contrast dye. Patient reports she woke up  "in ICU.     Bee Venom      Edema     Pollen Extract      Runny nose     Sulfa Drugs Hives        Family History   Problem Relation Age of Onset     Tuberculosis Mother        Family Status   Relation Name Status     Mo          Social History     Tobacco Use     Smoking status: Passive Smoke Exposure - Never Smoker     Smokeless tobacco: Never Used     Tobacco comment: Lived with a pipe smoker for 15 years   Substance Use Topics     Alcohol use: No     Frequency: Never       Social History     Social History Narrative     Not on file       ROS  GEN: -fevers/-chills/-night sweats/+wt change  NEURO: +chronic headaches/-vision changes  EARS: -hearing/-tinnitus  NOSE: -drainage/-congestion  MOUTH/THROAT: - sore throat/-dysphagia/-sores  LUNGS: -Change in sob/-cough  CARDIOVASCULAR: -cp/-palpitations  GI: -pain/-diarrhea/+ occasional constipation/-bloody stools  : -Change in bladder/-vaginal discharge or bleeding  ENDOCRINE: +skin or hair changes  HEMATOLOGIC/LYMPHATIC: -swollen nodes/-easy bruising  SKIN: -rashes/-lesions  MSK/RHEUM: -Significant joint pain/-swelling  NEURO: -weakness/-parasthesias  SLEEP: +Daytime Somnolence           Objective:    /70 (BP Location: Right arm, Patient Position: Sitting, Cuff Size: Adult Regular)   Pulse 74   Temp 97.1  F (36.2  C) (Tympanic)   Resp 18   Ht 1.626 m (5' 4\")   Wt 58.6 kg (129 lb 3.2 oz)   SpO2 98%   Breastfeeding? No   BMI 22.18 kg/m    GEN: Vitals reviewed.  Patient is in no acute distress. Cooperative with exam.  HEENT: Normocephalic atraumatic.  Pupils equally round.  No scleral icterus, no conjunctival erythema. Oropharynx with no erythema or exudates. Dentition adequate.  NECK: Supple; no thyromegaly.  No neck, cervical LAD.    CV: Heart regular in rate and rhythm with no murmur.   LUNGS: Lungs clear to auscultation bilaterally.  Chest rise equal bilaterally.  No accessory muscle use.  ABD: nondistended  SKIN: Warm and dry to touch.  No rash " on face, arms and legs.  EXT: No clubbing or cyanosis.  No peripheral edema.  PSYCH: Mood is good.  Affect appropriate.  Thought processes normal and appropriate.     Assessment/Plan:   Hypothyroidism, unspecified type  -At this time she is noted to have clinical hypothyroidism.  We will start low-dose levothyroxine.  I suspect she has Hashimoto's disease.  We will plan to check repeat TSH along with TPO antibodies with upcoming labs.  She is to call if she has any side effects or concerns with the medication.  - levothyroxine (SYNTHROID/LEVOTHROID) 25 MCG tablet  Dispense: 90 tablet; Refill: 0  - TSH Reflex [facility]  - Thyroid peroxidase antibody    Constipation, unspecified constipation type  -Likely secondary to thyroid dysfunction.  We discussed medications that she can use over-the-counter to help with this.  She is to call with worsening issues.    Weight gain  -Likely secondary to thyroid dysfunction.  Given her weight gain with her prior elevated blood sugar we will check an A1c with upcoming labs.  He is to work on diet and activity.  - Hemoglobin A1c    Elevated blood sugar  See above  - Hemoglobin A1c      - Return/call as needed for follow-up should any new symptoms develop, for worsening of current symptoms or if symptoms do not resolve with above plan.    Return in about 2 months (around 8/19/2019) for as scheduled.     DESI KIM, DO   6/19/2019 7:34 AM    This document was prepared using voice generated softwear. While every attempt was made for accuracy, grammatical errors may exist.

## 2019-06-19 NOTE — PATIENT INSTRUCTIONS
Patient Education     Hypothyroidism  You have hypothyroidism. This means your thyroid gland is not making enough thyroid hormone. This hormone is vital to body growth and metabolism. If you don t make enough, many body processes slow down. This can cause symptoms throughout the body. Hypothyroidism can range from mild to severe. The most severe form is called myxedema.  There are a number of causes of hypothyroidism. A common cause is Hashimoto s disease. This disease causes the body s own immune system to attack the thyroid gland. When you have certain treatments, such as surgery to remove the thyroid gland, this can also cause hypothyroidism. Sometimes the thyroid gland is not functioning because of lack of stimulation from the pituitary gland.  Symptoms of hypothyroidism can include:    Fatigue    Trouble concentrating or thinking clearly; forgetfulness    Dry skin    Hair loss    Weight gain    Low tolerance to cold    Constipation    Depression    Personality changes    Tingling or prickling of the hands or feet    Heavy, absent, or irregular periods (women only)  Older adults may sometimes have other symptoms. These can include:    Muscle aches and weakness    Confusion    Incontinence (unable to control urine or stool)    Trouble moving around    Falling  Treatment for hypothyroidism involves taking thyroid hormone pills daily. These pills replace the hormone your thyroid doesn t make. You will likely need to take a daily pill for the rest of your life. Tips for taking this medicine are given below.  Home care  Tips for taking your medicine    Take your thyroid hormone pills as prescribed by your healthcare provider. This is most often 1 pill a day on an empty stomach. Use a pillbox labeled with the days of the week. This will help you remember to take your pill each day.    Don t take products that contain iron and calcium or antacids within 4 hours of taking your thyroid hormone pills.    Don t take other  medicines with your thyroid hormone pill without checking with your provider first.    Tell your provider if you have any side effects from your medicines that bother you, especially any chest pain or irregular heart beats.    Never change the dosage or stop taking your thyroid pills without talking to your provider first.  General care    Always talk with your provider before trying other medicines or treatments for your thyroid problem.    If you see other healthcare providers, be sure to let them know about your thyroid problem.    Let your healthcare provider know if you become pregnant because your dose of thyroid hormone will need to be adjusted.  Follow-up care  See your healthcare provider for checkups as advised. You may need regular tests to check the level of thyroid hormone in your blood.  When to seek medical advice  Call your healthcare provider right away if any of these occur:    New symptoms develop    Symptoms return, continue, or worsen even after treatment    Extreme fatigue    Puffy hands, face, or feet    Fast or irregular heartbeat    Confusion  Call 911  Call 911 if any of these occur:    Fainting    Chest pain    Shortness of breath or trouble breathing  Date Last Reviewed: 4/1/2018 2000-2018 [employer]. [address] [place] [address], [place] [address]. All rights reserved. This information is not intended as a substitute for professional medical care. Always follow your healthcare professional's instructions.

## 2019-06-19 NOTE — NURSING NOTE
Patient presents to the clinic for possible thyroid issues.     Medication Reconciliation: complete   Jerri Myers LPN............. June 19, 2019 10:28 AM

## 2019-06-19 NOTE — PROGRESS NOTES
Visit Date:   06/18/2019      HISTORY OF PRESENT ILLNESS:  Ms. 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  We had seen the patient in consultation at the request of Dr. Tresa Evangelista and MARY Nelson of [facility] and Dr. Speedy Menezes of the [facility] where he had seen the patient on 02/07/2019.  At that time, she was a 68-year-old white female with a history of migraine headaches.  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  She apparently presented with abdominal discomfort in mid-October associated with nausea.  This progressed to the point where she was seen by MARY Nelson, her primary care provider in October.  At that time, she appeared with nausea, vomiting associated epigastric pain and left lower quadrant abdominal pain.  MRI of the liver was obtained.  There were findings of hemangioma.  Symptoms persisted and the patient underwent EGD on 12/07/2018.  There were no significant findings.  Apparently, she has not progressed to the point where she was having significant projectile vomiting.  On 12/14/2016, she was seen in the [facility] and then transferred to [facility] where she was admitted for small-bowel obstruction.  She was treated initially conservatively with NG suction.  Subsequently, underwent a right hemicolectomy, which revealed a mass in the terminal ileum.  The mass was adherent to the right ovary.  Pathology was read as adenocarcinoma, ex goblet cell carcinoid of the appendix and was staged T2b N1.  Initially tumor was present focally in the posterior retroperitoneal region.  Two out of 21 lymph nodes were involved with disease, peritoneal fluid was sent for cytology.   Initially was read as rare malignant cells.  The patient was referred to [facility], seen by Dr. Speedy Menezes, Medical Oncologist at [place] on 01/16/2019.  The slides reviewed by [facility] pathologist and the findings were the patient peritoneal washings that were consistent with involvement by adenocarcinoma ex goblet cell carcinoid and also terminal ileum, appendix, right colon from the right hemicolectomy specimen revealed adenocarcinoma ex goblet cell carcinoid performed.  An indurated mass diffusely involving the appendix and invading the visceral peritoneum.  Angiolymphatic invasion was present.  The retroperitoneal soft tissue margin was positive, 3 of 21 lymph nodes were involved, pathologic stage was T4a pN1 immunohistochemical staining for DNA mismatch repair enzymes were intact.  Also, CT of the abdomen and pelvis performed at the [facility].  Findings were there was interval postoperative change of the right hemicolectomy with resolution of previously seen small-bowel dilatation obstruction.  There was mild soft tissue edema in the resection bed along the midline incision, favored to be postop.  No definite peritoneal metastases identified.  No free fluid.  There was abnormal geographic perfusion with hepatic segments 5 and 8 without evidence of focal hepatic mass.  There was indeterminate mild thickening of the left adrenal gland, unchanged.  On 01/15/2019,  Dr. Menezes recommended FOLFOX chemotherapy given the fact she had positive cytology and recommended 3 months of FOLFOX chemotherapy followed by imaging at the [facility].  The patient had port placement on 02/17/2019.  The plan was to proceed with FOLFOX and 5-FU given at 400 mg IV bolus with leucovorin bolus and oxaliplatin 85 mg/m2 given on day 1 and continuous IV 5-FU 2400 mg/m2 IV over 46 hours continuously.  Otherwise, the plan was to restage after 3 months of therapy.  The patient was seen by us on 03/19/2019.  At that time, she completed  4 cycles of FOLFOX at 20% dose reduction.  The last 2 cycles were dose reduced by 20% due to neutropenia.  She had staging studies after 4 cycles on 04/09/2019.  The findings were there was no evidence of metastatic disease.  There was evolution of postoperative change in the abdomen, no evidence of microscopic peritoneal implant disease or size.  There was nodular opacity in the lung that were consistent with endobronchial debris likely due to bronchitis.  The patient went on to complete 8 cycles of chemotherapy and then underwent restaging imaging was included CT chest, abdomen and pelvis performed on 06/06/2019.  The findings were that there was no evidence of active disease throughout the chest, abdomen and pelvis.  The nodular foci seen previously are no longer identified suggesting the presence of endobronchial debris on prior study.  No evidence of new nodules or lymphadenopathy.  No evidence of intraperitoneal metastatic disease.  The patient otherwise was decided that she does not want to go to [facility].  We discussed the case Dr. Speedy Menezes who recommended that HIPEC chemotherapy may be an option.  He felt otherwise he would recommend observation with serial imaging.  The patient otherwise states she has had some nail changes as the nails appear more brittle.  She does describe being more fatigued than usual.  She wonders about her thyroid function.  She says she has had weight gain which she is concerned about, but denies any abdominal distention.      PHYSICAL EXAMINATION:   GENERAL:  She is a middle-aged white female in no acute distress.   VITAL SIGNS:  Blood pressure 160/80, pulse 80, respirations 14, temperature 98.3.   HEENT:  Atraumatic, normocephalic.  Oropharynx nonerythematous.   NECK:  Supple.   LUNGS:  Clear to auscultation and percussion.   HEART:  Regular rhythm, S1, S2 is normal.   ABDOMEN:  Soft, normoactive bowel sounds.  No mass effects.   LYMPHATICS:  No cervical,  supraclavicular, axillary or inguinal nodes.   EXTREMITIES:  No edema.   NEUROLOGIC:  Nonfocal.      LABORATORY DATA:  Reveal CBC with white count 3.8, H and H 10.5 and 31.6, platelet count 184.      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  The patient was deemed a candidate for chemotherapy for stage IV disease as per Dr. Speedy Menezes of the [facility].  The plan was to initiate FOLFOX chemotherapy.  The patient completed 2 cycles.  Course complicated by neutropenia.  The patient went on to receive 20% dose reduction of 4 cycles it was staged as indicated evidence of progression of disease after 8 cycles.  No evidence of disease, stable findings, no evidence of peritoneal disease.  The plan is to otherwise obtain a CBC in mid-July with CBC, CMP, LDH, and CA.  Otherwise, we will see the patient in approximately 2 months, obtain CT chest, abdomen and pelvis, CEA.      Forty minutes was spent with the patient, greater than half the time spent in counseling and coordination of care.         DEEPIKA BARRIOS MD             D: 2019   T: 2019   MT: BREANNA      Name:     ALMA CORTES   MRN:      [id number]        Account:      [id number]   :      1950           Visit Date:   2019      Document: [id number]       cc: Isaura Evangelista MD

## 2019-07-15 ENCOUNTER — MYC MEDICAL ADVICE (OUTPATIENT)
Dept: ONCOLOGY | Facility: OTHER | Age: 69
End: 2019-07-15

## 2019-07-17 ENCOUNTER — HOSPITAL ENCOUNTER (OUTPATIENT)
Dept: INFUSION THERAPY | Facility: OTHER | Age: 69
Discharge: HOME | End: 2019-07-17
Attending: INTERNAL MEDICINE | Admitting: INTERNAL MEDICINE
Payer: MEDICARE

## 2019-07-17 DIAGNOSIS — C78.6 MALIGNANT NEOPLASM METASTATIC TO PERITONEUM (H): Primary | ICD-10-CM

## 2019-07-17 DIAGNOSIS — E03.9 HYPOTHYROIDISM, UNSPECIFIED TYPE: ICD-10-CM

## 2019-07-17 DIAGNOSIS — C18.1 MALIGNANT NEOPLASM OF APPENDIX (H): ICD-10-CM

## 2019-07-17 DIAGNOSIS — C18.1 CANCER OF APPENDIX METASTATIC TO INTRA-ABDOMINAL LYMPH NODE (H): ICD-10-CM

## 2019-07-17 DIAGNOSIS — R63.5 WEIGHT GAIN: ICD-10-CM

## 2019-07-17 DIAGNOSIS — R73.9 ELEVATED BLOOD SUGAR: ICD-10-CM

## 2019-07-17 DIAGNOSIS — C77.2 CANCER OF APPENDIX METASTATIC TO INTRA-ABDOMINAL LYMPH NODE (H): ICD-10-CM

## 2019-07-17 LAB
ALBUMIN SERPL-MCNC: 4 G/DL (ref 3.5–5.7)
ALP SERPL-CCNC: 63 U/L (ref 34–104)
ALT SERPL W P-5'-P-CCNC: 19 U/L (ref 7–52)
ANION GAP SERPL CALCULATED.3IONS-SCNC: 5 MMOL/L (ref 3–14)
AST SERPL W P-5'-P-CCNC: 26 U/L (ref 13–39)
BASOPHILS # BLD AUTO: 0.1 10E9/L (ref 0–0.2)
BASOPHILS NFR BLD AUTO: 1.3 %
BILIRUB SERPL-MCNC: 0.4 MG/DL (ref 0.3–1)
BUN SERPL-MCNC: 20 MG/DL (ref 7–25)
CALCIUM SERPL-MCNC: 9.4 MG/DL (ref 8.6–10.3)
CEA SERPL-MCNC: <3 NG/ML
CHLORIDE SERPL-SCNC: 105 MMOL/L (ref 98–107)
CO2 SERPL-SCNC: 28 MMOL/L (ref 21–31)
CREAT SERPL-MCNC: 0.86 MG/DL (ref 0.6–1.2)
DIFFERENTIAL METHOD BLD: ABNORMAL
EOSINOPHIL # BLD AUTO: 0.2 10E9/L (ref 0–0.7)
EOSINOPHIL NFR BLD AUTO: 3.3 %
ERYTHROCYTE [DISTWIDTH] IN BLOOD BY AUTOMATED COUNT: 12.4 % (ref 10–15)
GFR SERPL CREATININE-BSD FRML MDRD: 66 ML/MIN/{1.73_M2}
GLUCOSE SERPL-MCNC: 100 MG/DL (ref 70–105)
HBA1C MFR BLD: 5.5 % (ref 4–6)
HCT VFR BLD AUTO: 34.4 % (ref 35–47)
HGB BLD-MCNC: 11.3 G/DL (ref 11.7–15.7)
IMM GRANULOCYTES # BLD: 0 10E9/L (ref 0–0.4)
IMM GRANULOCYTES NFR BLD: 0.6 %
LDH SERPL L TO P-CCNC: 190 U/L (ref 140–271)
LYMPHOCYTES # BLD AUTO: 1.4 10E9/L (ref 0.8–5.3)
LYMPHOCYTES NFR BLD AUTO: 26.2 %
MCH RBC QN AUTO: 33.7 PG (ref 26.5–33)
MCHC RBC AUTO-ENTMCNC: 32.8 G/DL (ref 31.5–36.5)
MCV RBC AUTO: 103 FL (ref 78–100)
MONOCYTES # BLD AUTO: 0.7 10E9/L (ref 0–1.3)
MONOCYTES NFR BLD AUTO: 13.5 %
NEUTROPHILS # BLD AUTO: 3 10E9/L (ref 1.6–8.3)
NEUTROPHILS NFR BLD AUTO: 55.1 %
PLATELET # BLD AUTO: 242 10E9/L (ref 150–450)
POTASSIUM SERPL-SCNC: 4.4 MMOL/L (ref 3.5–5.1)
PROT SERPL-MCNC: 6.8 G/DL (ref 6.4–8.9)
RBC # BLD AUTO: 3.35 10E12/L (ref 3.8–5.2)
SODIUM SERPL-SCNC: 138 MMOL/L (ref 134–144)
TSH SERPL DL<=0.05 MIU/L-ACNC: 1.82 IU/ML (ref 0.34–5.6)
WBC # BLD AUTO: 5.4 10E9/L (ref 4–11)

## 2019-07-17 PROCEDURE — 25000128 H RX IP 250 OP 636: Performed by: INTERNAL MEDICINE

## 2019-07-17 PROCEDURE — 80053 COMPREHEN METABOLIC PANEL: CPT | Performed by: INTERNAL MEDICINE

## 2019-07-17 PROCEDURE — 36591 DRAW BLOOD OFF VENOUS DEVICE: CPT

## 2019-07-17 PROCEDURE — 86376 MICROSOMAL ANTIBODY EACH: CPT | Performed by: INTERNAL MEDICINE

## 2019-07-17 PROCEDURE — 85025 COMPLETE CBC W/AUTO DIFF WBC: CPT | Performed by: INTERNAL MEDICINE

## 2019-07-17 PROCEDURE — 84443 ASSAY THYROID STIM HORMONE: CPT | Performed by: INTERNAL MEDICINE

## 2019-07-17 PROCEDURE — 83615 LACTATE (LD) (LDH) ENZYME: CPT | Performed by: INTERNAL MEDICINE

## 2019-07-17 PROCEDURE — 83036 HEMOGLOBIN GLYCOSYLATED A1C: CPT | Performed by: INTERNAL MEDICINE

## 2019-07-17 PROCEDURE — 82378 CARCINOEMBRYONIC ANTIGEN: CPT | Performed by: INTERNAL MEDICINE

## 2019-07-17 RX ORDER — HEPARIN SODIUM (PORCINE) LOCK FLUSH IV SOLN 100 UNIT/ML 100 UNIT/ML
500 SOLUTION INTRAVENOUS
Status: COMPLETED | OUTPATIENT
Start: 2019-07-17 | End: 2019-07-17

## 2019-07-17 RX ORDER — HEPARIN SODIUM (PORCINE) LOCK FLUSH IV SOLN 100 UNIT/ML 100 UNIT/ML
500 SOLUTION INTRAVENOUS
Status: CANCELLED
Start: 2019-07-17

## 2019-07-17 RX ADMIN — Medication 500 UNITS: at 10:18

## 2019-07-17 NOTE — PROGRESS NOTES
Infusion Nursing Note:  Nadya Flanagan presents today for port flush and labs.    Patient seen by provider today: No   present during visit today: Not Applicable.    Note: N/A.    Intravenous Access:  Labs drawn without difficulty.  Implanted Port.    Treatment Conditions:  Not Applicable.      Post Infusion Assessment:  Port accessed per protocol. Brisk Blood return noted labs drawn and port flushed with NS and heparin and de-accessed per ptotocol. Sterile dressing applied  Site patent and intact, free from redness, edema or discomfort.  No evidence of extravasations.  Access discontinued per protocol.       Discharge Plan:   Departure Mode: Ambulatory, will return in 4-6 weeks for next port flush.    Saumya Alvarado RN

## 2019-07-18 LAB — THYROPEROXIDASE AB SERPL-ACNC: 27 IU/ML

## 2019-07-19 ENCOUNTER — MYC MEDICAL ADVICE (OUTPATIENT)
Dept: INTERNAL MEDICINE | Facility: OTHER | Age: 69
End: 2019-07-19

## 2019-07-30 ENCOUNTER — TELEPHONE (OUTPATIENT)
Dept: INTERNAL MEDICINE | Facility: OTHER | Age: 69
End: 2019-07-30

## 2019-07-30 NOTE — TELEPHONE ENCOUNTER
Patient phoned to question if symptoms could last after active chemotherapy treatment.  No worsening and reports that she has been traveling via air plane and automobile.  Reports that she has been busy and staying active.  This nurse spoke to oncologist with report to alert patient to continue plan of care and if any changes with worsening and decreased mobility to be seen.  Patient has an appointment with PMD in two weeks and will discuss with Dr. Pearce.  Patient verbalized that she is taking Vitamin B6 and a multi vitamin. Steffany Urrutia RN on 7/30/2019 at 3:28 PM

## 2019-08-02 ENCOUNTER — TELEPHONE (OUTPATIENT)
Dept: INTERNAL MEDICINE | Facility: OTHER | Age: 69
End: 2019-08-02

## 2019-08-05 ENCOUNTER — OFFICE VISIT (OUTPATIENT)
Dept: INTERNAL MEDICINE | Facility: OTHER | Age: 69
End: 2019-08-05
Attending: INTERNAL MEDICINE
Payer: MEDICARE

## 2019-08-05 VITALS
DIASTOLIC BLOOD PRESSURE: 78 MMHG | HEIGHT: 64 IN | OXYGEN SATURATION: 99 % | BODY MASS INDEX: 22.67 KG/M2 | HEART RATE: 93 BPM | WEIGHT: 132.8 LBS | SYSTOLIC BLOOD PRESSURE: 132 MMHG | TEMPERATURE: 97.2 F | RESPIRATION RATE: 16 BRPM

## 2019-08-05 DIAGNOSIS — G62.9 PERIPHERAL POLYNEUROPATHY: Primary | ICD-10-CM

## 2019-08-05 DIAGNOSIS — C78.6 MALIGNANT NEOPLASM METASTATIC TO PERITONEUM (H): ICD-10-CM

## 2019-08-05 DIAGNOSIS — R71.8 ELEVATED MCV: ICD-10-CM

## 2019-08-05 DIAGNOSIS — D64.9 ANEMIA, UNSPECIFIED TYPE: ICD-10-CM

## 2019-08-05 LAB
ERYTHROCYTE [DISTWIDTH] IN BLOOD BY AUTOMATED COUNT: 11.8 % (ref 10–15)
HCT VFR BLD AUTO: 37.9 % (ref 35–47)
HGB BLD-MCNC: 12.5 G/DL (ref 11.7–15.7)
MCH RBC QN AUTO: 33.3 PG (ref 26.5–33)
MCHC RBC AUTO-ENTMCNC: 33 G/DL (ref 31.5–36.5)
MCV RBC AUTO: 101 FL (ref 78–100)
PLATELET # BLD AUTO: 251 10E9/L (ref 150–450)
RBC # BLD AUTO: 3.75 10E12/L (ref 3.8–5.2)
VIT B12 SERPL-MCNC: 572 PG/ML (ref 180–914)
WBC # BLD AUTO: 6.2 10E9/L (ref 4–11)

## 2019-08-05 PROCEDURE — 99214 OFFICE O/P EST MOD 30 MIN: CPT | Performed by: INTERNAL MEDICINE

## 2019-08-05 PROCEDURE — 36415 COLL VENOUS BLD VENIPUNCTURE: CPT | Mod: ZL | Performed by: INTERNAL MEDICINE

## 2019-08-05 PROCEDURE — 85027 COMPLETE CBC AUTOMATED: CPT | Mod: ZL | Performed by: INTERNAL MEDICINE

## 2019-08-05 PROCEDURE — G0463 HOSPITAL OUTPT CLINIC VISIT: HCPCS

## 2019-08-05 PROCEDURE — 82607 VITAMIN B-12: CPT | Mod: ZL | Performed by: INTERNAL MEDICINE

## 2019-08-05 ASSESSMENT — PAIN SCALES - GENERAL: PAINLEVEL: NO PAIN (0)

## 2019-08-05 ASSESSMENT — MIFFLIN-ST. JEOR: SCORE: 1117.38

## 2019-08-05 NOTE — NURSING NOTE
Patient presents to the clinic for increase neuropathy issues.       Medication Reconciliation: complete   Jerri Myers LPN............. August 5, 2019 10:30 AM

## 2019-08-05 NOTE — PROGRESS NOTES
"Chief Complaint   Patient presents with     Neurologic Problem          Subjective:   Ms. Flanagan is a 68 year old female  seen for the acute concern today of peripheral neuropathy.    She reports that this has started over the last couple months.  She has a recent history of chemotherapy for her metastatic carcinoid adenocarcinoma.  Her last dose of chemotherapy was on May 31, 2019.  She feels that her main symptom is numbness.  She does get some tingling and paresthesias at times.  She denies any consistent pain.  She does have the numbness of her bilateral feet the left is worse than the right up to about the level of the ankle.  She also has it in her fingers.  She has been using some Tylenol as needed along with some Aspercreme.      She has a history of thyroid disease but recently had her thyroid checked.  She had recent labs done for oncology which showed a slight decrease in hemoglobin along with an elevated MCV.  She has been taking B6 supplementation along with a multivitamin.    She  reports that she is a non-smoker but has been exposed to tobacco smoke. She has never used smokeless tobacco.    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      ROS:   Pertinent  ROS was performed and was negative, including for fevers, chills, chest pain, shortness of breath, increased lower extremity edema, changes in bowel or bladder, blood in the stool, difficulty swallowing, sores in the mouth. No other concerns, with exception of HPI above.      Objective:    /78 (BP Location: Right arm, Patient Position: Sitting, Cuff Size: Adult Regular)   Pulse 93   Temp 97.2  F (36.2  C) (Tympanic)   Resp 16   Ht 1.626 m (5' 4\")   Wt 60.2 kg (132 lb 12.8 oz)   SpO2 99%   " Breastfeeding? No   BMI 22.80 kg/m    GEN: Vitals reviewed.  Patient is in no acute distress. Cooperative with exam.  HEENT: Normocephalic atraumatic.  Pupils equally round.  No scleral icterus, no conjunctival erythema.   LUNGS: Chest rise equal bilaterally.  No accessory muscle use.  SKIN: Warm and dry to touch.  No rash on face, arms and legs.  EXT: No clubbing or cyanosis.  No peripheral edema.  NEURO: Decreased sensation bilaterally on the feet below the ankle and on the fingers bilaterally.  No decreased range of motion is noted.  Muscle strength is intact.    LABS: 8/5/2019 - Personally ordered/reviewed  Results for orders placed or performed in visit on 08/05/19   Vitamin B12   Result Value Ref Range    Vitamin B12 572 180 - 914 pg/mL   CBC W PLT No Diff   Result Value Ref Range    WBC 6.2 4.0 - 11.0 10e9/L    RBC Count 3.75 (L) 3.8 - 5.2 10e12/L    Hemoglobin 12.5 11.7 - 15.7 g/dL    Hematocrit 37.9 35.0 - 47.0 %     (H) 78 - 100 fl    MCH 33.3 (H) 26.5 - 33.0 pg    MCHC 33.0 31.5 - 36.5 g/dL    RDW 11.8 10.0 - 15.0 %    Platelet Count 251 150 - 450 10e9/L           Assessment/Plan:   Peripheral polyneuropathy  - I suspect this is secondary to her chemotherapy.  We discussed potential for medications and she declined today as she does not feel that the pain is bad enough to justify this.  B12 level appears to be adequate.  MCV has improved.  At this time she can continue with Aspercreme and if she does feel she needs medications in the future they can be prescribed.  She could try a B12 supplement in addition to her current medications to see if this benefits her.    Anemia, unspecified type  Hemoglobin has improved at this time.  - Vitamin B12  - CBC W PLT No Diff    Elevated MCV  -MCV likely related to recent chemotherapy.  Improved at this time overall.  - Vitamin B12    Malignant neoplasm metastatic to peritoneum (H)  -Continue to follow with oncology.  Continue with upcoming imaging.      -  Return/call as needed for follow-up should any new symptoms develop, for worsening of current symptoms or if symptoms do not resolve with above plan.    Return in about 3 weeks (around 8/26/2019) for as scheduled.     A total of 25 minutes spent with in face-to-face consultation of this patient with greater than 50% spent in counseling and care coordination of above listed medical problems including diagnosis, treatment options with emphasis on risks and benefits of each,prognosis and importance of compliance for each.    DESI KIM DO   8/5/2019 10:56 AM    This document was prepared using voice generated softwear. While every attempt was made for accuracy, grammatical errors may exist.

## 2019-08-26 ENCOUNTER — HOSPITAL ENCOUNTER (OUTPATIENT)
Dept: CT IMAGING | Facility: OTHER | Age: 69
End: 2019-08-26
Attending: INTERNAL MEDICINE
Payer: MEDICARE

## 2019-08-26 ENCOUNTER — OFFICE VISIT (OUTPATIENT)
Dept: INTERNAL MEDICINE | Facility: OTHER | Age: 69
End: 2019-08-26
Attending: INTERNAL MEDICINE
Payer: MEDICARE

## 2019-08-26 ENCOUNTER — HOSPITAL ENCOUNTER (OUTPATIENT)
Dept: CT IMAGING | Facility: OTHER | Age: 69
Discharge: HOME | End: 2019-08-26
Attending: INTERNAL MEDICINE | Admitting: INTERNAL MEDICINE
Payer: MEDICARE

## 2019-08-26 VITALS
TEMPERATURE: 96.7 F | RESPIRATION RATE: 16 BRPM | OXYGEN SATURATION: 98 % | SYSTOLIC BLOOD PRESSURE: 138 MMHG | WEIGHT: 136.6 LBS | BODY MASS INDEX: 23.32 KG/M2 | HEIGHT: 64 IN | DIASTOLIC BLOOD PRESSURE: 82 MMHG | HEART RATE: 79 BPM

## 2019-08-26 DIAGNOSIS — C18.1 CANCER OF APPENDIX METASTATIC TO INTRA-ABDOMINAL LYMPH NODE (H): ICD-10-CM

## 2019-08-26 DIAGNOSIS — C77.2 CANCER OF APPENDIX METASTATIC TO INTRA-ABDOMINAL LYMPH NODE (H): ICD-10-CM

## 2019-08-26 DIAGNOSIS — E03.9 HYPOTHYROIDISM, UNSPECIFIED TYPE: Primary | ICD-10-CM

## 2019-08-26 DIAGNOSIS — E03.9 HYPOTHYROIDISM, UNSPECIFIED TYPE: ICD-10-CM

## 2019-08-26 DIAGNOSIS — R63.5 WEIGHT GAIN: ICD-10-CM

## 2019-08-26 DIAGNOSIS — R71.8 ELEVATED MCV: ICD-10-CM

## 2019-08-26 DIAGNOSIS — Z23 NEED FOR PNEUMOCOCCAL VACCINATION: ICD-10-CM

## 2019-08-26 DIAGNOSIS — C78.6 MALIGNANT NEOPLASM METASTATIC TO PERITONEUM (H): ICD-10-CM

## 2019-08-26 PROBLEM — Z90.89 S/P TONSILLECTOMY AND ADENOIDECTOMY: Status: RESOLVED | Noted: 2019-02-12 | Resolved: 2019-08-26

## 2019-08-26 LAB
ALBUMIN SERPL-MCNC: 4.4 G/DL (ref 3.5–5.7)
ALP SERPL-CCNC: 58 U/L (ref 34–104)
ALT SERPL W P-5'-P-CCNC: 19 U/L (ref 7–52)
ANION GAP SERPL CALCULATED.3IONS-SCNC: 6 MMOL/L (ref 3–14)
AST SERPL W P-5'-P-CCNC: 24 U/L (ref 13–39)
BASOPHILS # BLD AUTO: 0 10E9/L (ref 0–0.2)
BASOPHILS NFR BLD AUTO: 0.3 %
BILIRUB SERPL-MCNC: 0.4 MG/DL (ref 0.3–1)
BUN SERPL-MCNC: 21 MG/DL (ref 7–25)
CALCIUM SERPL-MCNC: 9.7 MG/DL (ref 8.6–10.3)
CHLORIDE SERPL-SCNC: 103 MMOL/L (ref 98–107)
CO2 SERPL-SCNC: 24 MMOL/L (ref 21–31)
CREAT SERPL-MCNC: 0.83 MG/DL (ref 0.6–1.2)
DIFFERENTIAL METHOD BLD: NORMAL
EOSINOPHIL # BLD AUTO: 0 10E9/L (ref 0–0.7)
EOSINOPHIL NFR BLD AUTO: 0 %
ERYTHROCYTE [DISTWIDTH] IN BLOOD BY AUTOMATED COUNT: 11.5 % (ref 10–15)
GFR SERPL CREATININE-BSD FRML MDRD: 68 ML/MIN/{1.73_M2}
GLUCOSE SERPL-MCNC: 132 MG/DL (ref 70–105)
HCT VFR BLD AUTO: 37.1 % (ref 35–47)
HGB BLD-MCNC: 12.4 G/DL (ref 11.7–15.7)
IMM GRANULOCYTES # BLD: 0 10E9/L (ref 0–0.4)
IMM GRANULOCYTES NFR BLD: 0.3 %
LDH SERPL L TO P-CCNC: 195 U/L (ref 140–271)
LYMPHOCYTES # BLD AUTO: 0.8 10E9/L (ref 0.8–5.3)
LYMPHOCYTES NFR BLD AUTO: 13.5 %
MCH RBC QN AUTO: 32.3 PG (ref 26.5–33)
MCHC RBC AUTO-ENTMCNC: 33.4 G/DL (ref 31.5–36.5)
MCV RBC AUTO: 97 FL (ref 78–100)
MONOCYTES # BLD AUTO: 0.1 10E9/L (ref 0–1.3)
MONOCYTES NFR BLD AUTO: 2.2 %
NEUTROPHILS # BLD AUTO: 4.9 10E9/L (ref 1.6–8.3)
NEUTROPHILS NFR BLD AUTO: 83.7 %
PLATELET # BLD AUTO: 236 10E9/L (ref 150–450)
POTASSIUM SERPL-SCNC: 4.4 MMOL/L (ref 3.5–5.1)
PROT SERPL-MCNC: 7.7 G/DL (ref 6.4–8.9)
RBC # BLD AUTO: 3.84 10E12/L (ref 3.8–5.2)
SODIUM SERPL-SCNC: 133 MMOL/L (ref 134–144)
T4 FREE SERPL-MCNC: 0.75 NG/DL (ref 0.6–1.6)
TSH SERPL DL<=0.05 MIU/L-ACNC: 0.78 IU/ML (ref 0.34–5.6)
WBC # BLD AUTO: 5.9 10E9/L (ref 4–11)

## 2019-08-26 PROCEDURE — 25000128 H RX IP 250 OP 636: Performed by: INTERNAL MEDICINE

## 2019-08-26 PROCEDURE — 83615 LACTATE (LD) (LDH) ENZYME: CPT | Performed by: INTERNAL MEDICINE

## 2019-08-26 PROCEDURE — 36415 COLL VENOUS BLD VENIPUNCTURE: CPT | Mod: ZL | Performed by: INTERNAL MEDICINE

## 2019-08-26 PROCEDURE — 71260 CT THORAX DX C+: CPT

## 2019-08-26 PROCEDURE — 36415 COLL VENOUS BLD VENIPUNCTURE: CPT | Performed by: INTERNAL MEDICINE

## 2019-08-26 PROCEDURE — 80053 COMPREHEN METABOLIC PANEL: CPT | Performed by: INTERNAL MEDICINE

## 2019-08-26 PROCEDURE — 85025 COMPLETE CBC W/AUTO DIFF WBC: CPT | Performed by: INTERNAL MEDICINE

## 2019-08-26 PROCEDURE — 84443 ASSAY THYROID STIM HORMONE: CPT | Mod: ZL | Performed by: INTERNAL MEDICINE

## 2019-08-26 PROCEDURE — G0009 ADMIN PNEUMOCOCCAL VACCINE: HCPCS

## 2019-08-26 PROCEDURE — 74177 CT ABD & PELVIS W/CONTRAST: CPT

## 2019-08-26 PROCEDURE — 25500064 ZZH RX 255 OP 636: Performed by: INTERNAL MEDICINE

## 2019-08-26 PROCEDURE — 90670 PCV13 VACCINE IM: CPT

## 2019-08-26 PROCEDURE — 84439 ASSAY OF FREE THYROXINE: CPT | Performed by: INTERNAL MEDICINE

## 2019-08-26 PROCEDURE — G0463 HOSPITAL OUTPT CLINIC VISIT: HCPCS

## 2019-08-26 PROCEDURE — G0463 HOSPITAL OUTPT CLINIC VISIT: HCPCS | Mod: 25

## 2019-08-26 PROCEDURE — 99215 OFFICE O/P EST HI 40 MIN: CPT | Performed by: INTERNAL MEDICINE

## 2019-08-26 PROCEDURE — 82378 CARCINOEMBRYONIC ANTIGEN: CPT | Mod: ZL | Performed by: INTERNAL MEDICINE

## 2019-08-26 RX ORDER — LEVOTHYROXINE SODIUM 25 UG/1
25 TABLET ORAL DAILY
Qty: 90 TABLET | Refills: 4 | Status: SHIPPED | OUTPATIENT
Start: 2019-08-26 | End: 2020-09-04

## 2019-08-26 RX ORDER — LANOLIN ALCOHOL/MO/W.PET/CERES
500 CREAM (GRAM) TOPICAL DAILY
COMMUNITY
Start: 2019-08-26 | End: 2022-01-01

## 2019-08-26 RX ORDER — HEPARIN SODIUM (PORCINE) LOCK FLUSH IV SOLN 100 UNIT/ML 100 UNIT/ML
500 SOLUTION INTRAVENOUS
Status: COMPLETED | OUTPATIENT
Start: 2019-08-26 | End: 2019-08-26

## 2019-08-26 RX ADMIN — IOHEXOL 100 ML: 350 INJECTION, SOLUTION INTRAVENOUS at 11:51

## 2019-08-26 RX ADMIN — Medication 500 UNITS: at 11:59

## 2019-08-26 ASSESSMENT — PAIN SCALES - GENERAL: PAINLEVEL: NO PAIN (0)

## 2019-08-26 ASSESSMENT — MIFFLIN-ST. JEOR: SCORE: 1134.61

## 2019-08-26 NOTE — PROGRESS NOTES
"Pt has had previous exams with IV contrast at our facility, premedication \"recipe\" seems to be showing no signs of allergy/reaction. . akd  "

## 2019-08-26 NOTE — NURSING NOTE
Patient presents to the clinic to establish care.     Medication Reconciliation: complete   Jerri Myers LPN............. August 26, 2019 12:47 PM

## 2019-08-26 NOTE — PROGRESS NOTES
Patient's implanted port was accessed by a trained RN per hospital policy/procedure. Blood return brisk. Site noted to be Abnormal - slightly bruised at port site. Patient tolerated well.     Lab drawn by this nurse for lab via vaccutainer/tubes.     Following imaging procedure, port was flushed with 10 mL sterile normal saline and 500 units (5 mLs) heparin. Site was deaccessed by a trained RN per hospital policy/procedure. Site noted to be Normal. Patient tolerated well.     Site covered with dry, sterile dressing.     See flowsheet and MAR for details.

## 2019-08-26 NOTE — PROGRESS NOTES
Chief Complaint   Patient presents with     [facility]       HPI: Ms. Flanagan is a 68 year old female who presents today to establish [facility].  She overall is feeling good.      She has been seen prior but is officially establishing care today.  She overall feels fairly good.  She has however had weight gain over the summer of approximately 20 pounds.  She has not had any significant distention of her abdomen.  She did have a CT T today that was reviewed personally and did not show any free fluid in the abdomen.  She does not have any significant swelling of the ankles.  She feels that she does watch her diet closely.    She has a history of hypothyroidism.  Recent labs showed a TSH in the normal range along with a normal TPO antibody.  She is curious if there may be changes and if we can recheck this today.    She has a history of appendiceal malignant neoplasm with peritoneal metastases.  She underwent chemotherapy earlier this year.  She is curious how long her port would stay in place.  She is also curious about what kind of follow-up she would help with this.    She recently was noted to have an elevated MCV.  She has been taking vitamin B6 and also started on vitamin B12 daily.  She continues to have fatigue overall.    She is due for pneumonia vaccine.  She believes she is up-to-date on her tetanus vaccine and mammogram which she had in [place].  No records are available regarding this.    She has a history of prediabetes.  She will be due for A1c in January.  She is not on medication for this.    History is discussed on 8/26/2019 with patient and reviewed in previous available records by myself.  It is current to the best of my knowledge as below.    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      Pre-diabetes 2019     Thyroid disease      Tricuspid regurgitation         Past Surgical History:   Procedure Laterality Date     FRACTURE TX, HIP RT/LT Right 2013     INSERT PORT VASCULAR ACCESS N/A 2019    Procedure: INSERT PORT VASCULAR ACCESS;  Surgeon: Tresa Evangelista MD;  Location: [facility] OR     LAPAROTOMY EXPLORATORY N/A 2018    Procedure: Exploratory Laparotomy with right hemicolectomy;  Surgeon: Tresa Evangelista MD;  Location: [facility] OR     TONSILLECTOMY       TUBAL LIGATION           Current Outpatient Medications   Medication Sig Dispense Refill     acetaminophen (TYLENOL) 500 MG tablet Take 500-1,000 mg by mouth every 6 hours as needed for mild pain       cetirizine (ZYRTEC) 10 MG tablet Take 10 mg by mouth daily       cyanocobalamin 500 MCG TABS Take 500 mcg by mouth daily       famotidine (PEPCID) 20 MG tablet Take 10 mg by mouth daily as needed       fluocinonide (LIDEX) 0.05 % external gel        Lactobacillus (FLORAJEN ACIDOPHILUS) CAPS        levothyroxine (SYNTHROID/LEVOTHROID) 25 MCG tablet Take 1 tablet (25 mcg) by mouth daily 90 tablet 4     lidocaine-prilocaine (EMLA) 2.5-2.5 % external cream Apply to port site 60 minutes before needle and cover with occlusive dressing. 30 g 3     Multiple Vitamin (MULTI-VITAMINS) TABS Take 1 tablet by mouth daily       polyethylene glycol (MIRALAX/GLYCOLAX) powder        pyridOXINE (VITAMIN B6) 100 MG TABS             Allergies   Allergen Reactions     Metrizamide Anaphylaxis     Had contrast dye. Patient reports she woke up in ICU.     Bee Venom      Edema     Pollen Extract      Runny nose     Sulfa Drugs Hives        Family History   Problem Relation Age of Onset     Tuberculosis Mother      Chronic Obstructive Pulmonary Disease Mother      Heart Disease Father         massive MI     Heart Disease Brother         stents/CABG     Glaucoma Brother      Hypertension Brother        Family Status   Relation Name Status     Mo       Fa   "     Bro 1 Alive     Bro 2 Alive         Social History     Tobacco Use     Smoking status: Passive Smoke Exposure - Never Smoker     Smokeless tobacco: Never Used     Tobacco comment: Lived with a pipe smoker for 15 years   Substance Use Topics     Alcohol use: No     Frequency: Never       Social History     Social History Narrative    , CHAU (Cheng).  Live in [facility].  2 biological son ([place]) and daughter ([place]) and 1 step daughter ([place]).              ROS  GEN:-fevers/-chills/-night sweats/+wt change -20 pound increase.  MOUTH/THROAT: - sore throat/-dysphagia/-sores  LUNGS: -sob/-cough  CARDIOVASCULAR: -cp/-palpitations  GI: -pain/-change in bowels/-bloody stools  : -change in bladder  PSYCH:-anhedonia/-depression/-anxiety       EXAM:   /82 (BP Location: Right arm, Patient Position: Sitting, Cuff Size: Adult Regular)   Pulse 79   Temp 96.7  F (35.9  C) (Tympanic)   Resp 16   Ht 1.626 m (5' 4\")   Wt 62 kg (136 lb 9.6 oz)   SpO2 98%   BMI 23.45 kg/m      Estimated body mass index is 23.45 kg/m  as calculated from the following:    Height as of this encounter: 1.626 m (5' 4\").    Weight as of this encounter: 62 kg (136 lb 9.6 oz).   GEN: Vitals reviewed.  Healthy appearing. Patient isin no acute distress. Cooperative with exam.  HEENT: Normocephalic atraumatic.  Normal external eye, conjunctiva, lids, cornea with no scleral icterus or conjunctival erythema. Pupils equally round.   LUNGS: Chest rise equal bilaterally.  No accessory muscle use.  ABD: Nondistended.  SKIN: Warm and dry to touch.  No rash on face, arms and legs.  EXT: No clubbing or cyanosis.  No peripheral edema.  PSYCH: Mood is good. Affect appropriate. Speech fluent. Answers questions appropriately and thought process normal.       ASSESSMENT AND PLAN:    Hypothyroidism, unspecified type  -Thyroid is pending.  We will adjust medication as appropriate.  - levothyroxine (SYNTHROID/LEVOTHROID) 25 MCG tablet  Dispense: 90 " tablet; Refill: 4  - T4, Free    Elevated MCV  -Improved at this time.  Continue on B12 and B6 supplementation.    Malignant neoplasm metastatic to peritoneum (H)  -She was encouraged to talk with oncology regarding follow-up of her cancer along with methods for detection of metastases.    Need for pneumococcal vaccination  Vaccine given today.    Weight gain  -Etiology is unknown.  No obvious fluid is noted on evaluation or CT scan today.  We are awaiting her results from her labs to verify that her thyroid is in the normal range.  We discussed in depth ways for her to watch for dietary causes of weight gain.  She is to watch her intake overall.    Release of information will be filled out for [place] to get outside records regarding her tetanus shot and mammogram.    A total of 46 minutes spent with in face-to-face consultation of this patient with greater than 50% spent in counseling and care coordination of above listed medical problems including diagnosis, treatment options with emphasis on risks and benefits of each,prognosis and importance of compliance for each.         DESI KIM DO   8/26/2019 1:42 PM    This document was prepared using voice generated softwear. While every attempt was made for accuracy, grammatical errors may exist.

## 2019-08-28 ENCOUNTER — ONCOLOGY VISIT (OUTPATIENT)
Dept: ONCOLOGY | Facility: OTHER | Age: 69
End: 2019-08-28
Attending: INTERNAL MEDICINE
Payer: MEDICARE

## 2019-08-28 VITALS
OXYGEN SATURATION: 99 % | HEIGHT: 64 IN | BODY MASS INDEX: 22.88 KG/M2 | RESPIRATION RATE: 16 BRPM | WEIGHT: 134 LBS | TEMPERATURE: 97.8 F | HEART RATE: 70 BPM | SYSTOLIC BLOOD PRESSURE: 124 MMHG | DIASTOLIC BLOOD PRESSURE: 84 MMHG

## 2019-08-28 DIAGNOSIS — C18.1 MALIGNANT NEOPLASM OF APPENDIX (H): ICD-10-CM

## 2019-08-28 DIAGNOSIS — C18.1 MALIGNANT NEOPLASM OF APPENDIX (H): Primary | ICD-10-CM

## 2019-08-28 LAB — CEA SERPL-MCNC: <3 NG/ML

## 2019-08-28 PROCEDURE — G0463 HOSPITAL OUTPT CLINIC VISIT: HCPCS

## 2019-08-28 PROCEDURE — 99215 OFFICE O/P EST HI 40 MIN: CPT | Performed by: INTERNAL MEDICINE

## 2019-08-28 ASSESSMENT — MIFFLIN-ST. JEOR: SCORE: 1123.07

## 2019-08-28 ASSESSMENT — PAIN SCALES - GENERAL: PAINLEVEL: NO PAIN (0)

## 2019-08-28 NOTE — NURSING NOTE
"Chief Complaint   Patient presents with     RECHECK     Appendix cancer   Complains of neuropathy in her fingers and toes since 7/1/19.  No change since starting the Vitamin B12.  Right fingernails are bending backwards and ridging.  Notes weight gain of 20# since end of chemotherapy.      Initial /84   Pulse 70   Temp 97.8  F (36.6  C) (Oral)   Resp 16   Ht 1.626 m (5' 4.02\")   Wt 60.8 kg (134 lb)   SpO2 99%   BMI 22.99 kg/m   Estimated body mass index is 22.99 kg/m  as calculated from the following:    Height as of this encounter: 1.626 m (5' 4.02\").    Weight as of this encounter: 60.8 kg (134 lb).  Medication Reconciliation: complete    Tiffanie Pastor, CMA  "

## 2019-08-28 NOTE — PROGRESS NOTES
Visit Date:   08/28/2019      HEMATOLOGY/ONCOLOGY CLINIC NOTE      HISTORY OF PRESENT ILLNESS:  Ms. 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  We had seen the patient in consultation at the request of Dr. Tresa Evangelista and MARY Nelson of [facility] as well as Dr. Speedy Menezes at the [facility] where he had seen the patient on 02/07/2019.  At that time, she was a 68-year-old white female with history of migraine headaches.  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  She apparently presented with abdominal discomfort in mid-October associated with nausea.  This progressed to the point where she was seen by MARY Nelson, her primary care provider, in 10/2018.  At that time, she presented with nausea, vomiting, associated epigastric pain and left lower quadrant abdominal pain.  MRI of the liver was obtained.  There were findings of hemangioma.  Symptoms persisted and the patient underwent EGD on 12/07/2018.  There were no significant findings.  Apparently, she had progressed to the point where she was having significant projectile vomiting.   On 12/14/2016, she was seen in the [facility] and then transferred to [facility] where she was admitted for small-bowel obstruction.  She was treated initially conservatively with NG suction.  Subsequently, she underwent right hemicolectomy, which revealed a mass in the terminal ileum.  The mass was adherent to the right ovary.  Pathology was read as adenocarcinoma ex goblet cell carcinoid of the appendix.  It was stage T2b N1 initially.  Tumor was present focally in the posterior retroperitoneal region and 2/21 lymph nodes were involved with disease.  Peritoneal  fluid was sent for cytology.  Initially, it was read as malignant cells.  The patient was referred to [facility], seen by Dr. Speedy Menezes, medical oncologist at the [facility], on 01/16/2019.  The slides were reviewed by the [facility] pathologist and the findings were the patient had peritoneal washings were consistent with involvement by adenocarcinoma ex goblet cell carcinoid and also terminal ileum, appendix and right colon from the right hemicolectomy specimen revealed adenocarcinoma ex goblet cell carcinoid.  An indurated mass was noted diffusely involving the appendix and invading the visceroperitoneum.  Angiolymphatic invasion was present and the retroperitoneal soft tissue margin was positive and 03/21 lymph nodes were involved.  Pathologic stage was T4a, pN1.  Immunohistochemical staining for DNA mismatch repair enzymes were intact.  Also, CT of the abdomen and pelvis were performed at [facility].  Findings were there were interval postoperative changes in the right hemicolectomy with resolution of previously seen small-bowel dilatation and obstruction.  There was mild soft tissue edema in the resection bed along the midline incision which was felt to be secondary to postop status.  No definite peritoneal metastases were identified.  No free fluid.  There was abnormal geographic perfusion with hepatic segments 5 and 8 without evidence of focal hepatic mass.  There was indeterminate mild thickening of the left adrenal gland, unchanged.  On 01/15/2019, Dr. Menezes recommended FOLFOX chemotherapy given the fact that she had positive cytology and recommended 3 months of FOLFOX chemotherapy followed by imaging at the [facility].  The patient had port placement on 02/17/2019 and the plan was to proceed with FOLFOX and 5-FU given at 400 mg IV bolus with leucovorin bolus and oxaliplatin 85 mg/m2 given on day #1 and continuous IV 5-FU 2400 mg/m2 IV over 46 hours continuously.  Otherwise, the plan was to restage  after 3 months of therapy.        The patient was seen by us on 03/19/2019.  At that time, she had completed 4 cycles of FOLFOX with 20% dose reduction.  The last 2 cycles were dose reduced by 20% due to neutropenia.  She had staging studies after 4 cycles on 04/09/2019.  The findings were there was no evidence of metastatic disease.  There was evolution of postoperative change in the abdomen, no evidence of microscopic peritoneal implant disease.  There was nodular opacity in the lung that was consistent with endobronchial debris likely due to bronchitis.  The patient went on to complete 8 cycles of chemotherapy, then underwent restaging imaging on 06/06/2019 including CT chest, abdomen and pelvis.  Findings were there was no evidence of active disease throughout the chest, abdomen and pelvis.  The nodular foci seen previously were no longer identified suggesting the presence of endobronchial debris on prior study.  No evidence of new nodules or lymphadenopathy, no evidence of intraperitoneal metastatic disease.  The patient otherwise decided that she did not want to go to the [facility].  We discussed the case with Dr. Speedy Menezes who recommended that HIPEC chemotherapy may be an option.  He would otherwise recommend observation with serial imaging.  The patient otherwise stated that she had some nail changes and her nails appear more brittle.  When we saw the patient last, the plan was to continue surveillance.  CT of chest, abdomen and pelvis was performed subsequently on 08/26 and there was no evidence of metastatic disease.  Her CEA was less than 3.        She states she is feeling well.  No complaints of shortness of breath, chest pain, abdominal pain.  In fact, she has had weight gain and her appetite is improved.  Overall, she is doing much better.  ECOG Performance Status is 0.  She would like to have her port out and is not interested in any more chemo at this point.      PHYSICAL EXAMINATION:    GENERAL:  She is a middle-aged white female in no acute distress.   VITAL SIGNS:  Blood pressure 124/84, pulse 90, respirations 16, temperature 97.8.   HEENT:  Atraumatic, normocephalic.  Oropharynx nonerythematous.   NECK:  Supple.   LUNGS:  Clear to auscultation and percussion.   HEART:  Regular rhythm, S1, S2 normal.   ABDOMEN:  Soft, normoactive bowel sounds.  No mass, nontender.   LYMPHATICS:  No cervical, supraclavicular, axillary or inguinal nodes.   EXTREMITIES:  No edema.   NEUROLOGIC:  Nonfocal.      LABORATORY DATA:  Laboratories reveal CBC that is within normal limits.  LFTs are normal.  BUN 21, creatinine 0.83, .      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  The patient was deemed a candidate for chemotherapy for stage IV disease.  As per Dr. Ruff of the [facility], the plan was to initiate FOLFOX chemotherapy.  The patient completed 2 cycles.  Course was complicated by neutropenia.  The patient went on to receive 20% dose reduction and after 4 cycles, it was staged with no evidence of disease.  After 8 cycles, there was no evidence of disease on CTs.  The patient has been off chemotherapy and recent scans have been negative.  The patient would like to have her port taken out.  We will therefore remove the port.  Otherwise, we will continue surveillance.  We will see the patient in 3 months and obtain CBC, CMP, LDH, CEA.  If she develops symptoms, we will order scans.  If not, we will continue surveillance.      Forty minutes was spent with the patient, greater than half that time was spent in counseling and coordination of care.         DEEPIKA BARRIOS MD             D: 2019   T: 2019   MT: JORDY      Name:     ALMA CORTES   MRN:      [id number]        Account:      [id number]   :      1950           Visit Date:    08/28/2019      Document: [id number]       cc: Isaura Menezes MD

## 2019-08-29 ENCOUNTER — TELEPHONE (OUTPATIENT)
Dept: ONCOLOGY | Facility: OTHER | Age: 69
End: 2019-08-29

## 2019-08-29 ENCOUNTER — HOSPITAL ENCOUNTER (OUTPATIENT)
Facility: OTHER | Age: 69
End: 2019-08-29
Attending: SURGERY | Admitting: SURGERY
Payer: MEDICARE

## 2019-08-29 ENCOUNTER — MYC MEDICAL ADVICE (OUTPATIENT)
Dept: ONCOLOGY | Facility: OTHER | Age: 69
End: 2019-08-29

## 2019-08-29 NOTE — TELEPHONE ENCOUNTER
Returned call to patient regarding call she received from the [facility] surgery team for appendix removal and CEA lab results. Released patient's CEA lab result to patient via [employer] for patient to view. Informed patient that surgery referral sent to the [facility] was intended to be a surgery referral to [facility] for her Port-A-Cath referral. Patient stated understanding and stated she will disregard the [facility] phone call. Patient further informed nurse she already has an appointment for her Port-A-Cath removal scheduled for next Wednesday with Dr. Evangelista. No further action required.       Jacquelyn GARCIAN, RN on 8/29/2019 at 10:27 AM

## 2019-09-03 ENCOUNTER — TELEPHONE (OUTPATIENT)
Dept: SURGERY | Facility: OTHER | Age: 69
End: 2019-09-03

## 2019-09-03 NOTE — TELEPHONE ENCOUNTER
Patient called to switch her surgery date since she wasn't feeling well today.  She changed to October 9th.  She than questioned if she needed to have her port flushed again before the removal since it was moved a month back.  I let her know that Dr. Evangelista did recommend her to have it flushed again sometime in September.  Patient will call if any other questions or problems.  Arlet Hallman LPN .......9/3/2019 10:27 AM

## 2019-09-10 ENCOUNTER — MYC MEDICAL ADVICE (OUTPATIENT)
Dept: ONCOLOGY | Facility: OTHER | Age: 69
End: 2019-09-10

## 2019-09-26 ENCOUNTER — HOSPITAL ENCOUNTER (OUTPATIENT)
Dept: INFUSION THERAPY | Facility: OTHER | Age: 69
Discharge: HOME | End: 2019-09-26
Attending: INTERNAL MEDICINE | Admitting: INTERNAL MEDICINE
Payer: MEDICARE

## 2019-09-26 DIAGNOSIS — C18.1 MALIGNANT NEOPLASM OF APPENDIX (H): ICD-10-CM

## 2019-09-26 DIAGNOSIS — C78.6 MALIGNANT NEOPLASM METASTATIC TO PERITONEUM (H): Primary | ICD-10-CM

## 2019-09-26 PROCEDURE — 96523 IRRIG DRUG DELIVERY DEVICE: CPT

## 2019-09-26 PROCEDURE — 25000128 H RX IP 250 OP 636: Performed by: INTERNAL MEDICINE

## 2019-09-26 RX ORDER — HEPARIN SODIUM (PORCINE) LOCK FLUSH IV SOLN 100 UNIT/ML 100 UNIT/ML
500 SOLUTION INTRAVENOUS
Status: COMPLETED | OUTPATIENT
Start: 2019-09-26 | End: 2019-09-26

## 2019-09-26 RX ORDER — HEPARIN SODIUM (PORCINE) LOCK FLUSH IV SOLN 100 UNIT/ML 100 UNIT/ML
500 SOLUTION INTRAVENOUS
Status: CANCELLED
Start: 2019-09-26

## 2019-09-26 RX ADMIN — Medication 500 UNITS: at 13:54

## 2019-09-26 NOTE — PROGRESS NOTES
Patients  Left chest port accessed using non-coring, 19 gauge, non power 3/4 needle.  Port accessed per facility protocol.  Hand hygiene performed: yes Mask donned by caregiver: yes Site prepped with CHG: yes Labs drawn: no  Dressing applied using aseptic technique: yes Port flushed easily, without resistance. Flushed with 10 cc's normal saline.   Immediate blood return noted.   Port flushed with 20 cc 0.9% normal saline and 5 cc heparinized saline.  Needle removed intact, sterile dressing applied.  Slight pressure applied for 30 seconds.   Patient tolerated port flush well, denies pain nor discomfort at this time. Patient instructed to leave dressing intact for a minimum of one hour, and to call with questions or concerns.  Patient states understanding and is in agreement with this plan. Patient having port removed on 1/9/2019.  Patient discharged ambulatory. Steffany Urrutia RN

## 2019-10-08 ENCOUNTER — ANESTHESIA EVENT (OUTPATIENT)
Dept: SURGERY | Facility: OTHER | Age: 69
End: 2019-10-08
Payer: MEDICARE

## 2019-10-09 ENCOUNTER — ANESTHESIA (OUTPATIENT)
Dept: SURGERY | Facility: OTHER | Age: 69
End: 2019-10-09
Payer: MEDICARE

## 2019-10-09 ENCOUNTER — HOSPITAL ENCOUNTER (OUTPATIENT)
Facility: OTHER | Age: 69
Discharge: HOME | End: 2019-10-09
Attending: SURGERY | Admitting: SURGERY
Payer: MEDICARE

## 2019-10-09 VITALS
DIASTOLIC BLOOD PRESSURE: 67 MMHG | SYSTOLIC BLOOD PRESSURE: 120 MMHG | HEART RATE: 64 BPM | BODY MASS INDEX: 21.96 KG/M2 | TEMPERATURE: 97 F | OXYGEN SATURATION: 97 % | WEIGHT: 128 LBS | RESPIRATION RATE: 12 BRPM

## 2019-10-09 DIAGNOSIS — C77.2 CANCER OF APPENDIX METASTATIC TO INTRA-ABDOMINAL LYMPH NODE (H): Primary | ICD-10-CM

## 2019-10-09 DIAGNOSIS — C18.1 CANCER OF APPENDIX METASTATIC TO INTRA-ABDOMINAL LYMPH NODE (H): Primary | ICD-10-CM

## 2019-10-09 PROCEDURE — 36000050 ZZH SURGERY LEVEL 2 1ST 30 MIN: Performed by: SURGERY

## 2019-10-09 PROCEDURE — 25000132 ZZH RX MED GY IP 250 OP 250 PS 637: Mod: GY | Performed by: SURGERY

## 2019-10-09 PROCEDURE — 36590 REMOVAL TUNNELED CV CATH: CPT | Performed by: NURSE ANESTHETIST, CERTIFIED REGISTERED

## 2019-10-09 PROCEDURE — 37000009 ZZH ANESTHESIA TECHNICAL FEE, EACH ADDTL 15 MIN: Performed by: SURGERY

## 2019-10-09 PROCEDURE — 25000128 H RX IP 250 OP 636: Performed by: NURSE ANESTHETIST, CERTIFIED REGISTERED

## 2019-10-09 PROCEDURE — 27210794 ZZH OR GENERAL SUPPLY STERILE: Performed by: SURGERY

## 2019-10-09 PROCEDURE — 71000027 ZZH RECOVERY PHASE 2 EACH 15 MINS: Performed by: SURGERY

## 2019-10-09 PROCEDURE — 40000306 ZZH STATISTIC PRE PROC ASSESS II: Performed by: SURGERY

## 2019-10-09 PROCEDURE — 25800030 ZZH RX IP 258 OP 636: Performed by: NURSE ANESTHETIST, CERTIFIED REGISTERED

## 2019-10-09 PROCEDURE — 25000125 ZZHC RX 250: Performed by: NURSE ANESTHETIST, CERTIFIED REGISTERED

## 2019-10-09 PROCEDURE — 37000008 ZZH ANESTHESIA TECHNICAL FEE, 1ST 30 MIN: Performed by: SURGERY

## 2019-10-09 PROCEDURE — 36590 REMOVAL TUNNELED CV CATH: CPT | Performed by: SURGERY

## 2019-10-09 PROCEDURE — 25000125 ZZHC RX 250: Performed by: SURGERY

## 2019-10-09 RX ORDER — FENTANYL CITRATE 50 UG/ML
INJECTION, SOLUTION INTRAMUSCULAR; INTRAVENOUS PRN
Status: DISCONTINUED | OUTPATIENT
Start: 2019-10-09 | End: 2019-10-09

## 2019-10-09 RX ORDER — SODIUM CHLORIDE, SODIUM LACTATE, POTASSIUM CHLORIDE, CALCIUM CHLORIDE 600; 310; 30; 20 MG/100ML; MG/100ML; MG/100ML; MG/100ML
INJECTION, SOLUTION INTRAVENOUS CONTINUOUS
Status: DISCONTINUED | OUTPATIENT
Start: 2019-10-09 | End: 2019-10-09 | Stop reason: HOSPADM

## 2019-10-09 RX ORDER — LIDOCAINE HYDROCHLORIDE 20 MG/ML
INJECTION, SOLUTION INFILTRATION; PERINEURAL PRN
Status: DISCONTINUED | OUTPATIENT
Start: 2019-10-09 | End: 2019-10-09

## 2019-10-09 RX ORDER — FENTANYL CITRATE 50 UG/ML
25-50 INJECTION, SOLUTION INTRAMUSCULAR; INTRAVENOUS
Status: DISCONTINUED | OUTPATIENT
Start: 2019-10-09 | End: 2019-10-09 | Stop reason: HOSPADM

## 2019-10-09 RX ORDER — ACETAMINOPHEN 325 MG/1
650 TABLET ORAL EVERY 4 HOURS PRN
Qty: 50 TABLET | Refills: 0 | Status: SHIPPED | OUTPATIENT
Start: 2019-10-09 | End: 2020-05-13 | Stop reason: DRUGHIGH

## 2019-10-09 RX ORDER — ONDANSETRON 2 MG/ML
4 INJECTION INTRAMUSCULAR; INTRAVENOUS EVERY 30 MIN PRN
Status: DISCONTINUED | OUTPATIENT
Start: 2019-10-09 | End: 2019-10-09 | Stop reason: HOSPADM

## 2019-10-09 RX ORDER — PROPOFOL 10 MG/ML
INJECTION, EMULSION INTRAVENOUS PRN
Status: DISCONTINUED | OUTPATIENT
Start: 2019-10-09 | End: 2019-10-09

## 2019-10-09 RX ORDER — ONDANSETRON 4 MG/1
4 TABLET, ORALLY DISINTEGRATING ORAL EVERY 30 MIN PRN
Status: DISCONTINUED | OUTPATIENT
Start: 2019-10-09 | End: 2019-10-09 | Stop reason: HOSPADM

## 2019-10-09 RX ORDER — BUPIVACAINE HYDROCHLORIDE AND EPINEPHRINE 2.5; 5 MG/ML; UG/ML
INJECTION, SOLUTION EPIDURAL; INFILTRATION; INTRACAUDAL; PERINEURAL PRN
Status: DISCONTINUED | OUTPATIENT
Start: 2019-10-09 | End: 2019-10-09 | Stop reason: HOSPADM

## 2019-10-09 RX ORDER — NALOXONE HYDROCHLORIDE 0.4 MG/ML
.1-.4 INJECTION, SOLUTION INTRAMUSCULAR; INTRAVENOUS; SUBCUTANEOUS
Status: DISCONTINUED | OUTPATIENT
Start: 2019-10-09 | End: 2019-10-09 | Stop reason: HOSPADM

## 2019-10-09 RX ORDER — ONDANSETRON 2 MG/ML
INJECTION INTRAMUSCULAR; INTRAVENOUS PRN
Status: DISCONTINUED | OUTPATIENT
Start: 2019-10-09 | End: 2019-10-09

## 2019-10-09 RX ORDER — MEPERIDINE HYDROCHLORIDE 50 MG/ML
12.5 INJECTION INTRAMUSCULAR; INTRAVENOUS; SUBCUTANEOUS
Status: DISCONTINUED | OUTPATIENT
Start: 2019-10-09 | End: 2019-10-09 | Stop reason: HOSPADM

## 2019-10-09 RX ORDER — ACETAMINOPHEN 325 MG/1
975 TABLET ORAL ONCE
Status: COMPLETED | OUTPATIENT
Start: 2019-10-09 | End: 2019-10-09

## 2019-10-09 RX ORDER — HYDROMORPHONE HYDROCHLORIDE 1 MG/ML
.3-.5 INJECTION, SOLUTION INTRAMUSCULAR; INTRAVENOUS; SUBCUTANEOUS EVERY 10 MIN PRN
Status: DISCONTINUED | OUTPATIENT
Start: 2019-10-09 | End: 2019-10-09 | Stop reason: HOSPADM

## 2019-10-09 RX ORDER — PROPOFOL 10 MG/ML
INJECTION, EMULSION INTRAVENOUS CONTINUOUS PRN
Status: DISCONTINUED | OUTPATIENT
Start: 2019-10-09 | End: 2019-10-09

## 2019-10-09 RX ADMIN — FENTANYL CITRATE 25 MCG: 50 INJECTION, SOLUTION INTRAMUSCULAR; INTRAVENOUS at 10:52

## 2019-10-09 RX ADMIN — ONDANSETRON 4 MG: 2 INJECTION INTRAMUSCULAR; INTRAVENOUS at 10:53

## 2019-10-09 RX ADMIN — PROPOFOL 100 MCG/KG/MIN: 10 INJECTION, EMULSION INTRAVENOUS at 10:54

## 2019-10-09 RX ADMIN — PROPOFOL 60 MG: 10 INJECTION, EMULSION INTRAVENOUS at 10:53

## 2019-10-09 RX ADMIN — LIDOCAINE HYDROCHLORIDE 60 MG: 20 INJECTION, SOLUTION INFILTRATION; PERINEURAL at 10:53

## 2019-10-09 RX ADMIN — SODIUM CHLORIDE, POTASSIUM CHLORIDE, SODIUM LACTATE AND CALCIUM CHLORIDE: 600; 310; 30; 20 INJECTION, SOLUTION INTRAVENOUS at 09:32

## 2019-10-09 RX ADMIN — MIDAZOLAM 2 MG: 1 INJECTION INTRAMUSCULAR; INTRAVENOUS at 10:48

## 2019-10-09 RX ADMIN — ACETAMINOPHEN 975 MG: 325 TABLET, FILM COATED ORAL at 09:22

## 2019-10-09 NOTE — ANESTHESIA PREPROCEDURE EVALUATION
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 Pt has had prior anesthetic.     No history of anesthetic complications          ROS/MED HX    ENT/Pulmonary:  - neg pulmonary ROS     Neurologic:     (+)migraines,     Cardiovascular: Comment: 2017 EF 64%    (+) ----. : . . . :. valvular problems/murmurs type: MVP mild TR:.       METS/Exercise Tolerance:  >4 METS   Hematologic:  - neg hematologic  ROS       Musculoskeletal:  - neg musculoskeletal ROS       GI/Hepatic:     (+) GERD Asymptomatic on medication,       Renal/Genitourinary:  - ROS Renal section negative       Endo:     (+) thyroid problem .      Psychiatric:  - neg psychiatric ROS       Infectious Disease:  - neg infectious disease ROS       Malignancy:   (+)   Hx of malignant neoplasm of appendix        Other:    - neg other ROS                      Physical Exam  Normal systems: cardiovascular, pulmonary and dental    Airway   Mallampati: I  TM distance: >3 FB  Neck ROM: full    Dental     Cardiovascular   Rhythm and rate: regular and  "normal      Pulmonary             Lab Results   Component Value Date    WBC 5.9 08/26/2019    HGB 12.4 08/26/2019    HCT 37.1 08/26/2019     08/26/2019     (L) 08/26/2019    POTASSIUM 4.4 08/26/2019    CHLORIDE 103 08/26/2019    CO2 24 08/26/2019    BUN 21 08/26/2019    CR 0.83 08/26/2019     (H) 08/26/2019    STELLA 9.7 08/26/2019    ALBUMIN 4.4 08/26/2019    PROTTOTAL 7.7 08/26/2019    ALT 19 08/26/2019    AST 24 08/26/2019    ALKPHOS 58 08/26/2019    BILITOTAL 0.4 08/26/2019    LIPASE 21 12/16/2018    TSH 5.18 (H) 11/12/2018    T4 0.75 08/26/2019       Preop Vitals  BP Readings from Last 3 Encounters:   10/09/19 134/80   08/28/19 124/84   08/26/19 138/82    Pulse Readings from Last 3 Encounters:   10/09/19 68   08/28/19 70   08/26/19 79      Resp Readings from Last 3 Encounters:   10/09/19 16   08/28/19 16   08/26/19 16    SpO2 Readings from Last 3 Encounters:   10/09/19 97%   08/28/19 99%   08/26/19 98%      Temp Readings from Last 1 Encounters:   10/09/19 97  F (36.1  C) (Temporal)    Ht Readings from Last 1 Encounters:   08/28/19 1.626 m (5' 4.02\")      Wt Readings from Last 1 Encounters:   10/09/19 58.1 kg (128 lb)    Estimated body mass index is 21.96 kg/m  as calculated from the following:    Height as of 8/28/19: 1.626 m (5' 4.02\").    Weight as of this encounter: 58.1 kg (128 lb).       Anesthesia Plan      History & Physical Review      ASA Status:  2 .    NPO Status:  > 8 hours    Plan for MAC with Intravenous induction. Maintenance will be Balanced.    PONV prophylaxis:  Ondansetron (or other 5HT-3)  Risks, benefits and alternatives discussed and would like to proceed. General anesthesia LMA if indicated.         Postoperative Care  Postoperative pain management:  IV analgesics.      Consents  Anesthetic plan, risks, benefits and alternatives discussed with:  Patient and Spouse.  Use of blood products discussed: No .   .                 ALBERT Sheldon CRNA  "

## 2019-10-09 NOTE — DISCHARGE INSTRUCTIONS
Procedure you had done: removal of port-YAY!  Your health care provider is:  eMghan Pearce  Your surgeon is Dr. Tresa Evangelista.   Please call your health care provider or surgeon at [phone] if:    - you feel you are getting worse or having an increase in problems    - fever greater than 101 degrees  - increasing shortness of breath or chest pain  - any signs of infection (increasing redness, swelling, tenderness, warmth, change in appearance, or  increased drainage)  - nausea (upset stomach) and vomiting and/or diarrhea that will not stop  - severe pain that is not relieved by medicine, rest or ice    Home care :  You will be discharged when you are safe to go home. Anesthesia can change judgment, reaction time and coordination for several hours after you seem back to normal. Therefore, do not operate any motorized vehicles or power tools for 24 hours after discharge.     Activity:  You should rest or do quiet activities for the rest of the day. The day after surgery you may be as active as you feel able. You may find that you require more rest than usual the first 3-4 days as your body heals.      Diet:  Eat small amounts frequently after arriving home. Avoid foods that are hard to digest such as heavy, sweet, spicy, or fried foods until you are feeling well.   Vomiting can occur after general anesthesia. If vomiting lasts more than 5-7 times after arriving home, or if you have other difficulties, call your doctor.     Other:  1. Problems urinating can happen after surgery.  Please call your physician if you have any problems.  2. Some people have constipation after surgery-you can use over the counter stool softener or laxative as needed.  3. You can use ice on the area of the incision to help with pain and swelling. Apply an ice pack wrapped in a towel to the area for 10-15 minutes once an hour.   Dressing:  Your incision was covered with Steri-strips and a bandage. The bandage can be removed and you can  shower 24 hours or more after the surgery. After you shower dry your incision gently. You may apply a clean bandage if you want to. The Steri-strips will stay on for 5-7 days.   No soaking in a bath, hot tub, pool or lake for 5 days.      Drainage:   Bleeding or drainage should be minimal.  1. If bleeding soaks the dressings, cover with another sterile dressing.  2. If bleeding continues, apply gentle steady pressure over the incision for 5 minutes.  3. If bleeding persists or there is an increased swelling of the area, call your surgeon or go to the Emergency Room.      [facility]  Adult Discharge Orders & Instructions      For 24 hours after surgery:  1. Get plenty of rest.  A responsible adult must stay with you for at least 24 hours after you leave the hospital.   2. You may feel lightheaded.  IF so, sit for a few minutes before standing.  Have someone help you get up.   3. You may have a slight fever. Call the doctor if your fever is over 101 F (38.3 C) (taken under the tongue) or lasts longer than 24 hours.  4. You may have a dry mouth, a sore throat, muscle aches or trouble sleeping.  These should go away after 24 hours.  5. Do not make important or legal decisions.  6.   Do not drive or use heavy equipment.  If you have weakness or tingling, don't drive or use heavy equipment until this feeling goes away.                                                                                                                                                                         To contact a doctor, call    [phone]______________

## 2019-10-09 NOTE — H&P
Primary Care Physician: Meghan Pearce    HPI:   The patient is a 69 year old female would like to have her vascular access port removed. 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 There hasn't been any trouble with the port. No swelling of the left arm or chest wall. Not anticipating requiring any long term infusions. The patient understands that the port could be left in place and that it would require routine flushing.    CONSULTATION ASSESSMENT AND PLAN/RECOMMENDATIONS:   I discussed port removal with the patient. We specifically discussed the risks of infection, bleeding, vein scarring, and the possibility of needed a port in the future. The patient expressed understanding and questions were answered. Informed consent paperwork was completed.   REVIEW OF SYSTEMS  GENERAL: No fevers orchills. Denies fatigue, recent weight loss.  HEENT: No sinus drainage. No changes with vision or hearing. No difficulty swallowing.   LYMPHATICS:  No swollen nodes in axilla, neck or groin.  CARDIOVASCULAR: Denies chest pain, palpitations and dyspnea on exertion.  PULMONARY: No shortness of breath or cough. No increase in sputum production.  GI: Denies melena, bright red blood in stools. No hematemesis. No constipation ordiarrhea.  : No dysuria or hematuria.  SKIN: No recent rashes or ulcers.   HEMATOLOGY:  No history of easy bruising or bleeding.  ENDOCRINE:  No history of diabetes or thyroid problems.  NEUROLOGY:  Nohistory of seizures or headaches. No motor or sensory changes.  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     Pre-diabetes 2/12/2019     Thyroid disease      Tricuspid regurgitation      Past Surgical History:   Procedure  Laterality Date     FRACTURE TX, HIP RT/LT Right 2013     INSERT PORT VASCULAR ACCESS N/A 2/7/2019    Procedure: INSERT PORT VASCULAR ACCESS;  Surgeon: Tresa Evangelista MD;  Location:  OR     LAPAROTOMY EXPLORATORY N/A 12/18/2018    Procedure: Exploratory Laparotomy with right hemicolectomy;  Surgeon: Tresa Evangelista MD;  Location:  OR     TONSILLECTOMY  1962     TUBAL LIGATION  1979     Current Facility-Administered Medications   Medication     lactated ringers infusion     PRE OP antibiotics NOT needed for this surgical procedure.        Allergies   Allergen Reactions     Metrizamide Anaphylaxis     Had contrast dye. Patient reports she woke up in ICU.     Bee Venom      Edema     Pollen Extract      Runny nose     Sulfa Drugs Hives     Family History   Problem Relation Age of Onset     Tuberculosis Mother      Chronic Obstructive Pulmonary Disease Mother      Heart Disease Father         massive MI     Heart Disease Brother         stents/CABG     Glaucoma Brother      Hypertension Brother      Social History     Socioeconomic History     Marital status:      Spouse name: None     Number of children: None     Years of education: None     Highest education level: None   Occupational History     None   Social Needs     Financial resource strain: None     Food insecurity:     Worry: None     Inability: None     Transportation needs:     Medical: None     Non-medical: None   Tobacco Use     Smoking status: Passive Smoke Exposure - Never Smoker     Smokeless tobacco: Never Used     Tobacco comment: Lived with a pipe smoker for 15 years   Substance and Sexual Activity     Alcohol use: No     Frequency: Never     Drug use: No     Sexual activity: Yes     Partners: Male     Birth control/protection: Post-menopausal   Lifestyle     Physical activity:     Days per week: None     Minutes per session: None     Stress: None   Relationships     Social connections:     Talks on phone: None     Gets together: None      Attends Yarsanism service: None     Active member of club or organization: None     Attends meetings of clubs or organizations: None     Relationship status: None     Intimate partner violence:     Fear of current or ex partner: None     Emotionally abused: None     Physically abused: None     Forced sexual activity: None   Other Topics Concern     Parent/sibling w/ CABG, MI or angioplasty before 65F 55M? Not Asked   Social History Narrative    , L.JUAN. ([place]).  Live in [place] [facility] [place].  2 biological son ([place]) and daughter ([place]) and 1 step daughter ([place]).       PHYSICAL EXAM  Vitals: /80   Pulse 68   Temp 97  F (36.1  C) (Temporal)   Resp 16   Wt 58.1 kg (128 lb)   SpO2 97%   Breastfeeding? No   BMI 21.96 kg/m    BMI= Body mass index is 21.96 kg/m .  GENERAL: Healthy appearing patient in no acute distress. Pleasant and cooperative with exam and interview.   HEENT:Head-normocephalic. Eyes-no scleral icterus, pupils equal, round, and reactive to light. Nose-no nasal drainage. No lesions. Mouth-oral mucosa pink and moist, no lesions.  NECK: Supple. No thyroid nodules. Tracheamidline.  LYMPHATICS:  No cervical, axillary or supraclavicular adenopathy. No upper extremity edema.  CV: Regular rate and rhythm, no murmurs. No peripheral edema.  LUNGS:  No respiratory distress. Clearbilaterally to auscultation.  CHEST WALL: Port palpable left upper chest wall with no tenderness, erythema or fluid collection. Well healed skin incision.  ABDOMEN: Non distended. Bowel sounds active. Soft, non-tender,no hepatosplenomegaly or hernias. No peritoneal signs.  SKIN: Pink, warm and dry. No jaundice. No rash.  NEURO:  Cranial nerves II-XII grossly intact. Alert and oriented.  PSYCH: Appropriate mood and affect.

## 2019-10-09 NOTE — ANESTHESIA POSTPROCEDURE EVALUATION
Patient: Nadya Flanagan    Procedure(s):  Remove Port    Diagnosis:done with chemotherapy  Diagnosis Additional Information: No value filed.    Anesthesia Type:  MAC    Note:  Anesthesia Post Evaluation    Patient location during evaluation: Phase 2  Patient participation: Able to fully participate in evaluation  Level of consciousness: awake and alert  Pain management: adequate  Airway patency: patent  Cardiovascular status: acceptable  Respiratory status: acceptable  Hydration status: acceptable  PONV: none     Anesthetic complications: None          Last vitals:  Vitals:    10/09/19 0912   BP: 134/80   Pulse: 68   Resp: 16   Temp: 97  F (36.1  C)   SpO2: 97%         Electronically Signed By: ALBERT SHEIKH CRNA  October 9, 2019  11:23 AM

## 2019-11-01 ENCOUNTER — MYC MEDICAL ADVICE (OUTPATIENT)
Dept: ONCOLOGY | Facility: OTHER | Age: 69
End: 2019-11-01

## 2019-11-04 ENCOUNTER — TELEPHONE (OUTPATIENT)
Dept: INTERNAL MEDICINE | Facility: OTHER | Age: 69
End: 2019-11-04

## 2019-11-04 DIAGNOSIS — R73.9 ELEVATED BLOOD SUGAR: Primary | ICD-10-CM

## 2019-11-04 NOTE — TELEPHONE ENCOUNTER
Nadya will be coming in on 11/11 for a Dr. Thakkar lab draw and states would like to have have her Dr. Pearce labs drawn at same time but no orders found.  Please advise.

## 2019-11-04 NOTE — TELEPHONE ENCOUNTER
I have placed orders for an A1c.  Dr. Thakkar will be checking her blood counts and kidney and liver test.  Her last thyroid look good and does not need repeated at this time unless she has concerns.

## 2019-11-05 NOTE — TELEPHONE ENCOUNTER
After verifying patients name and date of birth, Patient was given the below information and states she has an appt set up already for 11/11/19 at 10:30AM. Patient states she has no concerns about her Thyroid.     Tresa Wilkerson LPN on 11/5/2019 at 9:24 AM

## 2019-11-11 DIAGNOSIS — R73.9 ELEVATED BLOOD SUGAR: ICD-10-CM

## 2019-11-11 DIAGNOSIS — C18.1 MALIGNANT NEOPLASM OF APPENDIX (H): ICD-10-CM

## 2019-11-11 LAB
ALBUMIN SERPL-MCNC: 4.3 G/DL (ref 3.5–5.7)
ALP SERPL-CCNC: 66 U/L (ref 34–104)
ALT SERPL W P-5'-P-CCNC: 23 U/L (ref 7–52)
ANION GAP SERPL CALCULATED.3IONS-SCNC: 6 MMOL/L (ref 3–14)
AST SERPL W P-5'-P-CCNC: 22 U/L (ref 13–39)
BASOPHILS # BLD AUTO: 0.1 10E9/L (ref 0–0.2)
BASOPHILS NFR BLD AUTO: 1.1 %
BILIRUB SERPL-MCNC: 0.5 MG/DL (ref 0.3–1)
BUN SERPL-MCNC: 19 MG/DL (ref 7–25)
CALCIUM SERPL-MCNC: 9.4 MG/DL (ref 8.6–10.3)
CEA SERPL-MCNC: <3 NG/ML
CHLORIDE SERPL-SCNC: 104 MMOL/L (ref 98–107)
CO2 SERPL-SCNC: 28 MMOL/L (ref 21–31)
CREAT SERPL-MCNC: 0.98 MG/DL (ref 0.6–1.2)
DIFFERENTIAL METHOD BLD: NORMAL
EOSINOPHIL # BLD AUTO: 0.2 10E9/L (ref 0–0.7)
EOSINOPHIL NFR BLD AUTO: 3.8 %
ERYTHROCYTE [DISTWIDTH] IN BLOOD BY AUTOMATED COUNT: 13.4 % (ref 10–15)
GFR SERPL CREATININE-BSD FRML MDRD: 56 ML/MIN/{1.73_M2}
GLUCOSE SERPL-MCNC: 100 MG/DL (ref 70–105)
HBA1C MFR BLD: 5.9 % (ref 4–6)
HCT VFR BLD AUTO: 38.2 % (ref 35–47)
HGB BLD-MCNC: 12.4 G/DL (ref 11.7–15.7)
IMM GRANULOCYTES # BLD: 0 10E9/L (ref 0–0.4)
IMM GRANULOCYTES NFR BLD: 0.3 %
LDH SERPL L TO P-CCNC: 164 U/L (ref 140–271)
LYMPHOCYTES # BLD AUTO: 2.1 10E9/L (ref 0.8–5.3)
LYMPHOCYTES NFR BLD AUTO: 33.7 %
MCH RBC QN AUTO: 31 PG (ref 26.5–33)
MCHC RBC AUTO-ENTMCNC: 32.5 G/DL (ref 31.5–36.5)
MCV RBC AUTO: 96 FL (ref 78–100)
MONOCYTES # BLD AUTO: 0.6 10E9/L (ref 0–1.3)
MONOCYTES NFR BLD AUTO: 10 %
NEUTROPHILS # BLD AUTO: 3.2 10E9/L (ref 1.6–8.3)
NEUTROPHILS NFR BLD AUTO: 51.1 %
PLATELET # BLD AUTO: 273 10E9/L (ref 150–450)
POTASSIUM SERPL-SCNC: 4.2 MMOL/L (ref 3.5–5.1)
PROT SERPL-MCNC: 8 G/DL (ref 6.4–8.9)
RBC # BLD AUTO: 4 10E12/L (ref 3.8–5.2)
SODIUM SERPL-SCNC: 138 MMOL/L (ref 134–144)
WBC # BLD AUTO: 6.3 10E9/L (ref 4–11)

## 2019-11-11 PROCEDURE — 82378 CARCINOEMBRYONIC ANTIGEN: CPT | Mod: ZL | Performed by: INTERNAL MEDICINE

## 2019-11-11 PROCEDURE — 83615 LACTATE (LD) (LDH) ENZYME: CPT | Mod: ZL | Performed by: INTERNAL MEDICINE

## 2019-11-11 PROCEDURE — 36415 COLL VENOUS BLD VENIPUNCTURE: CPT | Mod: ZL | Performed by: INTERNAL MEDICINE

## 2019-11-11 PROCEDURE — 80053 COMPREHEN METABOLIC PANEL: CPT | Mod: ZL | Performed by: INTERNAL MEDICINE

## 2019-11-11 PROCEDURE — 83036 HEMOGLOBIN GLYCOSYLATED A1C: CPT | Mod: ZL | Performed by: INTERNAL MEDICINE

## 2019-11-11 PROCEDURE — 85025 COMPLETE CBC W/AUTO DIFF WBC: CPT | Mod: ZL | Performed by: INTERNAL MEDICINE

## 2019-11-14 ENCOUNTER — ONCOLOGY VISIT (OUTPATIENT)
Dept: ONCOLOGY | Facility: OTHER | Age: 69
End: 2019-11-14
Attending: INTERNAL MEDICINE
Payer: MEDICARE

## 2019-11-14 VITALS
BODY MASS INDEX: 23.05 KG/M2 | RESPIRATION RATE: 16 BRPM | HEIGHT: 64 IN | HEART RATE: 80 BPM | DIASTOLIC BLOOD PRESSURE: 82 MMHG | TEMPERATURE: 97.7 F | WEIGHT: 135 LBS | OXYGEN SATURATION: 98 % | SYSTOLIC BLOOD PRESSURE: 120 MMHG

## 2019-11-14 DIAGNOSIS — C18.1 CANCER OF APPENDIX METASTATIC TO INTRA-ABDOMINAL LYMPH NODE (H): Primary | ICD-10-CM

## 2019-11-14 DIAGNOSIS — C77.2 CANCER OF APPENDIX METASTATIC TO INTRA-ABDOMINAL LYMPH NODE (H): Primary | ICD-10-CM

## 2019-11-14 PROCEDURE — 99215 OFFICE O/P EST HI 40 MIN: CPT | Performed by: INTERNAL MEDICINE

## 2019-11-14 PROCEDURE — G0463 HOSPITAL OUTPT CLINIC VISIT: HCPCS

## 2019-11-14 ASSESSMENT — PAIN SCALES - GENERAL: PAINLEVEL: MILD PAIN (3)

## 2019-11-14 ASSESSMENT — MIFFLIN-ST. JEOR: SCORE: 1122.61

## 2019-11-14 NOTE — PROGRESS NOTES
Visit Date:   11/14/2019      HEMATOLOGY ONCOLOGY CLINIC NOTE      Mrs. Flanagan returns for followup of adenocarcinoma, ex goblet cell carcinoid of the appendix with metastases to intraabdominal lymph nodes with malignant ascites.  We had seen the patient in consultation at the request of Dr. Tresa Evangelista and MARY Nelson of [facility] as well as Dr. Speedy Menezes at the [facility], where he had seen the patient on 02/07/2019.  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  She apparently presented with abdominal discomfort in mid-10/2018 associated with nausea.  It progressed to the point where she was seen by MARY Nelson, her primary care provider in 10/2018.  At that time, she presented with nausea, vomiting associated epigastric pain and left lower quadrant abdominal pain.  MRI of the liver was obtained.  There were findings of hemangioma, symptoms persisted.  The patient underwent an EGD on 12/07/2018.  There were no significant findings.  Apparently, she had progressed to the point where she was having significant projectile vomiting.  On 12/14/2018, she was seen in the [facility] and then transferred to [facility], where she was admitted for small-bowel obstruction.  She was treated initially conservatively with NG suction.  Subsequently, she underwent right hemicolectomy, which revealed a mass in the terminal ileum.  The mass was adherent to the right ovary.  Pathology was read as adenocarcinoma, ex goblet cell carcinoid of the appendix, was staged T2b N1 initially.  Tumor was present focally in the posterior retroperitoneal region 3/23 lymph nodes were involved with disease.  Peritoneal fluid was sent for cytology.   Initially, it was read as malignant cells.  The patient was referred to [facility], seen by Dr. Speedy Menezes, medical oncologist at the [facility], 01/16/2019.  The slides were reviewed by the [facility] pathologist and the findings were the patient had peritoneal washings were consistent with involvement by adenocarcinoma, ex goblet cell carcinoid.  Right hemicolectomy specimen revealed adenocarcinoma, ex goblet cell carcinoid.  Indurated mass was noted diffusely involving the appendix and invading the visceral peritoneum.  Angiolymphatic invasion was present and the retroperitoneal soft tissue margins were positive with 3/21 lymph nodes were involved, pathologic stage was T4a pN1.  Immunohistochemical staining for DNA mismatch repair enzymes were intact.  Also, CT of the abdomen and pelvis was performed at [facility].  Findings were there were interval postoperative changes in the right hemicolectomy with resolution of previously seen small bowel dilatation and obstruction.  There was mild soft tissue edema in the resection bed along the midline incision, which was felt to be secondary to postop status.  No definite peritoneal metastases were identified.  No free fluid.  There was abnormal geographic perfusion with hepatic segments 5 and 8 without evidence of focal hepatic mass.  There was indeterminate mild thickening left adrenal gland, unchanged.  On 01/15/2019, Dr. Menezes recommended FOLFOX chemotherapy given the fact that she had positive cytology and recommended 3 months of FOLFOX chemotherapy followed by imaging at the [facility].  The patient had port placement on 02/17/2019.  The plan was to proceed with FOLFOX and 5-FU given at 400 mg IV bolus with leucovorin bolus oxaliplatin 85 mg/m2 given on day 1, continuous IV 5-FU 2400 mg/m2 IV over 46 hours.  The plan was to restage after 3 months of therapy.  The patient completed 4 cycles of FOLFOX with 20% dose reduction.  The last 2 cycles were dose reduced by  20% due to neutropenia.  She had staging studies after 4 cycles on 04/09/2019.  The findings were there was no evidence of metastatic disease.  There was evolution of postop change in the abdomen.  No evidence of microscopic peritoneal implant disease.  There was a nodular opacity in the lung that was consistent with endobronchial debris, likely due to bronchitis.  The patient went on to complete 8 cycles of chemotherapy, then underwent restaging imaging on 06/06/2019, including CT chest, abdomen and pelvis.  Findings were there was no evidence of active disease throughout the chest, abdomen and pelvis.  Nodular foci seen previously were no longer identified suggesting the presence of endobronchial debris on prior study.  No evidence of new nodules or lymphadenopathy.  No evidence of intraperitoneal metastatic disease.  The patient otherwise decided that she did not want to go to the [facility].  I discussed the case with Dr. Speedy Menezes, who recommended that HIPEC chemotherapy may be an option.  He would otherwise recommend observation with serial imaging.  The patient otherwise stated that she had some nail changes and her nails were more brittle.  When we saw the patient subsequently, the plan was to continue surveillance.  CT chest, abdomen and pelvis was performed subsequently on 08/26 and there was no evidence of metastatic disease.  Her CEA was less than 3.  Her ECOG performance status was 0.  When we saw her last on 08/28, she wanted her port out; therefore, she had her port taken out.  She comes in today.  She has concerns about weight gain of 8 pounds.  She says she was 125 two months ago and she is 133 today.  She denies any fevers, night sweats, abdominal pain.  She does still have some mild neuropathy in her fingers and she says she gets some pain in her joints, particularly in her thumbs.  She does not want to be on any gabapentin or any other drugs for this.  She says it is tolerable.  She also  has an occasional cough, she says, but she has had problems with allergies and sinuses; it does not keep her up at night.  Otherwise, no other major complaints.  She says she is energetic.  No real decline in health status otherwise.      PHYSICAL EXAMINATION:   GENERAL:  She is a middle-aged white female in no acute distress.   VITAL SIGNS:  Blood pressure is 120/82, pulse 80, respirations 16, temp 97.7.   HEENT:  Atraumatic, normocephalic.  Oropharynx nonerythematous.   NECK:  Supple.   LUNGS:  Clear to auscultation and percussion.   HEART:  Regular rhythm.  S1, S2 normal.   ABDOMEN:  Soft, nondistended, minimal periumbilical tenderness, no rebound.   LYMPHATICS:  No cervical, supraclavicular, axillary or inguinal nodes.   EXTREMITIES:  No edema.   NEUROLOGIC:  Nonfocal.      LABORATORY DATA:  CEA is less than 3.  CBC is within normal limits.  BUN is 19, creatinine 0.98.  LFTs are normal.  LDH is normal.      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  The patient was deemed a candidate for chemotherapy for stage IV disease as per Dr. Speedy Menezes of [facility].  The plan was to initiate FOLFOX chemotherapy.  The patient completed 2 cycles.  Course was complicated by neutropenia.  The patient went on to receive 4 cycles, the last 2 requiring 20% dose reduction due to neutropenia and was staged with no evidence of disease after 8 cycles.  No evidence of disease on CT.  The patient had scans done in August that were negative.  She is clinically doing well with normal CEA.  We discussed that she may need surveillance scans in the future.  She is concerned about weight gain.  We recommended CT of the abdomen and pelvis.  She refused.  Otherwise, we will see the patient in 3 months.  We told her if she gets worsening symptoms, i.e. abdominal pain, fevers, night sweats, that she should come  in sooner.  Otherwise, if she still has ongoing weight gain, we would recommend repeat scans in 3 months.  Otherwise, we will see her at that point and obtain CBC, CMP, LDH, CEA.  If CEA is elevated, we will proceed with scans at that point as well.      Forty minutes were spent with the patient; greater than half the time was spent in counseling and coordinating care.         DEEPIKA BARRIOS MD             D: 2019   T: 2019   MT: CLARISSA      Name:     ALMA CORTES   MRN:      [id number]        Account:      [id number]   :      1950           Visit Date:   2019      Document: [id number]       cc: Isaura Menezes MD

## 2019-11-14 NOTE — NURSING NOTE
"Chief Complaint   Patient presents with     RECHECK     Appendix Cancer       Initial /82 (BP Location: Right arm, Patient Position: Sitting, Cuff Size: Adult Regular)   Pulse 80   Temp 97.7  F (36.5  C) (Oral)   Resp 16   Ht 1.626 m (5' 4.02\")   Wt 61.2 kg (135 lb)   SpO2 98%   Breastfeeding No   BMI 23.16 kg/m   Estimated body mass index is 23.16 kg/m  as calculated from the following:    Height as of this encounter: 1.626 m (5' 4.02\").    Weight as of this encounter: 61.2 kg (135 lb).  Medication Reconciliation: complete  Jacquelyn Patterson LPN  "

## 2019-11-14 NOTE — PROGRESS NOTES
Verbal order received for CBC, CMP, LDH, CEA in 3 months and follow up with ACP.     Alyssa Nice RN on 11/14/2019 at 12:05 PM

## 2019-12-18 ENCOUNTER — MYC MEDICAL ADVICE (OUTPATIENT)
Dept: SURGERY | Facility: OTHER | Age: 69
End: 2019-12-18

## 2020-02-03 DIAGNOSIS — C18.1 CANCER OF APPENDIX METASTATIC TO INTRA-ABDOMINAL LYMPH NODE (H): ICD-10-CM

## 2020-02-03 DIAGNOSIS — C77.2 CANCER OF APPENDIX METASTATIC TO INTRA-ABDOMINAL LYMPH NODE (H): ICD-10-CM

## 2020-02-03 LAB
ALBUMIN SERPL-MCNC: 4.6 G/DL (ref 3.5–5.7)
ALP SERPL-CCNC: 67 U/L (ref 34–104)
ALT SERPL W P-5'-P-CCNC: 22 U/L (ref 7–52)
ANION GAP SERPL CALCULATED.3IONS-SCNC: 8 MMOL/L (ref 3–14)
AST SERPL W P-5'-P-CCNC: 27 U/L (ref 13–39)
BASOPHILS # BLD AUTO: 0.1 10E9/L (ref 0–0.2)
BASOPHILS NFR BLD AUTO: 0.7 %
BILIRUB SERPL-MCNC: 0.5 MG/DL (ref 0.3–1)
BUN SERPL-MCNC: 18 MG/DL (ref 7–25)
CALCIUM SERPL-MCNC: 9.9 MG/DL (ref 8.6–10.3)
CEA SERPL-MCNC: <3 NG/ML
CHLORIDE SERPL-SCNC: 103 MMOL/L (ref 98–107)
CO2 SERPL-SCNC: 28 MMOL/L (ref 21–31)
CREAT SERPL-MCNC: 1.09 MG/DL (ref 0.6–1.2)
DIFFERENTIAL METHOD BLD: NORMAL
EOSINOPHIL # BLD AUTO: 0.2 10E9/L (ref 0–0.7)
EOSINOPHIL NFR BLD AUTO: 1.8 %
ERYTHROCYTE [DISTWIDTH] IN BLOOD BY AUTOMATED COUNT: 12.5 % (ref 10–15)
GFR SERPL CREATININE-BSD FRML MDRD: 50 ML/MIN/{1.73_M2}
GLUCOSE SERPL-MCNC: 95 MG/DL (ref 70–105)
HCT VFR BLD AUTO: 41.6 % (ref 35–47)
HGB BLD-MCNC: 13.6 G/DL (ref 11.7–15.7)
IMM GRANULOCYTES # BLD: 0 10E9/L (ref 0–0.4)
IMM GRANULOCYTES NFR BLD: 0.2 %
LDH SERPL L TO P-CCNC: 182 U/L (ref 140–271)
LYMPHOCYTES # BLD AUTO: 1.4 10E9/L (ref 0.8–5.3)
LYMPHOCYTES NFR BLD AUTO: 17.2 %
MCH RBC QN AUTO: 31.1 PG (ref 26.5–33)
MCHC RBC AUTO-ENTMCNC: 32.7 G/DL (ref 31.5–36.5)
MCV RBC AUTO: 95 FL (ref 78–100)
MONOCYTES # BLD AUTO: 0.7 10E9/L (ref 0–1.3)
MONOCYTES NFR BLD AUTO: 8.5 %
NEUTROPHILS # BLD AUTO: 5.9 10E9/L (ref 1.6–8.3)
NEUTROPHILS NFR BLD AUTO: 71.6 %
PLATELET # BLD AUTO: 342 10E9/L (ref 150–450)
POTASSIUM SERPL-SCNC: 4.4 MMOL/L (ref 3.5–5.1)
PROT SERPL-MCNC: 7.8 G/DL (ref 6.4–8.9)
RBC # BLD AUTO: 4.37 10E12/L (ref 3.8–5.2)
SODIUM SERPL-SCNC: 139 MMOL/L (ref 134–144)
WBC # BLD AUTO: 8.3 10E9/L (ref 4–11)

## 2020-02-03 PROCEDURE — 85025 COMPLETE CBC W/AUTO DIFF WBC: CPT | Mod: ZL | Performed by: INTERNAL MEDICINE

## 2020-02-03 PROCEDURE — 36415 COLL VENOUS BLD VENIPUNCTURE: CPT | Mod: ZL | Performed by: INTERNAL MEDICINE

## 2020-02-03 PROCEDURE — 83615 LACTATE (LD) (LDH) ENZYME: CPT | Mod: ZL | Performed by: INTERNAL MEDICINE

## 2020-02-03 PROCEDURE — 80053 COMPREHEN METABOLIC PANEL: CPT | Mod: ZL | Performed by: INTERNAL MEDICINE

## 2020-02-03 PROCEDURE — 82378 CARCINOEMBRYONIC ANTIGEN: CPT | Mod: ZL | Performed by: INTERNAL MEDICINE

## 2020-02-06 ENCOUNTER — ONCOLOGY VISIT (OUTPATIENT)
Dept: ONCOLOGY | Facility: OTHER | Age: 70
End: 2020-02-06
Attending: NURSE PRACTITIONER
Payer: MEDICARE

## 2020-02-06 VITALS
TEMPERATURE: 97.1 F | BODY MASS INDEX: 24.24 KG/M2 | OXYGEN SATURATION: 99 % | RESPIRATION RATE: 16 BRPM | HEART RATE: 76 BPM | SYSTOLIC BLOOD PRESSURE: 124 MMHG | WEIGHT: 142 LBS | DIASTOLIC BLOOD PRESSURE: 82 MMHG | HEIGHT: 64 IN

## 2020-02-06 DIAGNOSIS — R10.9 ABDOMINAL PAIN, UNSPECIFIED ABDOMINAL LOCATION: ICD-10-CM

## 2020-02-06 DIAGNOSIS — C18.1 MALIGNANT NEOPLASM OF APPENDIX (H): Primary | ICD-10-CM

## 2020-02-06 DIAGNOSIS — Z91.041 CONTRAST MEDIA ALLERGY: ICD-10-CM

## 2020-02-06 PROCEDURE — G0463 HOSPITAL OUTPT CLINIC VISIT: HCPCS

## 2020-02-06 PROCEDURE — 99213 OFFICE O/P EST LOW 20 MIN: CPT | Performed by: NURSE PRACTITIONER

## 2020-02-06 RX ORDER — METHYLPREDNISOLONE 32 MG/1
TABLET ORAL
Qty: 2 TABLET | Refills: 0 | Status: SHIPPED | OUTPATIENT
Start: 2020-02-06 | End: 2020-02-17

## 2020-02-06 RX ORDER — DIPHENHYDRAMINE HCL 50 MG
CAPSULE ORAL
Qty: 1 CAPSULE | Refills: 0 | Status: SHIPPED | OUTPATIENT
Start: 2020-02-06 | End: 2020-05-13

## 2020-02-06 ASSESSMENT — MIFFLIN-ST. JEOR: SCORE: 1154.36

## 2020-02-06 ASSESSMENT — PAIN SCALES - GENERAL: PAINLEVEL: MILD PAIN (2)

## 2020-02-06 NOTE — NURSING NOTE
"Chief Complaint   Patient presents with     RECHECK     Appendix Cancer       Initial /82 (BP Location: Left arm, Patient Position: Sitting, Cuff Size: Adult Regular)   Pulse 76   Temp 97.1  F (36.2  C) (Tympanic)   Resp 16   Ht 1.626 m (5' 4.02\")   Wt 64.4 kg (142 lb)   SpO2 99%   Breastfeeding No   BMI 24.36 kg/m   Estimated body mass index is 24.36 kg/m  as calculated from the following:    Height as of this encounter: 1.626 m (5' 4.02\").    Weight as of this encounter: 64.4 kg (142 lb).  Medication Reconciliation: complete  Jacquelyn Patterson LPN  "

## 2020-02-06 NOTE — PROGRESS NOTES
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 Patient was initially seen in consultation on 2/7/19 at the request of Dr. Tresa Evangelista and MARY Nelson of [facility] as well as Dr. 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  She apparently presented with abdominal discomfort in mid-10/2018 associated with nausea.  It progressed to the point where she was seen by MARY Nelson, her primary care provider in 10/2018.  At that time, she presented with nausea, vomiting associated epigastric pain and left lower quadrant abdominal pain.  MRI of the liver was obtained.  There were findings of hemangioma.The patient underwent an EGD on 12/07/2018.  There were no significant findings.  Apparently, she had progressed to the point where she was having significant projectile vomiting.  On 12/14/2018, she was seen in the [facility] and then transferred to [facility], where she was admitted for small-bowel obstruction. Patient underwent right hemicolectomy, which revealed a mass in the terminal ileum.  The mass was adherent to the right ovary.  Pathology was read as adenocarcinoma, ex goblet cell carcinoid of the appendix, was staged T2b N1 initially. Tumor was present focally in the posterior retroperitoneal region 3/23 lymph nodes were involved with disease.  Peritoneal fluid was sent for cytology. The patient was referred to [facility], seen by Dr. Speedy Menezes, medical oncologist at the [place], 01/16/2019.  The slides were reviewed by the [place]  [facility] pathologist and the findings were the patient had peritoneal washings were consistent with involvement by adenocarcinoma, ex goblet cell carcinoid.  Right hemicolectomy specimen revealed adenocarcinoma, ex goblet cell carcinoid.  Indurated mass was noted diffusely involving the appendix and invading the visceral peritoneum.  Angiolymphatic invasion was present and the retroperitoneal soft tissue margins were positive with 3/21 lymph nodes were involved, pathologic stage was T4a pN1.  Immunohistochemical staining for DNA mismatch repair enzymes were intact.  Also, CT of the abdomen and pelvis was performed at [facility].  Findings were there were interval postoperative changes in the right hemicolectomy with resolution of previously seen small bowel dilatation and obstruction.  There was mild soft tissue edema in the resection bed along the midline incision, which was felt to be secondary to postop status.  No definite peritoneal metastases were identified.  No free fluid.  There was abnormal geographic perfusion with hepatic segments 5 and 8 without evidence of focal hepatic mass.  On 01/15/2019, Dr. Menezes recommended 3 months of FOLFOX chemotherapy followed by imaging at the [facility].  The patient had port placement on 02/17/2019.  The plan was to proceed with FOLFOX and 5-FU given at 400 mg IV bolus with leucovorin bolus oxaliplatin 85 mg/m2 given on day 1, continuous IV 5-FU 2400 mg/m2 IV over 46 hours.  The plan was to restage after 3 months of therapy.  The patient completed 4 cycles of FOLFOX with 20% dose reduction.  The last 2 cycles were dose reduced by 20% due to neutropenia.  She had staging studies after 4 cycles on 04/09/2019.  The findings were there was no evidence of metastatic disease.  There was evolution of postop change in the abdomen.  No evidence of microscopic peritoneal implant disease.  There was a nodular opacity in the lung that was consistent with endobronchial debris, likely  due to bronchitis.  The patient went on to complete 8 cycles of chemotherapy, then underwent restaging imaging on 06/06/2019, including CT chest, abdomen and pelvis.  Findings were there was no evidence of active disease throughout the chest, abdomen and pelvis.  Nodular foci seen previously were no longer identified suggesting the presence of endobronchial debris on prior study.  No evidence of new nodules or lymphadenopathy.  No evidence of intraperitoneal metastatic disease.  The patient otherwise decided that she did not want to go to the [facility]. Dr Thakkar did discuss the case with Dr. Speedy Menezes, who recommended that HIPEC chemotherapy may be an option.  He would otherwise recommend observation with serial imaging.  CT chest, abdomen and pelvis was performed subsequently on 08/26/19 and there was no evidence of metastatic disease.  Her CEA was less than 3.  Her ECOG performance status was 0. Patient continues to have some neuropathy in her hands and feet which she states has improved using ball rollers to stimulate her hands and feet. She reports increased left lower quadrant abdominal pain which has been present since before her diagnosis. She does have some ongoing constipation and has recently noticed mucous in her stool. She denies any blood in stool. She also reports some ongoing weight gain. Patient is otherwise doing well. Labs are stable. Tumor marker is normal.       Review Of Systems:  Review Of Systems  Eyes/Ears/Nose/Throat: denies new vision or hearing changes  Respiratory: No shortness of breath, dyspnea on exertion, cough, or hemoptysis  Cardiovascular: denies chest pain or palpitations  Gastrointestinal: reports ongoing LLQ abdominal pain, constipation. She denies blood in stool  Genitourinary: denies dysuria or hematuria  Musculoskeletal: reports right wrist pain, no new bone pain or muscle weakness  Neurologic: reports improvement in neuropathy in her hands and  "feet  Hematologic/Lymphatic/Immunologic: denies fevers, chills, night sweats      Nursing Notes:   Jacquelyn Patterson LPN  2/6/2020  1:49 PM  Signed  Chief Complaint   Patient presents with     RECHECK     Appendix Cancer       Initial /82 (BP Location: Left arm, Patient Position: Sitting, Cuff Size: Adult Regular)   Pulse 76   Temp 97.1  F (36.2  C) (Tympanic)   Resp 16   Ht 1.626 m (5' 4.02\")   Wt 64.4 kg (142 lb)   SpO2 99%   Breastfeeding No   BMI 24.36 kg/m    Estimated body mass index is 24.36 kg/m  as calculated from the following:    Height as of this encounter: 1.626 m (5' 4.02\").    Weight as of this encounter: 64.4 kg (142 lb).  Medication Reconciliation: complete  Jacquelyn Patterson LPN    Past medical, social, surgical, and family histories reviewed.    Allergies:  Allergies as of 02/06/2020 - Reviewed 02/06/2020   Allergen Reaction Noted     Metrizamide Anaphylaxis 12/14/2018     Bee venom  01/16/2019     Pollen extract  01/16/2019     Sulfa drugs Hives 02/15/2013       Current Medications:  Current Outpatient Medications   Medication Sig Dispense Refill     cetirizine (ZYRTEC) 10 MG tablet Take 10 mg by mouth daily       cyanocobalamin 500 MCG TABS Take 500 mcg by mouth daily       Lactobacillus (FLORAJEN ACIDOPHILUS) CAPS        levothyroxine (SYNTHROID/LEVOTHROID) 25 MCG tablet Take 1 tablet (25 mcg) by mouth daily 90 tablet 4     Multiple Vitamin (MULTI-VITAMINS) TABS Take 1 tablet by mouth daily       polyethylene glycol (MIRALAX/GLYCOLAX) powder        pyridOXINE (VITAMIN B6) 100 MG TABS        acetaminophen (TYLENOL) 325 MG tablet Take 2 tablets (650 mg) by mouth every 4 hours as needed for mild pain (Patient not taking: Reported on 2/6/2020) 50 tablet 0     acetaminophen (TYLENOL) 500 MG tablet Take 500-1,000 mg by mouth every 6 hours as needed for mild pain       famotidine (PEPCID) 20 MG tablet Take 10 mg by mouth daily as needed       fluocinonide (LIDEX) 0.05 % external gel   " "     lidocaine-prilocaine (EMLA) 2.5-2.5 % external cream Apply to port site 60 minutes before needle and cover with occlusive dressing. (Patient not taking: Reported on 2/6/2020) 30 g 3        Physical Exam:  /82 (BP Location: Left arm, Patient Position: Sitting, Cuff Size: Adult Regular)   Pulse 76   Temp 97.1  F (36.2  C) (Tympanic)   Resp 16   Ht 1.626 m (5' 4.02\")   Wt 64.4 kg (142 lb)   SpO2 99%   Breastfeeding No   BMI 24.36 kg/m      GENERAL APPEARANCE: 69 year old female, alert and no distress     NECK: no adenopathy, no asymmetry or masses     LYMPHATICS: No cervical, supraclavicular, axillary lymphadenopathy     RESP: lungs clear to auscultation - no rales, rhonchi or wheezes     CARDIOVASCULAR: regular rates and rhythm, normal S1 S2     ABDOMEN:  soft, nontender, no HSM or masses and bowel sounds normal     MUSCULOSKELETAL: extremities normal- no gross deformities noted,  No edema b/l LE.     SKIN: no suspicious lesions or rashes on exposed skin     PSYCHIATRIC: mentation appears normal and affect normal    Laboratory/Imaging Studies  Orders Only on 02/03/2020   Component Date Value Ref Range Status     Lactate Dehydrogenase 02/03/2020 182  140 - 271 U/L Final     Sodium 02/03/2020 139  134 - 144 mmol/L Final     Potassium 02/03/2020 4.4  3.5 - 5.1 mmol/L Final     Chloride 02/03/2020 103  98 - 107 mmol/L Final     Carbon Dioxide 02/03/2020 28  21 - 31 mmol/L Final     Anion Gap 02/03/2020 8  3 - 14 mmol/L Final     Glucose 02/03/2020 95  70 - 105 mg/dL Final     Urea Nitrogen 02/03/2020 18  7 - 25 mg/dL Final     Creatinine 02/03/2020 1.09  0.60 - 1.20 mg/dL Final     GFR Estimate 02/03/2020 50* >60 mL/min/[1.73_m2] Final     GFR Estimate If Black 02/03/2020 60* >60 mL/min/[1.73_m2] Final     Calcium 02/03/2020 9.9  8.6 - 10.3 mg/dL Final     Bilirubin Total 02/03/2020 0.5  0.3 - 1.0 mg/dL Final     Albumin 02/03/2020 4.6  3.5 - 5.7 g/dL Final     Protein Total 02/03/2020 7.8  6.4 - 8.9 " g/dL Final     Alkaline Phosphatase 02/03/2020 67  34 - 104 U/L Final     ALT 02/03/2020 22  7 - 52 U/L Final     AST 02/03/2020 27  13 - 39 U/L Final     Carcinoembryonic Agn 02/03/2020 <3.0  <3.0 ng/mL Final     WBC 02/03/2020 8.3  4.0 - 11.0 10e9/L Final     RBC Count 02/03/2020 4.37  3.8 - 5.2 10e12/L Final     Hemoglobin 02/03/2020 13.6  11.7 - 15.7 g/dL Final     Hematocrit 02/03/2020 41.6  35.0 - 47.0 % Final     MCV 02/03/2020 95  78 - 100 fl Final     MCH 02/03/2020 31.1  26.5 - 33.0 pg Final     MCHC 02/03/2020 32.7  31.5 - 36.5 g/dL Final     RDW 02/03/2020 12.5  10.0 - 15.0 % Final     Platelet Count 02/03/2020 342  150 - 450 10e9/L Final     Diff Method 02/03/2020 Automated Method   Final     % Neutrophils 02/03/2020 71.6  % Final     % Lymphocytes 02/03/2020 17.2  % Final     % Monocytes 02/03/2020 8.5  % Final     % Eosinophils 02/03/2020 1.8  % Final     % Basophils 02/03/2020 0.7  % Final     % Immature Granulocytes 02/03/2020 0.2  % Final     Absolute Neutrophil 02/03/2020 5.9  1.6 - 8.3 10e9/L Final     Absolute Lymphocytes 02/03/2020 1.4  0.8 - 5.3 10e9/L Final     Absolute Monocytes 02/03/2020 0.7  0.0 - 1.3 10e9/L Final     Absolute Eosinophils 02/03/2020 0.2  0.0 - 0.7 10e9/L Final     Absolute Basophils 02/03/2020 0.1  0.0 - 0.2 10e9/L Final     Abs Immature Granulocytes 02/03/2020 0.0  0 - 0.4 10e9/L Final        ASSESSMENT/PLAN:  Metastatic adenocarcinoma, 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  The patient was deemed a candidate for chemotherapy for stage IV disease as per Dr. Speedy Menezes of [facility].  The plan was to initiate FOLFOX chemotherapy. Course was complicated by neutropenia, requiring 20% dose reduction due to neutropenia and was staged with no evidence of disease after 8 cycles.  No evidence of disease on CT scans done in August 2018. Tumor marker is normal. All other labs are  stable. Patient does reports ongoing weight gain, increased LLQ abdominal pain. We will have patient set up for CT scans and will notify patient of results. Otherwise we will  Plan to see patient back for follow up in 3 months with CBC, CMP, LDH and CEA    Twenty minutes spent with patient with greater than 50% of that time spent counseling patient regarding disease process, interpretation of labs, discussing plan for imaging, discussing plan for follow up and coordination of care

## 2020-02-17 ENCOUNTER — MYC MEDICAL ADVICE (OUTPATIENT)
Dept: SURGERY | Facility: OTHER | Age: 70
End: 2020-02-17

## 2020-02-17 ENCOUNTER — MYC MEDICAL ADVICE (OUTPATIENT)
Dept: ONCOLOGY | Facility: OTHER | Age: 70
End: 2020-02-17

## 2020-02-17 DIAGNOSIS — Z91.041 CONTRAST MEDIA ALLERGY: ICD-10-CM

## 2020-02-17 RX ORDER — METHYLPREDNISOLONE 32 MG/1
TABLET ORAL
Qty: 2 TABLET | Refills: 0 | Status: SHIPPED | OUTPATIENT
Start: 2020-02-17 | End: 2020-05-13

## 2020-02-17 RX ORDER — DIPHENHYDRAMINE HCL 50 MG
CAPSULE ORAL
Qty: 1 CAPSULE | Refills: 0 | Status: SHIPPED | OUTPATIENT
Start: 2020-02-17 | End: 2020-05-13

## 2020-02-17 NOTE — TELEPHONE ENCOUNTER
Please fill for Nadya. She is having Ct scan and is allergic to contrast dye.  Selena Solorio RN...........2/17/2020 8:30 AM

## 2020-02-20 ENCOUNTER — MYC MEDICAL ADVICE (OUTPATIENT)
Dept: SURGERY | Facility: OTHER | Age: 70
End: 2020-02-20

## 2020-02-20 ENCOUNTER — HOSPITAL ENCOUNTER (OUTPATIENT)
Dept: CT IMAGING | Facility: OTHER | Age: 70
End: 2020-02-20
Attending: NURSE PRACTITIONER
Payer: MEDICARE

## 2020-02-20 ENCOUNTER — HOSPITAL ENCOUNTER (OUTPATIENT)
Dept: CT IMAGING | Facility: OTHER | Age: 70
Discharge: HOME | End: 2020-02-20
Attending: NURSE PRACTITIONER | Admitting: NURSE PRACTITIONER
Payer: MEDICARE

## 2020-02-20 DIAGNOSIS — C18.1 MALIGNANT NEOPLASM OF APPENDIX (H): ICD-10-CM

## 2020-02-20 DIAGNOSIS — R10.9 ABDOMINAL PAIN, UNSPECIFIED ABDOMINAL LOCATION: ICD-10-CM

## 2020-02-20 PROCEDURE — 71260 CT THORAX DX C+: CPT

## 2020-02-20 PROCEDURE — 25500064 ZZH RX 255 OP 636: Performed by: NURSE PRACTITIONER

## 2020-02-20 PROCEDURE — 74177 CT ABD & PELVIS W/CONTRAST: CPT

## 2020-02-20 RX ORDER — IODIXANOL 320 MG/ML
100 INJECTION, SOLUTION INTRAVASCULAR ONCE
Status: COMPLETED | OUTPATIENT
Start: 2020-02-20 | End: 2020-02-20

## 2020-02-20 RX ADMIN — IODIXANOL 94 ML: 320 INJECTION, SOLUTION INTRAVASCULAR at 11:20

## 2020-02-20 NOTE — PROGRESS NOTES
IV Contrast- Discharge Instructions After Your CT Scan      The IV contrast you received today will be filtered from your bloodstream by your kidneys during the next 24 hours and pass from the body in urine.  You will not be aware of this process and your urine will not change in color.  To help this process you should drink at least 4 additional glasses of water or juice today.  This reduces stress on your kidneys.    Most contrast reactions are immediate.  Should you develop symptoms of concern after discharge, contact the department at the number below.  After hours you should contact your personal physician.  If you develop breathing distress or wheezing, call 911.      1.  Has the patient had a previous reaction to IV contrast? Yes, pt is premedicated    2.  Does the patient have kidney disease? n    3.  Is the patient on dialysis? n    If YES to any of these questions, exam will be reviewed with a Radiologist before administering contrast.

## 2020-02-21 DIAGNOSIS — C77.2 CANCER OF APPENDIX METASTATIC TO INTRA-ABDOMINAL LYMPH NODE (H): ICD-10-CM

## 2020-02-21 DIAGNOSIS — T78.40XA ALLERGIC REACTION, INITIAL ENCOUNTER: ICD-10-CM

## 2020-02-21 DIAGNOSIS — C18.1 CANCER OF APPENDIX METASTATIC TO INTRA-ABDOMINAL LYMPH NODE (H): ICD-10-CM

## 2020-02-21 DIAGNOSIS — R10.9 ABDOMINAL PAIN, UNSPECIFIED ABDOMINAL LOCATION: ICD-10-CM

## 2020-02-21 DIAGNOSIS — C18.1 MALIGNANT NEOPLASM OF APPENDIX (H): ICD-10-CM

## 2020-02-21 DIAGNOSIS — T50.8X5A ALLERGIC REACTION TO CONTRAST DYE: ICD-10-CM

## 2020-02-21 DIAGNOSIS — Z91.041 ALLERGIC TO IV CONTRAST: Primary | ICD-10-CM

## 2020-02-21 RX ORDER — DIPHENHYDRAMINE HCL 50 MG
50 CAPSULE ORAL EVERY 6 HOURS PRN
Qty: 1 CAPSULE | Refills: 0 | Status: SHIPPED | OUTPATIENT
Start: 2020-02-21 | End: 2020-05-13

## 2020-02-21 RX ORDER — METHYLPREDNISOLONE 32 MG/1
TABLET ORAL
Qty: 2 TABLET | Refills: 0 | Status: SHIPPED | OUTPATIENT
Start: 2020-02-21 | End: 2020-05-13

## 2020-03-02 ENCOUNTER — HEALTH MAINTENANCE LETTER (OUTPATIENT)
Age: 70
End: 2020-03-02

## 2020-03-04 ENCOUNTER — HOSPITAL ENCOUNTER (OUTPATIENT)
Dept: PET IMAGING | Facility: OTHER | Age: 70
Discharge: HOME | End: 2020-03-04
Attending: NURSE PRACTITIONER | Admitting: NURSE PRACTITIONER
Payer: MEDICARE

## 2020-03-04 DIAGNOSIS — C18.1 MALIGNANT NEOPLASM OF APPENDIX (H): ICD-10-CM

## 2020-03-04 DIAGNOSIS — C18.1 CANCER OF APPENDIX METASTATIC TO INTRA-ABDOMINAL LYMPH NODE (H): ICD-10-CM

## 2020-03-04 DIAGNOSIS — R10.9 ABDOMINAL PAIN, UNSPECIFIED ABDOMINAL LOCATION: ICD-10-CM

## 2020-03-04 DIAGNOSIS — C77.2 CANCER OF APPENDIX METASTATIC TO INTRA-ABDOMINAL LYMPH NODE (H): ICD-10-CM

## 2020-03-04 PROCEDURE — 34300033 ZZH RX 343: Performed by: NURSE PRACTITIONER

## 2020-03-04 PROCEDURE — 78815 PET IMAGE W/CT SKULL-THIGH: CPT | Mod: PI

## 2020-03-04 PROCEDURE — A9552 F18 FDG: HCPCS | Performed by: NURSE PRACTITIONER

## 2020-03-04 RX ADMIN — FLUDEOXYGLUCOSE F-18 13.25 MCI.: 500 INJECTION, SOLUTION INTRAVENOUS at 16:36

## 2020-03-05 ENCOUNTER — TELEPHONE (OUTPATIENT)
Dept: ONCOLOGY | Facility: OTHER | Age: 70
End: 2020-03-05

## 2020-03-05 ENCOUNTER — MYC MEDICAL ADVICE (OUTPATIENT)
Dept: ONCOLOGY | Facility: OTHER | Age: 70
End: 2020-03-05

## 2020-03-05 NOTE — TELEPHONE ENCOUNTER
Call made to patient to discuss recent PET scan results. Message was left for patient to return my call.

## 2020-03-21 ENCOUNTER — MYC MEDICAL ADVICE (OUTPATIENT)
Dept: ONCOLOGY | Facility: OTHER | Age: 70
End: 2020-03-21

## 2020-03-21 ENCOUNTER — MYC MEDICAL ADVICE (OUTPATIENT)
Dept: SURGERY | Facility: OTHER | Age: 70
End: 2020-03-21

## 2020-05-05 DIAGNOSIS — C18.1 MALIGNANT NEOPLASM OF APPENDIX (H): ICD-10-CM

## 2020-05-05 LAB
ALBUMIN SERPL-MCNC: 4.7 G/DL (ref 3.5–5.7)
ALP SERPL-CCNC: 75 U/L (ref 34–104)
ALT SERPL W P-5'-P-CCNC: 21 U/L (ref 7–52)
ANION GAP SERPL CALCULATED.3IONS-SCNC: 7 MMOL/L (ref 3–14)
AST SERPL W P-5'-P-CCNC: 25 U/L (ref 13–39)
BASOPHILS # BLD AUTO: 0.1 10E9/L (ref 0–0.2)
BASOPHILS NFR BLD AUTO: 1 %
BILIRUB SERPL-MCNC: 0.4 MG/DL (ref 0.3–1)
BUN SERPL-MCNC: 18 MG/DL (ref 7–25)
CALCIUM SERPL-MCNC: 10 MG/DL (ref 8.6–10.3)
CEA SERPL-MCNC: <3 NG/ML
CHLORIDE SERPL-SCNC: 102 MMOL/L (ref 98–107)
CO2 SERPL-SCNC: 30 MMOL/L (ref 21–31)
CREAT SERPL-MCNC: 1.06 MG/DL (ref 0.6–1.2)
DIFFERENTIAL METHOD BLD: NORMAL
EOSINOPHIL # BLD AUTO: 0.2 10E9/L (ref 0–0.7)
EOSINOPHIL NFR BLD AUTO: 2.8 %
ERYTHROCYTE [DISTWIDTH] IN BLOOD BY AUTOMATED COUNT: 13.2 % (ref 10–15)
GFR SERPL CREATININE-BSD FRML MDRD: 51 ML/MIN/{1.73_M2}
GLUCOSE SERPL-MCNC: 84 MG/DL (ref 70–105)
HCT VFR BLD AUTO: 41 % (ref 35–47)
HGB BLD-MCNC: 13.3 G/DL (ref 11.7–15.7)
IMM GRANULOCYTES # BLD: 0 10E9/L (ref 0–0.4)
IMM GRANULOCYTES NFR BLD: 0.4 %
LDH SERPL L TO P-CCNC: 163 U/L (ref 140–271)
LYMPHOCYTES # BLD AUTO: 1.6 10E9/L (ref 0.8–5.3)
LYMPHOCYTES NFR BLD AUTO: 22.8 %
MCH RBC QN AUTO: 31.1 PG (ref 26.5–33)
MCHC RBC AUTO-ENTMCNC: 32.4 G/DL (ref 31.5–36.5)
MCV RBC AUTO: 96 FL (ref 78–100)
MONOCYTES # BLD AUTO: 0.8 10E9/L (ref 0–1.3)
MONOCYTES NFR BLD AUTO: 11.1 %
NEUTROPHILS # BLD AUTO: 4.4 10E9/L (ref 1.6–8.3)
NEUTROPHILS NFR BLD AUTO: 61.9 %
PLATELET # BLD AUTO: 369 10E9/L (ref 150–450)
POTASSIUM SERPL-SCNC: 4.6 MMOL/L (ref 3.5–5.1)
PROT SERPL-MCNC: 7.7 G/DL (ref 6.4–8.9)
RBC # BLD AUTO: 4.28 10E12/L (ref 3.8–5.2)
SODIUM SERPL-SCNC: 139 MMOL/L (ref 134–144)
WBC # BLD AUTO: 7.2 10E9/L (ref 4–11)

## 2020-05-05 PROCEDURE — 85025 COMPLETE CBC W/AUTO DIFF WBC: CPT | Mod: ZL | Performed by: NURSE PRACTITIONER

## 2020-05-05 PROCEDURE — 82378 CARCINOEMBRYONIC ANTIGEN: CPT | Mod: ZL | Performed by: NURSE PRACTITIONER

## 2020-05-05 PROCEDURE — 83615 LACTATE (LD) (LDH) ENZYME: CPT | Mod: ZL | Performed by: NURSE PRACTITIONER

## 2020-05-05 PROCEDURE — 36415 COLL VENOUS BLD VENIPUNCTURE: CPT | Mod: ZL | Performed by: NURSE PRACTITIONER

## 2020-05-05 PROCEDURE — 80053 COMPREHEN METABOLIC PANEL: CPT | Mod: ZL | Performed by: NURSE PRACTITIONER

## 2020-05-13 ENCOUNTER — ONCOLOGY VISIT (OUTPATIENT)
Dept: ONCOLOGY | Facility: OTHER | Age: 70
End: 2020-05-13
Attending: INTERNAL MEDICINE
Payer: MEDICARE

## 2020-05-13 VITALS
RESPIRATION RATE: 16 BRPM | OXYGEN SATURATION: 99 % | HEART RATE: 66 BPM | BODY MASS INDEX: 25.61 KG/M2 | SYSTOLIC BLOOD PRESSURE: 166 MMHG | WEIGHT: 150 LBS | TEMPERATURE: 98.2 F | HEIGHT: 64 IN | DIASTOLIC BLOOD PRESSURE: 96 MMHG

## 2020-05-13 DIAGNOSIS — Z91.041 CONTRAST MEDIA ALLERGY: ICD-10-CM

## 2020-05-13 DIAGNOSIS — M25.531 RIGHT WRIST PAIN: Primary | ICD-10-CM

## 2020-05-13 DIAGNOSIS — C18.1 MALIGNANT NEOPLASM OF APPENDIX (H): ICD-10-CM

## 2020-05-13 DIAGNOSIS — T50.8X5D ALLERGIC REACTION TO CONTRAST MATERIAL, SUBSEQUENT ENCOUNTER: ICD-10-CM

## 2020-05-13 PROCEDURE — 99215 OFFICE O/P EST HI 40 MIN: CPT | Performed by: INTERNAL MEDICINE

## 2020-05-13 PROCEDURE — G0463 HOSPITAL OUTPT CLINIC VISIT: HCPCS

## 2020-05-13 RX ORDER — DIPHENHYDRAMINE HCL 50 MG
CAPSULE ORAL
Qty: 1 CAPSULE | Refills: 0 | Status: SHIPPED | OUTPATIENT
Start: 2020-05-13 | End: 2020-06-25

## 2020-05-13 RX ORDER — METHYLPREDNISOLONE 32 MG/1
TABLET ORAL
Qty: 2 TABLET | Refills: 0 | Status: SHIPPED | OUTPATIENT
Start: 2020-05-13 | End: 2020-06-25

## 2020-05-13 RX ORDER — TROLAMINE SALICYLATE 10 G/100G
CREAM TOPICAL
COMMUNITY
Start: 2020-04-01 | End: 2022-01-01

## 2020-05-13 RX ORDER — LIDOCAINE 4 G/G
1 PATCH TOPICAL EVERY 24 HOURS
Qty: 5 PATCH | Refills: 1 | Status: SHIPPED | OUTPATIENT
Start: 2020-05-13 | End: 2020-09-23

## 2020-05-13 ASSESSMENT — PAIN SCALES - GENERAL: PAINLEVEL: MODERATE PAIN (4)

## 2020-05-13 ASSESSMENT — MIFFLIN-ST. JEOR: SCORE: 1190.65

## 2020-05-13 NOTE — PROGRESS NOTES
Patient advised to check her BP at home and if it remains high she should contact her primary care provider.  Tiffanie Pastor CMA ([facility])................ 5/13/2020 12:16 PM

## 2020-05-13 NOTE — NURSING NOTE
"Chief Complaint   Patient presents with     RECHECK     Appendix cancer   Complains of right wrist pain and weight gain.  She weighed 123# on 5/13/19.    Initial BP (!) 158/108   Pulse 66   Temp 98.2  F (36.8  C) (Oral)   Resp 16   Ht 1.626 m (5' 4.02\")   Wt 68 kg (150 lb)   SpO2 99%   BMI 25.73 kg/m   Estimated body mass index is 25.73 kg/m  as calculated from the following:    Height as of this encounter: 1.626 m (5' 4.02\").    Weight as of this encounter: 68 kg (150 lb).  Medication Reconciliation: complete    Tiffanie Pastor CMA ([facility])  "

## 2020-05-14 NOTE — PROGRESS NOTES
Visit Date:   05/13/2020      HEMATOLOGY ONCOLOGY CLINIC NOTE       Mrs. 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  We had seen the patient in consultation at the request of Dr. Tresa Evangelista and MARY Nelson of [facility] as well as Dr. Speedy Menezes at the [facility], where he had seen the patient on 02/07/2019.  At that time, she was a 68-year-old white female with a history of migraine headaches we were asked to evaluate concerning new diagnosis of stage IV adenocarcinoma, ex-goblet cell carcinoid of the appendix with metastases to intra-abdominal lymph nodes as well as malignant ascites.  She apparently presented with abdominal discomfort in mid 10/2018 associated with nausea, which progressed to the point where she was seen by MARY Nelson her primary care provider in 10/2018.  At that time, she presented with nausea and vomiting with associated epigastric pain, left lower quadrant abdominal pain.  MRI of the liver was obtained.  There were findings of hemangioma.  Symptoms persisted.  The patient underwent EGD on 12/07/2018.  There were no significant findings.  Apparently, she progressed to the point where she was having significant projectile vomiting.  On 12/14/2018, she was seen in the [facility] and then transferred to [facility], where she was admitted for small-bowel obstruction.  She was treated initially conservatively with NG suction.  Subsequently, she underwent right hemicolectomy, which revealed a mass in the terminal ileum.  The mass was adherent to the right ovary.  Pathology was read as adenocarcinoma, ex-goblet cell carcinoid of the appendix.  She was staged T2b N1 initially.  Tumor was present focally in the posterior retroperitoneal region with 2/23 lymph nodes involved with disease.  Peritoneal fluid was sent for cytology.   Initially, it was read as malignant cells.  The patient was referred to [facility], seen by Dr. Speedy Menezes, medical oncologist at the [place] on 01/16/2019.  The slides were reviewed by the [facility] pathologist and the findings were the patient had peritoneal washings consistent with involvement by adenocarcinoma, ex-goblet cell carcinoid.  Right hemicolectomy specimen revealed adenocarcinoma, ex-goblet cell carcinoid.  Indurated mass was noted diffusely involving the appendix and invading the visceral peritoneum.  Angiolymphatic invasion was present in the retroperitoneal soft tissue.  Margins were positive with 3/21 lymph nodes involved.  Pathologic stage was T4a pathologic N1 M1.  Immunohistochemical staining for DNA mismatch repair enzymes were intact.  Also, CT of the abdomen and pelvis was performed at the [facility].  Findings were that there were interval postoperative changes in the right hemicolectomy with resolution of previously seen small bowel dilatation obstruction.  There was mild soft tissue edema in the resection bed along the midline incision, which was felt to be secondary to postop status.  No definite peritoneal metastases were identified.  No free fluid.  There was abnormal geographic perfusion with hepatic segments 5 and 8 without evidence of a focal hepatic mass.  There was indeterminate mild thickening of the left adrenal gland, unchanged.  On 01/15/2019, Dr. Menezes recommended FOLFOX chemotherapy given the fact she had positive cytology and recommended 3 months of FOLFOX chemotherapy followed by imaging at the [facility].  The patient had port placement on 02/17/2019.  The plan was to proceed with FOLFOX with 5-FU given at 400 mg/m2 IV bolus with leucovorin, bolus oxaliplatin 85 mg/m2 given on day 1 and continuous IV 5-FU 2400 continuous IV over 46 hours on day 1.  The plan was to restage after 3 months of therapy.  The patient completed 4 cycles of FOLFOX with 20% dose reduction.   The last 2 cycles were dose reduced by 20% due to neutropenia.  She had staging studies after 4 cycles on 04/09/2019.  The findings were there was no evidence of metastatic disease.  There was evolution of postoperative change in the abdomen, no evidence of microscopic peritoneal implant disease.  There was a nodular opacity in the lung was consistent with endobronchial debris, likely due to bronchitis.  The patient went on to complete 8 cycles of chemotherapy, then underwent restaging imaging on 06/06/2019, including CT chest, abdomen and pelvis.  Findings were that there was no evidence of active disease throughout the chest, abdomen and pelvis.  Nodular foci seen previously were no longer identified suggesting the presence of endobronchial debris on prior study.  No evidence of new nodules or lymphadenopathy.  No evidence of intraperitoneal metastatic disease.  The patient otherwise decided that she did not want to go to the [facility].  We discussed the case with Dr. Speedy Menezes, who recommended HIPEC chemotherapy may be an option.  Would otherwise recommend observation with serial imaging.  The plan was to continue surveillance.  CT chest, abdomen and pelvis was performed on 08/26/2019 and there was no evidence of metastatic disease.  CEA was less than 3.  When we saw her on 08/28, she wanted her port out; therefore, she had her port taken out.  The patient was concerned about weight gain.  When we saw her in 11/2019, the plan was if she had continued weight gain then we would repeat scans in 3 months.  The patient did have imaging on 02/20.  CT chest was unremarkable.  There was mild soft tissue thickening in the right pericolic gutter, which was nonspecific.  CT of the abdomen and pelvis again revealed mild soft tissue thickening in the right pericolic gutter, nonspecific.  The patient complained of some abdominal discomfort.  We ordered a PET scan and this came back essentially negative.  There was  mildly increased soft tissue in the right pericolic gutter.  There was no associated hypermetabolism to suggest that this was likely postop scarring.  The patient also was concerned about some wrist pain that she has.  She denies any shortness of breath, chest pain, abdominal pain, fevers, night sweats, weight loss.      PHYSICAL EXAMINATION:   GENERAL:  She is a middle-aged white female in no acute distress.     VITAL SIGNS:  Stable, with /96, pulse 66, respirations 16, temp 98.2.   HEENT:  Atraumatic, normocephalic.  Oropharynx is nonerythematous.   NECK:  Supple.   LUNGS:  Clear to auscultation and percussion.   HEART:  Regular rhythm.  S1, S2 normal.   ABDOMEN:  Soft, normoactive bowel sounds.  There is some vague right lower quadrant tenderness, very mild, no rebound.   LYMPHATICS:  No cervical, supraclavicular, axillary or inguinal nodes.   EXTREMITIES:  No edema.   NEUROLOGIC:  Nonfocal.      LABORATORY DATA:  CBC is within normal limits.  BUN is 18, creatinine 1.06.  LFTs are normal.  CEA is less than 3.  LDH is normal.      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  The patient was deemed a candidate for chemotherapy for stage IV disease as per Dr. Speedy Menezes of [facility].  The plan was to initiate FOLFOX chemotherapy.  The patient completed 2 cycles.  Course was complicated by neutropenia.  The patient went on to receive 4 cycles, the last 2 requiring 20% dose reduction due to her neutropenia.  She was staged with no evidence of disease after 8 cycles.  The patient otherwise is clinically doing well.  CT of the abdomen and pelvis recently was stable and PET scan indicated no evidence of disease.  The plan is to continue surveillance.  Given her ongoing complaints of abdominal discomfort, we have recommended colonoscopy.  The patient refuses.  We will therefore proceed  with CT chest, abdomen and pelvis in .  We will also obtain an x-ray of the right wrist due to her right wrist pain.  We will attempt a Lidoderm patch for now.      Forty minutes were spent with patient; greater than half the time was spent in counseling and coordination of care.         DEEPIKA BARRIOS MD             D: 2020   T: 2020   MT: CLARISSA      Name:     ALMA CORTES   MRN:      [id number]        Account:      [id number]   :      1950           Visit Date:   2020      Document: [id number]       cc: Isaura Menezes MD

## 2020-06-15 ENCOUNTER — MYC MEDICAL ADVICE (OUTPATIENT)
Dept: ONCOLOGY | Facility: OTHER | Age: 70
End: 2020-06-15

## 2020-06-15 ENCOUNTER — TELEPHONE (OUTPATIENT)
Dept: INTERNAL MEDICINE | Facility: OTHER | Age: 70
End: 2020-06-15

## 2020-06-15 NOTE — TELEPHONE ENCOUNTER
I would recommend that patient be seen by myself or ALBERT Méndez in the next couple days for an exam and imaging of her shoulder.

## 2020-06-15 NOTE — TELEPHONE ENCOUNTER
fell two weeks ago (May 31) and hurt my left shoulder. I thought it was just bruised or strained, but it hasn't gotten any better. It feels like the bone is jammed in the socket. It hurts the most at night and I have trouble using it to push myself up from anything.  I also have to use both hands to take anything off the stove or reach over my head for anything, etc. I already have an xray scheduled for my right wrist.     Is there a way you can order an xray of my left shoulder shoulder

## 2020-06-15 NOTE — TELEPHONE ENCOUNTER
After proper verification patient was relayed message from below and sent to scheduling for an appointment,   Alayna Cameron LPN on 6/15/2020 at 2:35 PM

## 2020-06-15 NOTE — TELEPHONE ENCOUNTER
Nadya fell and hurt her shoulder last week and it still feels like something is not right with it.  She is having a wrist xray on 6/17/2020 and is wondering if you would be willing to add a shoulder?

## 2020-06-16 NOTE — TELEPHONE ENCOUNTER
Patient has an appointment at 10 am on Wednesday before her xray. Will have provider assess the injury and order xray if needed.  Alayna Cameron LPN on 6/16/2020 at 9:25 AM

## 2020-06-17 ENCOUNTER — OFFICE VISIT (OUTPATIENT)
Dept: INTERNAL MEDICINE | Facility: OTHER | Age: 70
End: 2020-06-17
Attending: NURSE PRACTITIONER
Payer: MEDICARE

## 2020-06-17 ENCOUNTER — HOSPITAL ENCOUNTER (OUTPATIENT)
Dept: GENERAL RADIOLOGY | Facility: OTHER | Age: 70
End: 2020-06-17
Attending: NURSE PRACTITIONER
Payer: MEDICARE

## 2020-06-17 ENCOUNTER — HOSPITAL ENCOUNTER (OUTPATIENT)
Dept: CT IMAGING | Facility: OTHER | Age: 70
End: 2020-06-17
Attending: INTERNAL MEDICINE
Payer: MEDICARE

## 2020-06-17 ENCOUNTER — HOSPITAL ENCOUNTER (OUTPATIENT)
Dept: GENERAL RADIOLOGY | Facility: OTHER | Age: 70
End: 2020-06-17
Attending: INTERNAL MEDICINE
Payer: MEDICARE

## 2020-06-17 VITALS
RESPIRATION RATE: 16 BRPM | SYSTOLIC BLOOD PRESSURE: 122 MMHG | WEIGHT: 152.2 LBS | BODY MASS INDEX: 25.99 KG/M2 | HEIGHT: 64 IN | TEMPERATURE: 96.4 F | DIASTOLIC BLOOD PRESSURE: 80 MMHG | HEART RATE: 72 BPM | OXYGEN SATURATION: 99 %

## 2020-06-17 DIAGNOSIS — M25.531 RIGHT WRIST PAIN: ICD-10-CM

## 2020-06-17 DIAGNOSIS — C18.1 MALIGNANT NEOPLASM OF APPENDIX (H): ICD-10-CM

## 2020-06-17 DIAGNOSIS — M25.512 CHRONIC LEFT SHOULDER PAIN: ICD-10-CM

## 2020-06-17 DIAGNOSIS — Z13.9 SCREENING FOR CONDITION: ICD-10-CM

## 2020-06-17 DIAGNOSIS — Z91.81 PERSONAL HISTORY OF FALL: ICD-10-CM

## 2020-06-17 DIAGNOSIS — M25.511 ACUTE PAIN OF RIGHT SHOULDER: Primary | ICD-10-CM

## 2020-06-17 DIAGNOSIS — G89.29 CHRONIC LEFT SHOULDER PAIN: ICD-10-CM

## 2020-06-17 PROBLEM — M25.519 SHOULDER PAIN: Status: ACTIVE | Noted: 2020-06-17

## 2020-06-17 PROBLEM — M25.519 PAIN IN SHOULDER: Status: ACTIVE | Noted: 2020-05-31

## 2020-06-17 PROBLEM — S61.012A LACERATION OF LEFT THUMB WITHOUT FOREIGN BODY WITHOUT DAMAGE TO NAIL: Status: ACTIVE | Noted: 2020-06-17

## 2020-06-17 PROBLEM — M25.539 PAIN IN WRIST, UNSPECIFIED LATERALITY: Status: ACTIVE | Noted: 2020-06-17

## 2020-06-17 LAB
ALBUMIN SERPL-MCNC: 4.8 G/DL (ref 3.5–5.7)
ALP SERPL-CCNC: 73 U/L (ref 34–104)
ALT SERPL W P-5'-P-CCNC: 21 U/L (ref 7–52)
ANION GAP SERPL CALCULATED.3IONS-SCNC: 7 MMOL/L (ref 3–14)
AST SERPL W P-5'-P-CCNC: 24 U/L (ref 13–39)
BASOPHILS # BLD AUTO: 0 10E9/L (ref 0–0.2)
BASOPHILS NFR BLD AUTO: 0.6 %
BILIRUB SERPL-MCNC: 0.4 MG/DL (ref 0.3–1)
BUN SERPL-MCNC: 20 MG/DL (ref 7–25)
CALCIUM SERPL-MCNC: 10.2 MG/DL (ref 8.6–10.3)
CEA SERPL-MCNC: <3 NG/ML
CHLORIDE SERPL-SCNC: 104 MMOL/L (ref 98–107)
CO2 SERPL-SCNC: 27 MMOL/L (ref 21–31)
CREAT SERPL-MCNC: 1 MG/DL (ref 0.6–1.2)
DIFFERENTIAL METHOD BLD: NORMAL
EOSINOPHIL # BLD AUTO: 0 10E9/L (ref 0–0.7)
EOSINOPHIL NFR BLD AUTO: 0 %
ERYTHROCYTE [DISTWIDTH] IN BLOOD BY AUTOMATED COUNT: 13 % (ref 10–15)
GFR SERPL CREATININE-BSD FRML MDRD: 55 ML/MIN/{1.73_M2}
GLUCOSE SERPL-MCNC: 145 MG/DL (ref 70–105)
HCT VFR BLD AUTO: 40.5 % (ref 35–47)
HGB BLD-MCNC: 13.1 G/DL (ref 11.7–15.7)
IMM GRANULOCYTES # BLD: 0 10E9/L (ref 0–0.4)
IMM GRANULOCYTES NFR BLD: 0.4 %
LDH SERPL L TO P-CCNC: 183 U/L (ref 140–271)
LYMPHOCYTES # BLD AUTO: 0.9 10E9/L (ref 0.8–5.3)
LYMPHOCYTES NFR BLD AUTO: 13 %
MCH RBC QN AUTO: 31 PG (ref 26.5–33)
MCHC RBC AUTO-ENTMCNC: 32.3 G/DL (ref 31.5–36.5)
MCV RBC AUTO: 96 FL (ref 78–100)
MONOCYTES # BLD AUTO: 0.1 10E9/L (ref 0–1.3)
MONOCYTES NFR BLD AUTO: 1.8 %
NEUTROPHILS # BLD AUTO: 6.1 10E9/L (ref 1.6–8.3)
NEUTROPHILS NFR BLD AUTO: 84.2 %
PLATELET # BLD AUTO: 339 10E9/L (ref 150–450)
POTASSIUM SERPL-SCNC: 5.1 MMOL/L (ref 3.5–5.1)
PROT SERPL-MCNC: 7.9 G/DL (ref 6.4–8.9)
RBC # BLD AUTO: 4.23 10E12/L (ref 3.8–5.2)
SODIUM SERPL-SCNC: 138 MMOL/L (ref 134–144)
WBC # BLD AUTO: 7.2 10E9/L (ref 4–11)

## 2020-06-17 PROCEDURE — 90715 TDAP VACCINE 7 YRS/> IM: CPT

## 2020-06-17 PROCEDURE — 36415 COLL VENOUS BLD VENIPUNCTURE: CPT | Mod: ZL | Performed by: INTERNAL MEDICINE

## 2020-06-17 PROCEDURE — 90471 IMMUNIZATION ADMIN: CPT

## 2020-06-17 PROCEDURE — 82378 CARCINOEMBRYONIC ANTIGEN: CPT | Mod: ZL | Performed by: INTERNAL MEDICINE

## 2020-06-17 PROCEDURE — 90472 IMMUNIZATION ADMIN EACH ADD: CPT

## 2020-06-17 PROCEDURE — 73110 X-RAY EXAM OF WRIST: CPT | Mod: RT

## 2020-06-17 PROCEDURE — G0463 HOSPITAL OUTPT CLINIC VISIT: HCPCS | Mod: 25 | Performed by: NURSE PRACTITIONER

## 2020-06-17 PROCEDURE — 74177 CT ABD & PELVIS W/CONTRAST: CPT

## 2020-06-17 PROCEDURE — 71260 CT THORAX DX C+: CPT

## 2020-06-17 PROCEDURE — 80053 COMPREHEN METABOLIC PANEL: CPT | Mod: ZL | Performed by: INTERNAL MEDICINE

## 2020-06-17 PROCEDURE — 25500064 ZZH RX 255 OP 636: Performed by: INTERNAL MEDICINE

## 2020-06-17 PROCEDURE — 85025 COMPLETE CBC W/AUTO DIFF WBC: CPT | Mod: ZL | Performed by: INTERNAL MEDICINE

## 2020-06-17 PROCEDURE — 83615 LACTATE (LD) (LDH) ENZYME: CPT | Mod: ZL | Performed by: INTERNAL MEDICINE

## 2020-06-17 PROCEDURE — 73030 X-RAY EXAM OF SHOULDER: CPT | Mod: LT

## 2020-06-17 PROCEDURE — 99214 OFFICE O/P EST MOD 30 MIN: CPT | Performed by: NURSE PRACTITIONER

## 2020-06-17 RX ORDER — COVID-19 ANTIGEN TEST
KIT MISCELLANEOUS
COMMUNITY
Start: 2020-05-06 | End: 2022-01-01

## 2020-06-17 RX ORDER — IODIXANOL 320 MG/ML
100 INJECTION, SOLUTION INTRAVASCULAR ONCE
Status: COMPLETED | OUTPATIENT
Start: 2020-06-17 | End: 2020-06-17

## 2020-06-17 RX ADMIN — IODIXANOL 100 ML: 320 INJECTION, SOLUTION INTRAVASCULAR at 11:22

## 2020-06-17 ASSESSMENT — MIFFLIN-ST. JEOR: SCORE: 1200.37

## 2020-06-17 ASSESSMENT — ENCOUNTER SYMPTOMS
MYALGIAS: 1
SLEEP DISTURBANCE: 1
ARTHRALGIAS: 1
WEAKNESS: 1
JOINT SWELLING: 1

## 2020-06-17 ASSESSMENT — PAIN SCALES - GENERAL: PAINLEVEL: MODERATE PAIN (4)

## 2020-06-17 NOTE — PROGRESS NOTES
IV Contrast- Discharge Instructions After Your CT Scan      The IV contrast you received today will be filtered from your bloodstream by your kidneys during the next 24 hours and pass from the body in urine.  You will not be aware of this process and your urine will not change in color.  To help this process you should drink at least 4 additional glasses of water or juice today.  This reduces stress on your kidneys.    Most contrast reactions are immediate.  Should you develop symptoms of concern after discharge, contact the department at the number below.  After hours you should contact your personal physician.  If you develop breathing distress or wheezing, call 911.      1.  Has the patient had a previous reaction to IV contrast? Yes pt is premedicated    2.  Does the patient have kidney disease? n    3.  Is the patient on dialysis? n    If YES to any of these questions, exam will be reviewed with a Radiologist before administering contrast.

## 2020-06-17 NOTE — PROGRESS NOTES
Nursing Notes:   Alayna Cameron LPN  6/17/2020 10:22 AM  Signed  Chief Complaint   Patient presents with     Shoulder Pain   Patient presents to the clinic today for shoulder and wrist concerns. Patient was walking dog with leash wrapped around her left wrist and dog pulled her. Patient fell to the ground hitting her left shoulder on ground and left wrist.   Medication Reconciliation: completed   Alayna Ronicharisma, LPN  6/17/2020 10:07 AM      Nursing note reviewed with patient.  Accuracy and completeness verified.      Subjective:     Nadya Flanagan is a very pleasant 69 year old female who presents to the clinic today with concerns of left shoulder and right wrist pain.     This is a result of an injury. She had a fall on [place], May 31st while walking her dog. She had the leash wrapped around her left wrist and the dog pulled her and she fell onto the ground hitting her left shoulder and left wrist. She cannot get out of the bathtub, cannot use left arm to push herself out of chair, anything over her head  Symptoms: Pain and limited ROM and use  Treatments used for relief? Lidocaine patches, voltaren gel,   Exacerbated by? moving  Relieved by? Sitting still    Right wrist pain since March, 2020. Wrist pain is intense at times and limits use of right hand. Was seen for this in March, 2020. Using Tylenol for this. Using Naproxen at night, still causing her to not sleep due to pain    She also sustained a left thumb laceration with plastic leash when she fell. She is overdue for her Tetanus so will get that today.    Has concerns about her blood pressure which was recorded as elevated at her last Oncology Appointment. She has been following her BP at home and brings readings with her today.  These ranged from 96 to 118 systolically to 69 to 84 diastolically.  She has concerns that her last elevated blood pressure reading at her oncology appointment will be in her record and will cause her problems.  Advised that I  "would have this paper scanned into her chart.    Weight gain of 30 lbs since last year this time. Discussed with patient.  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        Left shoulder as described in HPI   Neurological: Positive for weakness.        Weakness in right hand   Psychiatric/Behavioral: Positive for sleep disturbance.   All other systems reviewed and are negative.    Objective:    /80 (BP Location: Right arm, Patient Position: Sitting, Cuff Size: Adult Regular)   Pulse 72   Temp 96.4  F (35.8  C) (Tympanic)   Resp 16   Ht 1.626 m (5' 4\")   Wt 69 kg (152 lb 3.2 oz)   SpO2 99%   BMI 26.13 kg/m      Physical Exam  Vitals signs reviewed.   Constitutional:       Appearance: Normal appearance.   HENT:      Head: Normocephalic and atraumatic.   Musculoskeletal:         General: Tenderness present.      Left shoulder: She exhibits decreased range of motion, tenderness, pain and decreased strength.      Right wrist: She exhibits decreased range of motion and tenderness.        Arms:    Skin:     Findings: Signs of injury (left thumb, healing nicely) present.   Neurological:      Mental Status: She is alert.       Results for orders placed or performed during the hospital encounter of 06/17/20   XR Wrist Right G/E 3 Views     Status: None    Narrative    PROCEDURE: XR WRIST RT G/E 3 VW 6/17/2020 11:40 AM    HISTORY: Right wrist " pain    COMPARISONS: None.    TECHNIQUE: 3 views.    FINDINGS: No fracture or dislocation is seen.    There is moderate degenerative change in the STT joint with mild  degenerative change in the first carpal metacarpal joint.         Impression    IMPRESSION: Degenerative change without acute abnormality.    OH SILVEIRA MD   Results for orders placed or performed during the hospital encounter of 06/17/20   XR Shoulder Left 2 Views     Status: None    Narrative    PROCEDURE: XR SHOULDER 2 VIEW LEFT 6/17/2020 11:40 AM    HISTORY: Personal history of fall; Chronic left shoulder pain; Chronic  left shoulder pain    COMPARISONS: None.    TECHNIQUE: 2 views.    FINDINGS: No fracture or dislocation is seen. There is no focal bone  lesion. No significant degenerative change is seen in the glenohumeral  joint. There is mild degenerative change in the AC joint.         Impression    IMPRESSION: No acute abnormality.    OH SILVEIRA MD   Results for orders placed or performed in visit on 06/17/20   CBC with platelets differential     Status: None   Result Value Ref Range    WBC 7.2 4.0 - 11.0 10e9/L    RBC Count 4.23 3.8 - 5.2 10e12/L    Hemoglobin 13.1 11.7 - 15.7 g/dL    Hematocrit 40.5 35.0 - 47.0 %    MCV 96 78 - 100 fl    MCH 31.0 26.5 - 33.0 pg    MCHC 32.3 31.5 - 36.5 g/dL    RDW 13.0 10.0 - 15.0 %    Platelet Count 339 150 - 450 10e9/L    Diff Method Automated Method     % Neutrophils 84.2 %    % Lymphocytes 13.0 %    % Monocytes 1.8 %    % Eosinophils 0.0 %    % Basophils 0.6 %    % Immature Granulocytes 0.4 %    Absolute Neutrophil 6.1 1.6 - 8.3 10e9/L    Absolute Lymphocytes 0.9 0.8 - 5.3 10e9/L    Absolute Monocytes 0.1 0.0 - 1.3 10e9/L    Absolute Eosinophils 0.0 0.0 - 0.7 10e9/L    Absolute Basophils 0.0 0.0 - 0.2 10e9/L    Abs Immature Granulocytes 0.0 0 - 0.4 10e9/L   Carcinoembryonic Agn GH     Status: None   Result Value Ref Range    Carcinoembryonic Agn <3.0 <3.0 ng/mL   Comprehensive metabolic  panel     Status: Abnormal   Result Value Ref Range    Sodium 138 134 - 144 mmol/L    Potassium 5.1 3.5 - 5.1 mmol/L    Chloride 104 98 - 107 mmol/L    Carbon Dioxide 27 21 - 31 mmol/L    Anion Gap 7 3 - 14 mmol/L    Glucose 145 (H) 70 - 105 mg/dL    Urea Nitrogen 20 7 - 25 mg/dL    Creatinine 1.00 0.60 - 1.20 mg/dL    GFR Estimate 55 (L) >60 mL/min/[1.73_m2]    GFR Estimate If Black 67 >60 mL/min/[1.73_m2]    Calcium 10.2 8.6 - 10.3 mg/dL    Bilirubin Total 0.4 0.3 - 1.0 mg/dL    Albumin 4.8 3.5 - 5.7 g/dL    Protein Total 7.9 6.4 - 8.9 g/dL    Alkaline Phosphatase 73 34 - 104 U/L    ALT 21 7 - 52 U/L    AST 24 13 - 39 U/L   Lactate Dehydrogenase     Status: None   Result Value Ref Range    Lactate Dehydrogenase 183 140 - 271 U/L     Assessment/Plan:     Right wrist pain  Imaging indicates no fracture or dislocation seen.  There is moderate degenerative change in the STT joint with mild degenerative change in the first carpal metacarpal joint.  -She may use her prescriptions of diclofenac topical gel, lidocaine patches, naproxen for pain relief.  She may also use ice to painful areas as needed    Personal history of fall  - XR Shoulder Left 2 Views    Chronic left shoulder pain  No fracture dislocation seen.  No focal bone lesion.  No significant degenerative change seen in the glenohumeral joint.  Mild degenerative changes in the AC joint.  She should follow-up with orthopedics if this continues to bother her.  - XR Shoulder Left 2 Views  -She may use her prescriptions of diclofenac topical gel, lidocaine patches, naproxen for pain relief.  She may also use ice to painful areas as needed    Screening for condition  Patient is due for her tetanus booster which she will receive today.  - GH IMM-  TDAP VACCINE (BOOSTRIX )    - Return/call as needed for follow-up should any new symptoms develop, for worsening of current symptoms or if symptoms do not resolve with above plan.    Patient verbalizes understanding and is  agreeable to plan of care.    Marge Maldonado, MSN, APRN, AGNP-C  Internal Medicine  [facility]   Jun 17, 2020 12:29 PM

## 2020-06-17 NOTE — PATIENT INSTRUCTIONS

## 2020-06-17 NOTE — NURSING NOTE
Chief Complaint   Patient presents with     Shoulder Pain   Patient presents to the clinic today for shoulder and wrist concerns. Patient was walking dog with leash wrapped around her left wrist and dog pulled her. Patient fell to the ground hitting her left shoulder on ground and left wrist.   Medication Reconciliation: completed   Alayna Cameron LPN  6/17/2020 10:07 AM

## 2020-06-23 ENCOUNTER — MYC MEDICAL ADVICE (OUTPATIENT)
Dept: ONCOLOGY | Facility: OTHER | Age: 70
End: 2020-06-23

## 2020-06-24 ENCOUNTER — MYC MEDICAL ADVICE (OUTPATIENT)
Dept: ONCOLOGY | Facility: OTHER | Age: 70
End: 2020-06-24

## 2020-06-25 ENCOUNTER — ONCOLOGY VISIT (OUTPATIENT)
Dept: ONCOLOGY | Facility: OTHER | Age: 70
End: 2020-06-25
Attending: NURSE PRACTITIONER
Payer: MEDICARE

## 2020-06-25 VITALS
WEIGHT: 149.6 LBS | TEMPERATURE: 97.4 F | OXYGEN SATURATION: 97 % | HEART RATE: 76 BPM | HEIGHT: 64 IN | SYSTOLIC BLOOD PRESSURE: 140 MMHG | RESPIRATION RATE: 16 BRPM | DIASTOLIC BLOOD PRESSURE: 90 MMHG | BODY MASS INDEX: 25.54 KG/M2

## 2020-06-25 DIAGNOSIS — Z91.041 CONTRAST MEDIA ALLERGY: ICD-10-CM

## 2020-06-25 DIAGNOSIS — C18.1 MALIGNANT NEOPLASM OF APPENDIX (H): Primary | ICD-10-CM

## 2020-06-25 DIAGNOSIS — T50.8X5D ALLERGIC REACTION TO CONTRAST MATERIAL, SUBSEQUENT ENCOUNTER: ICD-10-CM

## 2020-06-25 PROCEDURE — G0463 HOSPITAL OUTPT CLINIC VISIT: HCPCS

## 2020-06-25 PROCEDURE — 99214 OFFICE O/P EST MOD 30 MIN: CPT | Performed by: NURSE PRACTITIONER

## 2020-06-25 RX ORDER — METHYLPREDNISOLONE 32 MG/1
TABLET ORAL
Qty: 2 TABLET | Refills: 0 | Status: SHIPPED | OUTPATIENT
Start: 2020-06-25 | End: 2020-09-23

## 2020-06-25 RX ORDER — DIPHENHYDRAMINE HCL 50 MG
CAPSULE ORAL
Qty: 1 CAPSULE | Refills: 0 | Status: SHIPPED | OUTPATIENT
Start: 2020-06-25 | End: 2020-09-23

## 2020-06-25 ASSESSMENT — PAIN SCALES - GENERAL: PAINLEVEL: NO PAIN (1)

## 2020-06-25 ASSESSMENT — MIFFLIN-ST. JEOR: SCORE: 1188.83

## 2020-06-25 NOTE — H&P (VIEW-ONLY)
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 Patient was initially seen in consultation on 2/7/19 at the request of Dr. Tresa Evangelista and MARY Nelson of [facility] as well as Dr. 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 The patient underwent an EGD on 12/07/2018.  There were no significant findings.  Apparently, she had progressed to the point where she was having significant projectile vomiting.  On 12/14/2018, she was seen in the [facility] and then transferred to [facility], where she was admitted for small-bowel obstruction. Patient underwent right hemicolectomy, which revealed a mass in the terminal ileum.  The mass was adherent to the right ovary.  Pathology was read as adenocarcinoma, ex goblet cell carcinoid of the appendix, was staged T2b N1 initially. Tumor was present focally in the posterior retroperitoneal region 3/23 lymph nodes were involved with disease.  Peritoneal fluid was sent for cytology. The patient was referred to [facility] ER, seen by Dr. Speedy Menezes, medical oncologist at the [place], 01/16/2019.  The slides were reviewed by the [facility] ER pathologist and the findings were the patient had peritoneal washings were consistent with involvement by adenocarcinoma, ex goblet cell carcinoid.  Right hemicolectomy specimen revealed adenocarcinoma, ex goblet cell carcinoid.  Indurated mass was noted diffusely involving the appendix and invading the visceral peritoneum.  Angiolymphatic invasion was present and the retroperitoneal soft  tissue margins were positive with 3/21 lymph nodes were involved, pathologic stage was T4a pN1.  Immunohistochemical staining for DNA mismatch repair enzymes were intact.  Also, CT of the abdomen and pelvis was performed at [facility].  Findings were there were interval postoperative changes in the right hemicolectomy with resolution of previously seen small bowel dilatation and obstruction.  There was mild soft tissue edema in the resection bed along the midline incision, which was felt to be secondary to postop status.  No definite peritoneal metastases were identified.  No free fluid.  There was abnormal geographic perfusion with hepatic segments 5 and 8 without evidence of focal hepatic mass.  On 01/15/2019, Dr. Menezes recommended 3 months of FOLFOX chemotherapy followed by imaging at the [facility].  The patient had port placement on 02/17/2019.  The plan was to proceed with FOLFOX and 5-FU given at 400 mg IV bolus with leucovorin bolus oxaliplatin 85 mg/m2 given on day 1, continuous IV 5-FU 2400 mg/m2 IV over 46 hours.  The plan was to restage after 3 months of therapy.  The patient completed 4 cycles of FOLFOX with 20% dose reduction.  The last 2 cycles were dose reduced by 20% due to neutropenia.  She had staging studies after 4 cycles on 04/09/2019.  The findings were there was no evidence of metastatic disease.  There was evolution of postop change in the abdomen.  No evidence of microscopic peritoneal implant disease.  There was a nodular opacity in the lung that was consistent with endobronchial debris, likely due to bronchitis.  The patient went on to complete 8 cycles of chemotherapy, then underwent restaging imaging on 06/06/2019, including CT chest, abdomen and pelvis.  Findings were there was no evidence of active disease throughout the chest, abdomen and pelvis.  Nodular foci seen previously were no longer identified suggesting the presence of endobronchial debris on prior study.  No evidence of  new nodules or lymphadenopathy.  No evidence of intraperitoneal metastatic disease.  The patient otherwise decided that she did not want to go to the [facility]. Dr Thakkar did discuss the case with Dr. Speedy Menezes, who recommended that HIPEC chemotherapy may be an option.  He would otherwise recommend observation with serial imaging.  CT chest, abdomen and pelvis was performed subsequently on 08/26/19 and there was no evidence of metastatic disease. PET scan was done on 3/4/20 and this came back essentially negative, there was mildly increased soft tissue in the right pericolic gutter. CT chest, abdomen and pelvis were done on 6/17/20. Scans show slowly worsening mesenteric soft tissue thickening in the right paracolic gutter and peritoneal cavity, subtle recurrent disease is not excluded, it was felt that direct visualization and tissue sampling via laparoscopy may be necessary. Patient had labs done on 6/17/20. Tumor marker is normal. All other labs are stable. Patient reports that she has been feeling well. She has some occasional constipation, no abdominal or pelvic pain. No new concerns at this time.         Review Of Systems:  Review Of Systems  Eyes/Ears/Nose/Throat: denies new vision or hearing changes  Respiratory: No shortness of breath, dyspnea on exertion, cough, or hemoptysis  Cardiovascular: denies chest pain or palpitations  Gastrointestinal: reports occasional constipation, denies abdominal pain  Genitourinary: denies dysuria or hematuria  Musculoskeletal: recent right wrist injury, improving. Patient denies new bone pain or muscle weakness  Neurologic: denies headaches, no dizziness  Hematologic/Lymphatic/Immunologic: denies fevers, chills, night sweats      Nursing Notes:   Valencia Montes De Oca CMA  6/25/2020 11:29 AM  Sign at exiting of workspace  Pt here for a f/u labs and CT.  Valencia Montes De Oca CMA ([facility])......................6/25/2020  11:29 AM       Medication Reconciliation:  complete    Valencia Montes De Oca, CMA  6/25/2020 11:29 AM     Past medical, social, surgical, and family histories reviewed.    Allergies:  Allergies as of 06/25/2020 - Reviewed 06/25/2020   Allergen Reaction Noted     Metrizamide Anaphylaxis 12/14/2018     Bee venom  01/16/2019     Pollen extract  01/16/2019     Sulfa drugs Hives 02/15/2013       Current Medications:  Current Outpatient Medications   Medication Sig Dispense Refill     acetaminophen (TYLENOL) 500 MG tablet Take 500-1,000 mg by mouth every 6 hours as needed for mild pain       cetirizine (ZYRTEC) 10 MG tablet Take 10 mg by mouth daily       cyanocobalamin 500 MCG TABS Take 500 mcg by mouth daily       diclofenac (VOLTAREN) 1 % topical gel        diphenhydrAMINE (BENADRYL) 50 MG capsule Take 1 tab 1 hour prior to Ct scan 1 capsule 0     fluocinonide (LIDEX) 0.05 % external gel        Lactobacillus (FLORAJEN ACIDOPHILUS) CAPS        levothyroxine (SYNTHROID/LEVOTHROID) 25 MCG tablet Take 1 tablet (25 mcg) by mouth daily 90 tablet 4     Lidocaine (LIDOCARE) 4 % Patch Place 1 patch onto the skin every 24 hours To prevent lidocaine toxicity, patient should be patch free for 12 hrs daily. 5 patch 1     lidocaine-prilocaine (EMLA) 2.5-2.5 % external cream Apply to port site 60 minutes before needle and cover with occlusive dressing. 30 g 3     methylPREDNISolone (MEDROL) 32 MG tablet Take 1 tab 12 hours prior to contrast dye and 1 tab 2 hours prior to contrast dye 2 tablet 0     Multiple Vitamin (MULTI-VITAMINS) TABS Take 1 tablet by mouth daily       naproxen sodium 220 MG capsule        polyethylene glycol (MIRALAX/GLYCOLAX) powder        pyridOXINE (VITAMIN B6) 100 MG TABS        trolamine salicylate (ASPERCREME) 10 % external cream Apply to affected area as needed       UNABLE TO FIND Melody's elderberry immune support, take 2 by mouth daily          Physical Exam:  BP (!) 140/90 (BP Location: Right arm, Patient Position: Sitting, Cuff Size: Adult Large)   " Pulse 76   Temp 97.4  F (36.3  C) (Tympanic)   Resp 16   Ht 1.626 m (5' 4.02\")   Wt 67.9 kg (149 lb 9.6 oz)   SpO2 97%   BMI 25.67 kg/m      GENERAL APPEARANCE: 69 year old female, alert and no distress     NECK: no adenopathy, no asymmetry or masses     LYMPHATICS: No cervical, supraclavicular, axillary lymphadenopathy     RESP: lungs clear to auscultation - no rales, rhonchi or wheezes     CARDIOVASCULAR: regular rates and rhythm, normal S1 S2     ABDOMEN:  soft, nontender, no HSM or masses and bowel sounds normal     MUSCULOSKELETAL: extremities normal- no gross deformities noted, No edema b/l LE.     SKIN: no suspicious lesions or rashes on exposed skin     PSYCHIATRIC: mentation appears normal and affect normal    Laboratory/Imaging Studies  Orders Only on 06/17/2020   Component Date Value Ref Range Status     WBC 06/17/2020 7.2  4.0 - 11.0 10e9/L Final     RBC Count 06/17/2020 4.23  3.8 - 5.2 10e12/L Final     Hemoglobin 06/17/2020 13.1  11.7 - 15.7 g/dL Final     Hematocrit 06/17/2020 40.5  35.0 - 47.0 % Final     MCV 06/17/2020 96  78 - 100 fl Final     MCH 06/17/2020 31.0  26.5 - 33.0 pg Final     MCHC 06/17/2020 32.3  31.5 - 36.5 g/dL Final     RDW 06/17/2020 13.0  10.0 - 15.0 % Final     Platelet Count 06/17/2020 339  150 - 450 10e9/L Final     Diff Method 06/17/2020 Automated Method   Final     % Neutrophils 06/17/2020 84.2  % Final     % Lymphocytes 06/17/2020 13.0  % Final     % Monocytes 06/17/2020 1.8  % Final     % Eosinophils 06/17/2020 0.0  % Final     % Basophils 06/17/2020 0.6  % Final     % Immature Granulocytes 06/17/2020 0.4  % Final     Absolute Neutrophil 06/17/2020 6.1  1.6 - 8.3 10e9/L Final     Absolute Lymphocytes 06/17/2020 0.9  0.8 - 5.3 10e9/L Final     Absolute Monocytes 06/17/2020 0.1  0.0 - 1.3 10e9/L Final     Absolute Eosinophils 06/17/2020 0.0  0.0 - 0.7 10e9/L Final     Absolute Basophils 06/17/2020 0.0  0.0 - 0.2 10e9/L Final     Abs Immature Granulocytes 06/17/2020 " 0.0  0 - 0.4 10e9/L Final     Carcinoembryonic Agn 06/17/2020 <3.0  <3.0 ng/mL Final     Sodium 06/17/2020 138  134 - 144 mmol/L Final     Potassium 06/17/2020 5.1  3.5 - 5.1 mmol/L Final     Chloride 06/17/2020 104  98 - 107 mmol/L Final     Carbon Dioxide 06/17/2020 27  21 - 31 mmol/L Final     Anion Gap 06/17/2020 7  3 - 14 mmol/L Final     Glucose 06/17/2020 145* 70 - 105 mg/dL Final     Urea Nitrogen 06/17/2020 20  7 - 25 mg/dL Final     Creatinine 06/17/2020 1.00  0.60 - 1.20 mg/dL Final     GFR Estimate 06/17/2020 55* >60 mL/min/[1.73_m2] Final     GFR Estimate If Black 06/17/2020 67  >60 mL/min/[1.73_m2] Final     Calcium 06/17/2020 10.2  8.6 - 10.3 mg/dL Final     Bilirubin Total 06/17/2020 0.4  0.3 - 1.0 mg/dL Final     Albumin 06/17/2020 4.8  3.5 - 5.7 g/dL Final     Protein Total 06/17/2020 7.9  6.4 - 8.9 g/dL Final     Alkaline Phosphatase 06/17/2020 73  34 - 104 U/L Final     ALT 06/17/2020 21  7 - 52 U/L Final     AST 06/17/2020 24  13 - 39 U/L Final     Lactate Dehydrogenase 06/17/2020 183  140 - 271 U/L Final        ASSESSMENT/PLAN:   Metastatic adenocarcinoma, ex-goblet cell carcinoid of the appendix with metastases to abdominal lymph nodes as well as the peritoneum and positive cytology.  The patient was deemed a candidate for chemotherapy for stage IV disease as per Dr. Speedy Menezes of [facility].  The plan was to initiate FOLFOX chemotherapy.  The patient completed 2 cycles.  Course was complicated by neutropenia.  The patient went on to receive 4 cycles, the last 2 requiring 20% dose reduction due to her neutropenia.  She was staged with no evidence of disease after 8 cycles. Scans have been stable and PET scan indicated no evidence of disease. CT chest, abdomen and pelvis were done on 6/17/20. Scans show slowly worsening mesenteric soft tissue thickening in the right paracolic gutter and peritoneal cavity, subtle recurrent disease is not excluded, it was felt that direct visualization and  tissue sampling via laparoscopy may be necessary. Scans were discussed with Dr Thakkar who feels patient should be set up for a PET scan. Also spoke with Dr Evangelista who felt patient could undergo tissue sampling her with her. Discussed options with patient. We will plan to have patient set up for PET scan now. Will wait for results of PET scan and will then decide plan of care which will likely include tissue sampling of the soft tissue thickening. Patient is in agreement with plan. Will call patient with PET results.     Thirty minutes spent with patient with greater than 50% of that time spent counseling patient regarding disease process, long discussion regarding options for further evaluation, discussing plan for imaging and follow up and coordination of care

## 2020-06-25 NOTE — PROGRESS NOTES
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 Patient was initially seen in consultation on 2/7/19 at the request of Dr. Tresa Evangelista and MARY Nelson of [facility] as well as Dr. Speedy Menezes at the 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 The patient underwent an EGD on 12/07/2018.  There were no significant findings.  Apparently, she had progressed to the point where she was having significant projectile vomiting.  On 12/14/2018, she was seen in the [facility] and then transferred to [facility], where she was admitted for small-bowel obstruction. Patient underwent right hemicolectomy, which revealed a mass in the terminal ileum.  The mass was adherent to the right ovary.  Pathology was read as adenocarcinoma, ex goblet cell carcinoid of the appendix, was staged T2b N1 initially. Tumor was present focally in the posterior retroperitoneal region 3/23 lymph nodes were involved with disease.  Peritoneal fluid was sent for cytology. The patient was referred to [facility], seen by Dr. Speedy Menezes, medical oncologist at the [place], 01/16/2019.  The slides were reviewed by the [facility] pathologist and the findings were the patient had peritoneal washings were consistent with involvement by adenocarcinoma, ex goblet cell carcinoid.  Right hemicolectomy specimen revealed adenocarcinoma, ex goblet cell carcinoid.  Indurated mass was noted diffusely involving the appendix and invading the visceral peritoneum.  Angiolymphatic invasion was present and the retroperitoneal soft  tissue margins were positive with 3/21 lymph nodes were involved, pathologic stage was T4a pN1.  Immunohistochemical staining for DNA mismatch repair enzymes were intact.  Also, CT of the abdomen and pelvis was performed at [facility].  Findings were there were interval postoperative changes in the right hemicolectomy with resolution of previously seen small bowel dilatation and obstruction.  There was mild soft tissue edema in the resection bed along the midline incision, which was felt to be secondary to postop status.  No definite peritoneal metastases were identified.  No free fluid.  There was abnormal geographic perfusion with hepatic segments 5 and 8 without evidence of focal hepatic mass.  On 01/15/2019, Dr. Menezes recommended 3 months of FOLFOX chemotherapy followed by imaging at the [facility].  The patient had port placement on 02/17/2019.  The plan was to proceed with FOLFOX and 5-FU given at 400 mg IV bolus with leucovorin bolus oxaliplatin 85 mg/m2 given on day 1, continuous IV 5-FU 2400 mg/m2 IV over 46 hours.  The plan was to restage after 3 months of therapy.  The patient completed 4 cycles of FOLFOX with 20% dose reduction.  The last 2 cycles were dose reduced by 20% due to neutropenia.  She had staging studies after 4 cycles on 04/09/2019.  The findings were there was no evidence of metastatic disease.  There was evolution of postop change in the abdomen.  No evidence of microscopic peritoneal implant disease.  There was a nodular opacity in the lung that was consistent with endobronchial debris, likely due to bronchitis.  The patient went on to complete 8 cycles of chemotherapy, then underwent restaging imaging on 06/06/2019, including CT chest, abdomen and pelvis.  Findings were there was no evidence of active disease throughout the chest, abdomen and pelvis.  Nodular foci seen previously were no longer identified suggesting the presence of endobronchial debris on prior study.  No evidence of  new nodules or lymphadenopathy.  No evidence of intraperitoneal metastatic disease.  The patient otherwise decided that she did not want to go to the [facility]. Dr Thakkar did discuss the case with Dr. Speedy Menezes, who recommended that HIPEC chemotherapy may be an option.  He would otherwise recommend observation with serial imaging.  CT chest, abdomen and pelvis was performed subsequently on 08/26/19 and there was no evidence of metastatic disease. PET scan was done on 3/4/20 and this came back essentially negative, there was mildly increased soft tissue in the right pericolic gutter. CT chest, abdomen and pelvis were done on 6/17/20. Scans show slowly worsening mesenteric soft tissue thickening in the right paracolic gutter and peritoneal cavity, subtle recurrent disease is not excluded, it was felt that direct visualization and tissue sampling via laparoscopy may be necessary. Patient had labs done on 6/17/20. Tumor marker is normal. All other labs are stable. Patient reports that she has been feeling well. She has some occasional constipation, no abdominal or pelvic pain. No new concerns at this time.         Review Of Systems:  Review Of Systems  Eyes/Ears/Nose/Throat: denies new vision or hearing changes  Respiratory: No shortness of breath, dyspnea on exertion, cough, or hemoptysis  Cardiovascular: denies chest pain or palpitations  Gastrointestinal: reports occasional constipation, denies abdominal pain  Genitourinary: denies dysuria or hematuria  Musculoskeletal: recent right wrist injury, improving. Patient denies new bone pain or muscle weakness  Neurologic: denies headaches, no dizziness  Hematologic/Lymphatic/Immunologic: denies fevers, chills, night sweats      Nursing Notes:   Valenica Montes De Oca CMA  6/25/2020 11:29 AM  Sign at exiting of workspace  Pt here for a f/u labs and CT.  Valencia Montes De Oca CMA ([facility])......................6/25/2020  11:29 AM       Medication Reconciliation:  complete    Valencia Montes De Oca, CMA  6/25/2020 11:29 AM     Past medical, social, surgical, and family histories reviewed.    Allergies:  Allergies as of 06/25/2020 - Reviewed 06/25/2020   Allergen Reaction Noted     Metrizamide Anaphylaxis 12/14/2018     Bee venom  01/16/2019     Pollen extract  01/16/2019     Sulfa drugs Hives 02/15/2013       Current Medications:  Current Outpatient Medications   Medication Sig Dispense Refill     acetaminophen (TYLENOL) 500 MG tablet Take 500-1,000 mg by mouth every 6 hours as needed for mild pain       cetirizine (ZYRTEC) 10 MG tablet Take 10 mg by mouth daily       cyanocobalamin 500 MCG TABS Take 500 mcg by mouth daily       diclofenac (VOLTAREN) 1 % topical gel        diphenhydrAMINE (BENADRYL) 50 MG capsule Take 1 tab 1 hour prior to Ct scan 1 capsule 0     fluocinonide (LIDEX) 0.05 % external gel        Lactobacillus (FLORAJEN ACIDOPHILUS) CAPS        levothyroxine (SYNTHROID/LEVOTHROID) 25 MCG tablet Take 1 tablet (25 mcg) by mouth daily 90 tablet 4     Lidocaine (LIDOCARE) 4 % Patch Place 1 patch onto the skin every 24 hours To prevent lidocaine toxicity, patient should be patch free for 12 hrs daily. 5 patch 1     lidocaine-prilocaine (EMLA) 2.5-2.5 % external cream Apply to port site 60 minutes before needle and cover with occlusive dressing. 30 g 3     methylPREDNISolone (MEDROL) 32 MG tablet Take 1 tab 12 hours prior to contrast dye and 1 tab 2 hours prior to contrast dye 2 tablet 0     Multiple Vitamin (MULTI-VITAMINS) TABS Take 1 tablet by mouth daily       naproxen sodium 220 MG capsule        polyethylene glycol (MIRALAX/GLYCOLAX) powder        pyridOXINE (VITAMIN B6) 100 MG TABS        trolamine salicylate (ASPERCREME) 10 % external cream Apply to affected area as needed       UNABLE TO FIND Melody's elderberry immune support, take 2 by mouth daily          Physical Exam:  BP (!) 140/90 (BP Location: Right arm, Patient Position: Sitting, Cuff Size: Adult Large)   " Pulse 76   Temp 97.4  F (36.3  C) (Tympanic)   Resp 16   Ht 1.626 m (5' 4.02\")   Wt 67.9 kg (149 lb 9.6 oz)   SpO2 97%   BMI 25.67 kg/m      GENERAL APPEARANCE: 69 year old female, alert and no distress     NECK: no adenopathy, no asymmetry or masses     LYMPHATICS: No cervical, supraclavicular, axillary lymphadenopathy     RESP: lungs clear to auscultation - no rales, rhonchi or wheezes     CARDIOVASCULAR: regular rates and rhythm, normal S1 S2     ABDOMEN:  soft, nontender, no HSM or masses and bowel sounds normal     MUSCULOSKELETAL: extremities normal- no gross deformities noted, No edema b/l LE.     SKIN: no suspicious lesions or rashes on exposed skin     PSYCHIATRIC: mentation appears normal and affect normal    Laboratory/Imaging Studies  Orders Only on 06/17/2020   Component Date Value Ref Range Status     WBC 06/17/2020 7.2  4.0 - 11.0 10e9/L Final     RBC Count 06/17/2020 4.23  3.8 - 5.2 10e12/L Final     Hemoglobin 06/17/2020 13.1  11.7 - 15.7 g/dL Final     Hematocrit 06/17/2020 40.5  35.0 - 47.0 % Final     MCV 06/17/2020 96  78 - 100 fl Final     MCH 06/17/2020 31.0  26.5 - 33.0 pg Final     MCHC 06/17/2020 32.3  31.5 - 36.5 g/dL Final     RDW 06/17/2020 13.0  10.0 - 15.0 % Final     Platelet Count 06/17/2020 339  150 - 450 10e9/L Final     Diff Method 06/17/2020 Automated Method   Final     % Neutrophils 06/17/2020 84.2  % Final     % Lymphocytes 06/17/2020 13.0  % Final     % Monocytes 06/17/2020 1.8  % Final     % Eosinophils 06/17/2020 0.0  % Final     % Basophils 06/17/2020 0.6  % Final     % Immature Granulocytes 06/17/2020 0.4  % Final     Absolute Neutrophil 06/17/2020 6.1  1.6 - 8.3 10e9/L Final     Absolute Lymphocytes 06/17/2020 0.9  0.8 - 5.3 10e9/L Final     Absolute Monocytes 06/17/2020 0.1  0.0 - 1.3 10e9/L Final     Absolute Eosinophils 06/17/2020 0.0  0.0 - 0.7 10e9/L Final     Absolute Basophils 06/17/2020 0.0  0.0 - 0.2 10e9/L Final     Abs Immature Granulocytes 06/17/2020 " 0.0  0 - 0.4 10e9/L Final     Carcinoembryonic Agn 06/17/2020 <3.0  <3.0 ng/mL Final     Sodium 06/17/2020 138  134 - 144 mmol/L Final     Potassium 06/17/2020 5.1  3.5 - 5.1 mmol/L Final     Chloride 06/17/2020 104  98 - 107 mmol/L Final     Carbon Dioxide 06/17/2020 27  21 - 31 mmol/L Final     Anion Gap 06/17/2020 7  3 - 14 mmol/L Final     Glucose 06/17/2020 145* 70 - 105 mg/dL Final     Urea Nitrogen 06/17/2020 20  7 - 25 mg/dL Final     Creatinine 06/17/2020 1.00  0.60 - 1.20 mg/dL Final     GFR Estimate 06/17/2020 55* >60 mL/min/[1.73_m2] Final     GFR Estimate If Black 06/17/2020 67  >60 mL/min/[1.73_m2] Final     Calcium 06/17/2020 10.2  8.6 - 10.3 mg/dL Final     Bilirubin Total 06/17/2020 0.4  0.3 - 1.0 mg/dL Final     Albumin 06/17/2020 4.8  3.5 - 5.7 g/dL Final     Protein Total 06/17/2020 7.9  6.4 - 8.9 g/dL Final     Alkaline Phosphatase 06/17/2020 73  34 - 104 U/L Final     ALT 06/17/2020 21  7 - 52 U/L Final     AST 06/17/2020 24  13 - 39 U/L Final     Lactate Dehydrogenase 06/17/2020 183  140 - 271 U/L Final        ASSESSMENT/PLAN:   Metastatic adenocarcinoma, ex-goblet cell carcinoid of the appendix with metastases to abdominal lymph nodes as well as the peritoneum and positive cytology.  The patient was deemed a candidate for chemotherapy for stage IV disease as per Dr. Speedy Menezes of [facility].  The plan was to initiate FOLFOX chemotherapy.  The patient completed 2 cycles.  Course was complicated by neutropenia.  The patient went on to receive 4 cycles, the last 2 requiring 20% dose reduction due to her neutropenia.  She was staged with no evidence of disease after 8 cycles. Scans have been stable and PET scan indicated no evidence of disease. CT chest, abdomen and pelvis were done on 6/17/20. Scans show slowly worsening mesenteric soft tissue thickening in the right paracolic gutter and peritoneal cavity, subtle recurrent disease is not excluded, it was felt that direct visualization and  tissue sampling via laparoscopy may be necessary. Scans were discussed with Dr Thakkar who feels patient should be set up for a PET scan. Also spoke with Dr Evangelista who felt patient could undergo tissue sampling her with her. Discussed options with patient. We will plan to have patient set up for PET scan now. Will wait for results of PET scan and will then decide plan of care which will likely include tissue sampling of the soft tissue thickening. Patient is in agreement with plan. Will call patient with PET results.     Thirty minutes spent with patient with greater than 50% of that time spent counseling patient regarding disease process, long discussion regarding options for further evaluation, discussing plan for imaging and follow up and coordination of care

## 2020-06-25 NOTE — NURSING NOTE
Pt here for a f/u labs and CT.  Valencia Montes De Oca CMA ([facility])......................6/25/2020  11:29 AM       Medication Reconciliation: complete    Valencia Montes De Oca CMA  6/25/2020 11:29 AM

## 2020-06-29 ENCOUNTER — MYC MEDICAL ADVICE (OUTPATIENT)
Dept: SURGERY | Facility: OTHER | Age: 70
End: 2020-06-29

## 2020-06-29 DIAGNOSIS — Z01.818 PRE-OP TESTING: Primary | ICD-10-CM

## 2020-06-30 DIAGNOSIS — C18.1 MALIGNANT NEOPLASM OF APPENDIX (H): Primary | ICD-10-CM

## 2020-06-30 RX ORDER — CEFAZOLIN SODIUM 2 G/100ML
2 INJECTION, SOLUTION INTRAVENOUS
Status: CANCELLED | OUTPATIENT
Start: 2020-06-30

## 2020-06-30 RX ORDER — CEFAZOLIN SODIUM 1 G/50ML
1 INJECTION, SOLUTION INTRAVENOUS SEE ADMIN INSTRUCTIONS
Status: CANCELLED | OUTPATIENT
Start: 2020-06-30

## 2020-06-30 RX ORDER — ACETAMINOPHEN 325 MG/1
975 TABLET ORAL ONCE
Status: CANCELLED | OUTPATIENT
Start: 2020-06-30 | End: 2020-06-30

## 2020-06-30 NOTE — TELEPHONE ENCOUNTER
Called patient with the information for her surgery.  Patient requested for me to leave her soap and surgery book in the ED for her to  tomorrow when she has her pet scan.  Arlet Hallman LPN .......6/30/2020 12:52 PM

## 2020-07-01 ENCOUNTER — HOSPITAL ENCOUNTER (OUTPATIENT)
Dept: PET IMAGING | Facility: OTHER | Age: 70
Discharge: HOME | End: 2020-07-01
Attending: NURSE PRACTITIONER | Admitting: NURSE PRACTITIONER
Payer: MEDICARE

## 2020-07-01 DIAGNOSIS — C18.1 MALIGNANT NEOPLASM OF APPENDIX (H): ICD-10-CM

## 2020-07-01 PROCEDURE — A9552 F18 FDG: HCPCS | Performed by: NURSE PRACTITIONER

## 2020-07-01 PROCEDURE — 78815 PET IMAGE W/CT SKULL-THIGH: CPT | Mod: PS

## 2020-07-01 PROCEDURE — 34300033 ZZH RX 343: Performed by: NURSE PRACTITIONER

## 2020-07-01 RX ADMIN — FLUDEOXYGLUCOSE F-18 12.56 MCI.: 500 INJECTION, SOLUTION INTRAVENOUS at 15:14

## 2020-07-02 ENCOUNTER — MYC MEDICAL ADVICE (OUTPATIENT)
Dept: INTERNAL MEDICINE | Facility: OTHER | Age: 70
End: 2020-07-02

## 2020-07-02 ENCOUNTER — MYC MEDICAL ADVICE (OUTPATIENT)
Dept: ONCOLOGY | Facility: OTHER | Age: 70
End: 2020-07-02

## 2020-07-02 NOTE — TELEPHONE ENCOUNTER
When routing this message, the PET results were not done yet.  Message could be addressed now, so sent to Diana Alvarado NP.  Tiffanie Pastor CMA ([facility])................ 7/2/2020 8:27 AM

## 2020-07-05 ENCOUNTER — ALLIED HEALTH/NURSE VISIT (OUTPATIENT)
Dept: FAMILY MEDICINE | Facility: OTHER | Age: 70
End: 2020-07-05
Attending: SURGERY
Payer: MEDICARE

## 2020-07-05 DIAGNOSIS — Z01.818 PRE-OP TESTING: ICD-10-CM

## 2020-07-05 PROCEDURE — 99207 ZZC NO CHARGE NURSE ONLY: CPT

## 2020-07-05 PROCEDURE — C9803 HOPD COVID-19 SPEC COLLECT: HCPCS

## 2020-07-05 PROCEDURE — U0003 INFECTIOUS AGENT DETECTION BY NUCLEIC ACID (DNA OR RNA); SEVERE ACUTE RESPIRATORY SYNDROME CORONAVIRUS 2 (SARS-COV-2) (CORONAVIRUS DISEASE [COVID-19]), AMPLIFIED PROBE TECHNIQUE, MAKING USE OF HIGH THROUGHPUT TECHNOLOGIES AS DESCRIBED BY CMS-2020-01-R: HCPCS | Mod: ZL | Performed by: SURGERY

## 2020-07-06 ENCOUNTER — MYC MEDICAL ADVICE (OUTPATIENT)
Dept: SURGERY | Facility: OTHER | Age: 70
End: 2020-07-06

## 2020-07-06 LAB
SARS-COV-2 RNA SPEC QL NAA+PROBE: NOT DETECTED
SPECIMEN SOURCE: NORMAL

## 2020-07-07 ENCOUNTER — MYC MEDICAL ADVICE (OUTPATIENT)
Dept: SURGERY | Facility: OTHER | Age: 70
End: 2020-07-07

## 2020-07-07 ENCOUNTER — ANESTHESIA EVENT (OUTPATIENT)
Dept: SURGERY | Facility: OTHER | Age: 70
End: 2020-07-07
Payer: MEDICARE

## 2020-07-08 ENCOUNTER — ANESTHESIA (OUTPATIENT)
Dept: SURGERY | Facility: OTHER | Age: 70
End: 2020-07-08
Payer: MEDICARE

## 2020-07-08 ENCOUNTER — HOSPITAL ENCOUNTER (OUTPATIENT)
Facility: OTHER | Age: 70
Discharge: HOME | End: 2020-07-08
Attending: SURGERY | Admitting: SURGERY
Payer: MEDICARE

## 2020-07-08 VITALS
DIASTOLIC BLOOD PRESSURE: 67 MMHG | BODY MASS INDEX: 25.44 KG/M2 | TEMPERATURE: 97.2 F | SYSTOLIC BLOOD PRESSURE: 135 MMHG | OXYGEN SATURATION: 94 % | HEIGHT: 64 IN | HEART RATE: 74 BPM | WEIGHT: 149 LBS | RESPIRATION RATE: 15 BRPM

## 2020-07-08 DIAGNOSIS — C78.6 MALIGNANT NEOPLASM METASTATIC TO PERITONEUM (H): ICD-10-CM

## 2020-07-08 DIAGNOSIS — C18.1 MALIGNANT NEOPLASM OF APPENDIX (H): Primary | ICD-10-CM

## 2020-07-08 PROCEDURE — 25000132 ZZH RX MED GY IP 250 OP 250 PS 637: Mod: GY | Performed by: SURGERY

## 2020-07-08 PROCEDURE — 71000027 ZZH RECOVERY PHASE 2 EACH 15 MINS: Performed by: SURGERY

## 2020-07-08 PROCEDURE — 88112 CYTOPATH CELL ENHANCE TECH: CPT

## 2020-07-08 PROCEDURE — 71000014 ZZH RECOVERY PHASE 1 LEVEL 2 FIRST HR: Performed by: SURGERY

## 2020-07-08 PROCEDURE — 36000056 ZZH SURGERY LEVEL 3 1ST 30 MIN: Performed by: SURGERY

## 2020-07-08 PROCEDURE — 49320 DIAG LAPARO SEPARATE PROC: CPT | Performed by: SURGERY

## 2020-07-08 PROCEDURE — 25000125 ZZHC RX 250: Performed by: NURSE ANESTHETIST, CERTIFIED REGISTERED

## 2020-07-08 PROCEDURE — 37000009 ZZH ANESTHESIA TECHNICAL FEE, EACH ADDTL 15 MIN: Performed by: SURGERY

## 2020-07-08 PROCEDURE — 25000128 H RX IP 250 OP 636: Performed by: NURSE ANESTHETIST, CERTIFIED REGISTERED

## 2020-07-08 PROCEDURE — 36000058 ZZH SURGERY LEVEL 3 EA 15 ADDTL MIN: Performed by: SURGERY

## 2020-07-08 PROCEDURE — 25000125 ZZHC RX 250: Performed by: SURGERY

## 2020-07-08 PROCEDURE — 88305 TISSUE EXAM BY PATHOLOGIST: CPT

## 2020-07-08 PROCEDURE — 40000306 ZZH STATISTIC PRE PROC ASSESS II: Performed by: SURGERY

## 2020-07-08 PROCEDURE — 25800025 ZZH RX 258: Performed by: SURGERY

## 2020-07-08 PROCEDURE — 25800030 ZZH RX IP 258 OP 636: Performed by: NURSE ANESTHETIST, CERTIFIED REGISTERED

## 2020-07-08 PROCEDURE — 27210794 ZZH OR GENERAL SUPPLY STERILE: Performed by: SURGERY

## 2020-07-08 PROCEDURE — 25000566 ZZH SEVOFLURANE, EA 15 MIN: Performed by: SURGERY

## 2020-07-08 PROCEDURE — 25000128 H RX IP 250 OP 636: Performed by: SURGERY

## 2020-07-08 PROCEDURE — 27211024 ZZHC OR SUPPLY OTHER OPNP: Performed by: SURGERY

## 2020-07-08 PROCEDURE — 49320 DIAG LAPARO SEPARATE PROC: CPT | Performed by: NURSE ANESTHETIST, CERTIFIED REGISTERED

## 2020-07-08 PROCEDURE — 37000008 ZZH ANESTHESIA TECHNICAL FEE, 1ST 30 MIN: Performed by: SURGERY

## 2020-07-08 RX ORDER — CEFAZOLIN SODIUM 1 G/50ML
1 INJECTION, SOLUTION INTRAVENOUS SEE ADMIN INSTRUCTIONS
Status: DISCONTINUED | OUTPATIENT
Start: 2020-07-08 | End: 2020-07-08 | Stop reason: HOSPADM

## 2020-07-08 RX ORDER — GLYCOPYRROLATE 0.2 MG/ML
INJECTION, SOLUTION INTRAMUSCULAR; INTRAVENOUS PRN
Status: DISCONTINUED | OUTPATIENT
Start: 2020-07-08 | End: 2020-07-08

## 2020-07-08 RX ORDER — MEPERIDINE HYDROCHLORIDE 50 MG/ML
12.5 INJECTION INTRAMUSCULAR; INTRAVENOUS; SUBCUTANEOUS
Status: DISCONTINUED | OUTPATIENT
Start: 2020-07-08 | End: 2020-07-08 | Stop reason: HOSPADM

## 2020-07-08 RX ORDER — ONDANSETRON 2 MG/ML
INJECTION INTRAMUSCULAR; INTRAVENOUS PRN
Status: DISCONTINUED | OUTPATIENT
Start: 2020-07-08 | End: 2020-07-08

## 2020-07-08 RX ORDER — NALOXONE HYDROCHLORIDE 0.4 MG/ML
.1-.4 INJECTION, SOLUTION INTRAMUSCULAR; INTRAVENOUS; SUBCUTANEOUS
Status: DISCONTINUED | OUTPATIENT
Start: 2020-07-08 | End: 2020-07-08 | Stop reason: HOSPADM

## 2020-07-08 RX ORDER — LIDOCAINE HYDROCHLORIDE 20 MG/ML
INJECTION, SOLUTION INFILTRATION; PERINEURAL PRN
Status: DISCONTINUED | OUTPATIENT
Start: 2020-07-08 | End: 2020-07-08

## 2020-07-08 RX ORDER — ONDANSETRON 2 MG/ML
4 INJECTION INTRAMUSCULAR; INTRAVENOUS EVERY 30 MIN PRN
Status: DISCONTINUED | OUTPATIENT
Start: 2020-07-08 | End: 2020-07-08 | Stop reason: HOSPADM

## 2020-07-08 RX ORDER — DEXAMETHASONE SODIUM PHOSPHATE 4 MG/ML
INJECTION, SOLUTION INTRA-ARTICULAR; INTRALESIONAL; INTRAMUSCULAR; INTRAVENOUS; SOFT TISSUE PRN
Status: DISCONTINUED | OUTPATIENT
Start: 2020-07-08 | End: 2020-07-08

## 2020-07-08 RX ORDER — HYDROMORPHONE HYDROCHLORIDE 1 MG/ML
.3-.5 INJECTION, SOLUTION INTRAMUSCULAR; INTRAVENOUS; SUBCUTANEOUS EVERY 10 MIN PRN
Status: DISCONTINUED | OUTPATIENT
Start: 2020-07-08 | End: 2020-07-08 | Stop reason: HOSPADM

## 2020-07-08 RX ORDER — FENTANYL CITRATE 50 UG/ML
25-50 INJECTION, SOLUTION INTRAMUSCULAR; INTRAVENOUS EVERY 5 MIN PRN
Status: DISCONTINUED | OUTPATIENT
Start: 2020-07-08 | End: 2020-07-08 | Stop reason: HOSPADM

## 2020-07-08 RX ORDER — BUPIVACAINE HYDROCHLORIDE AND EPINEPHRINE 5; 5 MG/ML; UG/ML
INJECTION, SOLUTION EPIDURAL; INTRACAUDAL; PERINEURAL PRN
Status: DISCONTINUED | OUTPATIENT
Start: 2020-07-08 | End: 2020-07-08 | Stop reason: HOSPADM

## 2020-07-08 RX ORDER — LIDOCAINE 40 MG/G
CREAM TOPICAL
Status: DISCONTINUED | OUTPATIENT
Start: 2020-07-08 | End: 2020-07-08 | Stop reason: HOSPADM

## 2020-07-08 RX ORDER — FENTANYL CITRATE 50 UG/ML
INJECTION, SOLUTION INTRAMUSCULAR; INTRAVENOUS PRN
Status: DISCONTINUED | OUTPATIENT
Start: 2020-07-08 | End: 2020-07-08

## 2020-07-08 RX ORDER — KETOROLAC TROMETHAMINE 30 MG/ML
15 INJECTION, SOLUTION INTRAMUSCULAR; INTRAVENOUS ONCE
Status: COMPLETED | OUTPATIENT
Start: 2020-07-08 | End: 2020-07-08

## 2020-07-08 RX ORDER — ACETAMINOPHEN 325 MG/1
975 TABLET ORAL ONCE
Status: COMPLETED | OUTPATIENT
Start: 2020-07-08 | End: 2020-07-08

## 2020-07-08 RX ORDER — ONDANSETRON 4 MG/1
4 TABLET, ORALLY DISINTEGRATING ORAL EVERY 30 MIN PRN
Status: DISCONTINUED | OUTPATIENT
Start: 2020-07-08 | End: 2020-07-08 | Stop reason: HOSPADM

## 2020-07-08 RX ORDER — CEFAZOLIN SODIUM 2 G/100ML
2 INJECTION, SOLUTION INTRAVENOUS
Status: COMPLETED | OUTPATIENT
Start: 2020-07-08 | End: 2020-07-08

## 2020-07-08 RX ORDER — TRAMADOL HYDROCHLORIDE 50 MG/1
50 TABLET ORAL EVERY 6 HOURS PRN
Qty: 10 TABLET | Refills: 0 | Status: SHIPPED | OUTPATIENT
Start: 2020-07-08 | End: 2020-07-11

## 2020-07-08 RX ORDER — SODIUM CHLORIDE, SODIUM LACTATE, POTASSIUM CHLORIDE, CALCIUM CHLORIDE 600; 310; 30; 20 MG/100ML; MG/100ML; MG/100ML; MG/100ML
INJECTION, SOLUTION INTRAVENOUS CONTINUOUS
Status: DISCONTINUED | OUTPATIENT
Start: 2020-07-08 | End: 2020-07-08 | Stop reason: HOSPADM

## 2020-07-08 RX ORDER — PROPOFOL 10 MG/ML
INJECTION, EMULSION INTRAVENOUS PRN
Status: DISCONTINUED | OUTPATIENT
Start: 2020-07-08 | End: 2020-07-08

## 2020-07-08 RX ORDER — SODIUM CHLORIDE, SODIUM LACTATE, POTASSIUM CHLORIDE, CALCIUM CHLORIDE 600; 310; 30; 20 MG/100ML; MG/100ML; MG/100ML; MG/100ML
INJECTION, SOLUTION INTRAVENOUS CONTINUOUS PRN
Status: DISCONTINUED | OUTPATIENT
Start: 2020-07-08 | End: 2020-07-08

## 2020-07-08 RX ORDER — HYDROCODONE BITARTRATE AND ACETAMINOPHEN 5; 325 MG/1; MG/1
1 TABLET ORAL
Status: DISCONTINUED | OUTPATIENT
Start: 2020-07-08 | End: 2020-07-08 | Stop reason: HOSPADM

## 2020-07-08 RX ADMIN — DEXAMETHASONE SODIUM PHOSPHATE 8 MG: 4 INJECTION, SOLUTION INTRA-ARTICULAR; INTRALESIONAL; INTRAMUSCULAR; INTRAVENOUS; SOFT TISSUE at 11:48

## 2020-07-08 RX ADMIN — LIDOCAINE HYDROCHLORIDE 100 MG: 20 INJECTION, SOLUTION INFILTRATION; PERINEURAL at 11:10

## 2020-07-08 RX ADMIN — PROPOFOL 150 MG: 10 INJECTION, EMULSION INTRAVENOUS at 11:10

## 2020-07-08 RX ADMIN — ROCURONIUM BROMIDE 40 MG: 10 INJECTION INTRAVENOUS at 11:10

## 2020-07-08 RX ADMIN — ONDANSETRON 4 MG: 2 INJECTION INTRAMUSCULAR; INTRAVENOUS at 11:48

## 2020-07-08 RX ADMIN — KETOROLAC TROMETHAMINE 15 MG: 30 INJECTION, SOLUTION INTRAMUSCULAR at 12:54

## 2020-07-08 RX ADMIN — FENTANYL CITRATE 50 MCG: 50 INJECTION, SOLUTION INTRAMUSCULAR; INTRAVENOUS at 11:45

## 2020-07-08 RX ADMIN — ACETAMINOPHEN 975 MG: 325 TABLET, FILM COATED ORAL at 10:14

## 2020-07-08 RX ADMIN — FENTANYL CITRATE 50 MCG: 50 INJECTION, SOLUTION INTRAMUSCULAR; INTRAVENOUS at 11:05

## 2020-07-08 RX ADMIN — SODIUM CHLORIDE, POTASSIUM CHLORIDE, SODIUM LACTATE AND CALCIUM CHLORIDE 100 ML/HR: 600; 310; 30; 20 INJECTION, SOLUTION INTRAVENOUS at 10:37

## 2020-07-08 RX ADMIN — GLYCOPYRROLATE 0.2 MG: 0.2 INJECTION, SOLUTION INTRAMUSCULAR; INTRAVENOUS at 11:05

## 2020-07-08 RX ADMIN — CEFAZOLIN SODIUM 2 G: 2 INJECTION, SOLUTION INTRAVENOUS at 11:05

## 2020-07-08 RX ADMIN — SODIUM CHLORIDE, POTASSIUM CHLORIDE, SODIUM LACTATE AND CALCIUM CHLORIDE: 600; 310; 30; 20 INJECTION, SOLUTION INTRAVENOUS at 11:05

## 2020-07-08 ASSESSMENT — MIFFLIN-ST. JEOR: SCORE: 1185.86

## 2020-07-08 NOTE — ANESTHESIA CARE TRANSFER NOTE
Patient: Nadya Flanagan    Procedure(s):  LAPAROSCOPY, DIAGNOSTIC, BY GENERAL SURGERY    Diagnosis: Malignant neoplasm of appendix (H) [C18.1]  Diagnosis Additional Information: No value filed.    Anesthesia Type:   General     Note:  Airway :Face Mask (Patient spont. breathing well.TV >350cc. SpO2 98%. RR 13. Patient transported on O2 @ 10L/min via simple mask. Patient cont. on O2 therapy during care transfer. PACU  RN afforded questions.)  Patient transferred to:PACU  Handoff Report: Identifed the Patient, Identified the Reponsible Provider, Reviewed the pertinent medical history, Discussed the surgical course, Reviewed Intra-OP anesthesia mangement and issues during anesthesia, Set expectations for post-procedure period and Allowed opportunity for questions and acknowledgement of understanding      Vitals: (Last set prior to Anesthesia Care Transfer)    CRNA VITALS  7/8/2020 1129 - 7/8/2020 1202      7/8/2020             Resp Rate (set):  10                Electronically Signed By: David Kellerman, APRN CRNA  July 8, 2020  12:02 PM

## 2020-07-08 NOTE — OP NOTE
Preoperative Diagnosis: history of appendiceal cancer    Postoperative Diagnosis: recurrent peritoneal implants of appendiceal cancer    Procedure planned: Diagnostic Laparoscopy    Procedure performed: diagnostic Laparoscopy with biopsies and peritoneal washings    Surgeon: Tresa Evangelista MD   Circulator: Elvie oCrdoba RN; Liz Man RN  Scrub Person: Bella Sullivan  First Assistant: Rosalee Sy RN  Pre-Op Nurse: Mirian Burt RN    Anesthesia: General endotracheal with local    Specimen:peritoneal washings, biopsies of peritoneal implants  Blood Loss: less than 10 ml     INDICATIONS   Please see H&P. The patient has a history of appendiceal cancer. She had recent PET showing increased thickening in the right abdomen and some enhancement there. The risks, benefits and alternatives to diagnostic laparoscopy to evaluate for recurrent disease were discussed with the patient. We specifically discussed the risks of infection, bleeding, injury to abdominal organs and the possible need for an open procedure. The patient expressed understanding and questions were answered. Informed consent paperwork was completed.     DESCRIPTION OF PROCEDURE   The patient was brought to the operating room and placed in a supine position on the operating table. Appropriate monitors were attached. The patient received IV antibiotics preoperatively. After general anesthesia was induced, the patient was positioned, prepped and draped in the standard fashion. Time out was performed confirming the patient's identity and procedure to be performed.   Local anesthetic was infiltrated in the skin and subcutaneous tissue just below the umbilicus. The anterior abdominal wall was grasped with towel clips. A 5 mm incision was made. The Veress needle was inserted into the peritoneal cavity and placement confirmed using saline test. CO2 was then used to establish pneumoperitoneum. Trocar was inserted without difficulty. The  camera was inserted, and the contents of the abdomen were inspected. No evidence of injury was noted on inspection. There was a small amount of peritoneal fluid noted. Numerous apparent peritoneal implants were noted. Dense tissue was noted in the right lower abdomen. Local anesthetic was infiltrated in the left lower abdomen. Skin incision was made and under direct visualization trocar was positioned. The abdominal fluid was suctioned and sent for cytology. Sharp biopsies were taken of numerous implants. In the left lower quadrant, clips were applied at a biopsy site for hemostasis.  Hemostasis was excellent. The pneumoperitoneum was then reduced and trocars removed from the abdomen. Further local anesthetic was infiltrated for postop pain control. Skin edges were approximated using interrupted Monocryl suture. Sterile dressings were applied. The patient was then awakened from anesthesia and taken to postanesthesia recovery in stable condition. All needle, sponge and instrument counts were reported as correct at the conclusion of the case. The patient tolerated the procedure with no immediately apparent complications.

## 2020-07-08 NOTE — ANESTHESIA PROCEDURE NOTES
Airway   Date/Time: 7/8/2020 11:10 AM   Patient location during procedure: OR    General Information and Staff   Performed: CRNA     Consent for Airway   Urgency: elective        Indications and Patient Condition  Indications for airway management: jean claude-procedural  Induction type:intravenous  Mask difficulty assessment: easy    Final Airway Details  Final airway type: endotracheal airway  Successful airway:ETT  ETT size (mm): 7.0 Cuffed: yes   Cuff volume (mL): 8  Blade: Feliciano  Blade size: #4  Successful intubation technique: direct and asleep  Endotracheal tube insertion site: right side of mouth   Measured from: teeth  ETT to teeth (cm): 20  Grade View of Cords: 1Number of attempts at approach: 1    Secured with:tape  Ease of procedure: easy  Dentition: Intact and Unchanged

## 2020-07-08 NOTE — INTERVAL H&P NOTE
I discussed diagnostic laparoscopy with the patient. The risks and benefits were explained. The patient's questions were answered. Informed consent paperwork was completed. Will proceed to the or.

## 2020-07-08 NOTE — OR NURSING
PACU Transfer Note    Nadya Flanagan was transferred to DSU via cart.  Equipment used for transport:  none.  Accompanied by: ELISA López   Prescriptions were: n/a    PACU Respiratory Event Documentation     1) Episodes of Apnea greater than or equal to 10 seconds: 0    2) Bradypnea - less than 8 breaths per minute: 0    3) Pain score on 0 to 10 scale: 2    4) Pain-sedation mismatch (yes or no): no    5) Repeated 02 desaturation less than 90% (yes or no): no    Anesthesia notified? (yes or no): n/a    Any of the above events occuring repeatedly in separate 30 minute intervals may be considered recurrent PACU respiratory events.    Patient stable and meets phase 1 discharge criteria for transport from PACU.

## 2020-07-08 NOTE — ANESTHESIA POSTPROCEDURE EVALUATION
Patient: Nadya Flanagan    Procedure(s):  LAPAROSCOPY, DIAGNOSTIC, BY GENERAL SURGERY    Diagnosis:Malignant neoplasm of appendix (H) [C18.1]  Diagnosis Additional Information: No value filed.    Anesthesia Type:  General    Note:  Anesthesia Post Evaluation    Patient location during evaluation: Phase 2  Patient participation: Able to fully participate in evaluation  Level of consciousness: awake and alert  Pain management: adequate  Airway patency: patent  Cardiovascular status: acceptable  Respiratory status: acceptable  Hydration status: acceptable  PONV: none     Anesthetic complications: None          Last vitals:  Vitals:    07/08/20 1215 07/08/20 1224 07/08/20 1230   BP: 134/76 123/73 138/78   Pulse: 86 78 77   Resp: 15     Temp: 97.4  F (36.3  C) 97.2  F (36.2  C)    SpO2: 98% 99%          Electronically Signed By: ALBERT HAIRSTON CRNA  July 8, 2020  12:51 PM

## 2020-07-08 NOTE — OR NURSING
Patient has been discharged to home at 1335 via ambulatory accompanied by her .    Written discharge instructions were provided to patient.  Prescriptions were sent to [facility] and picked up by patients . She states pain is tolerable using a cold pack and denies any nausea or dizziness upon discharge.      Patient and adult caring for them verbalize understanding of discharge instructions including no driving until tomorrow and no longer taking narcotic pain medications - no operating mechanical equipment and no making any important decisions.They understand reason for discharge, and necessary follow-up appointments.      Maribel Ken RN

## 2020-07-08 NOTE — DISCHARGE INSTRUCTIONS
Procedure you had done: diagnostic laparoscopy with biopsies  Your health care provider is:  Meghan Pearce  Your surgeon is Dr. Tresa Evangelista.   Please call your health care provider or surgeon at [phone] if:    - you feel you are getting worse or having an increase in problems    - fever greater than 101 degrees  - increasing shortness of breath or chest pain  - any signs of infection (increasing redness, swelling, tenderness, warmth, change in appearance, or  increased drainage)  - nausea (upset stomach) and vomiting and/or diarrhea that will not stop  - severe pain that is not relieved by medicine, rest or ice    Home care :  You will be discharged when you are safe to go home. Anesthesia can change judgment, reaction time and coordination for several hours after you seem back to normal. Therefore, do not operate any motorized vehicles or power tools for 24 hours after discharge.     Activity:  You should rest or do quiet activities for the rest of the day. The day after surgery you may be as active as you feel able. You may find that you require more rest than usual the first 3-4 days as your body heals.       Diet:  Eat small amounts frequently after arriving home. Avoid foods that are hard to digest such as heavy, sweet, spicy, or fried foods until you are feeling well.   Vomiting can occur after general anesthesia. If vomiting lasts more than 5-7 times after arriving home, or if you have other difficulties, call your doctor.     Other:  1. Problems urinating can happen after surgery.  Please call your physician if you have any problems.  2. Some people have constipation after surgery-you can use over the counter stool softener or laxative as needed.  3. You can use ice on the area of the incision to help with pain and swelling. Apply an ice pack wrapped in a towel to the area for 10-15 minutes once an hour.   Dressing:  Your incision was covered with Steri-strips and a bandage. The bandage can be removed  and you can shower 24 hours or more after the surgery. After you shower dry your incision gently. You may apply a clean bandage if you want to. The Steri-strips will stay on for 5-7 days.   No soaking in a bath, hot tub, pool or lake for 5 days.      Drainage:   Bleeding or drainage should be minimal.  1. If bleeding soaks the dressings, cover with another sterile dressing.  2. If bleeding continues, apply gentle steady pressure over the incision for 5 minutes.  3. If bleeding persists or there is an increased swelling of the area, call your surgeon or go to the Emergency Room.      [facility]  Adult Discharge Orders & Instructions      For 24 hours after surgery:  1. Get plenty of rest.  A responsible adult must stay with you for at least 24 hours after you leave the hospital.   2. You may feel lightheaded.  IF so, sit for a few minutes before standing.  Have someone help you get up.   3. You may have a slight fever. Call the doctor if your fever is over 101 F (38.3 C) (taken under the tongue) or lasts longer than 24 hours.  4. You may have a dry mouth, a sore throat, muscle aches or trouble sleeping.  These should go away after 24 hours.  5. Do not make important or legal decisions.  6.   Do not drive or use heavy equipment.  If you have weakness or tingling, don't drive or use heavy equipment until this feeling goes away.                                                                                                                                                                         To contact a doctor, call    [phone]______________

## 2020-07-08 NOTE — ANESTHESIA PREPROCEDURE EVALUATION
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     Pre-diabetes 2019     Thyroid disease      Tricuspid regurgitation      Past Surgical History:   Procedure Laterality Date     FRACTURE TX, HIP RT/LT Right      INSERT PORT VASCULAR ACCESS N/A 2019    Procedure: INSERT PORT VASCULAR ACCESS;  Surgeon: Tresa Evangelista MD;  Location:  OR     LAPAROTOMY EXPLORATORY N/A 2018    Procedure: Exploratory Laparotomy with right hemicolectomy;  Surgeon: Tresa Evangelista MD;  Location:  OR     REMOVE CATHETER VASCULAR ACCESS N/A 10/9/2019    Procedure: Remove Port;  Surgeon: Tresa Evangelista MD;  Location:  OR     TONSILLECTOMY       TUBAL LIGATION         Anesthesia Evaluation     . Pt has had prior anesthetic.     No history of anesthetic complications          ROS/MED HX    ENT/Pulmonary:  - neg pulmonary ROS     Neurologic:  - neg neurologic ROS     Cardiovascular:  - neg cardiovascular ROS       METS/Exercise Tolerance:  >4 METS   Hematologic:  - neg hematologic  ROS       Musculoskeletal:  - neg musculoskeletal ROS       GI/Hepatic:     (+) GERD Asymptomatic on medication,       Renal/Genitourinary:  - ROS Renal section negative       Endo:     (+) thyroid problem .      Psychiatric:  - neg psychiatric ROS       Infectious Disease:  - neg infectious disease ROS       Malignancy:   (+) Malignancy History of GI  GI CA  Remission status post Surgery,         Other:    - neg other ROS                      Physical Exam  Normal systems: cardiovascular,  "pulmonary and dental    Airway   Mallampati: II  TM distance: >3 FB  Neck ROM: full    Dental     Cardiovascular   Rhythm and rate: regular and normal      Pulmonary    breath sounds clear to auscultation            Lab Results   Component Value Date    WBC 7.2 06/17/2020    HGB 13.1 06/17/2020    HCT 40.5 06/17/2020     06/17/2020     06/17/2020    POTASSIUM 5.1 06/17/2020    CHLORIDE 104 06/17/2020    CO2 27 06/17/2020    BUN 20 06/17/2020    CR 1.00 06/17/2020     (H) 06/17/2020    STELLA 10.2 06/17/2020    ALBUMIN 4.8 06/17/2020    PROTTOTAL 7.9 06/17/2020    ALT 21 06/17/2020    AST 24 06/17/2020    ALKPHOS 73 06/17/2020    BILITOTAL 0.4 06/17/2020    LIPASE 21 12/16/2018    TSH 5.18 (H) 11/12/2018    T4 0.75 08/26/2019       Preop Vitals  BP Readings from Last 3 Encounters:   07/08/20 129/83   06/25/20 (!) 140/90   06/17/20 122/80    Pulse Readings from Last 3 Encounters:   06/25/20 76   06/17/20 72   05/13/20 66      Resp Readings from Last 3 Encounters:   07/08/20 12   06/25/20 16   06/17/20 16    SpO2 Readings from Last 3 Encounters:   07/08/20 97%   06/25/20 97%   06/17/20 99%      Temp Readings from Last 1 Encounters:   07/08/20 96.9  F (36.1  C) (Tympanic)    Ht Readings from Last 1 Encounters:   07/08/20 1.626 m (5' 4\")      Wt Readings from Last 1 Encounters:   07/08/20 67.6 kg (149 lb)    Estimated body mass index is 25.58 kg/m  as calculated from the following:    Height as of this encounter: 1.626 m (5' 4\").    Weight as of this encounter: 67.6 kg (149 lb).       Anesthesia Plan      History & Physical Review      ASA Status:  2 .    NPO Status:  > 6 hours    Plan for General with Propofol induction. Maintenance will be Balanced.    PONV prophylaxis:  Ondansetron (or other 5HT-3) and Dexamethasone or Solumedrol         Postoperative Care  Postoperative pain management:  IV analgesics and Multi-modal analgesia.      Consents  Anesthetic plan, risks, benefits and alternatives " discussed with:  Patient..                 ALBERT HAIRSTON CRNA

## 2020-07-14 ENCOUNTER — MYC MEDICAL ADVICE (OUTPATIENT)
Dept: SURGERY | Facility: OTHER | Age: 70
End: 2020-07-14

## 2020-07-16 ENCOUNTER — MYC MEDICAL ADVICE (OUTPATIENT)
Dept: ONCOLOGY | Facility: OTHER | Age: 70
End: 2020-07-16

## 2020-07-16 ENCOUNTER — ONCOLOGY VISIT (OUTPATIENT)
Dept: ONCOLOGY | Facility: OTHER | Age: 70
End: 2020-07-16
Attending: INTERNAL MEDICINE
Payer: MEDICARE

## 2020-07-16 VITALS
OXYGEN SATURATION: 98 % | BODY MASS INDEX: 24.75 KG/M2 | SYSTOLIC BLOOD PRESSURE: 138 MMHG | HEART RATE: 75 BPM | DIASTOLIC BLOOD PRESSURE: 88 MMHG | TEMPERATURE: 98.4 F | HEIGHT: 64 IN | WEIGHT: 145 LBS | RESPIRATION RATE: 14 BRPM

## 2020-07-16 DIAGNOSIS — C18.1 CANCER OF APPENDIX METASTATIC TO INTRA-ABDOMINAL LYMPH NODE (H): Primary | ICD-10-CM

## 2020-07-16 DIAGNOSIS — C77.2 CANCER OF APPENDIX METASTATIC TO INTRA-ABDOMINAL LYMPH NODE (H): Primary | ICD-10-CM

## 2020-07-16 PROCEDURE — G0463 HOSPITAL OUTPT CLINIC VISIT: HCPCS

## 2020-07-16 PROCEDURE — 99215 OFFICE O/P EST HI 40 MIN: CPT | Performed by: INTERNAL MEDICINE

## 2020-07-16 RX ORDER — LORAZEPAM 0.5 MG/1
0.5 TABLET ORAL EVERY 4 HOURS PRN
Qty: 30 TABLET | Refills: 2 | Status: SHIPPED | OUTPATIENT
Start: 2020-07-16 | End: 2020-09-23

## 2020-07-16 RX ORDER — LOPERAMIDE HCL 2 MG
CAPSULE ORAL
Qty: 30 CAPSULE | Refills: 0 | Status: SHIPPED | OUTPATIENT
Start: 2020-07-16 | End: 2020-09-23

## 2020-07-16 RX ORDER — IBUPROFEN 200 MG
400 TABLET ORAL EVERY 6 HOURS PRN
COMMUNITY
End: 2022-01-01

## 2020-07-16 RX ORDER — ONDANSETRON 8 MG/1
8 TABLET, FILM COATED ORAL EVERY 8 HOURS PRN
Qty: 10 TABLET | Refills: 2 | Status: SHIPPED | OUTPATIENT
Start: 2020-07-16 | End: 2020-09-23

## 2020-07-16 RX ORDER — PROCHLORPERAZINE MALEATE 10 MG
10 TABLET ORAL EVERY 6 HOURS PRN
Qty: 30 TABLET | Refills: 2 | Status: SHIPPED | OUTPATIENT
Start: 2020-07-16 | End: 2020-09-23

## 2020-07-16 ASSESSMENT — MIFFLIN-ST. JEOR: SCORE: 1167.97

## 2020-07-16 ASSESSMENT — PAIN SCALES - GENERAL: PAINLEVEL: MODERATE PAIN (4)

## 2020-07-16 NOTE — NURSING NOTE
"Chief Complaint   Patient presents with     RECHECK     Appendix cancer   Complains of worsening abdominal bloating throughout the day to the point of having trouble sleeping.    Initial /88   Pulse 75   Temp 98.4  F (36.9  C) (Oral)   Resp 14   Ht 1.626 m (5' 4.02\")   Wt 65.8 kg (145 lb)   SpO2 98%   BMI 24.88 kg/m   Estimated body mass index is 24.88 kg/m  as calculated from the following:    Height as of this encounter: 1.626 m (5' 4.02\").    Weight as of this encounter: 65.8 kg (145 lb).  Medication Reconciliation: complete    Tiffanie Pastor CMA (AAMA)  "

## 2020-07-16 NOTE — NURSING NOTE
requisition form sent to pathology for tissue testing of recent surgical specimen.  Selena Solorio RN...........7/16/2020 2:10 PM

## 2020-07-17 ENCOUNTER — MYC MEDICAL ADVICE (OUTPATIENT)
Dept: ONCOLOGY | Facility: OTHER | Age: 70
End: 2020-07-17

## 2020-07-17 ENCOUNTER — MYC MEDICAL ADVICE (OUTPATIENT)
Dept: SURGERY | Facility: OTHER | Age: 70
End: 2020-07-17

## 2020-07-17 NOTE — PROGRESS NOTES
Visit Date:   07/16/2020      INDICATIONS FOR PROCEDURE:  Ms. 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  We had seen the patient in consultation at the request of Dr. Tresa Evangelista and MARY Nelson of [facility] as well as Dr. Speedy Menezes at the [facility], where he had seen the patient on 02/07/2019.  At that time, she was a 60-year-old white female with history of migraine headaches.  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  She apparently presented with abdominal discomfort in mid 10/2018 associated with nausea, which progressed to the point where she was seen by MARY Nelson, her primary care provider in 10/2018.  At that time, she presented with nausea and vomiting with associated epigastric pain, left lower quadrant abdominal pain.  MRI of the liver was obtained.  There were findings of hemangioma.  Symptoms persisted.  The patient underwent EGD on 12/07/2018.  There were no significant findings.  Apparently, she progressed to the point where she was having significant projectile vomiting.  On 07/14/2018 and was seen in the [facility] and then transferred to [facility] where she was admitted for small-bowel obstruction.  She was treated initially conservatively with NG suction.  Subsequently, she underwent right hemicolectomy, which revealed a mass in the terminal ileum.  The mass was adherent to the right ovary.  Pathology was read as adenocarcinoma, ex-goblet cell carcinoid of the appendix, was staged pathologic T1b N1 initially.  Tumor was present focally at the posterior retroperitoneal region with 2/23 lymph nodes involved with disease, peritoneal fluid was sent for cytology.  Initially, it  was read as malignant cells.      The patient was referred to the [facility], seen by Dr. Speedy Menezes, medical oncologist at the [facility] 01/16/2019.  Slides were reviewed by the [facility] pathologist and the findings were the patient had peritoneal washings consistent with involvement by adenocarcinoma, ex-goblet cell carcinoid.  Right hemicolectomy specimen revealed adenocarcinoma, ex-goblet cell carcinoid.  Indurated mass was noted diffusely involving the appendix and invading the visceral peritoneum.  Angiolymphatic invasion was present and the retroperitoneal soft tissue.  Margins were positive with 03/21 lymph nodes involved.  Pathologic stage was T4a N1 M1 immunohistochemical staining for DNA mismatch, repair enzymes were intact.  Also, CT of the abdomen and pelvis was performed at the [facility].  Findings were that there were interval postoperative changes in the right hemicolectomy with resolution of previously seen small bowel dilatation obstruction.  There was mild soft tissue edema in the resection bed along the midline incision, which was felt to be secondary to postop status.  No definite peritoneal metastases were identified.  No free fluid.  There was abnormal geographic perfusion with hepatic segments 5 and 8 without evidence of focal hepatic mass, indeterminate mild thickening, left adrenal gland, unchanged.      On 01/15/2019, Dr. Menezes recommended FOLFOX chemotherapy given the fact she had positive cytology and recommended 3 months of FOLFOX chemotherapy followed by imaging at the [facility].  The patient had port placement on 02/17/2019, the plan was to proceed with FOLFOX and 5-FU given at 400 mg/m2 IV bolus with leucovorin bolus oxaliplatin 85 mg/m2 given on day #1 and continuous IV 5-FU 2400 continuous IV over 46 hours on day #1.  The plan was to restage after 3 months of therapy.  The patient completed 4 cycles, 20% dose reduction.  The last 2 cycles dose reduced by 20% due to  neutropenia.  She had staging studies after 4 cycles on 04/09/2019.  The findings were there was no evidence of metastatic disease.  There was evolution of postoperative changes in the abdomen, no evidence of microscopic peritoneal implant disease.  There was nodular opacity in the lung consistent with endobronchial debris, likely due to bronchitis.      The patient went on to complete 8 cycles of chemotherapy then underwent restaging imaging on 06/06/2019 including CT chest, abdomen and pelvis.  Findings were there was no evidence of active disease throughout the chest, abdomen and pelvis.  Nodular foci seen previously were no longer identified suggesting the presence of endobronchial debris on prior study, no evidence of new nodules or left lymphadenopathy, no evidence of intraperitoneal metastatic disease.  The patient otherwise decided that she did not want to go to the [facility].  I discussed the case with Dr. Speedy Menezes who recommended HIPEC chemotherapy may be an option.  Would otherwise recommend observation with serial imaging.  The plan was continuous surveillance.      CT chest, abdomen and pelvis was performed on 08/26/2019.  There was no evidence of metastatic disease.  CEA was less than 3.  When we saw her on 08/28, she wanted her port out and therefore she had her port taken out.  The patient was concerned about weight gain when we saw her in 2019.  The plan was she had continued weight gain then we would repeat scans in 3 months.  The patient did have imaging on 02/20.  CT chest was unremarkable.  There was mild soft tissue thickening in the right pericolic gutter, which was nonspecific.  The patient complained of some abdominal discomfort.  We ordered a PET scan.  This came back essentially negative.  There was mild increased soft tissue in the right pericolic gutter.  There was no associated hypermetabolism to suggest that this was likely postoperative scarring.  The patient subsequently had  repeat scans on 06/17.  CT chest, abdomen and pelvis revealed that there was slowly worsening mesenteric stranding and soft tissue thickening in the right pericolic gutter and anterior peritoneal cavity.  At the same time the patient complained of some vague right-sided abdominal pain as well as abdominal bloating, we did obtain a PET scan, which revealed hypermetabolic tissue within the right lower quadrant pericolic gutter.      The patient subsequently was seen by Dr. Evangelista who proceeded with diagnostic laparoscopy with biopsies and peritoneal washings performed by Dr. Evangelista on 07/08/2020.  She was found to have grossly peritoneal implants throughout the abdomen.  There was a small amount of peritoneal fluid noted.  Numerous apparent peritoneal implants were noted.  Dense tissue was noted in the right lower abdomen.  Biopsies were taken of numerous implants in the left lower quadrant.  Pathology revealed that the patient had poorly differentiated adenocarcinoma with mucin production; metastatic, compatible with appendiceal primary peritoneal fluid cytology was negative.  The patient is now here for further treatment options.  She is not interested in chemotherapy.  She did not tolerate FOLFOX having significant neutropenia.  Since this is progression, we would favor treating with FOLFIRI and bevacizumab.  We actually contacted Dr. Speedy Menezes at the [facility] who originally saw her and agreed with our plan.  .  He would recommend doing 3 months of chemotherapy with FOLFIRI and bevacizumab and then go on to staging studies and then consider HIPEC chemotherapy.  If not, she would continue with chemotherapy.  He also recommended testing for MSI and KRAS to assess the role of immunotherapy or anti-EGFR therapy in the future.  The patient is otherwise willing to proceed.  She says she has increased bloating, diffuse abdominal pain.  She is willing to proceed.  She understands that she has a poor prognosis without  treatment.      PHYSICAL EXAMINATION:   GENERAL:   Middle-aged white female in no acute distress.   VITAL SIGNS:  Reveal blood pressure 130/80, pulse 75, respirations 14, temperature 98.4.   HEENT:  Atraumatic, normocephalic.  Oropharynx nonerythematous.   NECK:  Supple.   LUNGS:  Clear to auscultation and percussion.   HEART:  Regular rhythm, S1, S2 normal.   ABDOMEN:  Distended.  There is some diffuse mild tenderness to palpation, particularly in the right and left lower quadrants, no rebound.   LYMPHATICS:  No cervical or supraclavicular nodes.   EXTREMITIES:  Without edema.   NEUROLOGIC:  Nonfocal.      LABORATORY DATA:  CEA less than 3.  CBC is within normal limits.  BUN 20, creatinine 1.0.  LFTs are normal.      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  The patient was deemed a candidate for chemotherapy for stage IV disease as per Dr. Speedy Menezes of [facility].  The plan was to initiate FOLFOX chemotherapy.  The patient completed 2 cycles.  Course was complicated by neutropenia.  The patient went on to receive 4 cycles, the last 2 requiring 20% dose reduction due resistant neutropenia.  She was staged with no evidence of disease after 8 cycles.  CT of the abdomen and pelvis was stable.  PET scan indicated no evidence of disease.  Repeat scans indicated progression of disease with a diagnostic laparoscopic revealing numerous peritoneal implants with biopsy consistent with poorly differentiated adenocarcinoma, metastatic to the peritoneum consistent with recurrence of her disease, status.      We discussed the case with Dr. Speedy Menezes, who agreed that the patient is a candidate for FOLFIRI and bevacizumab.  We will treat with 6 cycles and then restage.  We also send off tissue for KRAS and MSI testing and BRAF.  Otherwise, we will start as soon as possible.  She will have a  port placed started as soon as possible.  She understands this is palliative chemotherapy and without treatment she has a poor prognosis.      Forty minutes was spent with the patient, greater than half the time was spent in counseling and coordinating of care.            DEEPIKA BARRIOS MD             D: 2020   T: 2020   MT: [id number]      Name:     ALMA CORTES   MRN:      [id number]        Account:      [id number]   :      1950           Visit Date:   2020      Document: [id number]       cc: Isaura Menezes MD

## 2020-07-17 NOTE — H&P (VIEW-ONLY)
Visit Date:   07/16/2020      INDICATIONS FOR PROCEDURE:  Ms. 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  We had seen the patient in consultation at the request of Dr. Tresa Evangelista and MARY Nelson of [facility] as well as Dr. Speedy Menezes at the [facility], where he had seen the patient on 02/07/2019.  At that time, she was a 60-year-old white female with history of migraine headaches.  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  She apparently presented with abdominal discomfort in mid 10/2018 associated with nausea, which progressed to the point where she was seen by MARY Nelson, her primary care provider in 10/2018.  At that time, she presented with nausea and vomiting with associated epigastric pain, left lower quadrant abdominal pain.  MRI of the liver was obtained.  There were findings of hemangioma.  Symptoms persisted.  The patient underwent EGD on 12/07/2018.  There were no significant findings.  Apparently, she progressed to the point where she was having significant projectile vomiting.  On 07/14/2018 and was seen in the [facility] and then transferred to [facility] where she was admitted for small-bowel obstruction.  She was treated initially conservatively with NG suction.  Subsequently, she underwent right hemicolectomy, which revealed a mass in the terminal ileum.  The mass was adherent to the right ovary.  Pathology was read as adenocarcinoma, ex-goblet cell carcinoid of the appendix, was staged pathologic T1b N1 initially.  Tumor was present focally at the posterior retroperitoneal region with 2/23 lymph nodes involved with disease, peritoneal fluid was sent for cytology.  Initially, it  was read as malignant cells.      The patient was referred to the [facility], seen by Dr. Speedy Menezes, medical oncologist at the [place] 01/16/2019.  Slides were reviewed by the [facility] pathologist and the findings were the patient had peritoneal washings consistent with involvement by adenocarcinoma, ex-goblet cell carcinoid.  Right hemicolectomy specimen revealed adenocarcinoma, ex-goblet cell carcinoid.  Indurated mass was noted diffusely involving the appendix and invading the visceral peritoneum.  Angiolymphatic invasion was present and the retroperitoneal soft tissue.  Margins were positive with 03/21 lymph nodes involved.  Pathologic stage was T4a N1 M1 immunohistochemical staining for DNA mismatch, repair enzymes were intact.  Also, CT of the abdomen and pelvis was performed at the [facility].  Findings were that there were interval postoperative changes in the right hemicolectomy with resolution of previously seen small bowel dilatation obstruction.  There was mild soft tissue edema in the resection bed along the midline incision, which was felt to be secondary to postop status.  No definite peritoneal metastases were identified.  No free fluid.  There was abnormal geographic perfusion with hepatic segments 5 and 8 without evidence of focal hepatic mass, indeterminate mild thickening, left adrenal gland, unchanged.      On 01/15/2019, Dr. Menezes recommended FOLFOX chemotherapy given the fact she had positive cytology and recommended 3 months of FOLFOX chemotherapy followed by imaging at the [facility].  The patient had port placement on 02/17/2019, the plan was to proceed with FOLFOX and 5-FU given at 400 mg/m2 IV bolus with leucovorin bolus oxaliplatin 85 mg/m2 given on day #1 and continuous IV 5-FU 2400 continuous IV over 46 hours on day #1.  The plan was to restage after 3 months of therapy.  The patient completed 4 cycles, 20% dose reduction.  The last 2 cycles dose reduced by 20% due to  neutropenia.  She had staging studies after 4 cycles on 04/09/2019.  The findings were there was no evidence of metastatic disease.  There was evolution of postoperative changes in the abdomen, no evidence of microscopic peritoneal implant disease.  There was nodular opacity in the lung consistent with endobronchial debris, likely due to bronchitis.      The patient went on to complete 8 cycles of chemotherapy then underwent restaging imaging on 06/06/2019 including CT chest, abdomen and pelvis.  Findings were there was no evidence of active disease throughout the chest, abdomen and pelvis.  Nodular foci seen previously were no longer identified suggesting the presence of endobronchial debris on prior study, no evidence of new nodules or left lymphadenopathy, no evidence of intraperitoneal metastatic disease.  The patient otherwise decided that she did not want to go to the [facility].  I discussed the case with Dr. Speedy Menezes who recommended HIPEC chemotherapy may be an option.  Would otherwise recommend observation with serial imaging.  The plan was continuous surveillance.      CT chest, abdomen and pelvis was performed on 08/26/2019.  There was no evidence of metastatic disease.  CEA was less than 3.  When we saw her on 08/28, she wanted her port out and therefore she had her port taken out.  The patient was concerned about weight gain when we saw her in 2019.  The plan was she had continued weight gain then we would repeat scans in 3 months.  The patient did have imaging on 02/20.  CT chest was unremarkable.  There was mild soft tissue thickening in the right pericolic gutter, which was nonspecific.  The patient complained of some abdominal discomfort.  We ordered a PET scan.  This came back essentially negative.  There was mild increased soft tissue in the right pericolic gutter.  There was no associated hypermetabolism to suggest that this was likely postoperative scarring.  The patient subsequently had  repeat scans on 06/17.  CT chest, abdomen and pelvis revealed that there was slowly worsening mesenteric stranding and soft tissue thickening in the right pericolic gutter and anterior peritoneal cavity.  At the same time the patient complained of some vague right-sided abdominal pain as well as abdominal bloating, we did obtain a PET scan, which revealed hypermetabolic tissue within the right lower quadrant pericolic gutter.      The patient subsequently was seen by Dr. Evangelista who proceeded with diagnostic laparoscopy with biopsies and peritoneal washings performed by Dr. Evangelista on 07/08/2020.  She was found to have grossly peritoneal implants throughout the abdomen.  There was a small amount of peritoneal fluid noted.  Numerous apparent peritoneal implants were noted.  Dense tissue was noted in the right lower abdomen.  Biopsies were taken of numerous implants in the left lower quadrant.  Pathology revealed that the patient had poorly differentiated adenocarcinoma with mucin production; metastatic, compatible with appendiceal primary peritoneal fluid cytology was negative.  The patient is now here for further treatment options.  She is not interested in chemotherapy.  She did not tolerate FOLFOX having significant neutropenia.  Since this is progression, we would favor treating with FOLFIRI and bevacizumab.  We actually contacted Dr. Speedy Menezes at the [facility] who originally saw her and agreed with our plan.  .  He would recommend doing 3 months of chemotherapy with FOLFIRI and bevacizumab and then go on to staging studies and then consider HIPEC chemotherapy.  If not, she would continue with chemotherapy.  He also recommended testing for MSI and KRAS to assess the role of immunotherapy or anti-EGFR therapy in the future.  The patient is otherwise willing to proceed.  She says she has increased bloating, diffuse abdominal pain.  She is willing to proceed.  She understands that she has a poor prognosis without  treatment.      PHYSICAL EXAMINATION:   GENERAL:   Middle-aged white female in no acute distress.   VITAL SIGNS:  Reveal blood pressure 130/80, pulse 75, respirations 14, temperature 98.4.   HEENT:  Atraumatic, normocephalic.  Oropharynx nonerythematous.   NECK:  Supple.   LUNGS:  Clear to auscultation and percussion.   HEART:  Regular rhythm, S1, S2 normal.   ABDOMEN:  Distended.  There is some diffuse mild tenderness to palpation, particularly in the right and left lower quadrants, no rebound.   LYMPHATICS:  No cervical or supraclavicular nodes.   EXTREMITIES:  Without edema.   NEUROLOGIC:  Nonfocal.      LABORATORY DATA:  CEA less than 3.  CBC is within normal limits.  BUN 20, creatinine 1.0.  LFTs are normal.      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  The patient was deemed a candidate for chemotherapy for stage IV disease as per Dr. Speedy Menezes of [facility].  The plan was to initiate FOLFOX chemotherapy.  The patient completed 2 cycles.  Course was complicated by neutropenia.  The patient went on to receive 4 cycles, the last 2 requiring 20% dose reduction due resistant neutropenia.  She was staged with no evidence of disease after 8 cycles.  CT of the abdomen and pelvis was stable.  PET scan indicated no evidence of disease.  Repeat scans indicated progression of disease with a diagnostic laparoscopic revealing numerous peritoneal implants with biopsy consistent with poorly differentiated adenocarcinoma, metastatic to the peritoneum consistent with recurrence of her disease, status.      We discussed the case with Dr. Speedy Menezes, who agreed that the patient is a candidate for FOLFIRI and bevacizumab.  We will treat with 6 cycles and then restage.  We also send off tissue for KRAS and MSI testing and BRAF.  Otherwise, we will start as soon as possible.  She will have a  port placed started as soon as possible.  She understands this is palliative chemotherapy and without treatment she has a poor prognosis.      Forty minutes was spent with the patient, greater than half the time was spent in counseling and coordinating of care.            DEEPIKA BARRIOS MD             D: 2020   T: 2020   MT: [id number]      Name:     ALMA CORTES   MRN:      [id number]        Account:      [id number]   :      1950           Visit Date:   2020      Document: [id number]       cc: Isaura Menezes MD

## 2020-07-17 NOTE — TELEPHONE ENCOUNTER
Port placement would need to be done in OR.  When would you like this patient scheduled.  Maricruz He LPN........................7/17/2020  9:05 AM

## 2020-07-17 NOTE — TELEPHONE ENCOUNTER
Called patient in regards to mychart message per Dr. Thakkar ordered all pertain test that Dr. Menezes was wanting ordered.  Patient is aware of this.  Patient is waiting for port placement and labs to be prior authorized patient would like Selena PÉREZ to call her on Monday.  Aline Weller RN on 7/17/2020 at 3:36 PM

## 2020-07-19 NOTE — TELEPHONE ENCOUNTER
Can you add Nadya on for Wednesday? And let her know?  I'll order a COVID for Monday...It should be back :) thanks!

## 2020-07-20 ENCOUNTER — MYC MEDICAL ADVICE (OUTPATIENT)
Dept: ONCOLOGY | Facility: OTHER | Age: 70
End: 2020-07-20

## 2020-07-20 NOTE — TELEPHONE ENCOUNTER
Patient aware that scheduling chemotherapy will depend on infusion schedule and staff availability for the day.  Selena Solorio RN...........7/20/2020 10:37 AM

## 2020-07-20 NOTE — TELEPHONE ENCOUNTER
Spoke with patient. Oncology  notified that she would like to start 7/22 if possible  Selena Solorio RN...........7/20/2020 10:36 AM

## 2020-07-20 NOTE — TELEPHONE ENCOUNTER
Would like to start chemotherapy on Wednesday 7/23 after port placed. Will forward message to [facility] oncology .  Selena Solorio RN...........7/20/2020 10:34 AM

## 2020-07-20 NOTE — TELEPHONE ENCOUNTER
Patient called to schedule surgery with Tresa Evangelista MD.  Surgery scheduled for Power port placement  on 7/22/2020.   PAC appointment not scheduled as no appointments available. Patient will be doing a 1 hour COVID test on morning of surgery.  Post op appointment to be scheduled with infusion RN.  Reviewed surgery folder with patient.  Patient declines picking up folder and soap as she states that she already has this at home.   All patient's questions were answered.

## 2020-07-21 ENCOUNTER — ALLIED HEALTH/NURSE VISIT (OUTPATIENT)
Dept: FAMILY MEDICINE | Facility: OTHER | Age: 70
End: 2020-07-21
Attending: SURGERY
Payer: MEDICARE

## 2020-07-21 ENCOUNTER — ANESTHESIA EVENT (OUTPATIENT)
Dept: SURGERY | Facility: OTHER | Age: 70
End: 2020-07-21
Payer: MEDICARE

## 2020-07-21 DIAGNOSIS — Z01.818 PRE-OP TESTING: Primary | ICD-10-CM

## 2020-07-21 LAB
LABORATORY COMMENT REPORT: NORMAL
SARS-COV-2 RNA SPEC QL NAA+PROBE: NEGATIVE
SARS-COV-2 RNA SPEC QL NAA+PROBE: NORMAL
SPECIMEN SOURCE: NORMAL
SPECIMEN SOURCE: NORMAL

## 2020-07-21 PROCEDURE — 87635 SARS-COV-2 COVID-19 AMP PRB: CPT | Mod: ZL | Performed by: SURGERY

## 2020-07-21 PROCEDURE — 99207 ZZC NO CHARGE NURSE ONLY: CPT

## 2020-07-21 PROCEDURE — C9803 HOPD COVID-19 SPEC COLLECT: HCPCS

## 2020-07-21 NOTE — NURSING NOTE
"Chief Complaint   Patient presents with     Covid 19 Testing     Patient swabbed for COVID-19 testing.  Mell Zurita on 7/21/2020 at 12:53 PM        Initial There were no vitals taken for this visit. Estimated body mass index is 24.88 kg/m  as calculated from the following:    Height as of 7/16/20: 1.626 m (5' 4.02\").    Weight as of 7/16/20: 65.8 kg (145 lb).    Medication Reconciliation: complete      Mell Zurita  "

## 2020-07-22 ENCOUNTER — HOSPITAL ENCOUNTER (OUTPATIENT)
Dept: GENERAL RADIOLOGY | Facility: OTHER | Age: 70
End: 2020-07-22
Attending: SURGERY
Payer: MEDICARE

## 2020-07-22 ENCOUNTER — ANESTHESIA (OUTPATIENT)
Dept: SURGERY | Facility: OTHER | Age: 70
End: 2020-07-22
Payer: MEDICARE

## 2020-07-22 ENCOUNTER — HOSPITAL ENCOUNTER (OUTPATIENT)
Facility: OTHER | Age: 70
Discharge: HOME | End: 2020-07-22
Attending: SURGERY | Admitting: SURGERY
Payer: MEDICARE

## 2020-07-22 VITALS
HEART RATE: 74 BPM | DIASTOLIC BLOOD PRESSURE: 68 MMHG | BODY MASS INDEX: 24.75 KG/M2 | TEMPERATURE: 97.2 F | HEIGHT: 64 IN | SYSTOLIC BLOOD PRESSURE: 113 MMHG | WEIGHT: 145 LBS | OXYGEN SATURATION: 97 % | RESPIRATION RATE: 14 BRPM

## 2020-07-22 DIAGNOSIS — C77.2 CANCER OF APPENDIX METASTATIC TO INTRA-ABDOMINAL LYMPH NODE (H): ICD-10-CM

## 2020-07-22 DIAGNOSIS — C18.1 CANCER OF APPENDIX METASTATIC TO INTRA-ABDOMINAL LYMPH NODE (H): ICD-10-CM

## 2020-07-22 DIAGNOSIS — C18.1 MALIGNANT NEOPLASM OF APPENDIX (H): Primary | ICD-10-CM

## 2020-07-22 PROCEDURE — 25000128 H RX IP 250 OP 636: Performed by: SURGERY

## 2020-07-22 PROCEDURE — 71045 X-RAY EXAM CHEST 1 VIEW: CPT

## 2020-07-22 PROCEDURE — 25000132 ZZH RX MED GY IP 250 OP 250 PS 637: Mod: GY | Performed by: SURGERY

## 2020-07-22 PROCEDURE — 36000054 ZZH SURGERY LEVEL 2 W FLUORO 1ST 30 MIN: Performed by: SURGERY

## 2020-07-22 PROCEDURE — 25000125 ZZHC RX 250: Performed by: SURGERY

## 2020-07-22 PROCEDURE — 25800030 ZZH RX IP 258 OP 636: Performed by: NURSE ANESTHETIST, CERTIFIED REGISTERED

## 2020-07-22 PROCEDURE — 27210794 ZZH OR GENERAL SUPPLY STERILE: Performed by: SURGERY

## 2020-07-22 PROCEDURE — 36561 INSERT TUNNELED CV CATH: CPT | Performed by: SURGERY

## 2020-07-22 PROCEDURE — 40000306 ZZH STATISTIC PRE PROC ASSESS II: Performed by: SURGERY

## 2020-07-22 PROCEDURE — 71000014 ZZH RECOVERY PHASE 1 LEVEL 2 FIRST HR: Performed by: SURGERY

## 2020-07-22 PROCEDURE — 37000009 ZZH ANESTHESIA TECHNICAL FEE, EACH ADDTL 15 MIN: Performed by: SURGERY

## 2020-07-22 PROCEDURE — 25000564 ZZH DESFLURANE, EA 15 MIN: Performed by: SURGERY

## 2020-07-22 PROCEDURE — 36561 INSERT TUNNELED CV CATH: CPT | Performed by: NURSE ANESTHETIST, CERTIFIED REGISTERED

## 2020-07-22 PROCEDURE — 25000125 ZZHC RX 250: Performed by: NURSE ANESTHETIST, CERTIFIED REGISTERED

## 2020-07-22 PROCEDURE — 25800030 ZZH RX IP 258 OP 636: Performed by: SURGERY

## 2020-07-22 PROCEDURE — 25000566 ZZH SEVOFLURANE, EA 15 MIN: Performed by: SURGERY

## 2020-07-22 PROCEDURE — 37000008 ZZH ANESTHESIA TECHNICAL FEE, 1ST 30 MIN: Performed by: SURGERY

## 2020-07-22 PROCEDURE — C1788 PORT, INDWELLING, IMP: HCPCS | Performed by: SURGERY

## 2020-07-22 PROCEDURE — 71000027 ZZH RECOVERY PHASE 2 EACH 15 MINS: Performed by: SURGERY

## 2020-07-22 PROCEDURE — 40000278 XR SURGERY CARM FLUORO LESS THAN 5 MIN

## 2020-07-22 PROCEDURE — 36000052 ZZH SURGERY LEVEL 2 EA 15 ADDTL MIN: Performed by: SURGERY

## 2020-07-22 PROCEDURE — 25000128 H RX IP 250 OP 636: Performed by: NURSE ANESTHETIST, CERTIFIED REGISTERED

## 2020-07-22 DEVICE — CATH PORT MRI POWERPORT 8FR 1808060: Type: IMPLANTABLE DEVICE | Site: CHEST | Status: FUNCTIONAL

## 2020-07-22 RX ORDER — SODIUM CHLORIDE, SODIUM LACTATE, POTASSIUM CHLORIDE, CALCIUM CHLORIDE 600; 310; 30; 20 MG/100ML; MG/100ML; MG/100ML; MG/100ML
INJECTION, SOLUTION INTRAVENOUS CONTINUOUS
Status: DISCONTINUED | OUTPATIENT
Start: 2020-07-22 | End: 2020-07-22 | Stop reason: HOSPADM

## 2020-07-22 RX ORDER — EPHEDRINE SULFATE 50 MG/ML
INJECTION, SOLUTION INTRAVENOUS PRN
Status: DISCONTINUED | OUTPATIENT
Start: 2020-07-22 | End: 2020-07-22

## 2020-07-22 RX ORDER — HYDROCODONE BITARTRATE AND ACETAMINOPHEN 5; 325 MG/1; MG/1
1 TABLET ORAL
Status: DISCONTINUED | OUTPATIENT
Start: 2020-07-22 | End: 2020-07-22 | Stop reason: HOSPADM

## 2020-07-22 RX ORDER — PROPOFOL 10 MG/ML
INJECTION, EMULSION INTRAVENOUS PRN
Status: DISCONTINUED | OUTPATIENT
Start: 2020-07-22 | End: 2020-07-22

## 2020-07-22 RX ORDER — ONDANSETRON 4 MG/1
4 TABLET, ORALLY DISINTEGRATING ORAL EVERY 30 MIN PRN
Status: DISCONTINUED | OUTPATIENT
Start: 2020-07-22 | End: 2020-07-22 | Stop reason: HOSPADM

## 2020-07-22 RX ORDER — CEFAZOLIN SODIUM 1 G/50ML
1 INJECTION, SOLUTION INTRAVENOUS SEE ADMIN INSTRUCTIONS
Status: DISCONTINUED | OUTPATIENT
Start: 2020-07-22 | End: 2020-07-22 | Stop reason: HOSPADM

## 2020-07-22 RX ORDER — FENTANYL CITRATE 50 UG/ML
25-50 INJECTION, SOLUTION INTRAMUSCULAR; INTRAVENOUS
Status: DISCONTINUED | OUTPATIENT
Start: 2020-07-22 | End: 2020-07-22 | Stop reason: HOSPADM

## 2020-07-22 RX ORDER — LIDOCAINE HYDROCHLORIDE 20 MG/ML
INJECTION, SOLUTION INFILTRATION; PERINEURAL PRN
Status: DISCONTINUED | OUTPATIENT
Start: 2020-07-22 | End: 2020-07-22

## 2020-07-22 RX ORDER — DEXAMETHASONE SODIUM PHOSPHATE 4 MG/ML
INJECTION, SOLUTION INTRA-ARTICULAR; INTRALESIONAL; INTRAMUSCULAR; INTRAVENOUS; SOFT TISSUE PRN
Status: DISCONTINUED | OUTPATIENT
Start: 2020-07-22 | End: 2020-07-22

## 2020-07-22 RX ORDER — CEFAZOLIN SODIUM 2 G/100ML
2 INJECTION, SOLUTION INTRAVENOUS
Status: COMPLETED | OUTPATIENT
Start: 2020-07-22 | End: 2020-07-22

## 2020-07-22 RX ORDER — MEPERIDINE HYDROCHLORIDE 50 MG/ML
12.5 INJECTION INTRAMUSCULAR; INTRAVENOUS; SUBCUTANEOUS
Status: DISCONTINUED | OUTPATIENT
Start: 2020-07-22 | End: 2020-07-22 | Stop reason: HOSPADM

## 2020-07-22 RX ORDER — HYDROMORPHONE HYDROCHLORIDE 1 MG/ML
.3-.5 INJECTION, SOLUTION INTRAMUSCULAR; INTRAVENOUS; SUBCUTANEOUS EVERY 10 MIN PRN
Status: DISCONTINUED | OUTPATIENT
Start: 2020-07-22 | End: 2020-07-22 | Stop reason: HOSPADM

## 2020-07-22 RX ORDER — LIDOCAINE 40 MG/G
CREAM TOPICAL
Status: DISCONTINUED | OUTPATIENT
Start: 2020-07-22 | End: 2020-07-22 | Stop reason: HOSPADM

## 2020-07-22 RX ORDER — ACETAMINOPHEN 325 MG/1
975 TABLET ORAL ONCE
Status: COMPLETED | OUTPATIENT
Start: 2020-07-22 | End: 2020-07-22

## 2020-07-22 RX ORDER — ONDANSETRON 2 MG/ML
INJECTION INTRAMUSCULAR; INTRAVENOUS PRN
Status: DISCONTINUED | OUTPATIENT
Start: 2020-07-22 | End: 2020-07-22

## 2020-07-22 RX ORDER — BUPIVACAINE HYDROCHLORIDE AND EPINEPHRINE 5; 5 MG/ML; UG/ML
INJECTION, SOLUTION EPIDURAL; INTRACAUDAL; PERINEURAL PRN
Status: DISCONTINUED | OUTPATIENT
Start: 2020-07-22 | End: 2020-07-22 | Stop reason: HOSPADM

## 2020-07-22 RX ORDER — FENTANYL CITRATE 50 UG/ML
INJECTION, SOLUTION INTRAMUSCULAR; INTRAVENOUS PRN
Status: DISCONTINUED | OUTPATIENT
Start: 2020-07-22 | End: 2020-07-22

## 2020-07-22 RX ORDER — ONDANSETRON 2 MG/ML
4 INJECTION INTRAMUSCULAR; INTRAVENOUS EVERY 30 MIN PRN
Status: DISCONTINUED | OUTPATIENT
Start: 2020-07-22 | End: 2020-07-22 | Stop reason: HOSPADM

## 2020-07-22 RX ORDER — KETOROLAC TROMETHAMINE 30 MG/ML
INJECTION, SOLUTION INTRAMUSCULAR; INTRAVENOUS PRN
Status: DISCONTINUED | OUTPATIENT
Start: 2020-07-22 | End: 2020-07-22

## 2020-07-22 RX ORDER — NALOXONE HYDROCHLORIDE 0.4 MG/ML
.1-.4 INJECTION, SOLUTION INTRAMUSCULAR; INTRAVENOUS; SUBCUTANEOUS
Status: DISCONTINUED | OUTPATIENT
Start: 2020-07-22 | End: 2020-07-22 | Stop reason: HOSPADM

## 2020-07-22 RX ADMIN — EPHEDRINE SULFATE 10 MG: 50 INJECTION, SOLUTION INTRAVENOUS at 09:23

## 2020-07-22 RX ADMIN — ACETAMINOPHEN 975 MG: 325 TABLET, FILM COATED ORAL at 08:02

## 2020-07-22 RX ADMIN — CEFAZOLIN SODIUM 2 G: 2 INJECTION, SOLUTION INTRAVENOUS at 09:04

## 2020-07-22 RX ADMIN — SODIUM CHLORIDE, POTASSIUM CHLORIDE, SODIUM LACTATE AND CALCIUM CHLORIDE: 600; 310; 30; 20 INJECTION, SOLUTION INTRAVENOUS at 08:37

## 2020-07-22 RX ADMIN — PROPOFOL 120 MG: 10 INJECTION, EMULSION INTRAVENOUS at 09:07

## 2020-07-22 RX ADMIN — FENTANYL CITRATE 50 MCG: 50 INJECTION, SOLUTION INTRAMUSCULAR; INTRAVENOUS at 09:04

## 2020-07-22 RX ADMIN — EPHEDRINE SULFATE 10 MG: 50 INJECTION, SOLUTION INTRAVENOUS at 09:32

## 2020-07-22 RX ADMIN — DEXAMETHASONE SODIUM PHOSPHATE 4 MG: 4 INJECTION, SOLUTION INTRA-ARTICULAR; INTRALESIONAL; INTRAMUSCULAR; INTRAVENOUS; SOFT TISSUE at 09:07

## 2020-07-22 RX ADMIN — LIDOCAINE HYDROCHLORIDE 40 MG: 20 INJECTION, SOLUTION INFILTRATION; PERINEURAL at 09:07

## 2020-07-22 RX ADMIN — KETOROLAC TROMETHAMINE 15 MG: 30 INJECTION, SOLUTION INTRAMUSCULAR at 09:13

## 2020-07-22 RX ADMIN — ONDANSETRON 4 MG: 2 INJECTION INTRAMUSCULAR; INTRAVENOUS at 09:07

## 2020-07-22 SDOH — HEALTH STABILITY: MENTAL HEALTH: CURRENT SMOKER: 0

## 2020-07-22 ASSESSMENT — MIFFLIN-ST. JEOR: SCORE: 1167.97

## 2020-07-22 NOTE — ANESTHESIA POSTPROCEDURE EVALUATION
Patient: Nadya Flanagan    Procedure(s):  power PORT placement    Diagnosis:Cancer of appendix (H) [C18.1]  Metastatic cancer to intra-abdominal lymph nodes (H) [C77.2]  Diagnosis Additional Information: No value filed.    Anesthesia Type:  General    Note:  Anesthesia Post Evaluation    Patient location during evaluation: Phase 2  Patient participation: Able to fully participate in evaluation  Level of consciousness: awake and alert  Pain management: adequate  Airway patency: patent  Cardiovascular status: acceptable  Respiratory status: acceptable  Hydration status: acceptable  PONV: none             Last vitals:  Vitals:    07/22/20 1000 07/22/20 1010 07/22/20 1015   BP: 128/63 116/69 119/68   Pulse: 72 71 70   Resp: 16 14 14   Temp:      SpO2: 96% 95% 95%         Electronically Signed By: ALBERT ZULETA CRNA  July 22, 2020  10:27 AM

## 2020-07-22 NOTE — ANESTHESIA PREPROCEDURE EVALUATION
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     Pre-diabetes 2019     Thyroid disease      Tricuspid regurgitation      Past Surgical History:   Procedure Laterality Date     FRACTURE TX, HIP RT/LT Right      INSERT PORT VASCULAR ACCESS N/A 2019    Procedure: INSERT PORT VASCULAR ACCESS;  Surgeon: Tresa Evangelista MD;  Location:  OR     LAPAROSCOPY DIAGNOSTIC (GENERAL) N/A 2020    Procedure: LAPAROSCOPY, DIAGNOSTIC, BY GENERAL SURGERY;  Surgeon: Tresa Evangelista MD;  Location:  OR     LAPAROTOMY EXPLORATORY N/A 2018    Procedure: Exploratory Laparotomy with right hemicolectomy;  Surgeon: Tresa Evangelista MD;  Location:  OR     REMOVE CATHETER VASCULAR ACCESS N/A 10/9/2019    Procedure: Remove Port;  Surgeon: Tresa Evangelista MD;  Location:  OR     TONSILLECTOMY       TUBAL LIGATION         Anesthesia Evaluation     . Pt has had prior anesthetic. Type: General and MAC           ROS/MED HX    ENT/Pulmonary:  - neg pulmonary ROS     Neurologic:  - neg neurologic ROS     Cardiovascular:     (+) ----. : . . . :. valvular problems/murmurs MVP:.       METS/Exercise Tolerance:  >4 METS   Hematologic:  - neg hematologic  ROS       Musculoskeletal:  - neg musculoskeletal ROS       GI/Hepatic:  - neg GI/hepatic ROS       Renal/Genitourinary:  - ROS Renal section negative       Endo:     (+) thyroid problem .      Psychiatric:  - neg psychiatric ROS       Infectious Disease:  - neg infectious disease ROS       Malignancy:   (+)  "Malignancy History of GI          Other:    - neg other ROS                      Physical Exam  Normal systems: pulmonary and dental    Airway   Mallampati: I  TM distance: >3 FB  Neck ROM: full    Dental     Cardiovascular   Rhythm and rate: regular and normal      Pulmonary             Lab Results   Component Value Date    WBC 7.2 06/17/2020    HGB 13.1 06/17/2020    HCT 40.5 06/17/2020     06/17/2020     06/17/2020    POTASSIUM 5.1 06/17/2020    CHLORIDE 104 06/17/2020    CO2 27 06/17/2020    BUN 20 06/17/2020    CR 1.00 06/17/2020     (H) 06/17/2020    STELLA 10.2 06/17/2020    ALBUMIN 4.8 06/17/2020    PROTTOTAL 7.9 06/17/2020    ALT 21 06/17/2020    AST 24 06/17/2020    ALKPHOS 73 06/17/2020    BILITOTAL 0.4 06/17/2020    LIPASE 21 12/16/2018    TSH 5.18 (H) 11/12/2018    T4 0.75 08/26/2019       Preop Vitals  BP Readings from Last 3 Encounters:   07/22/20 (!) 146/87   07/16/20 138/88   07/08/20 135/67    Pulse Readings from Last 3 Encounters:   07/16/20 75   07/08/20 74   06/25/20 76      Resp Readings from Last 3 Encounters:   07/22/20 16   07/16/20 14   07/08/20 15    SpO2 Readings from Last 3 Encounters:   07/22/20 98%   07/16/20 98%   07/08/20 94%      Temp Readings from Last 1 Encounters:   07/22/20 97  F (36.1  C) (Tympanic)    Ht Readings from Last 1 Encounters:   07/22/20 1.626 m (5' 4.02\")      Wt Readings from Last 1 Encounters:   07/22/20 65.8 kg (145 lb)    Estimated body mass index is 24.88 kg/m  as calculated from the following:    Height as of this encounter: 1.626 m (5' 4.02\").    Weight as of this encounter: 65.8 kg (145 lb).       Anesthesia Plan      History & Physical Review      ASA Status:  2 .    NPO Status:  > 8 hours    Plan for General with Propofol induction. Maintenance will be Balanced.    PONV prophylaxis:  Dexamethasone or Solumedrol and Ondansetron (or other 5HT-3)    The patient is not a current smoker      Postoperative Care  Postoperative pain management:  " IV analgesics and Multi-modal analgesia.      Consents  Anesthetic plan, risks, benefits and alternatives discussed with:  Patient..                 ALBERT ZULETA CRNA

## 2020-07-22 NOTE — OR NURSING
Central Line Insertion Note/Procedural Checklist      7/22/2020   9:41 AM  Type of Catheter:  MRI compatible power port  Central line was placed using the following Central Line Insertion Checklist:    Hand Hygiene: Yes or NO: Yes.   Maximal Barrier Precautions including Sterile Gown, Hat and Mask:Yes.   Full Body Drape: Yes.    Site cleansed with:  Prep? Yes.   Sterile field maintained: Yes.    During procedure, did the provider:  Wear sterile gloves during catheter insertion? Yes.    Wear hat, mask, and sterile gown? Yes.  Maintain sterile field? yes  Did all staff and patient in the room wear a mask? Yes.    After the procedure:  Was sterile technique maintained when applying dressing? Yes.  Was dressing dated?Yes.    7/22/2020  Name of Procedure MD Dr. Evangelista  Name of RN () Elvie Cordoba RN

## 2020-07-22 NOTE — INTERVAL H&P NOTE
History and Physical Update    The history and physical has been reviewed and the patient has been examined.  There are no changes to the patient's history or physical condition.  I discussed port placement with the patient. She requests right sided placement if possible. Informed consent paperwork was completed.

## 2020-07-22 NOTE — OR NURSING
PACU Transfer Note    Nadya Flanagan was transferred to DSU via cart.  Equipment used for transport:  none.  Accompanied by:  Rn  Prescriptions were: none    PACU Respiratory Event Documentation     1) Episodes of Apnea greater than or equal to 10 seconds: 0    2) Bradypnea - less than 8 breaths per minute: 0    3) Pain score on 0 to 10 scale: 0    4) Pain-sedation mismatch (yes or no): no    5) Repeated 02 desaturation less than 90% (yes or no): no    Anesthesia notified? (yes or no): no    Any of the above events occuring repeatedly in separate 30 minute intervals may be considered recurrent PACU respiratory events.    Patient stable and meets phase 1 discharge criteria for transport from PACU.

## 2020-07-22 NOTE — OP NOTE
Preoperative Diagnosis: appendiceal cancer    Postoperative Diagnosis: same    Procedure planned: Port placement     Procedure performed: right subclavian port placement with US guidance    Surgeon: Tresa Evangelista MD   Circulator: Kendy Stauffer RN; Elvie Cordoba RN  Scrub Person: Cee Nur  X-Ray Technologist: Maria Guadalupe Brown  Pre-Op Nurse: Ramiro Aranda RN    Anesthesia: general, local     Specimen: none     Estimated Blood Loss: Less than 10 ml     INDICATIONS     Please see the H&P.The patient has recurrent appendiceal cancer. The risks, benefits and alternatives to implanted port for chemotherapy were discussed with the patient. We specifically discussed the risks of infection, bleeding, injury to blood vessels and lung, blood clot, port malfunction and the possible need for further procedures. The patient expressed understanding and questions were answered. Informed consent paperwork was completed.     DESCRIPTION OF PROCEDURE     The patient was brought to the operating room and placed in a supine position on the operating table. Appropriate monitors were attached. The patient received IV antibiotics preoperatively. After sedation was administered, the patient was positioned, prepped with Chloraprep and draped in the standard fashion. Time out was performed confirming the patient's identity and procedure to be performed.   Local anesthetic was infiltrated in the skin and subcutaneous tissue in the area below the right clavicle. Cook needle was used to access the right axillary vein under US guidance. There was return of non-pulsatile blood. The guide wire was advanced through the needle and placement confirmed using flouroscopy. The needle was removed and the guide wire was secured. Local anesthetic was infiltrated in the area of planned pocket formation. Skin incision was made sharply and carried down to the subcutaneous tissue. Hemostasis was excellent. The pocket was checked for size and the  port fit without difficulty. The dilator and sheath were advanced over the wire under flouroscopy. The dilator and wire were removed. Thecatheter was flushed with heparinized saline and advanced through the sheath under fluoroscopy. The sheath was then removed with no difficulty. The catheter tip was confirmed in the superior vena cava with fluoroscopy. The catheter was cut at 19 cm and attached to the port and locked in position. The port was positioned in the pocket and secured using Vicryl sutures. The port was accessed and withdrew blood and flushed easily. Deep tissue was approximated using Monocryl suture. Further local anesthetic was infiltrated for post operative pain control. Skin edges were approximated with Monocryl suture.   Sterile dressings were applied. The patient was then awakened from sedation and taken to recovery for chest XR in stable condition. All needle, sponge and instrument counts were reported as correct at the conclusion of the case. The patient tolerated the procedure with no immediately apparent complications.

## 2020-07-22 NOTE — ANESTHESIA CARE TRANSFER NOTE
Patient: Nadya Flanagan    Procedure(s):  power PORT placement    Diagnosis: Cancer of appendix (H) [C18.1]  Metastatic cancer to intra-abdominal lymph nodes (H) [C77.2]  Diagnosis Additional Information: No value filed.    Anesthesia Type:   General     Note:  Airway :Face Mask  Patient transferred to:PACU  Handoff Report: Identifed the Patient, Identified the Reponsible Provider, Reviewed the pertinent medical history, Discussed the surgical course, Reviewed Intra-OP anesthesia mangement and issues during anesthesia, Set expectations for post-procedure period and Allowed opportunity for questions and acknowledgement of understanding      Vitals: (Last set prior to Anesthesia Care Transfer)    CRNA VITALS  7/22/2020 0920 - 7/22/2020 0951      7/22/2020             Resp Rate (observed):  (!) 2    Resp Rate (set):  10                Electronically Signed By: ALBERT Sheldon CRNA  July 22, 2020  9:51 AM

## 2020-07-23 DIAGNOSIS — C18.1 CANCER OF APPENDIX METASTATIC TO INTRA-ABDOMINAL LYMPH NODE (H): Primary | ICD-10-CM

## 2020-07-23 DIAGNOSIS — C77.2 CANCER OF APPENDIX METASTATIC TO INTRA-ABDOMINAL LYMPH NODE (H): Primary | ICD-10-CM

## 2020-07-23 RX ORDER — HEPARIN SODIUM,PORCINE 10 UNIT/ML
5 VIAL (ML) INTRAVENOUS
Status: CANCELLED | OUTPATIENT
Start: 2020-07-30

## 2020-07-23 RX ORDER — ALBUTEROL SULFATE 0.83 MG/ML
2.5 SOLUTION RESPIRATORY (INHALATION)
Status: CANCELLED | OUTPATIENT
Start: 2020-07-28

## 2020-07-23 RX ORDER — FLUOROURACIL 50 MG/ML
400 INJECTION, SOLUTION INTRAVENOUS ONCE
Status: CANCELLED | OUTPATIENT
Start: 2020-07-28

## 2020-07-23 RX ORDER — HEPARIN SODIUM (PORCINE) LOCK FLUSH IV SOLN 100 UNIT/ML 100 UNIT/ML
5 SOLUTION INTRAVENOUS
Status: CANCELLED | OUTPATIENT
Start: 2020-07-28

## 2020-07-23 RX ORDER — FLUOROURACIL 50 MG/ML
400 INJECTION, SOLUTION INTRAVENOUS ONCE
Status: CANCELLED | OUTPATIENT
Start: 2020-08-11

## 2020-07-23 RX ORDER — DIPHENHYDRAMINE HYDROCHLORIDE 50 MG/ML
50 INJECTION INTRAMUSCULAR; INTRAVENOUS
Status: CANCELLED
Start: 2020-08-11

## 2020-07-23 RX ORDER — ALBUTEROL SULFATE 90 UG/1
1-2 AEROSOL, METERED RESPIRATORY (INHALATION)
Status: CANCELLED
Start: 2020-07-28

## 2020-07-23 RX ORDER — NALOXONE HYDROCHLORIDE 0.4 MG/ML
.1-.4 INJECTION, SOLUTION INTRAMUSCULAR; INTRAVENOUS; SUBCUTANEOUS
Status: CANCELLED | OUTPATIENT
Start: 2020-08-11

## 2020-07-23 RX ORDER — NALOXONE HYDROCHLORIDE 0.4 MG/ML
.1-.4 INJECTION, SOLUTION INTRAMUSCULAR; INTRAVENOUS; SUBCUTANEOUS
Status: CANCELLED | OUTPATIENT
Start: 2020-07-28

## 2020-07-23 RX ORDER — MEPERIDINE HYDROCHLORIDE 50 MG/ML
25 INJECTION INTRAMUSCULAR; INTRAVENOUS; SUBCUTANEOUS EVERY 30 MIN PRN
Status: CANCELLED | OUTPATIENT
Start: 2020-08-11

## 2020-07-23 RX ORDER — EPINEPHRINE 1 MG/ML
0.3 INJECTION, SOLUTION, CONCENTRATE INTRAVENOUS EVERY 5 MIN PRN
Status: CANCELLED | OUTPATIENT
Start: 2020-07-28

## 2020-07-23 RX ORDER — HEPARIN SODIUM,PORCINE 10 UNIT/ML
5 VIAL (ML) INTRAVENOUS
Status: CANCELLED | OUTPATIENT
Start: 2020-08-11

## 2020-07-23 RX ORDER — DIPHENHYDRAMINE HYDROCHLORIDE 50 MG/ML
50 INJECTION INTRAMUSCULAR; INTRAVENOUS
Status: CANCELLED
Start: 2020-07-28

## 2020-07-23 RX ORDER — MEPERIDINE HYDROCHLORIDE 50 MG/ML
25 INJECTION INTRAMUSCULAR; INTRAVENOUS; SUBCUTANEOUS EVERY 30 MIN PRN
Status: CANCELLED | OUTPATIENT
Start: 2020-07-28

## 2020-07-23 RX ORDER — LORAZEPAM 2 MG/ML
0.5 INJECTION INTRAMUSCULAR EVERY 4 HOURS PRN
Status: CANCELLED
Start: 2020-08-11

## 2020-07-23 RX ORDER — HEPARIN SODIUM,PORCINE 10 UNIT/ML
5 VIAL (ML) INTRAVENOUS
Status: CANCELLED | OUTPATIENT
Start: 2020-08-13

## 2020-07-23 RX ORDER — HEPARIN SODIUM,PORCINE 10 UNIT/ML
5 VIAL (ML) INTRAVENOUS
Status: CANCELLED | OUTPATIENT
Start: 2020-07-28

## 2020-07-23 RX ORDER — HEPARIN SODIUM (PORCINE) LOCK FLUSH IV SOLN 100 UNIT/ML 100 UNIT/ML
5 SOLUTION INTRAVENOUS
Status: CANCELLED | OUTPATIENT
Start: 2020-07-30

## 2020-07-23 RX ORDER — ALBUTEROL SULFATE 90 UG/1
1-2 AEROSOL, METERED RESPIRATORY (INHALATION)
Status: CANCELLED
Start: 2020-08-11

## 2020-07-23 RX ORDER — HEPARIN SODIUM (PORCINE) LOCK FLUSH IV SOLN 100 UNIT/ML 100 UNIT/ML
5 SOLUTION INTRAVENOUS
Status: CANCELLED | OUTPATIENT
Start: 2020-08-13

## 2020-07-23 RX ORDER — METHYLPREDNISOLONE SODIUM SUCCINATE 125 MG/2ML
125 INJECTION, POWDER, LYOPHILIZED, FOR SOLUTION INTRAMUSCULAR; INTRAVENOUS
Status: CANCELLED
Start: 2020-07-28

## 2020-07-23 RX ORDER — SODIUM CHLORIDE 9 MG/ML
1000 INJECTION, SOLUTION INTRAVENOUS CONTINUOUS PRN
Status: CANCELLED
Start: 2020-07-28

## 2020-07-23 RX ORDER — EPINEPHRINE 1 MG/ML
0.3 INJECTION, SOLUTION, CONCENTRATE INTRAVENOUS EVERY 5 MIN PRN
Status: CANCELLED | OUTPATIENT
Start: 2020-08-11

## 2020-07-23 RX ORDER — SODIUM CHLORIDE 9 MG/ML
1000 INJECTION, SOLUTION INTRAVENOUS CONTINUOUS PRN
Status: CANCELLED
Start: 2020-08-11

## 2020-07-23 RX ORDER — HEPARIN SODIUM (PORCINE) LOCK FLUSH IV SOLN 100 UNIT/ML 100 UNIT/ML
5 SOLUTION INTRAVENOUS
Status: CANCELLED | OUTPATIENT
Start: 2020-08-11

## 2020-07-23 RX ORDER — METHYLPREDNISOLONE SODIUM SUCCINATE 125 MG/2ML
125 INJECTION, POWDER, LYOPHILIZED, FOR SOLUTION INTRAMUSCULAR; INTRAVENOUS
Status: CANCELLED
Start: 2020-08-11

## 2020-07-23 RX ORDER — LORAZEPAM 2 MG/ML
0.5 INJECTION INTRAMUSCULAR EVERY 4 HOURS PRN
Status: CANCELLED
Start: 2020-07-28

## 2020-07-23 RX ORDER — ALBUTEROL SULFATE 0.83 MG/ML
2.5 SOLUTION RESPIRATORY (INHALATION)
Status: CANCELLED | OUTPATIENT
Start: 2020-08-11

## 2020-07-28 ENCOUNTER — HOSPITAL ENCOUNTER (OUTPATIENT)
Dept: INFUSION THERAPY | Facility: OTHER | Age: 70
Discharge: HOME | End: 2020-07-28
Attending: INTERNAL MEDICINE | Admitting: INTERNAL MEDICINE
Payer: MEDICARE

## 2020-07-28 ENCOUNTER — HOME INFUSION (PRE-WILLOW HOME INFUSION) (OUTPATIENT)
Dept: PHARMACY | Facility: CLINIC | Age: 70
End: 2020-07-28

## 2020-07-28 VITALS
WEIGHT: 144.6 LBS | DIASTOLIC BLOOD PRESSURE: 66 MMHG | RESPIRATION RATE: 16 BRPM | HEART RATE: 84 BPM | BODY MASS INDEX: 24.81 KG/M2 | TEMPERATURE: 96.8 F | SYSTOLIC BLOOD PRESSURE: 136 MMHG

## 2020-07-28 DIAGNOSIS — C18.1 CANCER OF APPENDIX METASTATIC TO INTRA-ABDOMINAL LYMPH NODE (H): Primary | ICD-10-CM

## 2020-07-28 DIAGNOSIS — C77.2 CANCER OF APPENDIX METASTATIC TO INTRA-ABDOMINAL LYMPH NODE (H): Primary | ICD-10-CM

## 2020-07-28 LAB
ALBUMIN UR-MCNC: NEGATIVE MG/DL
BASOPHILS # BLD AUTO: 0.1 10E9/L (ref 0–0.2)
BASOPHILS NFR BLD AUTO: 1 %
BILIRUB SERPL-MCNC: 0.5 MG/DL (ref 0.3–1)
DIFFERENTIAL METHOD BLD: NORMAL
EOSINOPHIL # BLD AUTO: 0.2 10E9/L (ref 0–0.7)
EOSINOPHIL NFR BLD AUTO: 2.8 %
ERYTHROCYTE [DISTWIDTH] IN BLOOD BY AUTOMATED COUNT: 13.1 % (ref 10–15)
HCT VFR BLD AUTO: 37.4 % (ref 35–47)
HGB BLD-MCNC: 12.4 G/DL (ref 11.7–15.7)
IMM GRANULOCYTES # BLD: 0 10E9/L (ref 0–0.4)
IMM GRANULOCYTES NFR BLD: 0.4 %
LYMPHOCYTES # BLD AUTO: 1.8 10E9/L (ref 0.8–5.3)
LYMPHOCYTES NFR BLD AUTO: 23.4 %
MCH RBC QN AUTO: 31.2 PG (ref 26.5–33)
MCHC RBC AUTO-ENTMCNC: 33.2 G/DL (ref 31.5–36.5)
MCV RBC AUTO: 94 FL (ref 78–100)
MONOCYTES # BLD AUTO: 0.8 10E9/L (ref 0–1.3)
MONOCYTES NFR BLD AUTO: 10.4 %
NEUTROPHILS # BLD AUTO: 4.7 10E9/L (ref 1.6–8.3)
NEUTROPHILS NFR BLD AUTO: 62 %
PLATELET # BLD AUTO: 301 10E9/L (ref 150–450)
RBC # BLD AUTO: 3.98 10E12/L (ref 3.8–5.2)
WBC # BLD AUTO: 7.6 10E9/L (ref 4–11)

## 2020-07-28 PROCEDURE — 96411 CHEMO IV PUSH ADDL DRUG: CPT

## 2020-07-28 PROCEDURE — 96413 CHEMO IV INFUSION 1 HR: CPT

## 2020-07-28 PROCEDURE — 25000128 H RX IP 250 OP 636: Performed by: INTERNAL MEDICINE

## 2020-07-28 PROCEDURE — 81003 URINALYSIS AUTO W/O SCOPE: CPT | Performed by: INTERNAL MEDICINE

## 2020-07-28 PROCEDURE — 96417 CHEMO IV INFUS EACH ADDL SEQ: CPT

## 2020-07-28 PROCEDURE — 96416 CHEMO PROLONG INFUSE W/PUMP: CPT

## 2020-07-28 PROCEDURE — 96368 THER/DIAG CONCURRENT INF: CPT

## 2020-07-28 PROCEDURE — 85025 COMPLETE CBC W/AUTO DIFF WBC: CPT | Performed by: INTERNAL MEDICINE

## 2020-07-28 PROCEDURE — 25800030 ZZH RX IP 258 OP 636: Performed by: INTERNAL MEDICINE

## 2020-07-28 PROCEDURE — 96415 CHEMO IV INFUSION ADDL HR: CPT

## 2020-07-28 PROCEDURE — 96367 TX/PROPH/DG ADDL SEQ IV INF: CPT

## 2020-07-28 PROCEDURE — 82247 BILIRUBIN TOTAL: CPT | Performed by: INTERNAL MEDICINE

## 2020-07-28 RX ORDER — HEPARIN SODIUM (PORCINE) LOCK FLUSH IV SOLN 100 UNIT/ML 100 UNIT/ML
5 SOLUTION INTRAVENOUS
Status: DISCONTINUED | OUTPATIENT
Start: 2020-07-28 | End: 2020-07-29 | Stop reason: HOSPADM

## 2020-07-28 RX ORDER — FLUOROURACIL 50 MG/ML
700 INJECTION, SOLUTION INTRAVENOUS ONCE
Status: COMPLETED | OUTPATIENT
Start: 2020-07-28 | End: 2020-07-28

## 2020-07-28 RX ADMIN — FLUOROURACIL 700 MG: 50 INJECTION, SOLUTION INTRAVENOUS at 12:06

## 2020-07-28 RX ADMIN — DEXAMETHASONE SODIUM PHOSPHATE: 10 INJECTION, SOLUTION INTRAMUSCULAR; INTRAVENOUS at 08:55

## 2020-07-28 RX ADMIN — BEVACIZUMAB-AWWB 300 MG: 100 INJECTION, SOLUTION INTRAVENOUS at 09:25

## 2020-07-28 RX ADMIN — IRINOTECAN HYDROCHLORIDE 240 MG: 20 INJECTION, SOLUTION INTRAVENOUS at 10:19

## 2020-07-28 RX ADMIN — LEUCOVORIN CALCIUM 700 MG: 350 INJECTION, POWDER, LYOPHILIZED, FOR SUSPENSION INTRAMUSCULAR; INTRAVENOUS at 10:13

## 2020-07-28 RX ADMIN — SODIUM CHLORIDE 250 ML: 9 INJECTION, SOLUTION INTRAVENOUS at 08:54

## 2020-07-28 NOTE — PHARMACY
Pharmacy: NEW INFUSION MEDICATION EDUCATION    Nadya ÁNGEL Flanagan  [address]   [facility] [address]  [phone] (home)   69 year old female  PCP:Meghan Pearce    Allergies   Allergen Reactions     Metrizamide Anaphylaxis     Had contrast dye. Patient reports she woke up in ICU.     Bee Venom      Edema     Pollen Extract      Runny nose     Sulfa Drugs Hives         Summary of Education:   Met with patient today to review new medications to be administered in infusion including bevacizumab, irinotecan, leucovorin, fluorouracil. Discussed cycle length, and home use of as needed medications including loperamide, prochlorperazine, ondansetron and lorazepam. Discussed interaction between NSAIDs and chemotherapy; irinotecan and grapefruit juice. Patient states she has had troubles with mouth sores in the past and plans to rinse her mouth with salt/soda mixture as a preventative.     Materials Provided:   Drug information handouts printed from [facility] on: bevacizumab, irinotecan, leucovorin, fluorouracil    Thank you for the consult.     Kendy Lewis RPH ....................  7/28/2020   1:24 PM

## 2020-07-28 NOTE — PROGRESS NOTES
Infusion Nursing Note:  Nadya Flanagan presents today for MVASI+FOLFIRI CYCLE 1 DAY 1  Patient seen by provider today: No   present during visit today: Not Applicable.    Note: N/A.    Intravenous Access:  Labs drawn without difficulty from port access.  Implanted Port remains bruised from recent insertion, accessed with brisk blood return    Treatment Conditions:  Lab Results   Component Value Date    HGB 12.4 07/28/2020     Lab Results   Component Value Date    WBC 7.6 07/28/2020      Lab Results   Component Value Date    ANEU 4.7 07/28/2020     Lab Results   Component Value Date     07/28/2020      Lab Results   Component Value Date     06/17/2020                   Lab Results   Component Value Date    POTASSIUM 5.1 06/17/2020           No results found for: MAG         Lab Results   Component Value Date    CR 1.00 06/17/2020                   Lab Results   Component Value Date    STELLA 10.2 06/17/2020                Lab Results   Component Value Date    BILITOTAL 0.5 07/28/2020           Lab Results   Component Value Date    ALBUMIN 4.8 06/17/2020                    Lab Results   Component Value Date    ALT 21 06/17/2020           Lab Results   Component Value Date    AST 24 06/17/2020       Results reviewed, labs MET treatment parameters, ok to proceed with treatment.    CHEMOTHERAPY EDUCATION    Nadya Flanagan is a 69 year old female here today for chemotherapy education, accompanied by self. 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 Patient's oncologist is Dr. Thakkar and primary provider is Meghan Pearce    Reviewed the following with the patient and their support person:    General chemotherapy information, including ways it is excreted from the body and cleaning containment of vomitus or other bodily fluid, use of thebathroom, sexual health and intimacy, what to do if needing to miss a  "treatment, when to call the provider and the need for staff to wear protective equipment.  Importance of central line care (port) or IV site care.  Proper use of the home infusion pump, troubleshooting and who to call if there is a malfunction.    Treatment regimen; MVASI + FOLFIRI and rationale for strict adherence, specific medication names including pre-treatment medications and at home scheduled or as needed medications, delivery methods, and side effects and managementincluding; skin changes/hand-foot syndrome, anemia, neutropenia, thrombocytopenia, diarrhea/constipation, hair loss syndrome, memory changes/\"chemobrain\", mouth sores, taste changes, neuropathy, fatigue, myelosuppression,and risk of extravasation or infiltration.  Infection prevention, and monitoring of lab values, what lab tests and what changes of these values meant, along with the possibility of hydration or blood product transfusion,or the need to defer or hold treatment.    General orientation to the Medical Oncology department, InfusionServices department, HUCs/scheduling, bathrooms and usual flow of the treatment day provided as well as introduction to the Infusion nurses.  Patient received written information including Guide to Cancer Services folder, specific drug information guides, approved Internet sources, available community resources, and side effectspecific management pamphlets. Business card with contact information given as well as Patient Navigator contact information.    Barriers to learning noted: none.  Patient  verbalized understanding of all written and verbal information.    Type of treatment: CADD Pump for 46 hrs with 5FU     Other concerns: Patient instructed to call with further questions or concerns. Patient states understanding and is in agreement with this plan. Copy of appointments and AVS (After Visit Summary) given to patient. Patientdischarged ambulatory. Will have labs done today and begin treatment tomorrow if " "parameters are met.  Throughout treatment  educating patient and family member/support person, coordinating infusion and answeringquestions.  Pharmacist met with patient and educated on current treatment and medications.  Patient has received the Home CADD pump in past without concern.    Steffany Urrutia RN .............7/28/2020  1:16 PM       Post Infusion Assessment:  Patient tolerated infusion without incident.  Blood return noted pre and post infusion prior to CADD pump hook up..  Site patent and intact, free from redness, edema or discomfort.  Biologic Infusion Post Education: Call the triage nurse at your clinic or seek medical attention if you have chills and/or temperature greater than or equal to 100.5, uncontrolled nausea/vomiting, diarrhea, constipation, dizziness, shortness of breath, chest pain, heart palpitations, weakness or any other new or concerning symptoms, questions or concerns.  You cannot have any live virus vaccines prior to or during treatment or up to 6 months post infusion.  If you have an upcoming surgery, medical procedure or dental procedure during treatment, this should be discussed with your ordering physician and your surgeon/dentist.  If you are having any concerning symptom, if you are unsure if you should get your next infusion or wish to speak to a provider before your next infusion, please call your care coordinator or triage nurse at your clinic to notify them so we can adequately serve you.   Prior to discharge: Port is secured in place with tegaderm and flushed with 10cc NS with positive blood return noted.  Continuous home infusion CADD pump connected.    All connectors secured in place and clamps taped open.    Pump started, \"running\" noted on display (CADD): YES.  Patient instructed to call our clinic or [facility] with any questions or concerns at home.  Patient verbalized understanding.    Patient set up for pump disconnect at our clinic on 7/30.  "           Discharge Plan:   Patient and/or family verbalized understanding of discharge instructions and all questions answered.  Copy of AVS reviewed with patient and/or family.  Patient will return 7/30 for disconnect for next appointment.  Patient discharged in stable condition accompanied by: self.  Departure Mode: Ambulatory.  Patient discharged after 30 minute wait without concerns and pump functioning.  Steffany Urrutia RN

## 2020-07-29 ENCOUNTER — MEDICAL CORRESPONDENCE (OUTPATIENT)
Dept: HEALTH INFORMATION MANAGEMENT | Facility: OTHER | Age: 70
End: 2020-07-29

## 2020-07-29 NOTE — PROGRESS NOTES
This is a recent snapshot of the patient's [facility] medical record.  For current drug dose and complete information and questions, call [phone] or In Basket pool, [facility] home infusion ([id number])  CSN Number:  [id number]

## 2020-07-30 ENCOUNTER — HOSPITAL ENCOUNTER (OUTPATIENT)
Dept: INFUSION THERAPY | Facility: OTHER | Age: 70
Discharge: HOME | End: 2020-07-30
Attending: INTERNAL MEDICINE | Admitting: INTERNAL MEDICINE
Payer: MEDICARE

## 2020-07-30 DIAGNOSIS — C18.1 CANCER OF APPENDIX METASTATIC TO INTRA-ABDOMINAL LYMPH NODE (H): Primary | ICD-10-CM

## 2020-07-30 DIAGNOSIS — C77.2 CANCER OF APPENDIX METASTATIC TO INTRA-ABDOMINAL LYMPH NODE (H): Primary | ICD-10-CM

## 2020-07-30 PROCEDURE — 25000128 H RX IP 250 OP 636: Performed by: INTERNAL MEDICINE

## 2020-07-30 PROCEDURE — 96523 IRRIG DRUG DELIVERY DEVICE: CPT

## 2020-07-30 RX ORDER — HEPARIN SODIUM (PORCINE) LOCK FLUSH IV SOLN 100 UNIT/ML 100 UNIT/ML
5 SOLUTION INTRAVENOUS
Status: DISCONTINUED | OUTPATIENT
Start: 2020-07-30 | End: 2020-07-31 | Stop reason: HOSPADM

## 2020-07-30 RX ADMIN — Medication 5 ML: at 10:24

## 2020-08-11 ENCOUNTER — HOSPITAL ENCOUNTER (OUTPATIENT)
Dept: INFUSION THERAPY | Facility: OTHER | Age: 70
Discharge: HOME | End: 2020-08-11
Attending: INTERNAL MEDICINE | Admitting: INTERNAL MEDICINE
Payer: MEDICARE

## 2020-08-11 VITALS
TEMPERATURE: 97.1 F | HEART RATE: 63 BPM | BODY MASS INDEX: 24.19 KG/M2 | DIASTOLIC BLOOD PRESSURE: 73 MMHG | SYSTOLIC BLOOD PRESSURE: 150 MMHG | WEIGHT: 141 LBS

## 2020-08-11 DIAGNOSIS — C18.1 CANCER OF APPENDIX METASTATIC TO INTRA-ABDOMINAL LYMPH NODE (H): Primary | ICD-10-CM

## 2020-08-11 DIAGNOSIS — C77.2 CANCER OF APPENDIX METASTATIC TO INTRA-ABDOMINAL LYMPH NODE (H): Primary | ICD-10-CM

## 2020-08-11 LAB
ALBUMIN UR-MCNC: NEGATIVE MG/DL
BASOPHILS # BLD AUTO: 0 10E9/L (ref 0–0.2)
BASOPHILS NFR BLD AUTO: 0.9 %
BILIRUB SERPL-MCNC: 0.5 MG/DL (ref 0.3–1)
DIFFERENTIAL METHOD BLD: ABNORMAL
EOSINOPHIL # BLD AUTO: 0.2 10E9/L (ref 0–0.7)
EOSINOPHIL NFR BLD AUTO: 4.1 %
ERYTHROCYTE [DISTWIDTH] IN BLOOD BY AUTOMATED COUNT: 13.2 % (ref 10–15)
HCT VFR BLD AUTO: 35.1 % (ref 35–47)
HGB BLD-MCNC: 11.6 G/DL (ref 11.7–15.7)
IMM GRANULOCYTES # BLD: 0 10E9/L (ref 0–0.4)
IMM GRANULOCYTES NFR BLD: 0.2 %
LYMPHOCYTES # BLD AUTO: 1.6 10E9/L (ref 0.8–5.3)
LYMPHOCYTES NFR BLD AUTO: 36.3 %
MCH RBC QN AUTO: 30.9 PG (ref 26.5–33)
MCHC RBC AUTO-ENTMCNC: 33 G/DL (ref 31.5–36.5)
MCV RBC AUTO: 94 FL (ref 78–100)
MONOCYTES # BLD AUTO: 0.6 10E9/L (ref 0–1.3)
MONOCYTES NFR BLD AUTO: 13.7 %
NEUTROPHILS # BLD AUTO: 2 10E9/L (ref 1.6–8.3)
NEUTROPHILS NFR BLD AUTO: 44.8 %
PLATELET # BLD AUTO: 244 10E9/L (ref 150–450)
RBC # BLD AUTO: 3.75 10E12/L (ref 3.8–5.2)
WBC # BLD AUTO: 4.4 10E9/L (ref 4–11)

## 2020-08-11 PROCEDURE — 96413 CHEMO IV INFUSION 1 HR: CPT

## 2020-08-11 PROCEDURE — 96368 THER/DIAG CONCURRENT INF: CPT

## 2020-08-11 PROCEDURE — 96417 CHEMO IV INFUS EACH ADDL SEQ: CPT

## 2020-08-11 PROCEDURE — 25800030 ZZH RX IP 258 OP 636: Performed by: INTERNAL MEDICINE

## 2020-08-11 PROCEDURE — 96415 CHEMO IV INFUSION ADDL HR: CPT

## 2020-08-11 PROCEDURE — 96416 CHEMO PROLONG INFUSE W/PUMP: CPT

## 2020-08-11 PROCEDURE — 81003 URINALYSIS AUTO W/O SCOPE: CPT | Performed by: INTERNAL MEDICINE

## 2020-08-11 PROCEDURE — 25000128 H RX IP 250 OP 636: Performed by: INTERNAL MEDICINE

## 2020-08-11 PROCEDURE — 82247 BILIRUBIN TOTAL: CPT | Performed by: INTERNAL MEDICINE

## 2020-08-11 PROCEDURE — 85025 COMPLETE CBC W/AUTO DIFF WBC: CPT | Performed by: INTERNAL MEDICINE

## 2020-08-11 PROCEDURE — 96411 CHEMO IV PUSH ADDL DRUG: CPT

## 2020-08-11 PROCEDURE — 96367 TX/PROPH/DG ADDL SEQ IV INF: CPT

## 2020-08-11 RX ORDER — FLUOROURACIL 50 MG/ML
700 INJECTION, SOLUTION INTRAVENOUS ONCE
Status: COMPLETED | OUTPATIENT
Start: 2020-08-11 | End: 2020-08-11

## 2020-08-11 RX ADMIN — LEUCOVORIN CALCIUM 700 MG: 350 INJECTION, POWDER, LYOPHILIZED, FOR SUSPENSION INTRAMUSCULAR; INTRAVENOUS at 09:50

## 2020-08-11 RX ADMIN — FLUOROURACIL 700 MG: 50 INJECTION, SOLUTION INTRAVENOUS at 11:39

## 2020-08-11 RX ADMIN — BEVACIZUMAB-AWWB 300 MG: 100 INJECTION, SOLUTION INTRAVENOUS at 09:14

## 2020-08-11 RX ADMIN — DEXAMETHASONE SODIUM PHOSPHATE: 10 INJECTION, SOLUTION INTRAMUSCULAR; INTRAVENOUS at 08:52

## 2020-08-11 RX ADMIN — IRINOTECAN HYDROCHLORIDE 240 MG: 20 INJECTION, SOLUTION INTRAVENOUS at 09:50

## 2020-08-11 RX ADMIN — SODIUM CHLORIDE 250 ML: 9 INJECTION, SOLUTION INTRAVENOUS at 08:51

## 2020-08-11 NOTE — PROGRESS NOTES
"Infusion Nursing Note:  Nadya Flanagan presents today for AVASTIN/folfiri.    Patient seen by provider today: Yes: Jenny   present during visit today: Not Applicable.    Note: N/A.    Intravenous Access:  Labs drawn without difficulty.  Implanted Port.    Treatment Conditions:  Lab Results   Component Value Date    HGB 11.6 08/11/2020     Lab Results   Component Value Date    WBC 4.4 08/11/2020      Lab Results   Component Value Date    ANEU 2.0 08/11/2020     Lab Results   Component Value Date     08/11/2020      Lab Results   Component Value Date     06/17/2020                   Lab Results   Component Value Date    POTASSIUM 5.1 06/17/2020           No results found for: MAG         Lab Results   Component Value Date    CR 1.00 06/17/2020                   Lab Results   Component Value Date    STELLA 10.2 06/17/2020                Lab Results   Component Value Date    BILITOTAL 0.5 08/11/2020           Lab Results   Component Value Date    ALBUMIN 4.8 06/17/2020                    Lab Results   Component Value Date    ALT 21 06/17/2020           Lab Results   Component Value Date    AST 24 06/17/2020       Results reviewed, labs MET treatment parameters, ok to proceed with treatment.  Urine negative for proteins.      Post Infusion Assessment:  Patient tolerated infusion without incident.  Blood return noted pre and post infusion.  Site patent and intact, free from redness, edema or discomfort.  No evidence of extravasations.   Prior to discharge: Port is secured in place with tegaderm and flushed with 10cc NS with positive blood return noted.  Continuous home infusion CADD pump connected.    All connectors secured in place and clamps taped open.    Pump started, \"running\" noted on display (CADD): YES.  Patient instructed to call our clinic or [facility] with any questions or concerns at home.  Patient verbalized understanding.    Patient set up for pump disconnect at our " clinic on thursday.      Discharge Plan:   Discharge instructions reviewed with: Patient.  Patient and/or family verbalized understanding of discharge instructions and all questions answered.  Copy of AVS reviewed with patient and/or family.  Patient will return 3 days for next appointment.  Patient discharged in stable condition accompanied by: self.  Departure Mode: Ambulatory.    Jean S. Hammann, RN

## 2020-08-13 ENCOUNTER — HOSPITAL ENCOUNTER (OUTPATIENT)
Dept: INFUSION THERAPY | Facility: OTHER | Age: 70
Discharge: HOME | End: 2020-08-13
Attending: INTERNAL MEDICINE | Admitting: INTERNAL MEDICINE
Payer: MEDICARE

## 2020-08-13 DIAGNOSIS — C18.1 CANCER OF APPENDIX METASTATIC TO INTRA-ABDOMINAL LYMPH NODE (H): Primary | ICD-10-CM

## 2020-08-13 DIAGNOSIS — C77.2 CANCER OF APPENDIX METASTATIC TO INTRA-ABDOMINAL LYMPH NODE (H): Primary | ICD-10-CM

## 2020-08-13 PROCEDURE — 96523 IRRIG DRUG DELIVERY DEVICE: CPT

## 2020-08-13 PROCEDURE — 25000128 H RX IP 250 OP 636: Performed by: INTERNAL MEDICINE

## 2020-08-13 RX ORDER — NALOXONE HYDROCHLORIDE 0.4 MG/ML
.1-.4 INJECTION, SOLUTION INTRAMUSCULAR; INTRAVENOUS; SUBCUTANEOUS
Status: CANCELLED | OUTPATIENT
Start: 2020-08-25

## 2020-08-13 RX ORDER — ALBUTEROL SULFATE 0.83 MG/ML
2.5 SOLUTION RESPIRATORY (INHALATION)
Status: CANCELLED | OUTPATIENT
Start: 2020-08-25

## 2020-08-13 RX ORDER — LORAZEPAM 2 MG/ML
0.5 INJECTION INTRAMUSCULAR EVERY 4 HOURS PRN
Status: CANCELLED
Start: 2020-08-25

## 2020-08-13 RX ORDER — MEPERIDINE HYDROCHLORIDE 50 MG/ML
25 INJECTION INTRAMUSCULAR; INTRAVENOUS; SUBCUTANEOUS EVERY 30 MIN PRN
Status: CANCELLED | OUTPATIENT
Start: 2020-08-25

## 2020-08-13 RX ORDER — HEPARIN SODIUM (PORCINE) LOCK FLUSH IV SOLN 100 UNIT/ML 100 UNIT/ML
5 SOLUTION INTRAVENOUS
Status: DISCONTINUED | OUTPATIENT
Start: 2020-08-13 | End: 2020-08-14 | Stop reason: HOSPADM

## 2020-08-13 RX ORDER — SODIUM CHLORIDE 9 MG/ML
1000 INJECTION, SOLUTION INTRAVENOUS CONTINUOUS PRN
Status: CANCELLED
Start: 2020-08-25

## 2020-08-13 RX ORDER — EPINEPHRINE 1 MG/ML
0.3 INJECTION, SOLUTION, CONCENTRATE INTRAVENOUS EVERY 5 MIN PRN
Status: CANCELLED | OUTPATIENT
Start: 2020-08-25

## 2020-08-13 RX ORDER — HEPARIN SODIUM (PORCINE) LOCK FLUSH IV SOLN 100 UNIT/ML 100 UNIT/ML
5 SOLUTION INTRAVENOUS
Status: CANCELLED | OUTPATIENT
Start: 2020-08-27

## 2020-08-13 RX ORDER — FLUOROURACIL 50 MG/ML
400 INJECTION, SOLUTION INTRAVENOUS ONCE
Status: CANCELLED | OUTPATIENT
Start: 2020-08-25

## 2020-08-13 RX ORDER — HEPARIN SODIUM (PORCINE) LOCK FLUSH IV SOLN 100 UNIT/ML 100 UNIT/ML
5 SOLUTION INTRAVENOUS
Status: CANCELLED | OUTPATIENT
Start: 2020-08-25

## 2020-08-13 RX ORDER — DIPHENHYDRAMINE HYDROCHLORIDE 50 MG/ML
50 INJECTION INTRAMUSCULAR; INTRAVENOUS
Status: CANCELLED
Start: 2020-08-25

## 2020-08-13 RX ORDER — METHYLPREDNISOLONE SODIUM SUCCINATE 125 MG/2ML
125 INJECTION, POWDER, LYOPHILIZED, FOR SOLUTION INTRAMUSCULAR; INTRAVENOUS
Status: CANCELLED
Start: 2020-08-25

## 2020-08-13 RX ORDER — ALBUTEROL SULFATE 90 UG/1
1-2 AEROSOL, METERED RESPIRATORY (INHALATION)
Status: CANCELLED
Start: 2020-08-25

## 2020-08-13 RX ADMIN — Medication 5 ML: at 10:18

## 2020-08-25 ENCOUNTER — HOSPITAL ENCOUNTER (OUTPATIENT)
Dept: INFUSION THERAPY | Facility: OTHER | Age: 70
Discharge: HOME | End: 2020-08-25
Attending: INTERNAL MEDICINE | Admitting: INTERNAL MEDICINE
Payer: MEDICARE

## 2020-08-25 VITALS
TEMPERATURE: 96.2 F | HEART RATE: 58 BPM | SYSTOLIC BLOOD PRESSURE: 138 MMHG | WEIGHT: 142 LBS | DIASTOLIC BLOOD PRESSURE: 75 MMHG | RESPIRATION RATE: 16 BRPM | BODY MASS INDEX: 24.36 KG/M2

## 2020-08-25 DIAGNOSIS — C77.2 CANCER OF APPENDIX METASTATIC TO INTRA-ABDOMINAL LYMPH NODE (H): Primary | ICD-10-CM

## 2020-08-25 DIAGNOSIS — C18.1 CANCER OF APPENDIX METASTATIC TO INTRA-ABDOMINAL LYMPH NODE (H): Primary | ICD-10-CM

## 2020-08-25 LAB
ALBUMIN UR-MCNC: 10 MG/DL
BASOPHILS # BLD AUTO: 0.1 10E9/L (ref 0–0.2)
BASOPHILS NFR BLD AUTO: 1 %
BILIRUB SERPL-MCNC: 0.4 MG/DL (ref 0.3–1)
DIFFERENTIAL METHOD BLD: ABNORMAL
EOSINOPHIL # BLD AUTO: 0.1 10E9/L (ref 0–0.7)
EOSINOPHIL NFR BLD AUTO: 2.2 %
ERYTHROCYTE [DISTWIDTH] IN BLOOD BY AUTOMATED COUNT: 14 % (ref 10–15)
HCT VFR BLD AUTO: 34.7 % (ref 35–47)
HGB BLD-MCNC: 11.7 G/DL (ref 11.7–15.7)
IMM GRANULOCYTES # BLD: 0 10E9/L (ref 0–0.4)
IMM GRANULOCYTES NFR BLD: 0.2 %
LYMPHOCYTES # BLD AUTO: 1.4 10E9/L (ref 0.8–5.3)
LYMPHOCYTES NFR BLD AUTO: 29.2 %
MCH RBC QN AUTO: 31.7 PG (ref 26.5–33)
MCHC RBC AUTO-ENTMCNC: 33.7 G/DL (ref 31.5–36.5)
MCV RBC AUTO: 94 FL (ref 78–100)
MONOCYTES # BLD AUTO: 0.8 10E9/L (ref 0–1.3)
MONOCYTES NFR BLD AUTO: 15.4 %
NEUTROPHILS # BLD AUTO: 2.6 10E9/L (ref 1.6–8.3)
NEUTROPHILS NFR BLD AUTO: 52 %
PLATELET # BLD AUTO: 203 10E9/L (ref 150–450)
RBC # BLD AUTO: 3.69 10E12/L (ref 3.8–5.2)
WBC # BLD AUTO: 4.9 10E9/L (ref 4–11)

## 2020-08-25 PROCEDURE — 96413 CHEMO IV INFUSION 1 HR: CPT

## 2020-08-25 PROCEDURE — 96415 CHEMO IV INFUSION ADDL HR: CPT

## 2020-08-25 PROCEDURE — 81003 URINALYSIS AUTO W/O SCOPE: CPT | Performed by: INTERNAL MEDICINE

## 2020-08-25 PROCEDURE — 25000128 H RX IP 250 OP 636: Performed by: INTERNAL MEDICINE

## 2020-08-25 PROCEDURE — 96417 CHEMO IV INFUS EACH ADDL SEQ: CPT

## 2020-08-25 PROCEDURE — 82247 BILIRUBIN TOTAL: CPT | Performed by: INTERNAL MEDICINE

## 2020-08-25 PROCEDURE — 96411 CHEMO IV PUSH ADDL DRUG: CPT

## 2020-08-25 PROCEDURE — 96368 THER/DIAG CONCURRENT INF: CPT

## 2020-08-25 PROCEDURE — 96367 TX/PROPH/DG ADDL SEQ IV INF: CPT

## 2020-08-25 PROCEDURE — 96416 CHEMO PROLONG INFUSE W/PUMP: CPT

## 2020-08-25 PROCEDURE — 25800030 ZZH RX IP 258 OP 636: Performed by: INTERNAL MEDICINE

## 2020-08-25 PROCEDURE — 85025 COMPLETE CBC W/AUTO DIFF WBC: CPT | Performed by: INTERNAL MEDICINE

## 2020-08-25 RX ORDER — HEPARIN SODIUM (PORCINE) LOCK FLUSH IV SOLN 100 UNIT/ML 100 UNIT/ML
5 SOLUTION INTRAVENOUS
Status: DISCONTINUED | OUTPATIENT
Start: 2020-08-25 | End: 2020-08-26 | Stop reason: HOSPADM

## 2020-08-25 RX ORDER — FLUOROURACIL 50 MG/ML
700 INJECTION, SOLUTION INTRAVENOUS ONCE
Status: COMPLETED | OUTPATIENT
Start: 2020-08-25 | End: 2020-08-25

## 2020-08-25 RX ADMIN — FLUOROURACIL 700 MG: 50 INJECTION, SOLUTION INTRAVENOUS at 13:12

## 2020-08-25 RX ADMIN — DEXAMETHASONE SODIUM PHOSPHATE: 10 INJECTION, SOLUTION INTRAMUSCULAR; INTRAVENOUS at 10:16

## 2020-08-25 RX ADMIN — BEVACIZUMAB-AWWB 300 MG: 100 INJECTION, SOLUTION INTRAVENOUS at 10:42

## 2020-08-25 RX ADMIN — LEUCOVORIN CALCIUM 700 MG: 350 INJECTION, POWDER, LYOPHILIZED, FOR SUSPENSION INTRAMUSCULAR; INTRAVENOUS at 11:16

## 2020-08-25 RX ADMIN — SODIUM CHLORIDE 250 ML: 9 INJECTION, SOLUTION INTRAVENOUS at 10:16

## 2020-08-25 RX ADMIN — IRINOTECAN HYDROCHLORIDE 240 MG: 20 INJECTION, SOLUTION INTRAVENOUS at 11:16

## 2020-08-25 NOTE — PROGRESS NOTES
Infusion Nursing Note:  Nadya Flanagan presents today for MVASI+FOLFOX cycle 3 day 1.    Patient seen by provider today: No   present during visit today: Not Applicable.    Note: Patient verbalizing that she meets with oncologist Thursday and is going to request that her treatment plan be switched to every 3 weeks vs 2 weeks due to some research she has done.    Declined PT as indicated    Intravenous Access:  Labs drawn without difficulty from port access.  Implanted Port accessed with brisk blood return.    Treatment Conditions:  Lab Results   Component Value Date    HGB 11.7 08/25/2020     Lab Results   Component Value Date    WBC 4.9 08/25/2020      Lab Results   Component Value Date    ANEU 2.6 08/25/2020     Lab Results   Component Value Date     08/25/2020      Lab Results   Component Value Date     06/17/2020                   Lab Results   Component Value Date    POTASSIUM 5.1 06/17/2020           No results found for: MAG         Lab Results   Component Value Date    CR 1.00 06/17/2020                   Lab Results   Component Value Date    STELLA 10.2 06/17/2020                Lab Results   Component Value Date    BILITOTAL 0.4 08/25/2020           Lab Results   Component Value Date    ALBUMIN 4.8 06/17/2020                    Lab Results   Component Value Date    ALT 21 06/17/2020           Lab Results   Component Value Date    AST 24 06/17/2020       Results reviewed, labs MET treatment parameters, ok to proceed with treatment.  Urine  Protein 10.      Post Infusion Assessment:  Patient tolerated infusion without incident.  Blood return noted pre and post infusion.  Site patent and intact, free from redness, edema or discomfort.  No evidence of extravasations.  Biologic Infusion Post Education: Call the triage nurse at your clinic or seek medical attention if you have chills and/or temperature greater than or equal to 100.5, uncontrolled nausea/vomiting, diarrhea, constipation,  "dizziness, shortness of breath, chest pain, heart palpitations, weakness or any other new or concerning symptoms, questions or concerns.  You cannot have any live virus vaccines prior to or during treatment or up to 6 months post infusion.  If you have an upcoming surgery, medical procedure or dental procedure during treatment, this should be discussed with your ordering physician and your surgeon/dentist.  If you are having any concerning symptom, if you are unsure if you should get your next infusion or wish to speak to a provider before your next infusion, please call your care coordinator or triage nurse at your clinic to notify them so we can adequately serve you.     Prior to discharge: Port is secured in place with tegaderm and flushed with 10cc NS with positive blood return noted.  Continuous home infusion CADD pump connected.    All connectors secured in place and clamps taped open.    Pump started, \"running\" noted on display (CADD): YES.  Patient instructed to call our clinic or [facility] with any questions or concerns at home.  Patient verbalized understanding.    Patient set up for pump disconnect at our clinic on 8/27.          Discharge Plan:   Patient and/or family verbalized understanding of discharge instructions and all questions answered.  Copy of AVS reviewed with patient and/or family.  Patient will return 8/27 for next appointment for pump disconnect.  Patient discharged in stable condition accompanied by: self.  Departure Mode: Ambulatory.    Steffany Urrutia RN                        "

## 2020-08-27 ENCOUNTER — HOSPITAL ENCOUNTER (OUTPATIENT)
Dept: INFUSION THERAPY | Facility: OTHER | Age: 70
End: 2020-08-27
Attending: INTERNAL MEDICINE
Payer: MEDICARE

## 2020-08-27 ENCOUNTER — ONCOLOGY VISIT (OUTPATIENT)
Dept: ONCOLOGY | Facility: OTHER | Age: 70
End: 2020-08-27
Attending: INTERNAL MEDICINE
Payer: MEDICARE

## 2020-08-27 VITALS
SYSTOLIC BLOOD PRESSURE: 122 MMHG | RESPIRATION RATE: 16 BRPM | DIASTOLIC BLOOD PRESSURE: 70 MMHG | OXYGEN SATURATION: 98 % | TEMPERATURE: 98.3 F | HEART RATE: 75 BPM | BODY MASS INDEX: 24.36 KG/M2 | WEIGHT: 142 LBS

## 2020-08-27 DIAGNOSIS — C77.2 CANCER OF APPENDIX METASTATIC TO INTRA-ABDOMINAL LYMPH NODE (H): Primary | ICD-10-CM

## 2020-08-27 DIAGNOSIS — C18.1 CANCER OF APPENDIX METASTATIC TO INTRA-ABDOMINAL LYMPH NODE (H): Primary | ICD-10-CM

## 2020-08-27 DIAGNOSIS — C78.6 MALIGNANT NEOPLASM METASTATIC TO PERITONEUM (H): ICD-10-CM

## 2020-08-27 DIAGNOSIS — C18.1 MALIGNANT NEOPLASM OF APPENDIX (H): ICD-10-CM

## 2020-08-27 PROCEDURE — 99215 OFFICE O/P EST HI 40 MIN: CPT | Performed by: INTERNAL MEDICINE

## 2020-08-27 PROCEDURE — G0463 HOSPITAL OUTPT CLINIC VISIT: HCPCS

## 2020-08-27 PROCEDURE — 25000128 H RX IP 250 OP 636: Performed by: INTERNAL MEDICINE

## 2020-08-27 RX ORDER — NALOXONE HYDROCHLORIDE 0.4 MG/ML
.1-.4 INJECTION, SOLUTION INTRAMUSCULAR; INTRAVENOUS; SUBCUTANEOUS
Status: CANCELLED | OUTPATIENT
Start: 2020-09-15

## 2020-08-27 RX ORDER — EPINEPHRINE 1 MG/ML
0.3 INJECTION, SOLUTION, CONCENTRATE INTRAVENOUS EVERY 5 MIN PRN
Status: CANCELLED | OUTPATIENT
Start: 2020-09-15

## 2020-08-27 RX ORDER — MEPERIDINE HYDROCHLORIDE 50 MG/ML
25 INJECTION INTRAMUSCULAR; INTRAVENOUS; SUBCUTANEOUS EVERY 30 MIN PRN
Status: CANCELLED | OUTPATIENT
Start: 2020-09-15

## 2020-08-27 RX ORDER — LORAZEPAM 2 MG/ML
0.5 INJECTION INTRAMUSCULAR EVERY 4 HOURS PRN
Status: CANCELLED
Start: 2020-09-15

## 2020-08-27 RX ORDER — HEPARIN SODIUM (PORCINE) LOCK FLUSH IV SOLN 100 UNIT/ML 100 UNIT/ML
500 SOLUTION INTRAVENOUS
Status: COMPLETED | OUTPATIENT
Start: 2020-08-27 | End: 2020-08-27

## 2020-08-27 RX ORDER — ALBUTEROL SULFATE 90 UG/1
1-2 AEROSOL, METERED RESPIRATORY (INHALATION)
Status: CANCELLED
Start: 2020-09-15

## 2020-08-27 RX ORDER — FLUOROURACIL 50 MG/ML
400 INJECTION, SOLUTION INTRAVENOUS ONCE
Status: CANCELLED | OUTPATIENT
Start: 2020-09-15

## 2020-08-27 RX ORDER — SODIUM CHLORIDE 9 MG/ML
1000 INJECTION, SOLUTION INTRAVENOUS CONTINUOUS PRN
Status: CANCELLED
Start: 2020-09-15

## 2020-08-27 RX ORDER — METHYLPREDNISOLONE SODIUM SUCCINATE 125 MG/2ML
125 INJECTION, POWDER, LYOPHILIZED, FOR SOLUTION INTRAMUSCULAR; INTRAVENOUS
Status: CANCELLED
Start: 2020-09-15

## 2020-08-27 RX ORDER — HEPARIN SODIUM (PORCINE) LOCK FLUSH IV SOLN 100 UNIT/ML 100 UNIT/ML
5 SOLUTION INTRAVENOUS
Status: CANCELLED | OUTPATIENT
Start: 2020-09-15

## 2020-08-27 RX ORDER — DIPHENHYDRAMINE HYDROCHLORIDE 50 MG/ML
50 INJECTION INTRAMUSCULAR; INTRAVENOUS
Status: CANCELLED
Start: 2020-09-15

## 2020-08-27 RX ORDER — ALBUTEROL SULFATE 0.83 MG/ML
2.5 SOLUTION RESPIRATORY (INHALATION)
Status: CANCELLED | OUTPATIENT
Start: 2020-09-15

## 2020-08-27 RX ORDER — HEPARIN SODIUM (PORCINE) LOCK FLUSH IV SOLN 100 UNIT/ML 100 UNIT/ML
5 SOLUTION INTRAVENOUS
Status: CANCELLED | OUTPATIENT
Start: 2020-09-24

## 2020-08-27 RX ADMIN — Medication 500 UNITS: at 11:37

## 2020-08-27 ASSESSMENT — PAIN SCALES - GENERAL: PAINLEVEL: NO PAIN (0)

## 2020-08-27 NOTE — NURSING NOTE
"Chief Complaint   Patient presents with     RECHECK     Appendix cancer   Complains of being very tired.    Initial /70   Pulse 75   Temp 98.3  F (36.8  C) (Oral)   Resp 16   Wt 64.4 kg (142 lb)   SpO2 98%   BMI 24.36 kg/m   Estimated body mass index is 24.36 kg/m  as calculated from the following:    Height as of 7/22/20: 1.626 m (5' 4.02\").    Weight as of this encounter: 64.4 kg (142 lb).  Medication Reconciliation: complete    Tiffanie Pastor CMA (AAMA)  "

## 2020-08-27 NOTE — PROGRESS NOTES
Patient returns after CADD pump running with out difficulty for 46 hours, pump reads empty and patient tolerated treatment. Good brisk blood return post infusion and Port heparin locked and de-accessed. Patient returns in two weeks for next infusion.  Steffany Urrutia RN on 8/27/2020 at 11:38 AM

## 2020-08-28 NOTE — PROGRESS NOTES
Visit Date:   08/27/2020      HISTORY OF PRESENT ILLNESS:  Mrs. 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  We had seen the patient in consultation at the request of Dr. Tresa Evangelista and MARY Nelson of [facility], as well as Dr. Speedy Menezes at the [facility], where he had seen the patient on 02/07/2019.  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  She apparently presented with abdominal discomfort in mid-10/2018 associated with nausea which progressed to the point where she was seen by MARY Nelson, her primary care provider, in 10/2018.  At that time, she presented with nausea and vomiting with associated epigastric pain and left lower quadrant abdominal pain.  MRI of the liver was obtained.  There were findings of a hemangioma.  Symptoms persisted.  The patient underwent EGD on 12/07/2018.  There were no significant findings.  Apparently, she progressed to the point where she was having significant projectile vomiting.  On 07/14/2018 she was seen in the [facility] and then transferred to [facility] where she was admitted for small-bowel obstruction.  She was treated initially conservatively with NG suction.  Subsequently, she underwent right hemicolectomy, which revealed a mass in the terminal ileum.  The mass was adherent to the right ovary.  Pathology was read as adenocarcinoma, ex-goblet cell carcinoid of the appendix and was staged pathologic T1b N1 initially.  Tumor was present focally at the posterior retroperitoneal region with 2/23 lymph nodes involved with disease.  Peritoneal fluid was sent for  cytology.  Initially, it was read as malignant cells.  The patient was referred to the [facility], was seen by Dr. Speedy Menezes, medical oncologist at the [facility], on 01/16/2019.  Slides were reviewed by the [facility] pathologist and the findings were the patient had peritoneal washings consistent with involvement by adenocarcinoma, ex-goblet cell carcinoid.  Right hemicolectomy specimen revealed adenocarcinoma, ex-goblet cell carcinoid.  Indurated mass was noted diffusely involving the appendix and invading the visceral peritoneum.  Angiolymphatic invasion was present of retroperitoneal soft tissue.  Margins were positive with 3/21 lymph nodes involved.  Pathologic stage was T4a N1 M1.  Immunohistochemical staining for DNA mismatch repair enzymes were intact.  Also, CT of the abdomen and pelvis was performed at the [facility].  Findings were there were interval postop changes in the right hemicolectomy with resolution of previously seen small bowel dilatation and obstruction.  There was mild soft tissue edema in the resection bed along the midline incision, which was felt to be secondary to postop status.  No definite peritoneal metastases were identified.  No free fluid.  There was abnormal geographic perfusion with hepatic segments 5 and 8 without evidence of focal hepatic mass, indeterminate mild thickening of left adrenal gland, unchanged.  On 07/15/2019 Dr. Menezes recommended FOLFOX chemotherapy given the fact she had positive cytology and recommended 3 months of FOLFOX chemotherapy followed by imaging at the [facility].  The patient had a port placed on 02/17/2019.  The plan was to proceed with FOLFOX and 5-FU given at 400 mg/m2 IV bolus with leucovorin bolus, oxaliplatin 85 mg/m2 given on day 1, continuous IV 5-FU 2400 mg/m2 continuous IV over 46 hours on day 1.  The plan was to restage after 3 months of therapy.  The patient completed 4 cycles with 20% dose reduction.  The last 2 cycles were dose  reduced by 20% due to neutropenia.  She had staging studies after 4 cycles on 04/09/2019.  The findings were that there was no evidence of metastatic disease.  There was evolution of postop changes in the abdomen.  No evidence of microscopic peritoneal implant disease.  There was nodular opacity in the lung consistent with endobronchial debris, likely due to bronchitis.  The patient went on to complete 8 cycles of chemotherapy, then underwent restaging imaging on 06/16/2019 including CT chest, abdomen and pelvis.  Findings were there was no evidence of active disease throughout the chest, abdomen and pelvis.  Nodular foci seen previously were no longer identified suggesting the presence of an endobronchial debris on prior study.  No evidence of new nodules or left lymphadenopathy.  No evidence of intraperitoneal metastatic disease.  The patient otherwise decided that she did not want to go to the [facility].  We discussed the case with Dr. Speedy Menezes, recommended HIPEC chemotherapy may be an option, but otherwise he recommend serial imaging.  The plan was continuous surveillance CT chest, abdomen and pelvis on 08/26/2019 that was negative for metastatic disease.  The patient wanted her port out; therefore, it was taken out on 08/28.  The patient had imaging in 02/20/2020 and CT chest was unremarkable.  There was mild soft tissue thickening in the right pericolic gutter, which was nonspecific.  The patient did complain of some abdominal discomfort.  We ordered a PET scan.  This came back essentially negative.  There was some mild increased soft tissue in the right pericolic gutter.  There was no associated hypermetabolism to suggest that this was likely postoperative scarring.  The patient subsequently had repeat scans on 06/17.  CT chest, abdomen and pelvis revealed that there was slowly worsening mesenteric stranding and soft tissue thickening in the right pericolic gutter and anterior peritoneal cavity.  At  the same time, the patient completed some vague right-sided abdominal pain and abdominal bloating.  We did obtain a PET scan, which revealed hypermetabolic tissue within the right lower quadrant pericolic gutter.  The patient was seen by Dr. Evangelista who proceeded with diagnostic laparoscopy with biopsies and peritoneal washings performed by Dr. Evangelista on 07/08/2020.  She was found to have peritoneal implants throughout the abdomen.  There was a small amount of peritoneal fluid noted.  Peritoneal implants were noted.  Dense tissue was noted in the right lower abdomen.  Biopsies were taken of numerous implants in the left lower quadrant.  Pathology revealed the patient had poorly differentiated adenocarcinoma with mucin production metastatic compatible with appendiceal primary.  Peritoneal fluid cytology was negative.  The patient did not tolerate FOLFOX without significant neutropenia in the past.  Since this was progression, we felt the patient would require FOLFIRI and bevacizumab.  We actually contacted Dr. Speedy Menezes at the [facility] who originally saw her and agreed with our plan.  He recommended 3 months of chemotherapy with FOLFIRI and bevacizumab, then go on to staging studies and then consider HIPEC chemotherapy.  If not, she will continue with chemotherapy.  He also recommended MSI testing.  This came back with MSI intact.  The patient was started on FOLFIRI and Avastin and has received 3 cycles.  She says she gets very extremely fatigued the week after chemotherapy and would like to stretch her cycles out to a q.21 day basis.  She gets some loose stools, but otherwise is tolerating chemotherapy relatively well except for extreme fatigue.  She says she cannot function her whole week and would like to have the cycles extended to every 21 days, which is probably reasonable.  She offers no other complaints of bright red blood per rectum and states her bloating has improved.      PHYSICAL EXAMINATION:    GENERAL:  She is a middle-aged white female in no acute distress.   VITAL SIGNS:  Reveal blood pressure 120/70, pulse 75, respirations 16, temperature 98.3.   HEENT:  Atraumatic, normocephalic.  Oropharynx nonerythematous.   NECK:  Supple.   LUNGS:  Clear to auscultation and percussion.   HEART:  Regular rhythm, S1, S2 normal.   ABDOMEN:  Soft, nontender.  There is no fluid wave.  No masses.   LYMPHATICS:  No cervical, supraclavicular or axillary nodes.   EXTREMITIES:  No edema.   NEUROLOGIC:  Nonfocal.      LABORATORY DATA:  Laboratories reveal CBC with white count 4.9, H and H 11.7 and 34.7, platelet count 203, total bilirubin is 0.4.      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  The patient was deemed a candidate for chemotherapy for stage IV disease as per Dr. Speedy Menezes of [facility].  The plan was to initiate FOLFOX chemotherapy.  The patient completed 2 cycles.  Course was complicated by neutropenia.  The patient went on to receive 4 cycles, the last 2 requiring 20% dose reduction due to neutropenia.  She was staged with no evidence of disease after 8 cycles.  CT of the abdomen and pelvis was stable.  PET scan indicated no evidence of disease.  Repeat scans indicated progression of disease.  A diagnostic laparoscopy revealed numerous peritoneal implants with biopsy consistent with poorly differentiated adenocarcinoma metastatic to the peritoneum consistent with recurrence of her disease status.  I discussed the case with Dr. Speedy Menezes at the [facility] who agreed that the patient is a candidate for FOLFIRI and bevacizumab.  The plan was to treat with 6 cycles and then restage.  MSI testing was intact.  The patient received 3 cycles on a q.14 day schedule.  She says as her performance status worsens the second week, she would like to extend the cycles to 21 days, which  is probably reasonable.  Therefore, we will treat with FOLFIRI on a q.21 day basis and use bevacizumab 7.5 mg/kg on a q.21 day basis and restage after 3 more cycles of chemotherapy.      Forty minutes was spent with the patient, greater than half the time was spent in counseling and coordination of care.         DEEPIKA BARRIOS MD             D: 2020   T: 2020   MT: FRANCISCO      Name:     ALMA CORTES   MRN:      [id number]        Account:      [id number]   :      1950           Visit Date:   2020      Document: [id number]       cc: Isaura Menezes MD

## 2020-08-31 ENCOUNTER — MYC MEDICAL ADVICE (OUTPATIENT)
Dept: ONCOLOGY | Facility: OTHER | Age: 70
End: 2020-08-31

## 2020-08-31 ENCOUNTER — MYC MEDICAL ADVICE (OUTPATIENT)
Dept: INTERNAL MEDICINE | Facility: OTHER | Age: 70
End: 2020-08-31

## 2020-09-01 NOTE — TELEPHONE ENCOUNTER
Would you like to squeeze her in somewhere? She is supposed to be scheduled for a physical.  Jeimy Santos CMA([facility])..................9/1/2020   8:30 AM

## 2020-09-03 DIAGNOSIS — E03.9 HYPOTHYROIDISM, UNSPECIFIED TYPE: ICD-10-CM

## 2020-09-04 DIAGNOSIS — E03.9 HYPOTHYROIDISM, UNSPECIFIED TYPE: Primary | ICD-10-CM

## 2020-09-04 RX ORDER — LEVOTHYROXINE SODIUM 25 UG/1
25 TABLET ORAL DAILY
Qty: 90 TABLET | Refills: 4 | Status: SHIPPED | OUTPATIENT
Start: 2020-09-04 | End: 2021-01-01

## 2020-09-04 NOTE — TELEPHONE ENCOUNTER
We will refill but patient needs to stop in and have a current TSH drawn.  I will place the order.  Please let patient know she can have this done the next time she is having any labs done.

## 2020-09-04 NOTE — TELEPHONE ENCOUNTER
"Requested Prescriptions   Pending Prescriptions Disp Refills     levothyroxine (SYNTHROID/LEVOTHROID) 25 MCG tablet [Pharmacy Med Name: levothyroxine 25 mcg tablet] 90 tablet 4     Sig: Take 1 tablet (25 mcg) by mouth daily       Thyroid Protocol Failed - 9/3/2020 10:33 AM        Failed - Normal TSH on file in past 12 months     Recent Labs   Lab Test 11/12/18   TSH 5.18*              Passed - Patient is 12 years or older        Passed - Recent (12 mo) or future (30 days) visit within the authorizing provider's specialty     Patient has had an office visit with the authorizing provider or a provider within the authorizing providers department within the previous 12 mos or has a future within next 30 days. See \"Patient Info\" tab in inbasket, or \"Choose Columns\" in Meds & Orders section of the refill encounter.              Passed - Medication is active on med list        Passed - No active pregnancy on record     If patient is pregnant or has had a positive pregnancy test, please check TSH.          Passed - No positive pregnancy test in past 12 months     If patient is pregnant or has had a positive pregnancy test, please check TSH.                 Last Written Prescription Date:  08/26/2019  Last Fill Quantity: 90,   # refills: 4  Last Office Visit: 06/17/2020 with Marge Maldonado NP   Future Office visit:    Next 5 appointments (look out 90 days)    Sep 18, 2020 10:40 AM CDT  PHYSICAL with Meghan Pearce DO  [facility] ([facility] [address]  [place] [address]  [phone]   Oct 06, 2020 11:15 AM CDT  Return Visit with ALBERT [facility] and [facility] ([facility]) [address]  [place] [address]  [phone]      Unable to complete prescription refill per RN medication refill policy. Will route to provider for review and consideration.  Aline Weller RN on 9/4/2020 at 8:54 AM     "

## 2020-09-04 NOTE — TELEPHONE ENCOUNTER
Called patient, she will have it drawn the next time she comes in for chemo.  Arlet Hallman LPN .......9/4/2020 1:36 PM

## 2020-09-09 NOTE — TELEPHONE ENCOUNTER
Please schedule her on Tuesday the 15th at noon or Thursday the 17th at 240 for annual physical/multiple issues.  Okay to be 20 minutes.

## 2020-09-14 ENCOUNTER — MYC MEDICAL ADVICE (OUTPATIENT)
Dept: INTERNAL MEDICINE | Facility: OTHER | Age: 70
End: 2020-09-14

## 2020-09-14 DIAGNOSIS — E03.9 HYPOTHYROIDISM, UNSPECIFIED TYPE: Primary | ICD-10-CM

## 2020-09-14 NOTE — TELEPHONE ENCOUNTER
Per the last note from August 31 I had requested that patient's appointment be changed.  This was never done.  Please schedule her on Thursday with me at 240 as previously requested and let patient know.

## 2020-09-14 NOTE — TELEPHONE ENCOUNTER
Appointment changed to Thursday, 10/17/20 at 2:40 per Dr Airam Santos CMA([facility])..................9/14/2020   2:46 PM

## 2020-09-15 ENCOUNTER — HOSPITAL ENCOUNTER (OUTPATIENT)
Dept: INFUSION THERAPY | Facility: OTHER | Age: 70
Discharge: HOME | End: 2020-09-15
Attending: INTERNAL MEDICINE | Admitting: INTERNAL MEDICINE
Payer: MEDICARE

## 2020-09-15 VITALS
BODY MASS INDEX: 24.57 KG/M2 | HEART RATE: 72 BPM | TEMPERATURE: 96.6 F | WEIGHT: 143.2 LBS | RESPIRATION RATE: 18 BRPM | DIASTOLIC BLOOD PRESSURE: 63 MMHG | SYSTOLIC BLOOD PRESSURE: 115 MMHG

## 2020-09-15 DIAGNOSIS — E03.9 HYPOTHYROIDISM, UNSPECIFIED TYPE: ICD-10-CM

## 2020-09-15 DIAGNOSIS — C18.1 CANCER OF APPENDIX METASTATIC TO INTRA-ABDOMINAL LYMPH NODE (H): Primary | ICD-10-CM

## 2020-09-15 DIAGNOSIS — C77.2 CANCER OF APPENDIX METASTATIC TO INTRA-ABDOMINAL LYMPH NODE (H): Primary | ICD-10-CM

## 2020-09-15 LAB
ALBUMIN SERPL-MCNC: 4 G/DL (ref 3.5–5.7)
ALBUMIN UR-MCNC: NEGATIVE MG/DL
ALP SERPL-CCNC: 80 U/L (ref 34–104)
ALT SERPL W P-5'-P-CCNC: 43 U/L (ref 7–52)
ANION GAP SERPL CALCULATED.3IONS-SCNC: 5 MMOL/L (ref 3–14)
AST SERPL W P-5'-P-CCNC: 34 U/L (ref 13–39)
BASOPHILS # BLD AUTO: 0.1 10E9/L (ref 0–0.2)
BASOPHILS NFR BLD AUTO: 1.7 %
BILIRUB SERPL-MCNC: 0.6 MG/DL (ref 0.3–1)
BUN SERPL-MCNC: 16 MG/DL (ref 7–25)
CALCIUM SERPL-MCNC: 9.1 MG/DL (ref 8.6–10.3)
CEA SERPL-MCNC: <3 NG/ML
CHLORIDE SERPL-SCNC: 105 MMOL/L (ref 98–107)
CO2 SERPL-SCNC: 28 MMOL/L (ref 21–31)
CREAT SERPL-MCNC: 1.03 MG/DL (ref 0.6–1.2)
DIFFERENTIAL METHOD BLD: ABNORMAL
EOSINOPHIL # BLD AUTO: 0.2 10E9/L (ref 0–0.7)
EOSINOPHIL NFR BLD AUTO: 5.4 %
ERYTHROCYTE [DISTWIDTH] IN BLOOD BY AUTOMATED COUNT: 16.9 % (ref 10–15)
GFR SERPL CREATININE-BSD FRML MDRD: 53 ML/MIN/{1.73_M2}
GLUCOSE SERPL-MCNC: 102 MG/DL (ref 70–105)
HCT VFR BLD AUTO: 35.7 % (ref 35–47)
HGB BLD-MCNC: 11.8 G/DL (ref 11.7–15.7)
IMM GRANULOCYTES # BLD: 0 10E9/L (ref 0–0.4)
IMM GRANULOCYTES NFR BLD: 1.1 %
LYMPHOCYTES # BLD AUTO: 1.7 10E9/L (ref 0.8–5.3)
LYMPHOCYTES NFR BLD AUTO: 47.5 %
MCH RBC QN AUTO: 32.3 PG (ref 26.5–33)
MCHC RBC AUTO-ENTMCNC: 33.1 G/DL (ref 31.5–36.5)
MCV RBC AUTO: 98 FL (ref 78–100)
MONOCYTES # BLD AUTO: 0.8 10E9/L (ref 0–1.3)
MONOCYTES NFR BLD AUTO: 23.4 %
NEUTROPHILS # BLD AUTO: 0.7 10E9/L (ref 1.6–8.3)
NEUTROPHILS NFR BLD AUTO: 20.9 %
PLATELET # BLD AUTO: 210 10E9/L (ref 150–450)
POTASSIUM SERPL-SCNC: 4.3 MMOL/L (ref 3.5–5.1)
PROT SERPL-MCNC: 6.7 G/DL (ref 6.4–8.9)
RBC # BLD AUTO: 3.65 10E12/L (ref 3.8–5.2)
SODIUM SERPL-SCNC: 138 MMOL/L (ref 134–144)
TSH SERPL DL<=0.05 MIU/L-ACNC: 5.45 IU/ML (ref 0.34–5.6)
WBC # BLD AUTO: 3.6 10E9/L (ref 4–11)

## 2020-09-15 PROCEDURE — 80053 COMPREHEN METABOLIC PANEL: CPT | Performed by: INTERNAL MEDICINE

## 2020-09-15 PROCEDURE — 25000128 H RX IP 250 OP 636: Performed by: INTERNAL MEDICINE

## 2020-09-15 PROCEDURE — 36591 DRAW BLOOD OFF VENOUS DEVICE: CPT

## 2020-09-15 PROCEDURE — 81003 URINALYSIS AUTO W/O SCOPE: CPT | Performed by: INTERNAL MEDICINE

## 2020-09-15 PROCEDURE — 85025 COMPLETE CBC W/AUTO DIFF WBC: CPT | Performed by: INTERNAL MEDICINE

## 2020-09-15 PROCEDURE — 84443 ASSAY THYROID STIM HORMONE: CPT | Performed by: INTERNAL MEDICINE

## 2020-09-15 PROCEDURE — 82378 CARCINOEMBRYONIC ANTIGEN: CPT | Performed by: INTERNAL MEDICINE

## 2020-09-15 RX ORDER — HEPARIN SODIUM (PORCINE) LOCK FLUSH IV SOLN 100 UNIT/ML 100 UNIT/ML
5 SOLUTION INTRAVENOUS
Status: DISCONTINUED | OUTPATIENT
Start: 2020-09-15 | End: 2020-09-16 | Stop reason: HOSPADM

## 2020-09-15 RX ADMIN — Medication 5 ML: at 11:07

## 2020-09-15 NOTE — PROGRESS NOTES
Infusion Nursing Note:  Nadya Flanagan presents today for folfiri mvasi.    Patient seen by provider today: No   present during visit today: Not Applicable.    Note: patient reports having a really rough past three weeks since her last treatment with a lot of side effects and very fatigued.    Intravenous Access:  Labs drawn without difficulty.  Implanted Port.    Treatment Conditions:  Lab Results   Component Value Date    HGB 11.8 09/15/2020     Lab Results   Component Value Date    WBC 3.6 09/15/2020      Lab Results   Component Value Date    ANEU 0.7 09/15/2020     Lab Results   Component Value Date     09/15/2020      Lab Results   Component Value Date     09/15/2020                   Lab Results   Component Value Date    POTASSIUM 4.3 09/15/2020           No results found for: MAG         Lab Results   Component Value Date    CR 1.03 09/15/2020                   Lab Results   Component Value Date    STELLA 9.1 09/15/2020                Lab Results   Component Value Date    BILITOTAL 0.6 09/15/2020           Lab Results   Component Value Date    ALBUMIN 4.0 09/15/2020                    Lab Results   Component Value Date    ALT 43 09/15/2020           Lab Results   Component Value Date    AST 34 09/15/2020       Results reviewed, labs did NOT meet treatment parameters: ANC 0.7 SRP notified of TX hold and assessment.      Post Infusion Assessment:  Site patent and intact, free from redness, edema or discomfort.  No evidence of extravasations.  Access discontinued per protocol.       Discharge Plan:   Discharge instructions reviewed with: Patient.  Patient and/or family verbalized understanding of discharge instructions and all questions answered.  Copy of AVS reviewed with patient and/or family.  Patient will return in one week for next appointment.  Patient discharged in stable condition accompanied by: self.  Departure Mode: Ambulatory.    Elizabeth Chaidez RN

## 2020-09-17 ENCOUNTER — OFFICE VISIT (OUTPATIENT)
Dept: INTERNAL MEDICINE | Facility: OTHER | Age: 70
End: 2020-09-17
Attending: INTERNAL MEDICINE
Payer: MEDICARE

## 2020-09-17 VITALS
HEIGHT: 64 IN | TEMPERATURE: 96.6 F | RESPIRATION RATE: 12 BRPM | SYSTOLIC BLOOD PRESSURE: 162 MMHG | BODY MASS INDEX: 24.45 KG/M2 | DIASTOLIC BLOOD PRESSURE: 80 MMHG | HEART RATE: 74 BPM | WEIGHT: 143.2 LBS

## 2020-09-17 DIAGNOSIS — C78.6 MALIGNANT NEOPLASM METASTATIC TO PERITONEUM (H): ICD-10-CM

## 2020-09-17 DIAGNOSIS — Z23 NEED FOR 23-POLYVALENT PNEUMOCOCCAL POLYSACCHARIDE VACCINE: ICD-10-CM

## 2020-09-17 DIAGNOSIS — E07.9 THYROID CONDITION: Primary | ICD-10-CM

## 2020-09-17 DIAGNOSIS — Z79.899 HIGH RISK MEDICATION USE: ICD-10-CM

## 2020-09-17 DIAGNOSIS — M54.2 NECK PAIN ON LEFT SIDE: ICD-10-CM

## 2020-09-17 LAB — INTERPRETATION ECG - MUSE: NORMAL

## 2020-09-17 PROCEDURE — G0463 HOSPITAL OUTPT CLINIC VISIT: HCPCS | Mod: 25

## 2020-09-17 PROCEDURE — 93010 ELECTROCARDIOGRAM REPORT: CPT | Performed by: INTERNAL MEDICINE

## 2020-09-17 PROCEDURE — 93005 ELECTROCARDIOGRAM TRACING: CPT

## 2020-09-17 PROCEDURE — 90732 PPSV23 VACC 2 YRS+ SUBQ/IM: CPT

## 2020-09-17 PROCEDURE — 99215 OFFICE O/P EST HI 40 MIN: CPT | Performed by: INTERNAL MEDICINE

## 2020-09-17 PROCEDURE — G0009 ADMIN PNEUMOCOCCAL VACCINE: HCPCS

## 2020-09-17 ASSESSMENT — PAIN SCALES - GENERAL: PAINLEVEL: MILD PAIN (3)

## 2020-09-17 ASSESSMENT — MIFFLIN-ST. JEOR: SCORE: 1146.61

## 2020-09-17 NOTE — PROGRESS NOTES
Chief Complaint   Patient presents with     RECHECK         HPI: Ms. Flanagan is a 70 year old female who presents today for annual review.    She has a history of hypothyroidism.  She is on levothyroxine.  She is due for recheck of this.  She denies any signs of high or low thyroid.    She had an episode of neck pain.  This occurred once last week.  It is better now.  It was present when she woke up from sleep.  She is concerned that it is related to her heart although she denies any other cardiopulmonary symptoms.    She has been undergoing treatment of her metastatic appendiceal cancer.  She is on Avastatin.  She most recently was unable to do her treatment as her counts were low.  She has found that being off of it she has some increased energy.  She was originally diagnosed in December 2018.  She does have some neuropathy that has been coming back in her hands which was present with her prior treatment.  She tries to stay active even if she is not feeling well.    She is due for pneumonia vaccine.    History is discussed and updated on 9/17/2020 with patient.  It is current to the best of my knowledge as below.    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     Pre-diabetes 2/12/2019     Thyroid disease      Tricuspid regurgitation         Past Surgical History:   Procedure Laterality Date     FRACTURE TX, HIP RT/LT Right 2013     INSERT PORT VASCULAR ACCESS N/A 2/7/2019    Procedure: INSERT PORT VASCULAR ACCESS;  Surgeon: Tresa Evangelista MD;  Location:  OR     INSERT PORT VASCULAR ACCESS Right 7/22/2020    Procedure: power PORT placement;  Surgeon: Tresa Evangelista MD;  Location:  OR     LAPAROSCOPY DIAGNOSTIC (GENERAL) N/A 7/8/2020    Procedure: LAPAROSCOPY, DIAGNOSTIC, BY GENERAL  SURGERY;  Surgeon: Tresa Evangelista MD;  Location: [facility] OR     LAPAROTOMY EXPLORATORY N/A 12/18/2018    Procedure: Exploratory Laparotomy with right hemicolectomy;  Surgeon: Tresa Evangelista MD;  Location:  OR     REMOVE CATHETER VASCULAR ACCESS N/A 10/9/2019    Procedure: Remove Port;  Surgeon: Tresa Evangelista MD;  Location: [facility] OR     TONSILLECTOMY  1962     TUBAL LIGATION  1979         Current Outpatient Medications   Medication Sig Dispense Refill     acetaminophen (TYLENOL) 500 MG tablet Take 500-1,000 mg by mouth every 6 hours as needed for mild pain       cetirizine (ZYRTEC) 10 MG tablet Take 10 mg by mouth daily       cyanocobalamin 500 MCG TABS Take 500 mcg by mouth daily       Lactobacillus (FLORAJEN ACIDOPHILUS) CAPS        levothyroxine (SYNTHROID/LEVOTHROID) 25 MCG tablet Take 1 tablet (25 mcg) by mouth daily 90 tablet 4     Multiple Vitamin (MULTI-VITAMINS) TABS Take 1 tablet by mouth daily       pyridOXINE (VITAMIN B6) 100 MG TABS        UNABLE TO FIND [facility] immune support, take 2 by mouth daily       diclofenac (VOLTAREN) 1 % topical gel        fluocinonide (LIDEX) 0.05 % external gel        ibuprofen (ADVIL/MOTRIN) 200 MG tablet Take 400 mg by mouth every 6 hours as needed for mild pain       lidocaine-prilocaine (EMLA) 2.5-2.5 % external cream Apply to port site 60 minutes before needle and cover with occlusive dressing. 30 g 3     naproxen sodium 220 MG capsule        polyethylene glycol (MIRALAX/GLYCOLAX) powder Take 1 capful by mouth daily as needed        trolamine salicylate (ASPERCREME) 10 % external cream Apply to affected area as needed         Allergies   Allergen Reactions     Metrizamide Anaphylaxis     Had contrast dye. Patient reports she woke up in ICU.     Bee Venom      Edema     Pollen Extract      Runny nose     Sulfa Drugs Hives        Family History   Problem Relation Age of Onset     Tuberculosis Mother      Chronic Obstructive Pulmonary Disease Mother      Heart Disease  "Father         massive MI     Heart Disease Brother         stents/CABG     Glaucoma Brother      Lung Cancer Brother         mesothelioma     Hypertension Brother        Family Status   Relation Name Status     Mo       Fa       Bro 1      Bro 2 Alive        Social History     Tobacco Use     Smoking status: Passive Smoke Exposure - Never Smoker     Smokeless tobacco: Never Used     Tobacco comment: Lived with a pipe smoker for 15 years   Substance Use Topics     Alcohol use: No     Frequency: Never       Social History     Social History Narrative    , L.L. ([place]).  Live in [place].  2 biological son ([place]) and daughter ([place]) and 1 step daughter ([place]).               ROS  GEN:-fevers/-chills/-night sweats/-wt change  LUNGS: -sob/-cough  CARDIOVASCULAR: -cp/-palpitations  GI: -pain/-change in bowels/-bloody stools  : -change in bladder  PSYCH:-depression/-anxiety     EXAM:   BP (!) 162/80 (BP Location: Right arm, Patient Position: Sitting, Cuff Size: Adult Regular)   Pulse 74   Temp 96.6  F (35.9  C) (Tympanic)   Resp 12   Ht 1.613 m (5' 3.5\")   Wt 65 kg (143 lb 3.2 oz)   BMI 24.97 kg/m    Estimated body mass index is 24.97 kg/m  as calculated from the following:    Height as of this encounter: 1.613 m (5' 3.5\").    Weight as of this encounter: 65 kg (143 lb 3.2 oz).      GEN: Vitals reviewed. Healthy appearing. Patient is in no acute distress. Cooperative with exam.  HEENT: Normocephalic atraumatic.  Eyes grossly normal to inspection.  No discharge or erythema, or obvious scleral/conjunctival abnormalities.   CV: Heart regular in rate and rhythm with no murmur.    LUNGS: No audible wheeze, cough, or visible cyanosis.  No visible retractions or increased work of breathing.  Lungs clear to auscultation bilaterally.    ABD:  Nondistended  SKIN: Warm and dry to touch.  Visible skin clear. No significant rash, abnormal pigmentation or lesions.  EXT: No clubbing or cyanosis.  " No peripheral edema.  NEURO: Alert and oriented to person, place, and time.  Cranial nerves II-XII grossly intact with no focal or lateralizing deficits.  Muscle tone normal.  Gait normal. No tremor.   MSK: ROM of upper and lower ext symmetric and full.  PSYCH: Mood is good.  Mentation appears normal, affect normal/bright, judgement and insight intact, normal speech and appearance well-groomed.       ASSESSMENT AND PLAN:    1. Need for 23-polyvalent pneumococcal polysaccharide vaccine  - given today    2. Thyroid condition  - thyroid normal within the year, med renewed prior    3. Neck pain on left side  Given her concern that her neck pain is related to cardiac disease we did obtain an EKG today which is normal.  If she has any recurrence she is to let me know.  Additionally the adverse effects from Avastatin are reviewed and it does not appear ascvd or arrhythmia are common  - EKG 12-lead, tracing only    4. High risk medication use  - EKG 12-lead, tracing only    5. Malignant neoplasm metastatic to peritoneum (H)  We long discussion regarding ongoing treatment.  At this time she would like to continue her treatment.  She has several questions regarding what she has been hearing from oncology.  Her questions were answered.  She was informed that there is no right or wrong with continued treatment and it all depends on how well she tolerates chemotherapy.  She is to call if at any point she has any further questions or concerns.         A total of 44 minutes spent with in face-to-face consultation of this patient with greater than 50% spent in counseling and care coordination of above listed medical problems including diagnosis, treatment options with emphasis on risks and benefits of each,prognosis and importance of compliance for each.     Return in about 1 year (around 9/17/2021) for Annual Review.      DESI KIM DO   9/17/2020 3:36 PM    This document was prepared using voice generated softwear. While every  attempt was made for accuracy, spelling and grammatical errors may exist.

## 2020-09-17 NOTE — NURSING NOTE
"Patient presents to clinic today for follow up to discuss her health issues. She wonders about getting a flu shot and Pneumovax, but her white count is currently \"dangerously low\" per infusion nurses, so she isn't sure she still should.  Medication reconciliation completed.  Jeimy Santos CMA([facility])..................9/17/2020   2:56 PM      Immunization Documentation    Prior to Immunization administration, verified patients identity using patient's name and date of birth. Please see IMMUNIZATIONS  and order for additional information.  Patient / Parent instructed to remain in clinic for 15 minutes and report any adverse reaction to staff immediately.    Was entire vial of medication used? Yes  Vial/Syringe: Syringe    Jeimy Santos CMA([facility]).............9/17/2020  4:12 PM     "

## 2020-09-21 DIAGNOSIS — C18.1 CANCER OF APPENDIX METASTATIC TO INTRA-ABDOMINAL LYMPH NODE (H): Primary | ICD-10-CM

## 2020-09-21 DIAGNOSIS — C77.2 CANCER OF APPENDIX METASTATIC TO INTRA-ABDOMINAL LYMPH NODE (H): Primary | ICD-10-CM

## 2020-09-21 RX ORDER — DIPHENHYDRAMINE HYDROCHLORIDE 50 MG/ML
50 INJECTION INTRAMUSCULAR; INTRAVENOUS
Status: CANCELLED
Start: 2020-09-22

## 2020-09-21 RX ORDER — SODIUM CHLORIDE 9 MG/ML
1000 INJECTION, SOLUTION INTRAVENOUS CONTINUOUS PRN
Status: CANCELLED
Start: 2020-09-22

## 2020-09-21 RX ORDER — ALBUTEROL SULFATE 0.83 MG/ML
2.5 SOLUTION RESPIRATORY (INHALATION)
Status: CANCELLED | OUTPATIENT
Start: 2020-09-22

## 2020-09-21 RX ORDER — NALOXONE HYDROCHLORIDE 0.4 MG/ML
.1-.4 INJECTION, SOLUTION INTRAMUSCULAR; INTRAVENOUS; SUBCUTANEOUS
Status: CANCELLED | OUTPATIENT
Start: 2020-09-22

## 2020-09-21 RX ORDER — LORAZEPAM 2 MG/ML
0.5 INJECTION INTRAMUSCULAR EVERY 4 HOURS PRN
Status: CANCELLED
Start: 2020-09-22

## 2020-09-21 RX ORDER — ALBUTEROL SULFATE 90 UG/1
1-2 AEROSOL, METERED RESPIRATORY (INHALATION)
Status: CANCELLED
Start: 2020-09-22

## 2020-09-21 RX ORDER — METHYLPREDNISOLONE SODIUM SUCCINATE 125 MG/2ML
125 INJECTION, POWDER, LYOPHILIZED, FOR SOLUTION INTRAMUSCULAR; INTRAVENOUS
Status: CANCELLED
Start: 2020-09-22

## 2020-09-21 RX ORDER — EPINEPHRINE 1 MG/ML
0.3 INJECTION, SOLUTION, CONCENTRATE INTRAVENOUS EVERY 5 MIN PRN
Status: CANCELLED | OUTPATIENT
Start: 2020-09-22

## 2020-09-21 RX ORDER — ATROPINE SULFATE 0.4 MG/ML
0.4 AMPUL (ML) INJECTION
Status: CANCELLED
Start: 2020-09-22

## 2020-09-21 RX ORDER — HEPARIN SODIUM (PORCINE) LOCK FLUSH IV SOLN 100 UNIT/ML 100 UNIT/ML
5 SOLUTION INTRAVENOUS
Status: CANCELLED | OUTPATIENT
Start: 2020-09-22

## 2020-09-21 RX ORDER — FLUOROURACIL 50 MG/ML
400 INJECTION, SOLUTION INTRAVENOUS ONCE
Status: CANCELLED | OUTPATIENT
Start: 2020-09-22

## 2020-09-21 RX ORDER — MEPERIDINE HYDROCHLORIDE 50 MG/ML
25 INJECTION INTRAMUSCULAR; INTRAVENOUS; SUBCUTANEOUS EVERY 30 MIN PRN
Status: CANCELLED | OUTPATIENT
Start: 2020-09-22

## 2020-09-22 ENCOUNTER — HOSPITAL ENCOUNTER (OUTPATIENT)
Dept: INFUSION THERAPY | Facility: OTHER | Age: 70
Discharge: HOME | End: 2020-09-22
Attending: INTERNAL MEDICINE | Admitting: INTERNAL MEDICINE
Payer: MEDICARE

## 2020-09-22 VITALS
SYSTOLIC BLOOD PRESSURE: 140 MMHG | WEIGHT: 142.6 LBS | RESPIRATION RATE: 16 BRPM | HEART RATE: 71 BPM | BODY MASS INDEX: 24.86 KG/M2 | TEMPERATURE: 96.8 F | DIASTOLIC BLOOD PRESSURE: 70 MMHG

## 2020-09-22 DIAGNOSIS — C77.2 CANCER OF APPENDIX METASTATIC TO INTRA-ABDOMINAL LYMPH NODE (H): Primary | ICD-10-CM

## 2020-09-22 DIAGNOSIS — C18.1 CANCER OF APPENDIX METASTATIC TO INTRA-ABDOMINAL LYMPH NODE (H): Primary | ICD-10-CM

## 2020-09-22 LAB
ALBUMIN UR-MCNC: NEGATIVE MG/DL
BASOPHILS # BLD AUTO: 0.1 10E9/L (ref 0–0.2)
BASOPHILS NFR BLD AUTO: 1.2 %
DIFFERENTIAL METHOD BLD: ABNORMAL
EOSINOPHIL # BLD AUTO: 0.2 10E9/L (ref 0–0.7)
EOSINOPHIL NFR BLD AUTO: 2.5 %
ERYTHROCYTE [DISTWIDTH] IN BLOOD BY AUTOMATED COUNT: 16.1 % (ref 10–15)
HCT VFR BLD AUTO: 34.6 % (ref 35–47)
HGB BLD-MCNC: 11.3 G/DL (ref 11.7–15.7)
IMM GRANULOCYTES # BLD: 0.1 10E9/L (ref 0–0.4)
IMM GRANULOCYTES NFR BLD: 2.1 %
LYMPHOCYTES # BLD AUTO: 1.6 10E9/L (ref 0.8–5.3)
LYMPHOCYTES NFR BLD AUTO: 25.2 %
MCH RBC QN AUTO: 31.7 PG (ref 26.5–33)
MCHC RBC AUTO-ENTMCNC: 32.7 G/DL (ref 31.5–36.5)
MCV RBC AUTO: 97 FL (ref 78–100)
MONOCYTES # BLD AUTO: 0.9 10E9/L (ref 0–1.3)
MONOCYTES NFR BLD AUTO: 14.3 %
NEUTROPHILS # BLD AUTO: 3.6 10E9/L (ref 1.6–8.3)
NEUTROPHILS NFR BLD AUTO: 54.7 %
PLATELET # BLD AUTO: 282 10E9/L (ref 150–450)
RBC # BLD AUTO: 3.56 10E12/L (ref 3.8–5.2)
WBC # BLD AUTO: 6.5 10E9/L (ref 4–11)

## 2020-09-22 PROCEDURE — 96417 CHEMO IV INFUS EACH ADDL SEQ: CPT

## 2020-09-22 PROCEDURE — 85025 COMPLETE CBC W/AUTO DIFF WBC: CPT | Performed by: NURSE PRACTITIONER

## 2020-09-22 PROCEDURE — 96411 CHEMO IV PUSH ADDL DRUG: CPT

## 2020-09-22 PROCEDURE — 81003 URINALYSIS AUTO W/O SCOPE: CPT | Performed by: NURSE PRACTITIONER

## 2020-09-22 PROCEDURE — 25000128 H RX IP 250 OP 636: Performed by: NURSE PRACTITIONER

## 2020-09-22 PROCEDURE — 96413 CHEMO IV INFUSION 1 HR: CPT

## 2020-09-22 PROCEDURE — 96368 THER/DIAG CONCURRENT INF: CPT

## 2020-09-22 PROCEDURE — 25800030 ZZH RX IP 258 OP 636: Performed by: NURSE PRACTITIONER

## 2020-09-22 PROCEDURE — 96367 TX/PROPH/DG ADDL SEQ IV INF: CPT

## 2020-09-22 PROCEDURE — 96416 CHEMO PROLONG INFUSE W/PUMP: CPT

## 2020-09-22 RX ORDER — EPINEPHRINE 1 MG/ML
0.3 INJECTION, SOLUTION, CONCENTRATE INTRAVENOUS EVERY 5 MIN PRN
Status: CANCELLED | OUTPATIENT
Start: 2020-10-13

## 2020-09-22 RX ORDER — SODIUM CHLORIDE 9 MG/ML
1000 INJECTION, SOLUTION INTRAVENOUS CONTINUOUS PRN
Status: CANCELLED
Start: 2020-10-13

## 2020-09-22 RX ORDER — MEPERIDINE HYDROCHLORIDE 25 MG/ML
25 INJECTION INTRAMUSCULAR; INTRAVENOUS; SUBCUTANEOUS EVERY 30 MIN PRN
Status: CANCELLED | OUTPATIENT
Start: 2020-10-13

## 2020-09-22 RX ORDER — HEPARIN SODIUM (PORCINE) LOCK FLUSH IV SOLN 100 UNIT/ML 100 UNIT/ML
5 SOLUTION INTRAVENOUS
Status: CANCELLED | OUTPATIENT
Start: 2020-10-13

## 2020-09-22 RX ORDER — HEPARIN SODIUM (PORCINE) LOCK FLUSH IV SOLN 100 UNIT/ML 100 UNIT/ML
5 SOLUTION INTRAVENOUS
Status: DISCONTINUED | OUTPATIENT
Start: 2020-09-22 | End: 2020-09-23 | Stop reason: HOSPADM

## 2020-09-22 RX ORDER — LORAZEPAM 2 MG/ML
0.5 INJECTION INTRAMUSCULAR EVERY 4 HOURS PRN
Status: CANCELLED
Start: 2020-10-13

## 2020-09-22 RX ORDER — FLUOROURACIL 50 MG/ML
700 INJECTION, SOLUTION INTRAVENOUS ONCE
Status: COMPLETED | OUTPATIENT
Start: 2020-09-22 | End: 2020-09-22

## 2020-09-22 RX ORDER — ALBUTEROL SULFATE 90 UG/1
1-2 AEROSOL, METERED RESPIRATORY (INHALATION)
Status: CANCELLED
Start: 2020-10-13

## 2020-09-22 RX ORDER — METHYLPREDNISOLONE SODIUM SUCCINATE 125 MG/2ML
125 INJECTION, POWDER, LYOPHILIZED, FOR SOLUTION INTRAMUSCULAR; INTRAVENOUS
Status: CANCELLED
Start: 2020-10-13

## 2020-09-22 RX ORDER — HEPARIN SODIUM (PORCINE) LOCK FLUSH IV SOLN 100 UNIT/ML 100 UNIT/ML
5 SOLUTION INTRAVENOUS
Status: CANCELLED | OUTPATIENT
Start: 2020-10-23

## 2020-09-22 RX ORDER — ALBUTEROL SULFATE 0.83 MG/ML
2.5 SOLUTION RESPIRATORY (INHALATION)
Status: CANCELLED | OUTPATIENT
Start: 2020-10-13

## 2020-09-22 RX ORDER — DIPHENHYDRAMINE HYDROCHLORIDE 50 MG/ML
50 INJECTION INTRAMUSCULAR; INTRAVENOUS
Status: CANCELLED
Start: 2020-10-13

## 2020-09-22 RX ORDER — FLUOROURACIL 50 MG/ML
400 INJECTION, SOLUTION INTRAVENOUS ONCE
Status: CANCELLED | OUTPATIENT
Start: 2020-10-13

## 2020-09-22 RX ORDER — NALOXONE HYDROCHLORIDE 0.4 MG/ML
.1-.4 INJECTION, SOLUTION INTRAMUSCULAR; INTRAVENOUS; SUBCUTANEOUS
Status: CANCELLED | OUTPATIENT
Start: 2020-10-13

## 2020-09-22 RX ADMIN — IRINOTECAN HYDROCHLORIDE 240 MG: 20 INJECTION, SOLUTION INTRAVENOUS at 11:52

## 2020-09-22 RX ADMIN — SODIUM CHLORIDE 250 ML: 9 INJECTION, SOLUTION INTRAVENOUS at 10:20

## 2020-09-22 RX ADMIN — LEUCOVORIN CALCIUM 700 MG: 350 INJECTION, POWDER, LYOPHILIZED, FOR SUSPENSION INTRAMUSCULAR; INTRAVENOUS at 11:46

## 2020-09-22 RX ADMIN — FLUOROURACIL 700 MG: 50 INJECTION, SOLUTION INTRAVENOUS at 13:42

## 2020-09-22 RX ADMIN — DEXAMETHASONE SODIUM PHOSPHATE: 10 INJECTION, SOLUTION INTRAMUSCULAR; INTRAVENOUS at 10:20

## 2020-09-22 RX ADMIN — BEVACIZUMAB-AWWB 500 MG: 400 INJECTION, SOLUTION INTRAVENOUS at 10:42

## 2020-09-22 NOTE — PROGRESS NOTES
"Infusion Nursing Note:  Nadya Flanagan presents today for MVASI+FOLFIRI.    Patient seen by provider today: No   present during visit today: Not Applicable.    Note: N/A.    Intravenous Access:  Labs drawn without difficulty from port access.  Implanted Port accessed with blood return.    Treatment Conditions:  Lab Results   Component Value Date    HGB 11.3 09/22/2020     Lab Results   Component Value Date    WBC 6.5 09/22/2020      Lab Results   Component Value Date    ANEU 3.6 09/22/2020     Lab Results   Component Value Date     09/22/2020      Lab Results   Component Value Date     09/15/2020                   Lab Results   Component Value Date    POTASSIUM 4.3 09/15/2020           No results found for: MAG         Lab Results   Component Value Date    CR 1.03 09/15/2020                   Lab Results   Component Value Date    STELLA 9.1 09/15/2020                Lab Results   Component Value Date    BILITOTAL 0.6 09/15/2020           Lab Results   Component Value Date    ALBUMIN 4.0 09/15/2020                    Lab Results   Component Value Date    ALT 43 09/15/2020           Lab Results   Component Value Date    AST 34 09/15/2020       Results reviewed, labs MET treatment parameters, ok to proceed with treatment.  Spoke to pharmacist and CMP/BILI/TSH not indicated due to they were drawn last week.  Prior to discharge: Port is secured in place with tegaderm and flushed with 10cc NS with positive blood return noted.  Continuous home infusion CADD pump connected.    All connectors secured in place and clamps taped open.  Patient verbalized frustration with the white adaptor and having difficulty getting comfortable with attachment and pump.  Pump started, \"running\" noted on display (CADD): YES.  Patient instructed to call our clinic or [facility] with any questions or concerns at home.  Patient verbalized understanding.    Patient set up for pump disconnect at our clinic on " 9/24.        Post Infusion Assessment:  Patient tolerated infusion without incident.  Blood return noted pre and post infusion.  Site patent and intact, free from redness, edema or discomfort.  No evidence of extravasations.  Biologic Infusion Post Education: Call the triage nurse at your clinic or seek medical attention if you have chills and/or temperature greater than or equal to 100.5, uncontrolled nausea/vomiting, diarrhea, constipation, dizziness, shortness of breath, chest pain, heart palpitations, weakness or any other new or concerning symptoms, questions or concerns.  You cannot have any live virus vaccines prior to or during treatment or up to 6 months post infusion.  If you have an upcoming surgery, medical procedure or dental procedure during treatment, this should be discussed with your ordering physician and your surgeon/dentist.  If you are having any concerning symptom, if you are unsure if you should get your next infusion or wish to speak to a provider before your next infusion, please call your care coordinator or triage nurse at your clinic to notify them so we can adequately serve you.       Discharge Plan:   Patient and/or family verbalized understanding of discharge instructions and all questions answered.  Copy of AVS reviewed with patient and/or family.  Patient will return 9/24 for next appointment.  Patient discharged in stable condition accompanied by: self.  Departure Mode: Ambulatory.    Steffany Urrutia RN

## 2020-09-24 ENCOUNTER — HOSPITAL ENCOUNTER (OUTPATIENT)
Dept: INFUSION THERAPY | Facility: OTHER | Age: 70
Discharge: HOME | End: 2020-09-24
Attending: INTERNAL MEDICINE | Admitting: INTERNAL MEDICINE
Payer: MEDICARE

## 2020-09-24 DIAGNOSIS — C77.2 CANCER OF APPENDIX METASTATIC TO INTRA-ABDOMINAL LYMPH NODE (H): Primary | ICD-10-CM

## 2020-09-24 DIAGNOSIS — C18.1 CANCER OF APPENDIX METASTATIC TO INTRA-ABDOMINAL LYMPH NODE (H): Primary | ICD-10-CM

## 2020-09-24 PROCEDURE — 25000128 H RX IP 250 OP 636: Performed by: INTERNAL MEDICINE

## 2020-09-24 PROCEDURE — 96523 IRRIG DRUG DELIVERY DEVICE: CPT

## 2020-09-24 RX ORDER — HEPARIN SODIUM (PORCINE) LOCK FLUSH IV SOLN 100 UNIT/ML 100 UNIT/ML
5 SOLUTION INTRAVENOUS
Status: DISCONTINUED | OUTPATIENT
Start: 2020-09-24 | End: 2020-09-25 | Stop reason: HOSPADM

## 2020-09-24 RX ADMIN — HEPARIN 5 ML: 100 SYRINGE at 12:05

## 2020-09-24 NOTE — ADDENDUM NOTE
Encounter addended by: Steffany Urrutia RN on: 9/24/2020 8:45 AM   Actions taken: MAR administration accepted

## 2020-10-13 ENCOUNTER — ONCOLOGY VISIT (OUTPATIENT)
Dept: ONCOLOGY | Facility: OTHER | Age: 70
End: 2020-10-13
Attending: NURSE PRACTITIONER
Payer: MEDICARE

## 2020-10-13 ENCOUNTER — HOSPITAL ENCOUNTER (OUTPATIENT)
Dept: INFUSION THERAPY | Facility: OTHER | Age: 70
End: 2020-10-13
Attending: INTERNAL MEDICINE
Payer: MEDICARE

## 2020-10-13 ENCOUNTER — MYC MEDICAL ADVICE (OUTPATIENT)
Dept: ONCOLOGY | Facility: OTHER | Age: 70
End: 2020-10-13

## 2020-10-13 VITALS
BODY MASS INDEX: 24.86 KG/M2 | DIASTOLIC BLOOD PRESSURE: 68 MMHG | HEART RATE: 65 BPM | WEIGHT: 142.6 LBS | TEMPERATURE: 96.3 F | RESPIRATION RATE: 18 BRPM | SYSTOLIC BLOOD PRESSURE: 147 MMHG

## 2020-10-13 DIAGNOSIS — C18.1 CANCER OF APPENDIX METASTATIC TO INTRA-ABDOMINAL LYMPH NODE (H): Primary | ICD-10-CM

## 2020-10-13 DIAGNOSIS — Z91.041 CONTRAST MEDIA ALLERGY: Primary | ICD-10-CM

## 2020-10-13 DIAGNOSIS — C77.2 CANCER OF APPENDIX METASTATIC TO INTRA-ABDOMINAL LYMPH NODE (H): ICD-10-CM

## 2020-10-13 DIAGNOSIS — C77.2 CANCER OF APPENDIX METASTATIC TO INTRA-ABDOMINAL LYMPH NODE (H): Primary | ICD-10-CM

## 2020-10-13 DIAGNOSIS — C18.1 MALIGNANT NEOPLASM OF APPENDIX (H): Primary | ICD-10-CM

## 2020-10-13 DIAGNOSIS — C18.1 CANCER OF APPENDIX METASTATIC TO INTRA-ABDOMINAL LYMPH NODE (H): ICD-10-CM

## 2020-10-13 LAB
ALBUMIN SERPL-MCNC: 4.1 G/DL (ref 3.5–5.7)
ALBUMIN UR-MCNC: NEGATIVE MG/DL
ALP SERPL-CCNC: 68 U/L (ref 34–104)
ALT SERPL W P-5'-P-CCNC: 20 U/L (ref 7–52)
ANION GAP SERPL CALCULATED.3IONS-SCNC: 6 MMOL/L (ref 3–14)
AST SERPL W P-5'-P-CCNC: 21 U/L (ref 13–39)
BASOPHILS # BLD AUTO: 0 10E9/L (ref 0–0.2)
BASOPHILS NFR BLD AUTO: 1.2 %
BILIRUB SERPL-MCNC: 0.5 MG/DL (ref 0.3–1)
BUN SERPL-MCNC: 18 MG/DL (ref 7–25)
CALCIUM SERPL-MCNC: 9.4 MG/DL (ref 8.6–10.3)
CEA SERPL-MCNC: <3 NG/ML
CHLORIDE SERPL-SCNC: 105 MMOL/L (ref 98–107)
CO2 SERPL-SCNC: 28 MMOL/L (ref 21–31)
CREAT SERPL-MCNC: 0.99 MG/DL (ref 0.6–1.2)
DIFFERENTIAL METHOD BLD: ABNORMAL
EOSINOPHIL # BLD AUTO: 0.2 10E9/L (ref 0–0.7)
EOSINOPHIL NFR BLD AUTO: 6.2 %
ERYTHROCYTE [DISTWIDTH] IN BLOOD BY AUTOMATED COUNT: 16.1 % (ref 10–15)
GFR SERPL CREATININE-BSD FRML MDRD: 55 ML/MIN/{1.73_M2}
GLUCOSE SERPL-MCNC: 103 MG/DL (ref 70–105)
HCT VFR BLD AUTO: 34.2 % (ref 35–47)
HGB BLD-MCNC: 11.1 G/DL (ref 11.7–15.7)
IMM GRANULOCYTES # BLD: 0.1 10E9/L (ref 0–0.4)
IMM GRANULOCYTES NFR BLD: 1.5 %
LYMPHOCYTES # BLD AUTO: 1.7 10E9/L (ref 0.8–5.3)
LYMPHOCYTES NFR BLD AUTO: 50.7 %
MCH RBC QN AUTO: 32.2 PG (ref 26.5–33)
MCHC RBC AUTO-ENTMCNC: 32.5 G/DL (ref 31.5–36.5)
MCV RBC AUTO: 99 FL (ref 78–100)
MONOCYTES # BLD AUTO: 0.8 10E9/L (ref 0–1.3)
MONOCYTES NFR BLD AUTO: 23.7 %
NEUTROPHILS # BLD AUTO: 0.6 10E9/L (ref 1.6–8.3)
NEUTROPHILS NFR BLD AUTO: 16.7 %
PLATELET # BLD AUTO: 287 10E9/L (ref 150–450)
POTASSIUM SERPL-SCNC: 4.3 MMOL/L (ref 3.5–5.1)
PROT SERPL-MCNC: 7 G/DL (ref 6.4–8.9)
RBC # BLD AUTO: 3.45 10E12/L (ref 3.8–5.2)
SODIUM SERPL-SCNC: 139 MMOL/L (ref 134–144)
WBC # BLD AUTO: 3.5 10E9/L (ref 4–11)

## 2020-10-13 PROCEDURE — 250N000011 HC RX IP 250 OP 636: Performed by: INTERNAL MEDICINE

## 2020-10-13 PROCEDURE — 36591 DRAW BLOOD OFF VENOUS DEVICE: CPT

## 2020-10-13 PROCEDURE — 81003 URINALYSIS AUTO W/O SCOPE: CPT | Performed by: INTERNAL MEDICINE

## 2020-10-13 PROCEDURE — 85025 COMPLETE CBC W/AUTO DIFF WBC: CPT | Performed by: INTERNAL MEDICINE

## 2020-10-13 PROCEDURE — 99207 PR NO CHARGE LOS: CPT | Performed by: NURSE PRACTITIONER

## 2020-10-13 PROCEDURE — 80053 COMPREHEN METABOLIC PANEL: CPT | Performed by: INTERNAL MEDICINE

## 2020-10-13 PROCEDURE — 82378 CARCINOEMBRYONIC ANTIGEN: CPT | Performed by: INTERNAL MEDICINE

## 2020-10-13 RX ORDER — DIPHENHYDRAMINE HCL 50 MG
CAPSULE ORAL
Qty: 1 CAPSULE | Refills: 0 | Status: SHIPPED | OUTPATIENT
Start: 2020-10-13 | End: 2021-03-26

## 2020-10-13 RX ORDER — METHYLPREDNISOLONE 32 MG/1
TABLET ORAL
Qty: 2 TABLET | Refills: 0 | Status: SHIPPED | OUTPATIENT
Start: 2020-10-13 | End: 2021-03-26

## 2020-10-13 RX ORDER — HEPARIN SODIUM (PORCINE) LOCK FLUSH IV SOLN 100 UNIT/ML 100 UNIT/ML
5 SOLUTION INTRAVENOUS
Status: DISCONTINUED | OUTPATIENT
Start: 2020-10-13 | End: 2020-10-14 | Stop reason: HOSPADM

## 2020-10-13 RX ADMIN — HEPARIN 5 ML: 100 SYRINGE at 10:40

## 2020-10-13 NOTE — NURSING NOTE
Patient is being seen by the provider in infusion where all required infusion department rooming assessments, screenings, and vital signs were done by infusion RN.   Selena Solorio RN...........10/13/2020 9:49 AM

## 2020-10-13 NOTE — PROGRESS NOTES
Patient was seen in infusion therapy today for a status check. 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 Patient state she has been doing better on the every 3 week schedule for her chemotherapy. She states she does get occasional painful bowel movements but is otherwise feeling well. She has been doing some substitute teaching. Patient had labs done today showing a ANC of 0.6 which does not meet parameters for treatment. Will hold treatment today. We discussed neutropenic precautions. Will have patient return next week for possible infusion. She will need staging studies after cycle 6 and will see Dr Thakkar for results. Will also send prior imaging to [place] as she will have an upcoming appointment with their oncology department.

## 2020-10-13 NOTE — PROGRESS NOTES
Treatment held will cancel PET in November and have patient see Alicia Thakkar MD with next treatment to discuss having PET scan or Ct scans.  Selena Solorio RN...........10/13/2020 10:43 AM

## 2020-10-13 NOTE — PROGRESS NOTES
Infusion Nursing Note:  Nadya Flanagan presents today for cycle 5 day 1 of MVASI + FOLFIRI.    Patient seen by provider today: Yes: Diana Antonio NP   present during visit today: no    Note: N/A.    Intravenous Access:  Labs drawn without difficulty.  Implanted Port accessed with brisk blood return.    Treatment Conditions:  Lab Results   Component Value Date    HGB 11.1 10/13/2020     Lab Results   Component Value Date    WBC 3.5 10/13/2020      Lab Results   Component Value Date    ANEU 0.6 10/13/2020     Lab Results   Component Value Date     10/13/2020      Lab Results   Component Value Date     10/13/2020                   Lab Results   Component Value Date    POTASSIUM 4.3 10/13/2020           No results found for: MAG         Lab Results   Component Value Date    CR 0.99 10/13/2020                   Lab Results   Component Value Date    STELLA 9.4 10/13/2020                Lab Results   Component Value Date    BILITOTAL 0.5 10/13/2020           Lab Results   Component Value Date    ALBUMIN 4.1 10/13/2020                    Lab Results   Component Value Date    ALT 20 10/13/2020           Lab Results   Component Value Date    AST 21 10/13/2020       Results reviewed, labs did NOT meet treatment parameters: ANC 0.6.  Educated Neutropenic precautions as well as provider educated and discussed ANC.      Post Infusion Assessment:  Site patent and intact, free from redness, edema or discomfort.  Access discontinued per protocol.  Biologic Infusion Post Education: Call the triage nurse at your clinic or seek medical attention if you have chills and/or temperature greater than or equal to 100.5, uncontrolled nausea/vomiting, diarrhea, constipation, dizziness, shortness of breath, chest pain, heart palpitations, weakness or any other new or concerning symptoms, questions or concerns.  You cannot have any live virus vaccines prior to or during treatment or up to 6 months post infusion.  If  you have an upcoming surgery, medical procedure or dental procedure during treatment, this should be discussed with your ordering physician and your surgeon/dentist.  If you are having any concerning symptom, if you are unsure if you should get your next infusion or wish to speak to a provider before your next infusion, please call your care coordinator or triage nurse at your clinic to notify them so we can adequately serve you.       Discharge Plan:   Patient and/or family verbalized understanding of discharge instructions and all questions answered.  Copy of AVS reviewed with patient and/or family.  Patient will return 10/21 per patient request for next appointment.  She has previous scheduled dental appointments.    Steffany Urrutia RN

## 2020-10-20 DIAGNOSIS — C18.1 CANCER OF APPENDIX METASTATIC TO INTRA-ABDOMINAL LYMPH NODE (H): Primary | ICD-10-CM

## 2020-10-20 DIAGNOSIS — C77.2 CANCER OF APPENDIX METASTATIC TO INTRA-ABDOMINAL LYMPH NODE (H): Primary | ICD-10-CM

## 2020-10-20 RX ORDER — EPINEPHRINE 1 MG/ML
0.3 INJECTION, SOLUTION, CONCENTRATE INTRAVENOUS EVERY 5 MIN PRN
Status: CANCELLED | OUTPATIENT
Start: 2020-10-21

## 2020-10-20 RX ORDER — METHYLPREDNISOLONE SODIUM SUCCINATE 125 MG/2ML
125 INJECTION, POWDER, LYOPHILIZED, FOR SOLUTION INTRAMUSCULAR; INTRAVENOUS
Status: CANCELLED
Start: 2020-10-21

## 2020-10-20 RX ORDER — ATROPINE SULFATE 0.4 MG/ML
0.4 AMPUL (ML) INJECTION
Status: CANCELLED
Start: 2020-10-21

## 2020-10-20 RX ORDER — LORAZEPAM 2 MG/ML
0.5 INJECTION INTRAMUSCULAR EVERY 4 HOURS PRN
Status: CANCELLED
Start: 2020-10-21

## 2020-10-20 RX ORDER — ALBUTEROL SULFATE 0.83 MG/ML
2.5 SOLUTION RESPIRATORY (INHALATION)
Status: CANCELLED | OUTPATIENT
Start: 2020-10-21

## 2020-10-20 RX ORDER — HEPARIN SODIUM (PORCINE) LOCK FLUSH IV SOLN 100 UNIT/ML 100 UNIT/ML
5 SOLUTION INTRAVENOUS
Status: CANCELLED | OUTPATIENT
Start: 2020-10-21

## 2020-10-20 RX ORDER — MEPERIDINE HYDROCHLORIDE 25 MG/ML
25 INJECTION INTRAMUSCULAR; INTRAVENOUS; SUBCUTANEOUS EVERY 30 MIN PRN
Status: CANCELLED | OUTPATIENT
Start: 2020-10-21

## 2020-10-20 RX ORDER — FLUOROURACIL 50 MG/ML
400 INJECTION, SOLUTION INTRAVENOUS ONCE
Status: CANCELLED | OUTPATIENT
Start: 2020-10-21

## 2020-10-20 RX ORDER — NALOXONE HYDROCHLORIDE 0.4 MG/ML
.1-.4 INJECTION, SOLUTION INTRAMUSCULAR; INTRAVENOUS; SUBCUTANEOUS
Status: CANCELLED | OUTPATIENT
Start: 2020-10-21

## 2020-10-20 RX ORDER — SODIUM CHLORIDE 9 MG/ML
1000 INJECTION, SOLUTION INTRAVENOUS CONTINUOUS PRN
Status: CANCELLED
Start: 2020-10-21

## 2020-10-20 RX ORDER — ALBUTEROL SULFATE 90 UG/1
1-2 AEROSOL, METERED RESPIRATORY (INHALATION)
Status: CANCELLED
Start: 2020-10-21

## 2020-10-20 RX ORDER — DIPHENHYDRAMINE HYDROCHLORIDE 50 MG/ML
50 INJECTION INTRAMUSCULAR; INTRAVENOUS
Status: CANCELLED
Start: 2020-10-21

## 2020-10-20 RX ORDER — FLUOROURACIL 50 MG/ML
320 INJECTION, SOLUTION INTRAVENOUS ONCE
Status: CANCELLED | OUTPATIENT
Start: 2020-10-21

## 2020-10-21 ENCOUNTER — ONCOLOGY VISIT (OUTPATIENT)
Dept: ONCOLOGY | Facility: OTHER | Age: 70
End: 2020-10-21
Attending: INTERNAL MEDICINE
Payer: MEDICARE

## 2020-10-21 ENCOUNTER — HOSPITAL ENCOUNTER (OUTPATIENT)
Dept: INFUSION THERAPY | Facility: OTHER | Age: 70
End: 2020-10-21
Attending: INTERNAL MEDICINE
Payer: MEDICARE

## 2020-10-21 ENCOUNTER — HOME INFUSION (PRE-WILLOW HOME INFUSION) (OUTPATIENT)
Dept: PHARMACY | Facility: CLINIC | Age: 70
End: 2020-10-21

## 2020-10-21 VITALS
SYSTOLIC BLOOD PRESSURE: 142 MMHG | RESPIRATION RATE: 16 BRPM | DIASTOLIC BLOOD PRESSURE: 72 MMHG | WEIGHT: 144.4 LBS | BODY MASS INDEX: 25.18 KG/M2 | TEMPERATURE: 97.4 F | HEART RATE: 62 BPM

## 2020-10-21 DIAGNOSIS — C77.2 CANCER OF APPENDIX METASTATIC TO INTRA-ABDOMINAL LYMPH NODE (H): Primary | ICD-10-CM

## 2020-10-21 DIAGNOSIS — T50.8X5D ALLERGIC REACTION TO CONTRAST MATERIAL, SUBSEQUENT ENCOUNTER: ICD-10-CM

## 2020-10-21 DIAGNOSIS — C18.1 CANCER OF APPENDIX METASTATIC TO INTRA-ABDOMINAL LYMPH NODE (H): Primary | ICD-10-CM

## 2020-10-21 DIAGNOSIS — C18.1 MALIGNANT NEOPLASM OF APPENDIX (H): Primary | ICD-10-CM

## 2020-10-21 LAB
ALBUMIN SERPL-MCNC: 4.1 G/DL (ref 3.5–5.7)
ALBUMIN UR-MCNC: NEGATIVE MG/DL
ALP SERPL-CCNC: 70 U/L (ref 34–104)
ALT SERPL W P-5'-P-CCNC: 18 U/L (ref 7–52)
ANION GAP SERPL CALCULATED.3IONS-SCNC: 7 MMOL/L (ref 3–14)
AST SERPL W P-5'-P-CCNC: 21 U/L (ref 13–39)
BASOPHILS # BLD AUTO: 0.1 10E9/L (ref 0–0.2)
BASOPHILS NFR BLD AUTO: 1.4 %
BILIRUB SERPL-MCNC: 0.4 MG/DL (ref 0.3–1)
BUN SERPL-MCNC: 19 MG/DL (ref 7–25)
CALCIUM SERPL-MCNC: 9.6 MG/DL (ref 8.6–10.3)
CEA SERPL-MCNC: <3 NG/ML
CHLORIDE SERPL-SCNC: 105 MMOL/L (ref 98–107)
CO2 SERPL-SCNC: 27 MMOL/L (ref 21–31)
CREAT SERPL-MCNC: 1.01 MG/DL (ref 0.6–1.2)
DIFFERENTIAL METHOD BLD: ABNORMAL
EOSINOPHIL # BLD AUTO: 0.1 10E9/L (ref 0–0.7)
EOSINOPHIL NFR BLD AUTO: 1.4 %
ERYTHROCYTE [DISTWIDTH] IN BLOOD BY AUTOMATED COUNT: 15.8 % (ref 10–15)
GFR SERPL CREATININE-BSD FRML MDRD: 54 ML/MIN/{1.73_M2}
GLUCOSE SERPL-MCNC: 88 MG/DL (ref 70–105)
HCT VFR BLD AUTO: 36.1 % (ref 35–47)
HGB BLD-MCNC: 11.8 G/DL (ref 11.7–15.7)
IMM GRANULOCYTES # BLD: 0.1 10E9/L (ref 0–0.4)
IMM GRANULOCYTES NFR BLD: 0.9 %
LYMPHOCYTES # BLD AUTO: 2 10E9/L (ref 0.8–5.3)
LYMPHOCYTES NFR BLD AUTO: 28.9 %
MCH RBC QN AUTO: 32.2 PG (ref 26.5–33)
MCHC RBC AUTO-ENTMCNC: 32.7 G/DL (ref 31.5–36.5)
MCV RBC AUTO: 99 FL (ref 78–100)
MONOCYTES # BLD AUTO: 1 10E9/L (ref 0–1.3)
MONOCYTES NFR BLD AUTO: 13.9 %
NEUTROPHILS # BLD AUTO: 3.7 10E9/L (ref 1.6–8.3)
NEUTROPHILS NFR BLD AUTO: 53.5 %
PLATELET # BLD AUTO: 240 10E9/L (ref 150–450)
POTASSIUM SERPL-SCNC: 4.6 MMOL/L (ref 3.5–5.1)
PROT SERPL-MCNC: 6.9 G/DL (ref 6.4–8.9)
RBC # BLD AUTO: 3.66 10E12/L (ref 3.8–5.2)
SODIUM SERPL-SCNC: 139 MMOL/L (ref 134–144)
WBC # BLD AUTO: 7 10E9/L (ref 4–11)

## 2020-10-21 PROCEDURE — G0463 HOSPITAL OUTPT CLINIC VISIT: HCPCS | Mod: 25 | Performed by: INTERNAL MEDICINE

## 2020-10-21 PROCEDURE — 81003 URINALYSIS AUTO W/O SCOPE: CPT | Performed by: INTERNAL MEDICINE

## 2020-10-21 PROCEDURE — 85025 COMPLETE CBC W/AUTO DIFF WBC: CPT | Performed by: INTERNAL MEDICINE

## 2020-10-21 PROCEDURE — 96416 CHEMO PROLONG INFUSE W/PUMP: CPT

## 2020-10-21 PROCEDURE — 96415 CHEMO IV INFUSION ADDL HR: CPT

## 2020-10-21 PROCEDURE — 96417 CHEMO IV INFUS EACH ADDL SEQ: CPT

## 2020-10-21 PROCEDURE — 258N000003 HC RX IP 258 OP 636: Performed by: INTERNAL MEDICINE

## 2020-10-21 PROCEDURE — 82378 CARCINOEMBRYONIC ANTIGEN: CPT | Performed by: INTERNAL MEDICINE

## 2020-10-21 PROCEDURE — 96368 THER/DIAG CONCURRENT INF: CPT

## 2020-10-21 PROCEDURE — 96413 CHEMO IV INFUSION 1 HR: CPT

## 2020-10-21 PROCEDURE — 99215 OFFICE O/P EST HI 40 MIN: CPT | Performed by: INTERNAL MEDICINE

## 2020-10-21 PROCEDURE — 96411 CHEMO IV PUSH ADDL DRUG: CPT

## 2020-10-21 PROCEDURE — 80053 COMPREHEN METABOLIC PANEL: CPT | Performed by: INTERNAL MEDICINE

## 2020-10-21 PROCEDURE — 96375 TX/PRO/DX INJ NEW DRUG ADDON: CPT

## 2020-10-21 PROCEDURE — 250N000011 HC RX IP 250 OP 636: Performed by: INTERNAL MEDICINE

## 2020-10-21 RX ORDER — DIPHENHYDRAMINE HCL 50 MG
CAPSULE ORAL
Qty: 1 CAPSULE | Refills: 0 | Status: SHIPPED | OUTPATIENT
Start: 2020-10-21 | End: 2020-12-23

## 2020-10-21 RX ORDER — FLUOROURACIL 50 MG/ML
320 INJECTION, SOLUTION INTRAVENOUS ONCE
Status: COMPLETED | OUTPATIENT
Start: 2020-10-21 | End: 2020-10-21

## 2020-10-21 RX ORDER — HEPARIN SODIUM (PORCINE) LOCK FLUSH IV SOLN 100 UNIT/ML 100 UNIT/ML
5 SOLUTION INTRAVENOUS
Status: DISCONTINUED | OUTPATIENT
Start: 2020-10-21 | End: 2020-10-22 | Stop reason: HOSPADM

## 2020-10-21 RX ORDER — METHYLPREDNISOLONE 32 MG/1
TABLET ORAL
Qty: 2 TABLET | Refills: 0 | Status: SHIPPED | OUTPATIENT
Start: 2020-10-21 | End: 2021-01-14

## 2020-10-21 RX ADMIN — BEVACIZUMAB-AWWB 500 MG: 400 INJECTION, SOLUTION INTRAVENOUS at 10:49

## 2020-10-21 RX ADMIN — DEXAMETHASONE SODIUM PHOSPHATE: 10 INJECTION, SOLUTION INTRAMUSCULAR; INTRAVENOUS at 10:15

## 2020-10-21 RX ADMIN — IRINOTECAN HYDROCHLORIDE 240 MG: 20 INJECTION, SOLUTION INTRAVENOUS at 11:25

## 2020-10-21 RX ADMIN — LEUCOVORIN CALCIUM 550 MG: 350 INJECTION, POWDER, LYOPHILIZED, FOR SUSPENSION INTRAMUSCULAR; INTRAVENOUS at 11:25

## 2020-10-21 RX ADMIN — SODIUM CHLORIDE 250 ML: 9 INJECTION, SOLUTION INTRAVENOUS at 10:15

## 2020-10-21 RX ADMIN — FLUOROURACIL 550 MG: 50 INJECTION, SOLUTION INTRAVENOUS at 13:06

## 2020-10-21 NOTE — PROGRESS NOTES
Infusion Nursing Note:  Nadya Flanagan presents today for MVASI and FOLFIRI cycle 5, day 1.    Patient seen by provider today: Yes: Dr. Thakkar   present during visit today: Not Applicable.    Note: N/A.    Intravenous Access:  Labs drawn without difficulty.  Implanted Port.    Treatment Conditions:  Lab Results   Component Value Date    HGB 11.8 10/21/2020     Lab Results   Component Value Date    WBC 7.0 10/21/2020      Lab Results   Component Value Date    ANEU 3.7 10/21/2020     Lab Results   Component Value Date     10/21/2020      Results reviewed, labs MET treatment parameters, ok to proceed with treatment.  Urine negative protein.    Post Infusion Assessment:  Patient tolerated infusion without incident.  Blood return noted pre and post infusion.  Site patent and intact, free from redness, edema or discomfort.  No evidence of extravasations.     Prior to discharge: Port is secured in place with tegaderm and flushed with 10cc NS with positive blood return noted.  Continuous home infusion connected and is infusing.    All connectors secured in place and clamps taped open.    Patient instructed to call our clinic or [facility] with any questions or concerns at home.  Patient verbalized understanding.    Patient set up for pump disconnect at our clinic on 10/23/20.      Discharge Plan:   Patient and/or family verbalized understanding of discharge instructions and all questions answered.  Copy of AVS reviewed with patient and/or family.  Patient will return 11/11/20 for next appointment.  Patient discharged in stable condition accompanied by: self.  Departure Mode: Ambulatory.    Brenda J. Goodell, RN

## 2020-10-21 NOTE — NURSING NOTE
Patient is being seen by provider in infusion where all required infusion rooming assessments and vitals were done by the infusion RN.    Tiffanie Pastor CMA ([facility])................ 10/21/2020 10:58 AM

## 2020-10-22 ENCOUNTER — TELEPHONE (OUTPATIENT)
Dept: ONCOLOGY | Facility: OTHER | Age: 70
End: 2020-10-22

## 2020-10-22 NOTE — PROGRESS NOTES
This is a recent snapshot of the patient's [facility] medical record.  For current drug dose and complete information and questions, call [phone] or In Basket pool, fv home infusion ([id number])  CSN Number:  [id number]

## 2020-10-22 NOTE — TELEPHONE ENCOUNTER
Contacted patient to review when she needs her scans and follow up appointments after cycle 6.  After talking over dates and what she has been told by Dr Menezes and Dr Thakkar, she has decided to keep her scans on 11/30/20.  She will let Dr Menezes's office know this and they will schedule her follow up about a week later.  After that, she will follow up with Dr Thakkar and have treatment if more cycles are indicated.  Tiffanie Pastor CMA ([facility])

## 2020-10-22 NOTE — PROGRESS NOTES
Visit Date:   10/21/2020      HEMATOLOGY/ONCOLOGY CLINIC NOTE      HISTORY OF PRESENT ILLNESS:  Mrs. 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  We had seen the patient in consultation at the request of Dr. Tresa Evangelista and MARY Nelson of [facility] as well as Dr. Speedy Menezes of the [facility] where he had seen the patient on 02/07/2019.  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  She apparently presented with abdominal discomfort in mid-10/2018 associated with nausea, which progressed to the point where she was seen by MARY Nelson, her primary care provider, in 10/2018.  At that time she presented with nausea and vomiting with associated epigastric pain and left lower quadrant abdominal pain.  MRI of the liver was obtained.  There were findings of hemangioma.  Symptoms persisted.  The patient underwent EGD on 12/07/2018.  There were no significant findings.  Apparently she progressed to the point where she was having significant projectile vomiting.  On 01/14/2018 she was seen in the [facility] and then transferred to [facility] where she was admitted for small-bowel obstruction.  She was treated initially conservatively with NG suction.  Subsequently, she underwent right hemicolectomy which revealed a mass in the terminal ileum.  The mass was adherent to the right ovary.  Pathology was read as adenocarcinoma, ex-goblet cell carcinoid of the appendix, was staged pathologic T1b N1 initially.  Tumor was present focally at the posterior retroperitoneal region with 2/23 lymph nodes involved with disease.  Peritoneal  fluid was sent for cytology.  Initially it was read as malignant cells.  The patient was referred to the [facility], was seen by Dr. Speedy Menezes, medical oncologist at the [place] on 01/16/2019.  Slides were reviewed by the [facility] pathologist and the findings were the patient had peritoneal washings consistent with involvement by adenocarcinoma, ex-goblet cell carcinoid.  Right hemicolectomy specimen revealed adenocarcinoma, ex-goblet cell carcinoid.  Indurated mass was noted diffusely involving the appendix and invading the visceral peritoneum.  Angiolymphatic invasion was present of the retroperitoneal soft tissue margin, was positive for 3/21 lymph nodes involved.  Pathologic stage was T4a N1 M1.  Immunohistochemical staining for DNA mismatch repair enzymes were intact.  Also, CT of the abdomen and pelvis was performed at the [facility].  Findings were there were interval postoperative changes in the right hemicolectomy with resolution of previously seen small bowel dilatation obstruction.  There was mild soft tissue edema in the resection bed along the midline incision, which was felt to be secondary to postop status.  No definite peritoneal metastases were identified.  No free fluid.  There was abnormal geographic perfusion of hepatic segments 5 and 8 without evidence of focal hepatic mass, indeterminate mild thickening of the left adrenal gland, unchanged.  On 01/15/2019 Dr. Menezes recommended FOLFOX chemotherapy given the fact she had positive cytology and recommended 3 months of FOLFOX chemotherapy followed by imaging at the [facility].  The patient had a port placed on 02/17/2019.  The plan was to proceed with FOLFOX with 5-FU given at 400 mg/m2 IV bolus with leucovorin bolus, oxaliplatin 85 mg/m2 given on day 1 with continuous IV 5-FU 2400 mg/m2 continuous IV over 46 hours on day 1.  The plan was to restage after 3 months of therapy.  The patient completed 4 cycles, 20% dose reduction.  The last 2  cycles were dose reduced by 20% due to neutropenia.  She had staging studies after 4 cycles on 04/09/2019.  The findings were there was no evidence of metastatic disease.  There was evolution of postoperative change of the abdomen, no evidence of microscopic peritoneal implant disease.  There was nodular opacity in the lung consistent with endobronchial debris, likely due to bronchitis.  The patient went on to complete 8 cycles of chemotherapy, then underwent restaging imaging on 06/16/2019 including CT chest, abdomen and pelvis.  Findings were there was no evidence of active disease throughout the chest, abdomen and pelvis.  Nodular foci seen previously were no longer identified suggesting the presence of endobronchial debris on prior study.  No evidence of new nodules or lymphadenopathy, no evidence of intraperitoneal metastatic disease.  The patient otherwise decided that she did not want to go to the [facility].  We discussed the case with Dr. Speedy Menezes who recommended that HIPEC chemotherapy may be an option, otherwise recommended serial imaging.  The plan was for continued surveillance.  Scans on 08/26/2019 were negative for metastatic disease.  The patient wanted the port out, therefore it was taken out on 08/28/2019.  The patient's imaging on 02/20/2020.  CT chest was unremarkable.  There was mild soft tissue thickening in the right pericolic gutter, which was nonspecific.  The patient did complain of some abdominal discomfort.  We ordered a PET scan.  This came back essentially negative.  There was some mild increased soft tissue in the right pericolic gutter.  There was no associated hypermetabolism to suggest this was likely postoperative scarring.  The patient subsequently had repeat scans on 06/17.  CT chest, abdomen and pelvis revealed a slowly worsening mesenteric stranding and soft tissue thickening in the right pericolic gutter and anterior peritoneal cavity.  At the same time the patient  complained of some vague right-sided abdominal pain and abdominal bloating.  We did obtain a PET scan, which revealed hypermetabolic tissue within the right lower quadrant pericolic gutter.  The patient was seen by Dr. Evangelista who proceeded with diagnostic laparoscopy with biopsies and peritoneal washings performed by Dr. Evangelista in 07/2020.  She was found to have peritoneal implants throughout the abdomen.  There was a small amount of peritoneal fluid noted.  Peritoneal implants were noted.  Dense tissue noted in the right lower abdomen.  Biopsies were taken of numerous implants in the left lower quadrant.  Pathology revealed the patient had poorly differentiated adenocarcinoma with mucin production metastatic compatible with appendiceal primary.  Peritoneal fluid cytology was negative.  The patient did not tolerate FOLFOX without significant neutropenia in the past.  Since this was progression we felt the patient would require FOLFIRI and bevacizumab.  We actually contacted Dr. Speedy Menezes at the [facility] who originally saw her and agreed with our plan.  He recommended 3 months of chemotherapy with FOLFIRI and bevacizumab, then go on to staging studies and then consider HIPEC chemotherapy.  If not, she would continue chemotherapy.  He also recommended MSI testing.  This came back with MSI intact.  The patient was started on FOLFIRI and Avastin and had received 3 cycles.  She was getting extremely fatigued the week after chemotherapy and when we saw her on 08/27/2020 she wanted lengthen her cycles to 21 days to give her time to recover.  She did get some loose stools, otherwise tolerated chemotherapy relatively well except for extreme fatigue.  She could not function her whole week and would like to have the cycles extended to every 21 days, which was probably reasonable.  Therefore, we elected to extend her cycles beginning with cycle 4.  She has completed it.  She is here now for cycle 5.  She was due for cycle  5 last week and this was held due to neutropenia and we have elected to dose reduce her 5-FU infusion as well as bolus by 20% in addition to the irinotecan due to neutropenia.  She states she is doing relatively well.  She has had some mild hair loss, otherwise no abdominal distention.  She says she gets occasional nonspecific pains in her abdomen, particularly in the upper abdomen, but this has been stable since she started chemotherapy.  She denies any jaundice or fevers, night sweats, weight loss.  She says she is tolerating the every 3 week regimen much better.  She is here for cycle 5.  There is no contraindication to therapy.      PHYSICAL EXAMINATION:   GENERAL:  She is a middle-aged white female in no acute distress.  Performance status is 0.   VITAL SIGNS:  Temperature 97.4 Fahrenheit, respiratory rate 16, pulse is 73, blood pressure 131/68.   HEENT:  Significant for mild alopecia.  Atraumatic, normocephalic.  Oropharynx nonerythematous.   NECK:  Supple.   LUNGS:  Clear to auscultation and percussion.   HEART:  Regular rhythm, S1, S2 normal.   ABDOMEN:  Soft.  Normoactive bowel sounds.  There is minimal tenderness to palpation in the upper abdomen, no rebound.   LYMPHATICS:  No cervical or supraclavicular nodes.   EXTREMITIES:  Without edema.   NEUROLOGIC:  Nonfocal.      LABORATORY DATA:  Laboratories reveal CBC with white count 7.0 with absolute neutrophil count of 3.7, H and H 11.8 and 36.1, platelet count is 240.  CEA is less than 3, BUN 19, creatinine 1.01.  LFTs are normal.  Urine for protein, albumin is negative.      IMPRESSION:  Metastatic adenocarcinoma, ex-goblet cell carcinoid of the appendix with metastases to abdominal lymph nodes as well as the peritoneum with positive cytology.  The patient was deemed a candidate for chemotherapy for stage IV disease as per Dr. Speedy Menezes of [facility].  The plan was to initiate FOLFOX chemotherapy.  The patient completed 2 cycles.  Course was  complicated by neutropenia.  The patient went on to receive 4 cycles, last 2 requiring 20% dose reduction due to neutropenia.  She was staged with no evidence of disease after 8 cycles.  CT of the abdomen and pelvis was stable.  PET scan indicated no evidence of disease.  Repeat scans indicated progression of disease.  Diagnostic laparoscopy revealed numerous peritoneal implants with biopsy consistent with poorly differentiated adenocarcinoma, metastatic to the peritoneum, consistent with recurrence of her disease status.  We discussed the case with Dr. Speedy Menezes, medical oncologist at the [facility] who agreed that the patient is a candidate for FOLFIRI, bevacizumab.  The plan was to treat with 6 cycles and then restage.  MSI testing was intact.  The patient received 3 cycles on an every 14-day schedule.  Performance status worsened the second week.  We elected to increase her cycles to q.21 days with FOLFIRI and bevacizumab given on a q.21 day basis, bevacizumab given at 7.5 mg/kg every 21 days.  Irinotecan was dose reduced by 20%.  Course complicated with neutropenia requiring dose reduction of 5-FU infusional and bolus.  The patient tolerated therapy well.  The plan is to proceed with cycle 5 today.  There is no contraindication to therapy.  Otherwise, we will restage after cycle #6 with CT chest, abdomen and pelvis.  The patient plans to have a telephone video virtual visit with Dr. Menezes after the scans are obtained.  We will proceed with therapy.  If she has a complete response, she may be a candidate for HIPEC therapy as per Dr. Clif BARRIOS MD             D: 10/21/2020   T: 10/21/2020   MT: BRENNEN      Name:     ALMA CORTES   MRN:      [id number]        Account:      [id number]   :      1950           Visit Date:   10/21/2020      Document: [id number]       cc: Isaura Menezes MD

## 2020-10-23 ENCOUNTER — HOSPITAL ENCOUNTER (OUTPATIENT)
Dept: INFUSION THERAPY | Facility: OTHER | Age: 70
Discharge: HOME | End: 2020-10-23
Attending: INTERNAL MEDICINE | Admitting: INTERNAL MEDICINE
Payer: MEDICARE

## 2020-10-23 DIAGNOSIS — C77.2 CANCER OF APPENDIX METASTATIC TO INTRA-ABDOMINAL LYMPH NODE (H): Primary | ICD-10-CM

## 2020-10-23 DIAGNOSIS — C18.1 CANCER OF APPENDIX METASTATIC TO INTRA-ABDOMINAL LYMPH NODE (H): Primary | ICD-10-CM

## 2020-10-23 PROCEDURE — 96523 IRRIG DRUG DELIVERY DEVICE: CPT

## 2020-10-23 PROCEDURE — 250N000011 HC RX IP 250 OP 636: Performed by: INTERNAL MEDICINE

## 2020-10-23 RX ORDER — HEPARIN SODIUM (PORCINE) LOCK FLUSH IV SOLN 100 UNIT/ML 100 UNIT/ML
5 SOLUTION INTRAVENOUS
Status: DISCONTINUED | OUTPATIENT
Start: 2020-10-23 | End: 2020-10-24 | Stop reason: HOSPADM

## 2020-10-23 RX ADMIN — HEPARIN 5 ML: 100 SYRINGE at 11:50

## 2020-10-23 NOTE — PROGRESS NOTES
Patient returns after CADD pump running with out difficulty for 46 hours, pump reads empty and patient tolerated treatment. Good brisk blood return post infusion and Port heparin locked and de-accessed. Patient returns in three weeks for next infusion.

## 2020-10-26 DIAGNOSIS — C18.1 CANCER OF APPENDIX METASTATIC TO INTRA-ABDOMINAL LYMPH NODE (H): Primary | ICD-10-CM

## 2020-10-26 DIAGNOSIS — C77.2 CANCER OF APPENDIX METASTATIC TO INTRA-ABDOMINAL LYMPH NODE (H): Primary | ICD-10-CM

## 2020-10-26 RX ORDER — MEPERIDINE HYDROCHLORIDE 50 MG/ML
25 INJECTION INTRAMUSCULAR; INTRAVENOUS; SUBCUTANEOUS EVERY 30 MIN PRN
Status: CANCELLED | OUTPATIENT
Start: 2020-11-11

## 2020-10-26 RX ORDER — HEPARIN SODIUM (PORCINE) LOCK FLUSH IV SOLN 100 UNIT/ML 100 UNIT/ML
5 SOLUTION INTRAVENOUS
Status: CANCELLED | OUTPATIENT
Start: 2020-11-11

## 2020-10-26 RX ORDER — ALBUTEROL SULFATE 90 UG/1
1-2 AEROSOL, METERED RESPIRATORY (INHALATION)
Status: CANCELLED
Start: 2020-11-11

## 2020-10-26 RX ORDER — SODIUM CHLORIDE 9 MG/ML
1000 INJECTION, SOLUTION INTRAVENOUS CONTINUOUS PRN
Status: CANCELLED
Start: 2020-11-11

## 2020-10-26 RX ORDER — METHYLPREDNISOLONE SODIUM SUCCINATE 125 MG/2ML
125 INJECTION, POWDER, LYOPHILIZED, FOR SOLUTION INTRAMUSCULAR; INTRAVENOUS
Status: CANCELLED
Start: 2020-11-11

## 2020-10-26 RX ORDER — NALOXONE HYDROCHLORIDE 0.4 MG/ML
.1-.4 INJECTION, SOLUTION INTRAMUSCULAR; INTRAVENOUS; SUBCUTANEOUS
Status: CANCELLED | OUTPATIENT
Start: 2020-11-11

## 2020-10-26 RX ORDER — EPINEPHRINE 1 MG/ML
0.3 INJECTION, SOLUTION, CONCENTRATE INTRAVENOUS EVERY 5 MIN PRN
Status: CANCELLED | OUTPATIENT
Start: 2020-11-11

## 2020-10-26 RX ORDER — ALBUTEROL SULFATE 0.83 MG/ML
2.5 SOLUTION RESPIRATORY (INHALATION)
Status: CANCELLED | OUTPATIENT
Start: 2020-11-11

## 2020-10-26 RX ORDER — DIPHENHYDRAMINE HYDROCHLORIDE 50 MG/ML
50 INJECTION INTRAMUSCULAR; INTRAVENOUS
Status: CANCELLED
Start: 2020-11-11

## 2020-10-26 RX ORDER — HEPARIN SODIUM (PORCINE) LOCK FLUSH IV SOLN 100 UNIT/ML 100 UNIT/ML
5 SOLUTION INTRAVENOUS
Status: CANCELLED | OUTPATIENT
Start: 2020-11-13

## 2020-10-26 RX ORDER — FLUOROURACIL 50 MG/ML
320 INJECTION, SOLUTION INTRAVENOUS ONCE
Status: CANCELLED | OUTPATIENT
Start: 2020-11-11

## 2020-10-26 RX ORDER — LORAZEPAM 2 MG/ML
0.5 INJECTION INTRAMUSCULAR EVERY 4 HOURS PRN
Status: CANCELLED
Start: 2020-11-11

## 2020-11-11 ENCOUNTER — HOSPITAL ENCOUNTER (OUTPATIENT)
Dept: INFUSION THERAPY | Facility: OTHER | Age: 70
Discharge: HOME | End: 2020-11-11
Attending: INTERNAL MEDICINE | Admitting: INTERNAL MEDICINE
Payer: MEDICARE

## 2020-11-11 VITALS
SYSTOLIC BLOOD PRESSURE: 142 MMHG | TEMPERATURE: 98.1 F | RESPIRATION RATE: 16 BRPM | HEART RATE: 74 BPM | DIASTOLIC BLOOD PRESSURE: 79 MMHG

## 2020-11-11 DIAGNOSIS — C18.1 CANCER OF APPENDIX METASTATIC TO INTRA-ABDOMINAL LYMPH NODE (H): Primary | ICD-10-CM

## 2020-11-11 DIAGNOSIS — C77.2 CANCER OF APPENDIX METASTATIC TO INTRA-ABDOMINAL LYMPH NODE (H): Primary | ICD-10-CM

## 2020-11-11 LAB
ALBUMIN SERPL-MCNC: 4.1 G/DL (ref 3.5–5.7)
ALBUMIN UR-MCNC: NEGATIVE MG/DL
ALP SERPL-CCNC: 65 U/L (ref 34–104)
ALT SERPL W P-5'-P-CCNC: 19 U/L (ref 7–52)
ANION GAP SERPL CALCULATED.3IONS-SCNC: 9 MMOL/L (ref 3–14)
AST SERPL W P-5'-P-CCNC: 21 U/L (ref 13–39)
BASOPHILS # BLD AUTO: 0.1 10E9/L (ref 0–0.2)
BASOPHILS NFR BLD AUTO: 1.2 %
BILIRUB SERPL-MCNC: 0.4 MG/DL (ref 0.3–1)
BUN SERPL-MCNC: 20 MG/DL (ref 7–25)
CALCIUM SERPL-MCNC: 9.7 MG/DL (ref 8.6–10.3)
CEA SERPL-MCNC: <3 NG/ML
CHLORIDE SERPL-SCNC: 104 MMOL/L (ref 98–107)
CO2 SERPL-SCNC: 25 MMOL/L (ref 21–31)
CREAT SERPL-MCNC: 1 MG/DL (ref 0.6–1.2)
DIFFERENTIAL METHOD BLD: ABNORMAL
EOSINOPHIL # BLD AUTO: 0.2 10E9/L (ref 0–0.7)
EOSINOPHIL NFR BLD AUTO: 4.9 %
ERYTHROCYTE [DISTWIDTH] IN BLOOD BY AUTOMATED COUNT: 14.6 % (ref 10–15)
GFR SERPL CREATININE-BSD FRML MDRD: 55 ML/MIN/{1.73_M2}
GLUCOSE SERPL-MCNC: 99 MG/DL (ref 70–105)
HCT VFR BLD AUTO: 36.2 % (ref 35–47)
HGB BLD-MCNC: 11.7 G/DL (ref 11.7–15.7)
IMM GRANULOCYTES # BLD: 0 10E9/L (ref 0–0.4)
IMM GRANULOCYTES NFR BLD: 0.5 %
LYMPHOCYTES # BLD AUTO: 1.6 10E9/L (ref 0.8–5.3)
LYMPHOCYTES NFR BLD AUTO: 37.8 %
MCH RBC QN AUTO: 32.1 PG (ref 26.5–33)
MCHC RBC AUTO-ENTMCNC: 32.3 G/DL (ref 31.5–36.5)
MCV RBC AUTO: 99 FL (ref 78–100)
MONOCYTES # BLD AUTO: 0.8 10E9/L (ref 0–1.3)
MONOCYTES NFR BLD AUTO: 19.5 %
NEUTROPHILS # BLD AUTO: 1.5 10E9/L (ref 1.6–8.3)
NEUTROPHILS NFR BLD AUTO: 36.1 %
PLATELET # BLD AUTO: 222 10E9/L (ref 150–450)
POTASSIUM SERPL-SCNC: 4.4 MMOL/L (ref 3.5–5.1)
PROT SERPL-MCNC: 7.3 G/DL (ref 6.4–8.9)
RBC # BLD AUTO: 3.65 10E12/L (ref 3.8–5.2)
SODIUM SERPL-SCNC: 138 MMOL/L (ref 134–144)
WBC # BLD AUTO: 4.1 10E9/L (ref 4–11)

## 2020-11-11 PROCEDURE — 82378 CARCINOEMBRYONIC ANTIGEN: CPT | Performed by: NURSE PRACTITIONER

## 2020-11-11 PROCEDURE — 96415 CHEMO IV INFUSION ADDL HR: CPT

## 2020-11-11 PROCEDURE — 258N000003 HC RX IP 258 OP 636: Performed by: NURSE PRACTITIONER

## 2020-11-11 PROCEDURE — 96368 THER/DIAG CONCURRENT INF: CPT

## 2020-11-11 PROCEDURE — 96416 CHEMO PROLONG INFUSE W/PUMP: CPT

## 2020-11-11 PROCEDURE — 96413 CHEMO IV INFUSION 1 HR: CPT

## 2020-11-11 PROCEDURE — 250N000011 HC RX IP 250 OP 636: Mod: JW | Performed by: NURSE PRACTITIONER

## 2020-11-11 PROCEDURE — 81003 URINALYSIS AUTO W/O SCOPE: CPT | Performed by: NURSE PRACTITIONER

## 2020-11-11 PROCEDURE — 85025 COMPLETE CBC W/AUTO DIFF WBC: CPT | Performed by: NURSE PRACTITIONER

## 2020-11-11 PROCEDURE — 96411 CHEMO IV PUSH ADDL DRUG: CPT

## 2020-11-11 PROCEDURE — 96367 TX/PROPH/DG ADDL SEQ IV INF: CPT

## 2020-11-11 PROCEDURE — 80053 COMPREHEN METABOLIC PANEL: CPT | Performed by: NURSE PRACTITIONER

## 2020-11-11 PROCEDURE — 96417 CHEMO IV INFUS EACH ADDL SEQ: CPT

## 2020-11-11 RX ORDER — HEPARIN SODIUM (PORCINE) LOCK FLUSH IV SOLN 100 UNIT/ML 100 UNIT/ML
5 SOLUTION INTRAVENOUS
Status: DISCONTINUED | OUTPATIENT
Start: 2020-11-11 | End: 2020-11-12 | Stop reason: HOSPADM

## 2020-11-11 RX ORDER — FLUOROURACIL 50 MG/ML
320 INJECTION, SOLUTION INTRAVENOUS ONCE
Status: COMPLETED | OUTPATIENT
Start: 2020-11-11 | End: 2020-11-11

## 2020-11-11 RX ADMIN — FLUOROURACIL 550 MG: 50 INJECTION, SOLUTION INTRAVENOUS at 13:33

## 2020-11-11 RX ADMIN — DEXTROSE MONOHYDRATE 240 MG: 50 INJECTION, SOLUTION INTRAVENOUS at 11:42

## 2020-11-11 RX ADMIN — SODIUM CHLORIDE 250 ML: 9 INJECTION, SOLUTION INTRAVENOUS at 10:31

## 2020-11-11 RX ADMIN — BEVACIZUMAB-AWWB 500 MG: 400 INJECTION, SOLUTION INTRAVENOUS at 10:51

## 2020-11-11 RX ADMIN — LEUCOVORIN CALCIUM 550 MG: 350 INJECTION, POWDER, LYOPHILIZED, FOR SUSPENSION INTRAMUSCULAR; INTRAVENOUS at 11:42

## 2020-11-11 RX ADMIN — DEXAMETHASONE SODIUM PHOSPHATE: 10 INJECTION, SOLUTION INTRAMUSCULAR; INTRAVENOUS at 10:31

## 2020-11-11 NOTE — PROGRESS NOTES
Infusion Nursing Note:  Nadya Flanagan presents today for Cycle 6 day 1 of MVASI + FOLFIRI., Last cycle  Patient seen by provider today: No   present during visit today: Not Applicable.    Note: N/A.    Intravenous Access:  Labs drawn without difficulty from port access.  Implanted Port accessed with brisk blood return.    Treatment Conditions:  Lab Results   Component Value Date    HGB 11.7 11/11/2020     Lab Results   Component Value Date    WBC 4.1 11/11/2020      Lab Results   Component Value Date    ANEU 1.5 11/11/2020     Lab Results   Component Value Date     11/11/2020      Lab Results   Component Value Date     11/11/2020                   Lab Results   Component Value Date    POTASSIUM 4.4 11/11/2020           No results found for: MAG         Lab Results   Component Value Date    CR 1.00 11/11/2020                   Lab Results   Component Value Date    STELLA 9.7 11/11/2020                Lab Results   Component Value Date    BILITOTAL 0.4 11/11/2020           Lab Results   Component Value Date    ALBUMIN 4.1 11/11/2020                    Lab Results   Component Value Date    ALT 19 11/11/2020           Lab Results   Component Value Date    AST 21 11/11/2020       Results reviewed, labs MET treatment parameters, ok to proceed with treatment.      Post Infusion Assessment:  Patient tolerated infusion without incident.  Blood return noted pre and post infusion prior to pump connect.  Site patent and intact, free from redness, edema or discomfort.  No evidence of extravasations.  Biologic Infusion Post Education: Call the triage nurse at your clinic or seek medical attention if you have chills and/or temperature greater than or equal to 100.5, uncontrolled nausea/vomiting, diarrhea, constipation, dizziness, shortness of breath, chest pain, heart palpitations, weakness or any other new or concerning symptoms, questions or concerns.  You cannot have any live virus vaccines prior to or  "during treatment or up to 6 months post infusion.  If you have an upcoming surgery, medical procedure or dental procedure during treatment, this should be discussed with your ordering physician and your surgeon/dentist.    If you are having any concerning symptom, if you are unsure if you should get your next infusion or wish to speak to a provider before your next infusion, please call your care coordinator or triage nurse at your clinic to notify them so we can adequately serve you.   Prior to discharge: Port is secured in place with tegaderm and flushed with 10cc NS with positive blood return noted.  Continuous home infusion CADD pump connected.    All connectors secured in place and clamps taped open.    Pump started, \"running\" noted on display (CADD): YES.  Pump Connection double checked with 11/13.  Patient instructed to call our clinic or [facility] with any questions or concerns at home.  Patient verbalized understanding.    Patient set up for pump disconnect at our clinic on 11/13.          Discharge Plan:   Patient and/or family verbalized understanding of discharge instructions and all questions answered.  Patient discharged in stable condition accompanied by: self.  Departure Mode: Ambulatory.    Steffany Urrutia RN                        "

## 2020-11-13 ENCOUNTER — HOSPITAL ENCOUNTER (OUTPATIENT)
Dept: INFUSION THERAPY | Facility: OTHER | Age: 70
Discharge: HOME | End: 2020-11-13
Attending: INTERNAL MEDICINE | Admitting: INTERNAL MEDICINE
Payer: MEDICARE

## 2020-11-13 DIAGNOSIS — C18.1 CANCER OF APPENDIX METASTATIC TO INTRA-ABDOMINAL LYMPH NODE (H): Primary | ICD-10-CM

## 2020-11-13 DIAGNOSIS — C77.2 CANCER OF APPENDIX METASTATIC TO INTRA-ABDOMINAL LYMPH NODE (H): Primary | ICD-10-CM

## 2020-11-13 PROCEDURE — 96523 IRRIG DRUG DELIVERY DEVICE: CPT

## 2020-11-13 PROCEDURE — 250N000011 HC RX IP 250 OP 636: Performed by: NURSE PRACTITIONER

## 2020-11-13 RX ORDER — HEPARIN SODIUM (PORCINE) LOCK FLUSH IV SOLN 100 UNIT/ML 100 UNIT/ML
5 SOLUTION INTRAVENOUS
Status: DISCONTINUED | OUTPATIENT
Start: 2020-11-13 | End: 2020-11-14 | Stop reason: HOSPADM

## 2020-11-13 RX ADMIN — HEPARIN 5 ML: 100 SYRINGE at 12:03

## 2020-11-13 NOTE — PROGRESS NOTES
Patient returns after CADD pump running with out difficulty for 46 hours, pump reads empty and patient tolerated treatment. Good brisk blood return post infusion and Port heparin locked and de-accessed.   Selena Solorio RN...........11/13/2020 12:19 PM

## 2020-11-25 ENCOUNTER — APPOINTMENT (OUTPATIENT)
Dept: LAB | Facility: OTHER | Age: 70
End: 2020-11-25
Attending: INTERNAL MEDICINE
Payer: MEDICARE

## 2020-11-25 ENCOUNTER — HOSPITAL ENCOUNTER (OUTPATIENT)
Dept: CT IMAGING | Facility: OTHER | Age: 70
End: 2020-11-25
Attending: INTERNAL MEDICINE
Payer: MEDICARE

## 2020-11-25 DIAGNOSIS — C18.1 MALIGNANT NEOPLASM OF APPENDIX (H): ICD-10-CM

## 2020-11-25 PROCEDURE — 74177 CT ABD & PELVIS W/CONTRAST: CPT

## 2020-11-25 PROCEDURE — 71260 CT THORAX DX C+: CPT

## 2020-11-25 PROCEDURE — 255N000002 HC RX 255 OP 636: Performed by: INTERNAL MEDICINE

## 2020-11-25 RX ORDER — IODIXANOL 320 MG/ML
100 INJECTION, SOLUTION INTRAVASCULAR ONCE
Status: COMPLETED | OUTPATIENT
Start: 2020-11-25 | End: 2020-11-25

## 2020-11-25 RX ADMIN — IODIXANOL 100 ML: 320 INJECTION, SOLUTION INTRAVASCULAR at 13:20

## 2020-11-25 NOTE — PROGRESS NOTES
IV Contrast- Discharge Instructions After Your CT Scan      The IV contrast you received today will be filtered from your bloodstream by your kidneys during the next 24 hours and pass from the body in urine.  You will not be aware of this process and your urine will not change in color.  To help this process you should drink at least 4 additional glasses of water or juice today.  This reduces stress on your kidneys.    Most contrast reactions are immediate.  Should you develop symptoms of concern after discharge, contact the department at the number below.  After hours you should contact your personal physician.  If you develop breathing distress or wheezing, call 911.      1.  Has the patient had a previous reaction to IV contrast? Pt is premedicated    2.  Does the patient have kidney disease? n    3.  Is the patient on dialysis? n    If YES to any of these questions, exam will be reviewed with a Radiologist before administering contrast.

## 2020-11-27 ENCOUNTER — MYC MEDICAL ADVICE (OUTPATIENT)
Dept: ONCOLOGY | Facility: OTHER | Age: 70
End: 2020-11-27

## 2020-12-03 ENCOUNTER — ONCOLOGY VISIT (OUTPATIENT)
Dept: ONCOLOGY | Facility: OTHER | Age: 70
End: 2020-12-03
Attending: INTERNAL MEDICINE
Payer: MEDICARE

## 2020-12-03 VITALS
DIASTOLIC BLOOD PRESSURE: 76 MMHG | SYSTOLIC BLOOD PRESSURE: 132 MMHG | OXYGEN SATURATION: 99 % | TEMPERATURE: 97.6 F | WEIGHT: 149.6 LBS | BODY MASS INDEX: 26.08 KG/M2 | RESPIRATION RATE: 16 BRPM | HEART RATE: 80 BPM

## 2020-12-03 DIAGNOSIS — C18.1 MALIGNANT NEOPLASM OF APPENDIX (H): Primary | ICD-10-CM

## 2020-12-03 PROCEDURE — 99215 OFFICE O/P EST HI 40 MIN: CPT | Performed by: INTERNAL MEDICINE

## 2020-12-03 PROCEDURE — G0463 HOSPITAL OUTPT CLINIC VISIT: HCPCS

## 2020-12-03 ASSESSMENT — PAIN SCALES - GENERAL: PAINLEVEL: NO PAIN (0)

## 2020-12-03 NOTE — NURSING NOTE
"Chief Complaint   Patient presents with     RECHECK     APPENDIX CANCER       Initial /76 (BP Location: Right arm, Patient Position: Sitting)   Pulse 80   Temp 97.6  F (36.4  C) (Tympanic)   Resp 16   Wt 67.9 kg (149 lb 9.6 oz)   SpO2 99%   BMI 26.08 kg/m   Estimated body mass index is 26.08 kg/m  as calculated from the following:    Height as of 9/17/20: 1.613 m (5' 3.5\").    Weight as of this encounter: 67.9 kg (149 lb 9.6 oz).  Medication Reconciliation: complete    Kymberly Nolan RN    "

## 2020-12-04 NOTE — PROGRESS NOTES
Visit Date:   12/03/2020      HISTORY OF PRESENT ILLNESS:  Ms. 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  We had seen the patient in consultation at the request of Dr. Tresa Evangelista and MARY Nelson of [facility] as well as Dr. Speedy Menezes at the [facility], where he had seen the patient on 02/07/2019.  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  She apparently presented with abdominal discomfort in 10/2018. This was with nausea, which progressed to the point where she was seen by MARY Nelson, primary care provider in 10/2018.  At that time, she presented with nausea, vomiting associated epigastric pain, left lower quadrant abdominal pain.  MRI of the liver was obtained with findings of hemangioma.  Symptoms persisted.  The patient underwent EGD on 12/07/2018.  There were no significant findings.  Apparently, she progressed to the point where she was having significant projectile vomiting.  On 01/14/2019, she was seen in the [facility] and then transferred to [facility] where she was admitted for small-bowel obstruction.  She was treated initially conservatively with NG suction.  Subsequently, she underwent right hemicolectomy, which revealed a mass in the terminal ileum.  The mass was adherent to the right ovary.  Pathology was read as adenocarcinoma, ex-goblet cell carcinoid of the appendix, and was staged pathologic T1b N1 initially.  Tumor was present focally at the posterior retroperitoneal region with 2/23 lymph nodes involved with disease.  Peritoneal fluid was sent for cytology.  Initially, it was read as malignant  cells.  The patient was referred to the [facility] and was seen by Dr. Speedy Menezes, medical oncologist at the [facility] on 01/16/2019.  Slides were reviewed by the [facility] pathologist and the findings were the patient had peritoneal washings consistent with involvement by adenocarcinoma, ex-goblet cell carcinoid.  Right hemicolectomy specimen revealed adenocarcinoma, ex-goblet cell carcinoid.  Indurated mass was noted diffusely involving the appendix and invading the visceral peritoneum.  Angiolymphatic invasion was present of the retroperitoneal soft tissue margin was positive 3 out of 21 lymph nodes involved.  Pathologic stage was T4a N1 M1.  Immunohistochemical stains for DNA mismatch repair enzymes were intact.  Also, CT of the abdomen and pelvis performed at [facility].  Findings were there were interval postoperative changes in the right hemicolectomy with resolution of previously seen small bowel dilatation obstruction.  There was mild soft tissue edema in the resection bed along the midline incision, which was felt to be secondary to postop status.  No definite peritoneal metastases were identified.  No free fluid.  There was abnormal geographic perfusion with hepatic segments 5 and 8 without evidence of focal hepatic mass, indeterminate mild thickening, left adrenal gland, unchanged.  On 01/15/2018, Dr. Menezes recommended FOLFOX chemotherapy given the fact she had positive cytology and recommended 3 months of FOLFOX chemotherapy followed by imaging at the [facility].  The patient had a port placed on 02/17/2019.  The plan was to proceed with FOLFOX with 5-FU given 400 mg/m2 IV bolus with leucovorin bolus oxaliplatin 85 mg/m2 on day #1, with continuous IV 5-FU 2400 mg/m2 continuous IV over 46 hours day #1.  The plan was to restage 3 months of therapy.  The patient completed 4 cycles at 20% dose reduction.  The last 2 cycles reduced by 20% due to neutropenia.  She had staging studies done after 4 cycles  on 04/09/2019.  The findings were there was no evidence of metastatic disease.  There was evolution of postoperative change of the abdomen, no evidence of microscopic peritoneal implant disease.  There was nodular opacity in the lung consistent with endobronchial debris, likely due to bronchitis.  The patient went on to complete 8 cycles of chemotherapy, then underwent restaging imaging on 06/16/2019 including CT chest, abdomen and pelvis.  Findings were there was no evidence of active disease throughout the chest, abdomen and pelvis.  Nodular foci seen previously were no longer identified suggesting the presence of endobronchial debris on prior study.  No evidence of new nodules or lymphadenopathy, no evidence of intraperitoneal metastatic disease.  The patient otherwise decided that she did not want to go to the [facility].  We discussed the case with Dr. Speedy Menezes who recommended that HIPEC chemotherapy may be an option, otherwise recommended serial imaging.  The plan was to continue surveillance scans in 08/26/2019 were negative for metastatic disease.  Once the port out; therefore, it was taken out on 08/28/2019.  The patient's imaging on 02/20/2020, CT chest was unremarkable.  There was mild soft, diffuse thickening in the right pericolic gutter, which was nonspecific.  The patient did complain of some abdominal discomfort.  We ordered a PET scan.  This came back essentially negative.  There was some mild increased soft tissue in the right pericolic gutter.  There was no associated hypermetabolism to suggest this was likely postoperative scarring.  The patient subsequently had repeat scans on 06/17.  CT chest, abdomen and pelvis revealed a slowly worsening mesenteric stranding and soft tissue thickening in the right pericolic gutter and anterior peritoneal cavity.  At the same time, the patient completed some vague right-sided abdominal pain, abdominal bloating.  We did obtain a PET scan, which revealed  hypermetabolic tissue within the right lower quadrant pericolic gutter.  The patient was seen by Dr. Evangelista who proceeded with diagnostic laparoscopy with biopsies and peritoneal washings performed by Dr. Evangelista in 06/2020.  She was found to have peritoneal implants throughout the abdomen.  There was a small amount of peritoneal fluid noted.  Peritoneal implants were noted.  Dense tissue noted in the right lower abdomen.  Biopsies were taken of numerous implants in the left lower quadrant.  Pathology revealed the patient had poorly differentiated adenocarcinoma with mucin production metastatic compatible with appendiceal primary.  Peritoneal fluid cytology was negative.  The patient did not tolerate FOLFOX without significant neutropenia in the past.  Since this was progression, we felt the patient would require FOLFIRI, bevacizumab.  We actually contacted Dr. Menezes at the [facility], who originally saw her and agreed with our plan.  He recommended 3 months of chemotherapy with FOLFIRI and bevacizumab, then go on to staging studies and then consider HIPEC chemotherapy.  If not, she would continue chemotherapy.  He also recommended MSI testing.  This came back with MSI intact.  The patient was started on FOLFIRI and Avastin and received 3 cycles.  She was getting extremely fatigued the week after chemotherapy and when we saw her on 08/27/2020, she wanted her cycle length into 21 days to give her time to recover.  She did get some loose stools, otherwise tolerated chemotherapy relatively well except for extreme fatigue.  She could not function her whole week and would like to have her cycles extended to every 21 days, which was probably reasonable.  Therefore, the left extensor cycles.  Cycle 4, she had completed it.  She went on to complete 6 cycles of chemotherapy and then was re-imaged.  CT chest, abdomen and pelvis on 11/25/2020 revealed there was slight interval improvement subtle right pericolic gutter eventual  intraperitoneal implant disease.  There were no new sites of metastatic disease.  The patient did see Dr. Menezes yesterday for video visit consult and scans were reviewed by him they were read at the [facility] as peritoneal thickening and nodularity in the anterior omentum, right pericolic gutter, likely represents peritoneal carcinomatosis that appears unchanged since CT 06/17/2020.  Dr. Menezes felt that she had stable disease and that he recommended getting a PET scan to see if these lesions were less hypermetabolic.  She plans to see a surgeon or via video telephone visit on 12/14.  He suggested placing her on maintenance 5-FU, leucovorin, Avastin with 5-FU bolus and infusional 5-FU on day 1 with leucovorin and Avastin 5 mg/kg every 2 weeks, essentially FOLFIRI and Avastin without the irinotecan.  He also recommended getting tissue sent for [facility].  The patient is reluctant to start chemotherapy now she would like to wait to resume until January, she would like to get the PET scan.  Otherwise, she says she is doing relatively well.  She still has some abdominal bloating, but denies any fevers, night sweats, weight loss.      PHYSICAL EXAMINATION:   GENERAL:  She is an elderly white female in no acute distress.   VITAL SIGNS:  Blood pressure 132/76, pulse 80, respirations 16, temperature 97.6.   HEENT:  Atraumatic, normocephalic.  Oropharynx nonerythematous.   NECK:  Supple.   LUNGS:  Clear to auscultation and percussion.   HEART:  Regular rhythm, S1, S2 normal.   ABDOMEN:  Mildly distended.  There is some tenderness in the right upper quadrant with no rebound.   LYMPHATICS:  No cervical, supraclavicular nodes.   EXTREMITIES:  Without edema.   NEUROLOGIC:  Nonfocal.      LABORATORY DATA:  Laboratories reveal CBC:  White count 4.1, H and H 11.7 and 36.2, platelet count 222.  CEA is less than 3.  BUN is 20, creatinine 1.00.  LFTs are normal.      IMPRESSION:  Metastatic adenocarcinoma, ex-goblet cell  carcinoid of the appendix with metastases to abdominal lymph nodes as well as the peritoneum with positive cytology.  The patient was deemed a candidate for chemotherapy for stage IV disease as per Dr. Speedy Menezes of [facility].  The plan was to initiate FOLFOX chemotherapy.  The patient completed 2 cycles.  Course was complicated by neutropenia.  The patient went on to receive 4 cycles, last 2 requiring 20% dose reduction due to neutropenia.  She was staged with no evidence of disease after 8 cycles.  CT of the abdomen and pelvis was stable.  PET scan indicated no evidence of disease.  Repeat scans indicated progression of disease.  Diagnostic laparoscopy revealed numerous peritoneal implants with biopsy consistent with poorly differentiated adenocarcinoma metastatic to the peritoneum consistent with recurrence of her disease, status.  We discussed the case with Dr. Speedy Menezes, medical oncologist at the [facility], who agreed that the patient is a candidate for FOLFIRI, bevacizumab.  The plan was to treat with 6 cycles and then restage.  MSI testing was intact.  The patient received 3 cycles q.14-day schedule.  Performance status worsened second week.  We elected to increase her cycles to q.21 days with FOLFIRI and bevacizumab given on a every 21 day basis, bevacizumab given at 7.5 mg/kg every 21 days.  We did keep taking was dose reduced by 20%.  Course was complicated with neutropenia requiring dose reduction of 5-FU infusional and bolus.  The patient tolerated therapy well.  The plan was to proceed with a total of 6 cycles, which she did.  Staging studies as per [place] indicated stable peritoneal disease as per Dr. Speedy Menezes.  The patient will be a candidate for maintenance 5-FU, leucovorin and Avastin.  We would like to recommend getting a PET scan.  Therefore, the patient will get a PET scan and be seen by the surgeon at the [facility] on via video visit on 12/14.  We also sent tissue for [place] studies.  The patient would like to hold chemotherapy until January after the holidays.  We will see the patient after she sees the surgeon to review the PET scan and proceed with therapy at that time.         DEEPIKA BARRIOS MD             D: 2020   T: 2020   MT:       Name:     ALMA CORTES   MRN:      [id number]        Account:      [id number]   :      1950           Visit Date:   2020      Document: [id number]       cc: Isaura Menezes MD

## 2020-12-09 ENCOUNTER — HOSPITAL ENCOUNTER (OUTPATIENT)
Dept: PET IMAGING | Facility: HOSPITAL | Age: 70
Discharge: HOME | End: 2020-12-09
Attending: INTERNAL MEDICINE | Admitting: INTERNAL MEDICINE
Payer: MEDICARE

## 2020-12-09 DIAGNOSIS — C18.1 MALIGNANT NEOPLASM OF APPENDIX (H): ICD-10-CM

## 2020-12-09 PROCEDURE — 343N000001 HC RX 343: Performed by: INTERNAL MEDICINE

## 2020-12-09 PROCEDURE — 78815 PET IMAGE W/CT SKULL-THIGH: CPT | Mod: PS

## 2020-12-09 PROCEDURE — A9552 F18 FDG: HCPCS | Performed by: INTERNAL MEDICINE

## 2020-12-09 RX ADMIN — FLUDEOXYGLUCOSE F-18 12.25 MCI.: 500 INJECTION, SOLUTION INTRAVENOUS at 07:24

## 2020-12-10 ENCOUNTER — MYC MEDICAL ADVICE (OUTPATIENT)
Dept: ONCOLOGY | Facility: OTHER | Age: 70
End: 2020-12-10

## 2020-12-11 ENCOUNTER — MYC MEDICAL ADVICE (OUTPATIENT)
Dept: ONCOLOGY | Facility: OTHER | Age: 70
End: 2020-12-11

## 2020-12-14 ENCOUNTER — MYC MEDICAL ADVICE (OUTPATIENT)
Dept: ONCOLOGY | Facility: OTHER | Age: 70
End: 2020-12-14

## 2020-12-17 ENCOUNTER — VIRTUAL VISIT (OUTPATIENT)
Dept: ONCOLOGY | Facility: OTHER | Age: 70
End: 2020-12-17
Attending: INTERNAL MEDICINE
Payer: MEDICARE

## 2020-12-17 DIAGNOSIS — I70.90 CALCIFICATION OF ARTERY: Primary | ICD-10-CM

## 2020-12-17 DIAGNOSIS — C77.2 CANCER OF APPENDIX METASTATIC TO INTRA-ABDOMINAL LYMPH NODE (H): Primary | ICD-10-CM

## 2020-12-17 DIAGNOSIS — C18.1 CANCER OF APPENDIX METASTATIC TO INTRA-ABDOMINAL LYMPH NODE (H): Primary | ICD-10-CM

## 2020-12-17 PROCEDURE — 99443 PR PHYSICIAN TELEPHONE EVALUATION 21-30 MIN: CPT | Mod: 95 | Performed by: INTERNAL MEDICINE

## 2020-12-17 RX ORDER — FLUOROURACIL 50 MG/ML
320 INJECTION, SOLUTION INTRAVENOUS ONCE
Status: CANCELLED | OUTPATIENT
Start: 2020-12-17

## 2020-12-17 RX ORDER — DIPHENHYDRAMINE HYDROCHLORIDE 50 MG/ML
50 INJECTION INTRAMUSCULAR; INTRAVENOUS
Status: CANCELLED
Start: 2020-12-17

## 2020-12-17 RX ORDER — HEPARIN SODIUM (PORCINE) LOCK FLUSH IV SOLN 100 UNIT/ML 100 UNIT/ML
5 SOLUTION INTRAVENOUS
Status: CANCELLED | OUTPATIENT
Start: 2020-12-17

## 2020-12-17 RX ORDER — SODIUM CHLORIDE 9 MG/ML
1000 INJECTION, SOLUTION INTRAVENOUS CONTINUOUS PRN
Status: CANCELLED
Start: 2020-12-17

## 2020-12-17 RX ORDER — ATROPINE SULFATE 0.4 MG/ML
0.4 AMPUL (ML) INJECTION
Status: CANCELLED
Start: 2020-12-17

## 2020-12-17 RX ORDER — ALBUTEROL SULFATE 90 UG/1
1-2 AEROSOL, METERED RESPIRATORY (INHALATION)
Status: CANCELLED
Start: 2020-12-17

## 2020-12-17 RX ORDER — ALBUTEROL SULFATE 0.83 MG/ML
2.5 SOLUTION RESPIRATORY (INHALATION)
Status: CANCELLED | OUTPATIENT
Start: 2020-12-17

## 2020-12-17 RX ORDER — METHYLPREDNISOLONE SODIUM SUCCINATE 125 MG/2ML
125 INJECTION, POWDER, LYOPHILIZED, FOR SOLUTION INTRAMUSCULAR; INTRAVENOUS
Status: CANCELLED
Start: 2020-12-17

## 2020-12-17 RX ORDER — NALOXONE HYDROCHLORIDE 0.4 MG/ML
.1-.4 INJECTION, SOLUTION INTRAMUSCULAR; INTRAVENOUS; SUBCUTANEOUS
Status: CANCELLED | OUTPATIENT
Start: 2020-12-17

## 2020-12-17 RX ORDER — MEPERIDINE HYDROCHLORIDE 50 MG/ML
25 INJECTION INTRAMUSCULAR; INTRAVENOUS; SUBCUTANEOUS EVERY 30 MIN PRN
Status: CANCELLED | OUTPATIENT
Start: 2020-12-17

## 2020-12-17 RX ORDER — HEPARIN SODIUM (PORCINE) LOCK FLUSH IV SOLN 100 UNIT/ML 100 UNIT/ML
5 SOLUTION INTRAVENOUS
Status: CANCELLED | OUTPATIENT
Start: 2021-01-07

## 2020-12-17 RX ORDER — EPINEPHRINE 1 MG/ML
0.3 INJECTION, SOLUTION, CONCENTRATE INTRAVENOUS EVERY 5 MIN PRN
Status: CANCELLED | OUTPATIENT
Start: 2020-12-17

## 2020-12-17 RX ORDER — LORAZEPAM 2 MG/ML
0.5 INJECTION INTRAMUSCULAR EVERY 4 HOURS PRN
Status: CANCELLED
Start: 2020-12-17

## 2020-12-17 NOTE — PROGRESS NOTES
"Nadya Flanagan is a 70 year old female who is being evaluated via a billable telephone visit.      The patient has been notified of following:     \"This telephone visit will be conducted via a call between you and your physician/provider. We have found that certain health care needs can be provided without the need for a physical exam.  This service lets us provide the care you need with a short phone conversation.  If a prescription is necessary we can send it directly to your pharmacy.  If lab work is needed we can place an order for that and you can then stop by our lab to have the test done at a later time.    Telephone visits are billed at different rates depending on your insurance coverage. During this emergency period, for some insurers they may be billed the same as an in-person visit.  Please reach out to your insurance provider with any questions.    If during the course of the call the physician/provider feels a telephone visit is not appropriate, you will not be charged for this service.\"    Patient has given verbal consent for Telephone visit?  Yes    What phone number would you like to be contacted at? [phone]    How would you like to obtain your AVS? none    Phone call duration: 22 minutes        "

## 2020-12-17 NOTE — NURSING NOTE
"Chief Complaint   Patient presents with     RECHECK     cancer of appendix       Initial There were no vitals taken for this visit. Estimated body mass index is 26.08 kg/m  as calculated from the following:    Height as of 9/17/20: 1.613 m (5' 3.5\").    Weight as of 12/3/20: 67.9 kg (149 lb 9.6 oz).  Medication Reconciliation: complete    Selena Solorio RN    "

## 2020-12-18 NOTE — PROGRESS NOTES
Visit Date:   12/17/2020      HISTORY OF PRESENT ILLNESS:  Mrs. 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  We had seen the patient in consultation at the request of Dr. Tresa Evangelista and MARY Nelson of [facility] as well as Dr. Speedy Menezes at the [facility], where he had seen the patient on 02/07/2019.  For details, please see previous notes.  She was just seen on 12/03/2020.  The patient had recently undergone a PET scan, which revealed hypermetabolic tissue within the right lower quadrant pericolic gutter.  The patient was seen by Dr. Evangelista, who proceeded with diagnostic laparoscopy with biopsies and peritoneal washings performed by Dr. Evangelista in 06/2020.  She was found to have peritoneal implants throughout the abdomen.  There was a small amount of peritoneal fluid noted.  Peritoneal implants were noted.  Dense tissue noted in the right lower abdomen.  Biopsies were taken of numerous implants in the left lower quadrant.  Pathology revealed the patient had poorly differentiated adenocarcinoma with mucin production, metastatic, compatible with appendiceal primary.  Peritoneal fluid cytology was negative.  The patient did not tolerate FOLFOX without significant neutropenia in the past.  Since this was progression, we felt the patient would require FOLFIRI, bevacizumab.  We actually contacted Dr. Menezes at the [facility], who originally saw her and agreed with our plan and recommended 3 months of chemotherapy with FOLFIRI and bevacizumab, then go on to staging studies and consider HIPEC chemotherapy.  If not, she would continue chemotherapy.  He also recommended MSI testing.  This came back MSI intact.  The patient was started on FOLFIRI and Avastin and received 3 cycles.  She was getting extremely fatigued the week after chemotherapy.  When we saw her on 08/27, she wanted her cycle  lengthened to 21 days to give her time to recover.  She did get some loose stools, otherwise tolerated chemotherapy relatively well except for some fatigue.  She could not function a whole week and would like to have her cycle extended to every 21 days, which was probably reasonable.  Therefore, we extended her cycle 4.  She went on to complete 6 cycles of chemotherapy and then was re-imaged.  CT chest, abdomen and pelvis on 11/25/2020 revealed there was slight interval improvement in subtle right pericolic gutter intraperitoneal implant disease.  There were no new sites of metastatic disease.  The patient was seen by Dr. Menezes on 12/02 via video visit consultation, and scans were reviewed by him, and they were read by [place] as peritoneal thickening and nodularity in the anterior omentum and right pericolic gutter, likely representing peritoneal carcinomatosis that appeared unchanged since  6/17/2020.  Dr. Menezes felt that she had stable disease, and he recommended getting a PET scan  these lesions were less hypermetabolic.  She did have a PET scan subsequently on 12/09, and this was read by the [facility] as stable to slight improvement of mildly FDG avid peritoneal omental nodularity.  There was mentioned of a calcified left coronary artery.  The patient consulted with Surgery and was seen by Dr. Vic Bella of General Surgery via telemedicine virtual visit, and his impression was that the patient had stable to slight improvement in the omental peritoneal metastases.  There was only omental caking.  He felt the patient would be a candidate for a laparoscopy to determine the extent of peritoneal mets, and then she will be a candidate for CRS or HIPEC.  The patient was reluctant to proceed and was concerned about COVID and did refuse a diagnostic laparoscopy for now.  She would consider it and otherwise was interested mostly in maintenance chemotherapy.  She says she is doing relatively well.  She denies any  fevers, night sweats, weight loss.  She did have an episode of abdominal pain after eating pierogis.  She said it got better after cranberry juice.  She is concerned about that there was mention of a gallstone in the gallbladder and wonders if she has cholecystitis or any kind of gallbladder disease.  She plans to follow-up with Dr. Pearce concerning this.  She also is concerned about the calcified coronary.  She said she may have had an episode of chest pain in the distant past, but she did have an episode yesterday which resolved subsequent to cranberry juice.  She would like to see a cardiologist to discuss her coronary calcifications.  Otherwise, no complaints or change in bowel habits, bright red blood per rectum, hematemesis.      PHYSICAL EXAMINATION:  Could not be performed due the COVID-19 viral pandemic.      LABORATORY DATA:  Laboratories reveal CBC:  White count 4.1, H and H 11.7 and 36.2, platelet count 222.  CEA is less than 3.  BUN 20, creatinine 1.0.      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  The patient was deemed a candidate for chemotherapy for stage IV disease as per Dr. Speedy Menezes of [facility].  The plan was to initiate FOLFOX chemotherapy.  The patient completed 2 cycles.  Course was complicated by neutropenia.  The patient went on to receive 4 cycles, last 2 requiring 20% dose reduction due to neutropenia.  She was staged with no evidence of disease after 8 cycles.  CT of the abdomen and pelvis was stable.  PET scan indicated no evidence of disease.  Repeat scans indicated progression of disease.  Diagnostic laparoscopy revealed numerous peritoneal implants with biopsy consistent with poorly differentiated adenocarcinoma metastatic to peritoneum consistent with recurrence of her disease status.  We discussed the case with Dr. Speedy Menezes, medical  oncologist at the [facility], who agreed that the patient is a candidate for FOLFIRI, bevacizumab.  The plan was to treat with 6 cycles and then restage.  MSI testing was intact.  The patient received 3 cycles on every-14-day schedule.  Performance status worsened the second week.  We elected to to change cycles to q.21 days with FOLFIRI, bevacizumab given on an every-21-day basis with bevacizumab given at a dose of 7.5 mg/kg every 21 days with.  We did reduce the chemotherapy by 20%.  Course was complicated by neutropenia, requiring dose reduction of 5-FU infusional and bolus.  The plan was to proceed with 6 cycles.  Staging studies as per [place] indicated stable peritoneal disease.  As per Dr. Speedy Menezes, patient would be a candidate for maintenance 5-FU, leucovorin and Avastin.  PET scan indicated some improvement in disease, status.  The patient did consult with General Surgery and was not interested in diagnostic laparoscopy and HIPEC at this time.  We will therefore reinitiate maintenance chemotherapy as per Dr. Speedy Menezes with 5-FU, leucovorin and bevacizumab.  We will omit the irinotecan and proceed with 5-FU, leucovorin and bevacizumab with a FOLFIRI regimen.  We will see the patient with cycle 2 and plan on restaging after 6 cycles.  The patient is in agreement and wants to proceed.   2.  Coronary calcifications.  We will refer the patient to Dr. Valadez.   3.  Biliary colic.  We will refer the patient to Dr. Pearce for evaluation.  The patient is asymptomatic currently.      Twenty-two minutes was spent with the patient, greater than half the time spent in counseling and coordination of care.         DEEPIKA BARRIOS MD             D: 2020   T: 2020   MT: BARBARA      Name:     ALMA CORTES   MRN:      [id number]        Account:      [id number]   :      1950           Visit Date:   2020      Document: [id number]       cc: Boby Christianson  Portia De Souza MD

## 2020-12-19 ENCOUNTER — MYC MEDICAL ADVICE (OUTPATIENT)
Dept: INTERNAL MEDICINE | Facility: OTHER | Age: 70
End: 2020-12-19

## 2020-12-20 ENCOUNTER — MYC MEDICAL ADVICE (OUTPATIENT)
Dept: ONCOLOGY | Facility: OTHER | Age: 70
End: 2020-12-20

## 2020-12-20 NOTE — TELEPHONE ENCOUNTER
Spoke to patient and made her aware that Dr. Pearce does not have any openings coming up and was offered for her to see Marge Maldonado. Patient agreed to schedule an appointment and was transferred to scheduling. Patient states she was doing well today and is ok waiting until the next available appointment. Patient is wondering if she needs to have labs to have her gallbladder checked, should she eat or not?     Tresa Wilkerson, LPN on 12/19/2020 at 7:12 PM

## 2020-12-21 ENCOUNTER — MYC MEDICAL ADVICE (OUTPATIENT)
Dept: ONCOLOGY | Facility: OTHER | Age: 70
End: 2020-12-21

## 2020-12-21 NOTE — TELEPHONE ENCOUNTER
She does not need to fast for gallbladder labs.  I agree with seeing Marge uptonannaroberta and will discuss her case ahead of time.

## 2020-12-21 NOTE — TELEPHONE ENCOUNTER
After verifying name and birth date, patient was notified of provider's response.   Jeimy Maya([facility])........12/21/2020  9:18 AM

## 2020-12-22 PROBLEM — R10.11 RIGHT UPPER QUADRANT PAIN: Status: ACTIVE | Noted: 2020-12-22

## 2020-12-22 PROBLEM — N18.30 CHRONIC KIDNEY DISEASE, STAGE 3 (H): Status: ACTIVE | Noted: 2020-12-22

## 2020-12-22 PROBLEM — S61.012A LACERATION OF LEFT THUMB WITHOUT FOREIGN BODY WITHOUT DAMAGE TO NAIL: Status: RESOLVED | Noted: 2020-06-17 | Resolved: 2020-12-22

## 2020-12-22 PROBLEM — R07.81 PLEURITIC PAIN: Status: ACTIVE | Noted: 2020-12-22

## 2020-12-22 NOTE — PROGRESS NOTES
"Nursing Notes:   Purvi Vaca  12/23/2020 10:56 AM  Signed  Chief Complaint   Patient presents with     Abdominal Pain       Initial BP (!) 148/88 (BP Location: Right arm, Patient Position: Sitting, Cuff Size: Adult Small)   Pulse 72   Temp 97.5  F (36.4  C) (Tympanic)   Resp 18   Wt 68.4 kg (150 lb 12.8 oz)   SpO2 98%   BMI 26.29 kg/m   Estimated body mass index is 26.29 kg/m  as calculated from the following:    Height as of 9/17/20: 1.613 m (5' 3.5\").    Weight as of this encounter: 68.4 kg (150 lb 12.8 oz).  Medication Reconciliation: complete    Purvi Vaca       Nursing note reviewed with patient.  Accuracy and completeness verified.    SUBJECTIVE:                                                      Nadya Flanagan is a 70 year old year old female patient who presents to the clinic today for gallbladder issues, she reports.  Patient has a history of rare appendix cancer - follows with Dr. Thakkar and [facility].  She is having RUQ pain and pleuritic pain.  She had CT Abd in Nov that demonstrated gallstone already.   Dr. Pearce has previously reviewed patient's scans and indicates the cancer improved slightly.   Patient does report she feels her episodes of pain are related to gall bladder as she has been retracing the past few months and feels her complaints during her chemo were gallbladder related as 10-12 days post chemo, she would get nauseous and vomit after eating (as her appetite had improved). She reports she ate some chicken strips the other day and Wednesday, Thursday and Friday, felt like she needed to go to ED for RUQ pain.  Feels her pain worsens after eating.  Reports unintentional weight gain.  Discussed Dr. pearce's Recommendations as follows:  If labs are non urgent - would recommend referral to general surgery.  If severely elevated liver enzymes consider CT abd to rule out cholangitis.  Patient agreeable to surgical consult with Dr. Tresa Evangelista only.,    ADI Score:   No " flowsheet data found.     PHQ-2 Score:     PHQ-2 ( 1999 Pfizer) 12/23/2020 12/17/2020   Q1: Little interest or pleasure in doing things 0 (No Data)   Q2: Feeling down, depressed or hopeless 0 -   PHQ-2 Score 0 -        Problem List/PMH: Reviewed in EMR, and made relevant updates today.  Medications: Reviewed in EMR, and made relevant updates today.  Allergies: Reviewed in EMR, and made relevant updates today.    Current Code Status:  Prior    REVIEW OF SYSTEMS:    Review of Systems   Constitutional: Positive for unexpected weight change (weight gain).   Gastrointestinal: Positive for abdominal pain (RUQ) and constipation (chronic).   All other systems reviewed and are negative.      OBJECTIVE:                                                      EXAM:     BP (!) 148/88 (BP Location: Right arm, Patient Position: Sitting, Cuff Size: Adult Small)   Pulse 72   Temp 97.5  F (36.4  C) (Tympanic)   Resp 18   Wt 68.4 kg (150 lb 12.8 oz)   SpO2 98%   BMI 26.29 kg/m      Current Pain Score: No Pain (0)     Physical Exam  Vitals signs and nursing note reviewed.   Constitutional:       Appearance: Normal appearance.   HENT:      Head: Normocephalic and atraumatic.      Mouth/Throat:      Mouth: Mucous membranes are moist.      Pharynx: Oropharynx is clear.   Eyes:      Extraocular Movements: Extraocular movements intact.      Conjunctiva/sclera: Conjunctivae normal.      Pupils: Pupils are equal, round, and reactive to light.   Cardiovascular:      Rate and Rhythm: Normal rate and regular rhythm.      Heart sounds: Normal heart sounds.   Pulmonary:      Effort: Pulmonary effort is normal.      Breath sounds: Normal breath sounds.   Abdominal:      General: Bowel sounds are normal.      Palpations: Abdomen is soft.      Tenderness: There is abdominal tenderness (RUQ).   Musculoskeletal: Normal range of motion.   Skin:     General: Skin is warm and dry.   Neurological:      Mental Status: She is alert and oriented to  person, place, and time. Mental status is at baseline.   Psychiatric:         Mood and Affect: Mood normal.         Behavior: Behavior normal.         Thought Content: Thought content normal.         Judgment: Judgment normal.          Diagnostics Completed at this Visit:  Results for orders placed or performed in visit on 12/23/20   CBC with platelets differential     Status: None   Result Value Ref Range    WBC 6.7 4.0 - 11.0 10e9/L    RBC Count 3.95 3.8 - 5.2 10e12/L    Hemoglobin 12.8 11.7 - 15.7 g/dL    Hematocrit 39.4 35.0 - 47.0 %     78 - 100 fl    MCH 32.4 26.5 - 33.0 pg    MCHC 32.5 31.5 - 36.5 g/dL    RDW 13.0 10.0 - 15.0 %    Platelet Count 355 150 - 450 10e9/L    Diff Method Automated Method     % Neutrophils 59.7 %    % Lymphocytes 22.5 %    % Monocytes 11.4 %    % Eosinophils 5.2 %    % Basophils 0.9 %    % Immature Granulocytes 0.3 %    Absolute Neutrophil 4.0 1.6 - 8.3 10e9/L    Absolute Lymphocytes 1.5 0.8 - 5.3 10e9/L    Absolute Monocytes 0.8 0.0 - 1.3 10e9/L    Absolute Eosinophils 0.4 0.0 - 0.7 10e9/L    Absolute Basophils 0.1 0.0 - 0.2 10e9/L    Abs Immature Granulocytes 0.0 0 - 0.4 10e9/L   Comprehensive metabolic panel     Status: Abnormal   Result Value Ref Range    Sodium 137 134 - 144 mmol/L    Potassium 4.6 3.5 - 5.1 mmol/L    Chloride 103 98 - 107 mmol/L    Carbon Dioxide 28 21 - 31 mmol/L    Anion Gap 6 3 - 14 mmol/L    Glucose 101 70 - 105 mg/dL    Urea Nitrogen 18 7 - 25 mg/dL    Creatinine 0.98 0.60 - 1.20 mg/dL    GFR Estimate 56 (L) >60 mL/min/[1.73_m2]    GFR Estimate If Black 68 >60 mL/min/[1.73_m2]    Calcium 9.6 8.6 - 10.3 mg/dL    Bilirubin Total 0.4 0.3 - 1.0 mg/dL    Albumin 4.5 3.5 - 5.7 g/dL    Protein Total 7.4 6.4 - 8.9 g/dL    Alkaline Phosphatase 76 34 - 104 U/L    ALT 19 7 - 52 U/L    AST 23 13 - 39 U/L        ASSESSMENT/PLAN:                                                      Right upper quadrant pain  Surgical consult with Dr. Evangelista.  - Comprehensive  metabolic panel  - CBC with platelets differential  - Bilirubin, Total    Stage 3a chronic kidney disease  Drink more water.    Cancer of appendix metastatic to intra-abdominal lymph node (H)  - Comprehensive metabolic panel  - CBC with platelets differential  - Bilirubin, Total    Pleuritic pain  - Comprehensive metabolic panel  - CBC with platelets differential  - Bilirubin, Total    Pre-diabetes  Educated on importance of diet and exercise for weight loss. Daily exercise for at least 30 minutes is recommended. This can be walking, riding a bike, low impact aerobic activity, or yoga. Eliminate soda, high fructose corn syrup products, artificial sweeteners, and high sodium, high cholesterol foods. Encouraged more fruits and vegetables and an increase in daily water intake.    Thyroid condition  Continue to monitor    Malignant neoplasm metastatic to peritoneum (H)  Follow up as needed.     I explained my diagnostic considerations and recommendations to the patient, who voiced understanding and agreement with the treatment plan. All questions were answered. We discussed potential side effects of any prescribed or recommended therapies, as well as expectations for response to treatments.     Return in about 3 weeks (around 1/13/2021) for Recheck.       ALBERT Méndez, CHACHO-C  Internal Medicine  [facility]    12/24/2020 12:13 PM      Disclaimer:  This note consists of words and symbols derived from keyboarding, dictation, or using voice recognition software. As a result, there may be errors in the script that have gone undetected. Please consider this when interpreting information found in this note. Please contact me if there are any concerns regarding the accuracy of the dictation.

## 2020-12-23 ENCOUNTER — OFFICE VISIT (OUTPATIENT)
Dept: INTERNAL MEDICINE | Facility: OTHER | Age: 70
End: 2020-12-23
Attending: NURSE PRACTITIONER
Payer: MEDICARE

## 2020-12-23 VITALS
SYSTOLIC BLOOD PRESSURE: 148 MMHG | RESPIRATION RATE: 18 BRPM | OXYGEN SATURATION: 98 % | DIASTOLIC BLOOD PRESSURE: 88 MMHG | TEMPERATURE: 97.5 F | WEIGHT: 150.8 LBS | HEART RATE: 72 BPM | BODY MASS INDEX: 26.29 KG/M2

## 2020-12-23 DIAGNOSIS — R07.81 PLEURITIC PAIN: ICD-10-CM

## 2020-12-23 DIAGNOSIS — C77.2 CANCER OF APPENDIX METASTATIC TO INTRA-ABDOMINAL LYMPH NODE (H): Primary | ICD-10-CM

## 2020-12-23 DIAGNOSIS — C18.1 CANCER OF APPENDIX METASTATIC TO INTRA-ABDOMINAL LYMPH NODE (H): Primary | ICD-10-CM

## 2020-12-23 DIAGNOSIS — C78.6 MALIGNANT NEOPLASM METASTATIC TO PERITONEUM (H): ICD-10-CM

## 2020-12-23 DIAGNOSIS — R10.11 RIGHT UPPER QUADRANT PAIN: ICD-10-CM

## 2020-12-23 DIAGNOSIS — E07.9 THYROID CONDITION: ICD-10-CM

## 2020-12-23 DIAGNOSIS — R73.03 PRE-DIABETES: ICD-10-CM

## 2020-12-23 DIAGNOSIS — N18.31 STAGE 3A CHRONIC KIDNEY DISEASE (H): ICD-10-CM

## 2020-12-23 LAB
ALBUMIN SERPL-MCNC: 4.5 G/DL (ref 3.5–5.7)
ALP SERPL-CCNC: 76 U/L (ref 34–104)
ALT SERPL W P-5'-P-CCNC: 19 U/L (ref 7–52)
ANION GAP SERPL CALCULATED.3IONS-SCNC: 6 MMOL/L (ref 3–14)
AST SERPL W P-5'-P-CCNC: 23 U/L (ref 13–39)
BASOPHILS # BLD AUTO: 0.1 10E9/L (ref 0–0.2)
BASOPHILS NFR BLD AUTO: 0.9 %
BILIRUB SERPL-MCNC: 0.4 MG/DL (ref 0.3–1)
BUN SERPL-MCNC: 18 MG/DL (ref 7–25)
CALCIUM SERPL-MCNC: 9.6 MG/DL (ref 8.6–10.3)
CHLORIDE SERPL-SCNC: 103 MMOL/L (ref 98–107)
CO2 SERPL-SCNC: 28 MMOL/L (ref 21–31)
CREAT SERPL-MCNC: 0.98 MG/DL (ref 0.6–1.2)
DIFFERENTIAL METHOD BLD: NORMAL
EOSINOPHIL # BLD AUTO: 0.4 10E9/L (ref 0–0.7)
EOSINOPHIL NFR BLD AUTO: 5.2 %
ERYTHROCYTE [DISTWIDTH] IN BLOOD BY AUTOMATED COUNT: 13 % (ref 10–15)
GFR SERPL CREATININE-BSD FRML MDRD: 56 ML/MIN/{1.73_M2}
GLUCOSE SERPL-MCNC: 101 MG/DL (ref 70–105)
HCT VFR BLD AUTO: 39.4 % (ref 35–47)
HGB BLD-MCNC: 12.8 G/DL (ref 11.7–15.7)
IMM GRANULOCYTES # BLD: 0 10E9/L (ref 0–0.4)
IMM GRANULOCYTES NFR BLD: 0.3 %
LYMPHOCYTES # BLD AUTO: 1.5 10E9/L (ref 0.8–5.3)
LYMPHOCYTES NFR BLD AUTO: 22.5 %
MCH RBC QN AUTO: 32.4 PG (ref 26.5–33)
MCHC RBC AUTO-ENTMCNC: 32.5 G/DL (ref 31.5–36.5)
MCV RBC AUTO: 100 FL (ref 78–100)
MONOCYTES # BLD AUTO: 0.8 10E9/L (ref 0–1.3)
MONOCYTES NFR BLD AUTO: 11.4 %
NEUTROPHILS # BLD AUTO: 4 10E9/L (ref 1.6–8.3)
NEUTROPHILS NFR BLD AUTO: 59.7 %
PLATELET # BLD AUTO: 355 10E9/L (ref 150–450)
POTASSIUM SERPL-SCNC: 4.6 MMOL/L (ref 3.5–5.1)
PROT SERPL-MCNC: 7.4 G/DL (ref 6.4–8.9)
RBC # BLD AUTO: 3.95 10E12/L (ref 3.8–5.2)
SODIUM SERPL-SCNC: 137 MMOL/L (ref 134–144)
WBC # BLD AUTO: 6.7 10E9/L (ref 4–11)

## 2020-12-23 PROCEDURE — G0463 HOSPITAL OUTPT CLINIC VISIT: HCPCS

## 2020-12-23 PROCEDURE — 36415 COLL VENOUS BLD VENIPUNCTURE: CPT | Mod: ZL | Performed by: NURSE PRACTITIONER

## 2020-12-23 PROCEDURE — 80053 COMPREHEN METABOLIC PANEL: CPT | Mod: ZL | Performed by: NURSE PRACTITIONER

## 2020-12-23 PROCEDURE — 99214 OFFICE O/P EST MOD 30 MIN: CPT | Performed by: NURSE PRACTITIONER

## 2020-12-23 PROCEDURE — 85025 COMPLETE CBC W/AUTO DIFF WBC: CPT | Mod: ZL | Performed by: NURSE PRACTITIONER

## 2020-12-23 ASSESSMENT — ENCOUNTER SYMPTOMS
ABDOMINAL PAIN: 1
CONSTIPATION: 1
UNEXPECTED WEIGHT CHANGE: 1

## 2020-12-23 ASSESSMENT — PAIN SCALES - GENERAL: PAINLEVEL: NO PAIN (0)

## 2020-12-23 NOTE — NURSING NOTE
"Chief Complaint   Patient presents with     Abdominal Pain       Initial BP (!) 148/88 (BP Location: Right arm, Patient Position: Sitting, Cuff Size: Adult Small)   Pulse 72   Temp 97.5  F (36.4  C) (Tympanic)   Resp 18   Wt 68.4 kg (150 lb 12.8 oz)   SpO2 98%   BMI 26.29 kg/m   Estimated body mass index is 26.29 kg/m  as calculated from the following:    Height as of 9/17/20: 1.613 m (5' 3.5\").    Weight as of this encounter: 68.4 kg (150 lb 12.8 oz).  Medication Reconciliation: complete    Purvi Vaca  "

## 2020-12-24 ENCOUNTER — TELEPHONE (OUTPATIENT)
Dept: ONCOLOGY | Facility: OTHER | Age: 70
End: 2020-12-24

## 2020-12-24 NOTE — TELEPHONE ENCOUNTER
Would like call from Diana. Wants a Tuesday, 1/12 is start date. This is same chemo she has been getting omitting the irinotecan. Has questions.  Selena Solorio RN...........12/24/2020 10:34 AM

## 2020-12-24 NOTE — TELEPHONE ENCOUNTER
Spoke with patient. Patient had some questions regarding maintenance chemotherapy. Questions answered. She would also like to see about starting her chemotherapy sooner than 1/12/20. Monika will talk with infusion nurses to see if we can get patient set up sooner.

## 2020-12-30 ENCOUNTER — HOME INFUSION (PRE-WILLOW HOME INFUSION) (OUTPATIENT)
Dept: PHARMACY | Facility: CLINIC | Age: 70
End: 2020-12-30

## 2021-01-01 ENCOUNTER — HOSPITAL ENCOUNTER (OUTPATIENT)
Dept: INFUSION THERAPY | Facility: OTHER | Age: 71
Discharge: HOME | End: 2021-07-30
Attending: INTERNAL MEDICINE | Admitting: INTERNAL MEDICINE
Payer: MEDICARE

## 2021-01-01 ENCOUNTER — MYC MEDICAL ADVICE (OUTPATIENT)
Dept: ONCOLOGY | Facility: OTHER | Age: 71
End: 2021-01-01
Payer: MEDICARE

## 2021-01-01 ENCOUNTER — HOSPITAL ENCOUNTER (OUTPATIENT)
Dept: INFUSION THERAPY | Facility: OTHER | Age: 71
Discharge: HOME | End: 2021-12-03
Attending: INTERNAL MEDICINE | Admitting: INTERNAL MEDICINE
Payer: MEDICARE

## 2021-01-01 ENCOUNTER — HOSPITAL ENCOUNTER (OUTPATIENT)
Dept: INFUSION THERAPY | Facility: OTHER | Age: 71
Discharge: HOME | End: 2021-11-12
Attending: INTERNAL MEDICINE | Admitting: INTERNAL MEDICINE
Payer: MEDICARE

## 2021-01-01 ENCOUNTER — MYC MEDICAL ADVICE (OUTPATIENT)
Dept: ONCOLOGY | Facility: OTHER | Age: 71
End: 2021-01-01

## 2021-01-01 ENCOUNTER — HOSPITAL ENCOUNTER (OUTPATIENT)
Dept: PET IMAGING | Facility: OTHER | Age: 71
Discharge: HOME | End: 2021-09-29
Attending: INTERNAL MEDICINE | Admitting: INTERNAL MEDICINE
Payer: MEDICARE

## 2021-01-01 ENCOUNTER — HOSPITAL ENCOUNTER (OUTPATIENT)
Dept: INFUSION THERAPY | Facility: OTHER | Age: 71
End: 2021-10-01
Attending: INTERNAL MEDICINE
Payer: MEDICARE

## 2021-01-01 ENCOUNTER — HOSPITAL ENCOUNTER (OUTPATIENT)
Dept: INFUSION THERAPY | Facility: OTHER | Age: 71
Discharge: HOME | End: 2021-09-10
Attending: INTERNAL MEDICINE | Admitting: INTERNAL MEDICINE
Payer: MEDICARE

## 2021-01-01 ENCOUNTER — TELEPHONE (OUTPATIENT)
Dept: ONCOLOGY | Facility: OTHER | Age: 71
End: 2021-01-01
Payer: MEDICARE

## 2021-01-01 ENCOUNTER — ONCOLOGY VISIT (OUTPATIENT)
Dept: ONCOLOGY | Facility: OTHER | Age: 71
End: 2021-01-01
Attending: INTERNAL MEDICINE
Payer: MEDICARE

## 2021-01-01 ENCOUNTER — HOSPITAL ENCOUNTER (OUTPATIENT)
Dept: INFUSION THERAPY | Facility: OTHER | Age: 71
Discharge: HOME | End: 2021-12-24
Attending: INTERNAL MEDICINE | Admitting: INTERNAL MEDICINE
Payer: MEDICARE

## 2021-01-01 ENCOUNTER — HOSPITAL ENCOUNTER (OUTPATIENT)
Dept: INFUSION THERAPY | Facility: OTHER | Age: 71
End: 2021-08-20
Attending: INTERNAL MEDICINE
Payer: MEDICARE

## 2021-01-01 VITALS
RESPIRATION RATE: 18 BRPM | HEART RATE: 65 BPM | TEMPERATURE: 97.4 F | WEIGHT: 144.4 LBS | DIASTOLIC BLOOD PRESSURE: 74 MMHG | SYSTOLIC BLOOD PRESSURE: 114 MMHG | BODY MASS INDEX: 25.18 KG/M2

## 2021-01-01 VITALS
BODY MASS INDEX: 25.53 KG/M2 | SYSTOLIC BLOOD PRESSURE: 146 MMHG | DIASTOLIC BLOOD PRESSURE: 77 MMHG | RESPIRATION RATE: 16 BRPM | HEART RATE: 62 BPM | WEIGHT: 146.4 LBS | TEMPERATURE: 97.1 F

## 2021-01-01 VITALS
WEIGHT: 146.6 LBS | DIASTOLIC BLOOD PRESSURE: 70 MMHG | RESPIRATION RATE: 18 BRPM | TEMPERATURE: 97.7 F | SYSTOLIC BLOOD PRESSURE: 129 MMHG | HEART RATE: 66 BPM | BODY MASS INDEX: 25.56 KG/M2

## 2021-01-01 VITALS
WEIGHT: 146.2 LBS | BODY MASS INDEX: 25.49 KG/M2 | SYSTOLIC BLOOD PRESSURE: 145 MMHG | TEMPERATURE: 98.8 F | HEART RATE: 59 BPM | RESPIRATION RATE: 16 BRPM | DIASTOLIC BLOOD PRESSURE: 79 MMHG

## 2021-01-01 VITALS
WEIGHT: 148.8 LBS | RESPIRATION RATE: 20 BRPM | BODY MASS INDEX: 25.95 KG/M2 | TEMPERATURE: 95.9 F | HEART RATE: 71 BPM | DIASTOLIC BLOOD PRESSURE: 75 MMHG | SYSTOLIC BLOOD PRESSURE: 126 MMHG

## 2021-01-01 VITALS
HEART RATE: 67 BPM | TEMPERATURE: 97.8 F | BODY MASS INDEX: 25.35 KG/M2 | DIASTOLIC BLOOD PRESSURE: 74 MMHG | SYSTOLIC BLOOD PRESSURE: 153 MMHG | RESPIRATION RATE: 18 BRPM | WEIGHT: 145.4 LBS

## 2021-01-01 VITALS
HEART RATE: 67 BPM | DIASTOLIC BLOOD PRESSURE: 72 MMHG | WEIGHT: 145 LBS | SYSTOLIC BLOOD PRESSURE: 132 MMHG | BODY MASS INDEX: 25.28 KG/M2 | TEMPERATURE: 97.7 F

## 2021-01-01 DIAGNOSIS — C18.1 CANCER OF APPENDIX METASTATIC TO INTRA-ABDOMINAL LYMPH NODE (H): Primary | ICD-10-CM

## 2021-01-01 DIAGNOSIS — C77.2 CANCER OF APPENDIX METASTATIC TO INTRA-ABDOMINAL LYMPH NODE (H): Primary | ICD-10-CM

## 2021-01-01 DIAGNOSIS — C18.1 CANCER OF APPENDIX METASTATIC TO INTRA-ABDOMINAL LYMPH NODE (H): ICD-10-CM

## 2021-01-01 DIAGNOSIS — C77.2 CANCER OF APPENDIX METASTATIC TO INTRA-ABDOMINAL LYMPH NODE (H): ICD-10-CM

## 2021-01-01 DIAGNOSIS — R80.9 PROTEINURIA, UNSPECIFIED TYPE: Primary | ICD-10-CM

## 2021-01-01 DIAGNOSIS — B37.2 CANDIDIASIS OF SKIN AND NAILS: Primary | ICD-10-CM

## 2021-01-01 DIAGNOSIS — T50.8X5D ALLERGIC REACTION TO CONTRAST MATERIAL, SUBSEQUENT ENCOUNTER: Primary | ICD-10-CM

## 2021-01-01 DIAGNOSIS — C18.1 MALIGNANT NEOPLASM OF APPENDIX (H): Primary | ICD-10-CM

## 2021-01-01 DIAGNOSIS — R80.9 PROTEINURIA, UNSPECIFIED TYPE: ICD-10-CM

## 2021-01-01 DIAGNOSIS — Z13.220 SCREENING FOR HYPERLIPIDEMIA: ICD-10-CM

## 2021-01-01 LAB
ALBUMIN SERPL-MCNC: 4.1 G/DL (ref 3.5–5.7)
ALBUMIN UR-MCNC: 100 MG/DL
ALBUMIN UR-MCNC: NEGATIVE MG/DL
ALP SERPL-CCNC: 66 U/L (ref 34–104)
ALT SERPL W P-5'-P-CCNC: 29 U/L (ref 7–52)
ANION GAP SERPL CALCULATED.3IONS-SCNC: 8 MMOL/L (ref 3–14)
AST SERPL W P-5'-P-CCNC: 36 U/L (ref 13–39)
BASOPHILS # BLD AUTO: 0 10E3/UL (ref 0–0.2)
BASOPHILS # BLD AUTO: 0.1 10E3/UL (ref 0–0.2)
BASOPHILS NFR BLD AUTO: 1 %
BASOPHILS NFR BLD AUTO: 2 %
BILIRUB SERPL-MCNC: 0.9 MG/DL (ref 0.3–1)
BUN SERPL-MCNC: 16 MG/DL (ref 7–25)
CALCIUM SERPL-MCNC: 9.4 MG/DL (ref 8.6–10.3)
CEA SERPL-MCNC: <3 NG/ML (ref 0–3)
CHLORIDE BLD-SCNC: 105 MMOL/L (ref 98–107)
CHOLEST SERPL-MCNC: 229 MG/DL
CO2 SERPL-SCNC: 25 MMOL/L (ref 21–31)
CREAT SERPL-MCNC: 0.92 MG/DL (ref 0.6–1.2)
CREAT SERPL-MCNC: 0.95 MG/DL (ref 0.6–1.2)
CREAT SERPL-MCNC: 0.96 MG/DL (ref 0.6–1.2)
CREAT SERPL-MCNC: 0.98 MG/DL (ref 0.6–1.2)
CREAT SERPL-MCNC: 1.01 MG/DL (ref 0.6–1.2)
CREAT SERPL-MCNC: 1.02 MG/DL (ref 0.6–1.2)
CREAT SERPL-MCNC: 1.08 MG/DL (ref 0.6–1.2)
EOSINOPHIL # BLD AUTO: 0.2 10E3/UL (ref 0–0.7)
EOSINOPHIL # BLD AUTO: 0.3 10E3/UL (ref 0–0.7)
EOSINOPHIL # BLD AUTO: 0.3 10E3/UL (ref 0–0.7)
EOSINOPHIL NFR BLD AUTO: 3 %
EOSINOPHIL NFR BLD AUTO: 4 %
ERYTHROCYTE [DISTWIDTH] IN BLOOD BY AUTOMATED COUNT: 14.1 % (ref 10–15)
ERYTHROCYTE [DISTWIDTH] IN BLOOD BY AUTOMATED COUNT: 15 % (ref 10–15)
ERYTHROCYTE [DISTWIDTH] IN BLOOD BY AUTOMATED COUNT: 15.9 % (ref 10–15)
ERYTHROCYTE [DISTWIDTH] IN BLOOD BY AUTOMATED COUNT: 16 % (ref 10–15)
ERYTHROCYTE [DISTWIDTH] IN BLOOD BY AUTOMATED COUNT: 16.1 % (ref 10–15)
FASTING STATUS PATIENT QL REPORTED: NO
GFR SERPL CREATININE-BSD FRML MDRD: 55 ML/MIN/1.73M2
GFR SERPL CREATININE-BSD FRML MDRD: 56 ML/MIN/1.73M2
GFR SERPL CREATININE-BSD FRML MDRD: 56 ML/MIN/1.73M2
GFR SERPL CREATININE-BSD FRML MDRD: 59 ML/MIN/1.73M2
GFR SERPL CREATININE-BSD FRML MDRD: 60 ML/MIN/1.73M2
GFR SERPL CREATININE-BSD FRML MDRD: 60 ML/MIN/1.73M2
GFR SERPL CREATININE-BSD FRML MDRD: 63 ML/MIN/1.73M2
GLUCOSE BLD-MCNC: 116 MG/DL (ref 70–105)
HCT VFR BLD AUTO: 35.9 % (ref 35–47)
HCT VFR BLD AUTO: 36.1 % (ref 35–47)
HCT VFR BLD AUTO: 36.2 % (ref 35–47)
HCT VFR BLD AUTO: 36.5 % (ref 35–47)
HCT VFR BLD AUTO: 36.6 % (ref 35–47)
HCT VFR BLD AUTO: 36.8 % (ref 35–47)
HCT VFR BLD AUTO: 37.9 % (ref 35–47)
HDLC SERPL-MCNC: 64 MG/DL (ref 23–92)
HGB BLD-MCNC: 12.2 G/DL (ref 11.7–15.7)
HGB BLD-MCNC: 12.2 G/DL (ref 11.7–15.7)
HGB BLD-MCNC: 12.3 G/DL (ref 11.7–15.7)
HGB BLD-MCNC: 12.3 G/DL (ref 11.7–15.7)
HGB BLD-MCNC: 12.5 G/DL (ref 11.7–15.7)
HGB BLD-MCNC: 12.6 G/DL (ref 11.7–15.7)
HGB BLD-MCNC: 12.9 G/DL (ref 11.7–15.7)
IMM GRANULOCYTES # BLD: 0 10E3/UL
IMM GRANULOCYTES NFR BLD: 0 %
LDH SERPL L TO P-CCNC: 172 U/L (ref 140–271)
LDLC SERPL CALC-MCNC: 138 MG/DL
LYMPHOCYTES # BLD AUTO: 0.8 10E3/UL (ref 0.8–5.3)
LYMPHOCYTES # BLD AUTO: 0.9 10E3/UL (ref 0.8–5.3)
LYMPHOCYTES # BLD AUTO: 1.3 10E3/UL (ref 0.8–5.3)
LYMPHOCYTES # BLD AUTO: 1.3 10E3/UL (ref 0.8–5.3)
LYMPHOCYTES # BLD AUTO: 1.5 10E3/UL (ref 0.8–5.3)
LYMPHOCYTES # BLD AUTO: 1.6 10E3/UL (ref 0.8–5.3)
LYMPHOCYTES # BLD AUTO: 1.6 10E3/UL (ref 0.8–5.3)
LYMPHOCYTES NFR BLD AUTO: 11 %
LYMPHOCYTES NFR BLD AUTO: 17 %
LYMPHOCYTES NFR BLD AUTO: 18 %
LYMPHOCYTES NFR BLD AUTO: 21 %
LYMPHOCYTES NFR BLD AUTO: 22 %
LYMPHOCYTES NFR BLD AUTO: 24 %
LYMPHOCYTES NFR BLD AUTO: 30 %
MCH RBC QN AUTO: 35.5 PG (ref 26.5–33)
MCH RBC QN AUTO: 35.8 PG (ref 26.5–33)
MCH RBC QN AUTO: 36.6 PG (ref 26.5–33)
MCH RBC QN AUTO: 36.9 PG (ref 26.5–33)
MCH RBC QN AUTO: 36.9 PG (ref 26.5–33)
MCH RBC QN AUTO: 37.1 PG (ref 26.5–33)
MCH RBC QN AUTO: 37.5 PG (ref 26.5–33)
MCHC RBC AUTO-ENTMCNC: 33.7 G/DL (ref 31.5–36.5)
MCHC RBC AUTO-ENTMCNC: 33.7 G/DL (ref 31.5–36.5)
MCHC RBC AUTO-ENTMCNC: 34 G/DL (ref 31.5–36.5)
MCHC RBC AUTO-ENTMCNC: 34 G/DL (ref 31.5–36.5)
MCHC RBC AUTO-ENTMCNC: 34.1 G/DL (ref 31.5–36.5)
MCHC RBC AUTO-ENTMCNC: 34.2 G/DL (ref 31.5–36.5)
MCHC RBC AUTO-ENTMCNC: 34.2 G/DL (ref 31.5–36.5)
MCV RBC AUTO: 104 FL (ref 78–100)
MCV RBC AUTO: 105 FL (ref 78–100)
MCV RBC AUTO: 108 FL (ref 78–100)
MCV RBC AUTO: 108 FL (ref 78–100)
MCV RBC AUTO: 110 FL (ref 78–100)
MONOCYTES # BLD AUTO: 0.8 10E3/UL (ref 0–1.3)
MONOCYTES # BLD AUTO: 0.9 10E3/UL (ref 0–1.3)
MONOCYTES # BLD AUTO: 1 10E3/UL (ref 0–1.3)
MONOCYTES # BLD AUTO: 1.1 10E3/UL (ref 0–1.3)
MONOCYTES NFR BLD AUTO: 12 %
MONOCYTES NFR BLD AUTO: 13 %
MONOCYTES NFR BLD AUTO: 16 %
MONOCYTES NFR BLD AUTO: 16 %
MONOCYTES NFR BLD AUTO: 17 %
NEUTROPHILS # BLD AUTO: 2.5 10E3/UL (ref 1.6–8.3)
NEUTROPHILS # BLD AUTO: 2.9 10E3/UL (ref 1.6–8.3)
NEUTROPHILS # BLD AUTO: 3.7 10E3/UL (ref 1.6–8.3)
NEUTROPHILS # BLD AUTO: 4 10E3/UL (ref 1.6–8.3)
NEUTROPHILS # BLD AUTO: 4 10E3/UL (ref 1.6–8.3)
NEUTROPHILS # BLD AUTO: 4.7 10E3/UL (ref 1.6–8.3)
NEUTROPHILS # BLD AUTO: 5.6 10E3/UL (ref 1.6–8.3)
NEUTROPHILS NFR BLD AUTO: 50 %
NEUTROPHILS NFR BLD AUTO: 57 %
NEUTROPHILS NFR BLD AUTO: 59 %
NEUTROPHILS NFR BLD AUTO: 60 %
NEUTROPHILS NFR BLD AUTO: 61 %
NEUTROPHILS NFR BLD AUTO: 65 %
NEUTROPHILS NFR BLD AUTO: 72 %
NONHDLC SERPL-MCNC: 165 MG/DL
NRBC # BLD AUTO: 0 10E3/UL
NRBC BLD AUTO-RTO: 0 /100
PLATELET # BLD AUTO: 188 10E3/UL (ref 150–450)
PLATELET # BLD AUTO: 198 10E3/UL (ref 150–450)
PLATELET # BLD AUTO: 205 10E3/UL (ref 150–450)
PLATELET # BLD AUTO: 210 10E3/UL (ref 150–450)
PLATELET # BLD AUTO: 212 10E3/UL (ref 150–450)
PLATELET # BLD AUTO: 231 10E3/UL (ref 150–450)
PLATELET # BLD AUTO: 259 10E3/UL (ref 150–450)
POTASSIUM BLD-SCNC: 4.4 MMOL/L (ref 3.5–5.1)
PROT SERPL-MCNC: 7.1 G/DL (ref 6.4–8.9)
RBC # BLD AUTO: 3.29 10E6/UL (ref 3.8–5.2)
RBC # BLD AUTO: 3.33 10E6/UL (ref 3.8–5.2)
RBC # BLD AUTO: 3.33 10E6/UL (ref 3.8–5.2)
RBC # BLD AUTO: 3.36 10E6/UL (ref 3.8–5.2)
RBC # BLD AUTO: 3.41 10E6/UL (ref 3.8–5.2)
RBC # BLD AUTO: 3.52 10E6/UL (ref 3.8–5.2)
RBC # BLD AUTO: 3.52 10E6/UL (ref 3.8–5.2)
SODIUM SERPL-SCNC: 138 MMOL/L (ref 134–144)
TRIGL SERPL-MCNC: 135 MG/DL
WBC # BLD AUTO: 4.8 10E3/UL (ref 4–11)
WBC # BLD AUTO: 5 10E3/UL (ref 4–11)
WBC # BLD AUTO: 6.1 10E3/UL (ref 4–11)
WBC # BLD AUTO: 6.6 10E3/UL (ref 4–11)
WBC # BLD AUTO: 7.1 10E3/UL (ref 4–11)
WBC # BLD AUTO: 7.3 10E3/UL (ref 4–11)
WBC # BLD AUTO: 7.7 10E3/UL (ref 4–11)

## 2021-01-01 PROCEDURE — 250N000011 HC RX IP 250 OP 636: Performed by: INTERNAL MEDICINE

## 2021-01-01 PROCEDURE — G0463 HOSPITAL OUTPT CLINIC VISIT: HCPCS | Mod: 25 | Performed by: INTERNAL MEDICINE

## 2021-01-01 PROCEDURE — 36591 DRAW BLOOD OFF VENOUS DEVICE: CPT | Performed by: INTERNAL MEDICINE

## 2021-01-01 PROCEDURE — 85025 COMPLETE CBC W/AUTO DIFF WBC: CPT | Performed by: INTERNAL MEDICINE

## 2021-01-01 PROCEDURE — 99417 PROLNG OP E/M EACH 15 MIN: CPT | Performed by: INTERNAL MEDICINE

## 2021-01-01 PROCEDURE — 258N000003 HC RX IP 258 OP 636: Performed by: INTERNAL MEDICINE

## 2021-01-01 PROCEDURE — 82378 CARCINOEMBRYONIC ANTIGEN: CPT

## 2021-01-01 PROCEDURE — 36415 COLL VENOUS BLD VENIPUNCTURE: CPT | Performed by: INTERNAL MEDICINE

## 2021-01-01 PROCEDURE — 96413 CHEMO IV INFUSION 1 HR: CPT

## 2021-01-01 PROCEDURE — 36591 DRAW BLOOD OFF VENOUS DEVICE: CPT

## 2021-01-01 PROCEDURE — 85004 AUTOMATED DIFF WBC COUNT: CPT | Performed by: INTERNAL MEDICINE

## 2021-01-01 PROCEDURE — 82378 CARCINOEMBRYONIC ANTIGEN: CPT | Performed by: INTERNAL MEDICINE

## 2021-01-01 PROCEDURE — 81003 URINALYSIS AUTO W/O SCOPE: CPT | Performed by: INTERNAL MEDICINE

## 2021-01-01 PROCEDURE — 36415 COLL VENOUS BLD VENIPUNCTURE: CPT

## 2021-01-01 PROCEDURE — 82565 ASSAY OF CREATININE: CPT | Performed by: INTERNAL MEDICINE

## 2021-01-01 PROCEDURE — 81003 URINALYSIS AUTO W/O SCOPE: CPT | Mod: 91

## 2021-01-01 PROCEDURE — A9552 F18 FDG: HCPCS | Performed by: INTERNAL MEDICINE

## 2021-01-01 PROCEDURE — 99215 OFFICE O/P EST HI 40 MIN: CPT | Performed by: INTERNAL MEDICINE

## 2021-01-01 PROCEDURE — 78815 PET IMAGE W/CT SKULL-THIGH: CPT | Mod: PS

## 2021-01-01 PROCEDURE — 83615 LACTATE (LD) (LDH) ENZYME: CPT | Performed by: INTERNAL MEDICINE

## 2021-01-01 PROCEDURE — 80053 COMPREHEN METABOLIC PANEL: CPT | Performed by: INTERNAL MEDICINE

## 2021-01-01 PROCEDURE — 80061 LIPID PANEL: CPT | Mod: GZ

## 2021-01-01 PROCEDURE — 343N000001 HC RX 343: Performed by: INTERNAL MEDICINE

## 2021-01-01 RX ORDER — HEPARIN SODIUM (PORCINE) LOCK FLUSH IV SOLN 100 UNIT/ML 100 UNIT/ML
5 SOLUTION INTRAVENOUS
Status: DISCONTINUED | OUTPATIENT
Start: 2021-01-01 | End: 2021-01-01 | Stop reason: HOSPADM

## 2021-01-01 RX ORDER — EPINEPHRINE 1 MG/ML
0.3 INJECTION, SOLUTION, CONCENTRATE INTRAVENOUS EVERY 5 MIN PRN
Status: CANCELLED | OUTPATIENT
Start: 2021-01-01

## 2021-01-01 RX ORDER — NALOXONE HYDROCHLORIDE 0.4 MG/ML
.1-.4 INJECTION, SOLUTION INTRAMUSCULAR; INTRAVENOUS; SUBCUTANEOUS
Status: CANCELLED | OUTPATIENT
Start: 2021-01-01

## 2021-01-01 RX ORDER — METHYLPREDNISOLONE SODIUM SUCCINATE 125 MG/2ML
125 INJECTION, POWDER, LYOPHILIZED, FOR SOLUTION INTRAMUSCULAR; INTRAVENOUS
Status: CANCELLED
Start: 2021-01-01

## 2021-01-01 RX ORDER — DIPHENHYDRAMINE HYDROCHLORIDE 50 MG/ML
50 INJECTION INTRAMUSCULAR; INTRAVENOUS
Status: CANCELLED
Start: 2022-01-01

## 2021-01-01 RX ORDER — LORAZEPAM 2 MG/ML
0.5 INJECTION INTRAMUSCULAR EVERY 4 HOURS PRN
Status: CANCELLED
Start: 2022-01-01

## 2021-01-01 RX ORDER — MEPERIDINE HYDROCHLORIDE 50 MG/ML
25 INJECTION INTRAMUSCULAR; INTRAVENOUS; SUBCUTANEOUS EVERY 30 MIN PRN
Status: CANCELLED | OUTPATIENT
Start: 2021-01-01

## 2021-01-01 RX ORDER — ALBUTEROL SULFATE 0.83 MG/ML
2.5 SOLUTION RESPIRATORY (INHALATION)
Status: CANCELLED | OUTPATIENT
Start: 2021-01-01

## 2021-01-01 RX ORDER — SODIUM CHLORIDE 9 MG/ML
1000 INJECTION, SOLUTION INTRAVENOUS CONTINUOUS PRN
Status: CANCELLED
Start: 2021-01-01

## 2021-01-01 RX ORDER — LORAZEPAM 2 MG/ML
0.5 INJECTION INTRAMUSCULAR EVERY 4 HOURS PRN
Status: CANCELLED
Start: 2021-01-01

## 2021-01-01 RX ORDER — CAPECITABINE 500 MG/1
625 TABLET, FILM COATED ORAL 2 TIMES DAILY
Qty: 56 TABLET | Refills: 11 | COMMUNITY
Start: 2021-01-01 | End: 2022-01-01

## 2021-01-01 RX ORDER — HEPARIN SODIUM (PORCINE) LOCK FLUSH IV SOLN 100 UNIT/ML 100 UNIT/ML
5 SOLUTION INTRAVENOUS
Status: CANCELLED
Start: 2021-01-01

## 2021-01-01 RX ORDER — CAPECITABINE 500 MG/1
625 TABLET, FILM COATED ORAL 2 TIMES DAILY
Qty: 56 TABLET | Refills: 11 | Status: CANCELLED | COMMUNITY
Start: 2021-01-01

## 2021-01-01 RX ORDER — DIPHENHYDRAMINE HYDROCHLORIDE 50 MG/ML
50 INJECTION INTRAMUSCULAR; INTRAVENOUS
Status: CANCELLED
Start: 2021-01-01

## 2021-01-01 RX ORDER — ALBUTEROL SULFATE 90 UG/1
1-2 AEROSOL, METERED RESPIRATORY (INHALATION)
Status: CANCELLED
Start: 2021-01-01

## 2021-01-01 RX ORDER — MEPERIDINE HYDROCHLORIDE 50 MG/ML
25 INJECTION INTRAMUSCULAR; INTRAVENOUS; SUBCUTANEOUS EVERY 30 MIN PRN
Status: CANCELLED | OUTPATIENT
Start: 2022-01-01

## 2021-01-01 RX ORDER — HEPARIN SODIUM (PORCINE) LOCK FLUSH IV SOLN 100 UNIT/ML 100 UNIT/ML
5 SOLUTION INTRAVENOUS
Status: CANCELLED
Start: 2022-01-01

## 2021-01-01 RX ORDER — ALBUTEROL SULFATE 90 UG/1
1-2 AEROSOL, METERED RESPIRATORY (INHALATION)
Status: CANCELLED
Start: 2022-01-01

## 2021-01-01 RX ORDER — LISINOPRIL 10 MG/1
TABLET ORAL
COMMUNITY
Start: 2021-01-01 | End: 2022-01-01

## 2021-01-01 RX ORDER — UREA 200 MG/G
CREAM TOPICAL 3 TIMES DAILY
Qty: 120 G | Refills: 3 | Status: SHIPPED | OUTPATIENT
Start: 2021-01-01 | End: 2022-01-01

## 2021-01-01 RX ORDER — NYSTATIN 100000 U/G
CREAM TOPICAL 2 TIMES DAILY
Qty: 30 G | Refills: 0 | Status: SHIPPED | OUTPATIENT
Start: 2021-01-01 | End: 2022-01-01

## 2021-01-01 RX ORDER — SODIUM CHLORIDE 9 MG/ML
1000 INJECTION, SOLUTION INTRAVENOUS CONTINUOUS PRN
Status: CANCELLED
Start: 2022-01-01

## 2021-01-01 RX ORDER — LIDOCAINE/PRILOCAINE 2.5 %-2.5%
CREAM (GRAM) TOPICAL
Qty: 30 G | Refills: 3 | Status: SHIPPED | OUTPATIENT
Start: 2021-01-01 | End: 2022-01-01

## 2021-01-01 RX ORDER — METHYLPREDNISOLONE SODIUM SUCCINATE 125 MG/2ML
125 INJECTION, POWDER, LYOPHILIZED, FOR SOLUTION INTRAMUSCULAR; INTRAVENOUS
Status: CANCELLED
Start: 2022-01-01

## 2021-01-01 RX ORDER — ALBUTEROL SULFATE 0.83 MG/ML
2.5 SOLUTION RESPIRATORY (INHALATION)
Status: CANCELLED | OUTPATIENT
Start: 2022-01-01

## 2021-01-01 RX ORDER — EPINEPHRINE 1 MG/ML
0.3 INJECTION, SOLUTION, CONCENTRATE INTRAVENOUS EVERY 5 MIN PRN
Status: CANCELLED | OUTPATIENT
Start: 2022-01-01

## 2021-01-01 RX ORDER — NALOXONE HYDROCHLORIDE 0.4 MG/ML
.1-.4 INJECTION, SOLUTION INTRAMUSCULAR; INTRAVENOUS; SUBCUTANEOUS
Status: CANCELLED | OUTPATIENT
Start: 2022-01-01

## 2021-01-01 RX ADMIN — SODIUM CHLORIDE 500 MG: 9 INJECTION, SOLUTION INTRAVENOUS at 11:46

## 2021-01-01 RX ADMIN — HEPARIN 5 ML: 100 SYRINGE at 12:30

## 2021-01-01 RX ADMIN — HEPARIN 5 ML: 100 SYRINGE at 11:47

## 2021-01-01 RX ADMIN — HEPARIN 5 ML: 100 SYRINGE at 12:38

## 2021-01-01 RX ADMIN — HEPARIN 5 ML: 100 SYRINGE at 12:35

## 2021-01-01 RX ADMIN — SODIUM CHLORIDE 250 ML: 9 INJECTION, SOLUTION INTRAVENOUS at 11:13

## 2021-01-01 RX ADMIN — SODIUM CHLORIDE 500 MG: 9 INJECTION, SOLUTION INTRAVENOUS at 11:49

## 2021-01-01 RX ADMIN — SODIUM CHLORIDE 500 MG: 9 INJECTION, SOLUTION INTRAVENOUS at 11:07

## 2021-01-01 RX ADMIN — SODIUM CHLORIDE 250 ML: 9 INJECTION, SOLUTION INTRAVENOUS at 11:06

## 2021-01-01 RX ADMIN — SODIUM CHLORIDE 250 ML: 9 INJECTION, SOLUTION INTRAVENOUS at 11:25

## 2021-01-01 RX ADMIN — HEPARIN 5 ML: 100 SYRINGE at 12:10

## 2021-01-01 RX ADMIN — HEPARIN 5 ML: 100 SYRINGE at 12:00

## 2021-01-01 RX ADMIN — SODIUM CHLORIDE 250 ML: 9 INJECTION, SOLUTION INTRAVENOUS at 11:39

## 2021-01-01 RX ADMIN — SODIUM CHLORIDE 250 ML: 9 INJECTION, SOLUTION INTRAVENOUS at 11:29

## 2021-01-01 RX ADMIN — SODIUM CHLORIDE 500 MG: 900 INJECTION, SOLUTION INTRAVENOUS at 11:37

## 2021-01-01 RX ADMIN — SODIUM CHLORIDE 500 MG: 9 INJECTION, SOLUTION INTRAVENOUS at 10:59

## 2021-01-01 RX ADMIN — SODIUM CHLORIDE 500 MG: 9 INJECTION, SOLUTION INTRAVENOUS at 11:26

## 2021-01-01 RX ADMIN — SODIUM CHLORIDE 250 ML: 9 INJECTION, SOLUTION INTRAVENOUS at 10:44

## 2021-01-01 RX ADMIN — FLUDEOXYGLUCOSE F-18 10.54 MCI.: 500 INJECTION, SOLUTION INTRAVENOUS at 14:40

## 2021-01-01 RX ADMIN — SODIUM CHLORIDE 500 MG: 9 INJECTION, SOLUTION INTRAVENOUS at 11:45

## 2021-01-01 RX ADMIN — HEPARIN 5 ML: 100 SYRINGE at 12:26

## 2021-01-01 RX ADMIN — SODIUM CHLORIDE 250 ML: 9 INJECTION, SOLUTION INTRAVENOUS at 11:45

## 2021-01-04 NOTE — PROGRESS NOTES
This is a recent snapshot of the patient's [facility] medical record.  For current drug dose and complete information and questions, call [phone] or In Basket pool, [facility] home infusion ([id number])  CSN Number:  [id number]

## 2021-01-06 ENCOUNTER — HOSPITAL ENCOUNTER (OUTPATIENT)
Dept: INFUSION THERAPY | Facility: OTHER | Age: 71
Discharge: HOME | End: 2021-01-06
Attending: INTERNAL MEDICINE | Admitting: INTERNAL MEDICINE
Payer: MEDICARE

## 2021-01-06 VITALS
TEMPERATURE: 97.4 F | WEIGHT: 150.6 LBS | SYSTOLIC BLOOD PRESSURE: 140 MMHG | BODY MASS INDEX: 26.26 KG/M2 | HEART RATE: 80 BPM | DIASTOLIC BLOOD PRESSURE: 68 MMHG

## 2021-01-06 DIAGNOSIS — C18.1 CANCER OF APPENDIX METASTATIC TO INTRA-ABDOMINAL LYMPH NODE (H): Primary | ICD-10-CM

## 2021-01-06 DIAGNOSIS — C77.2 CANCER OF APPENDIX METASTATIC TO INTRA-ABDOMINAL LYMPH NODE (H): Primary | ICD-10-CM

## 2021-01-06 LAB
ALBUMIN SERPL-MCNC: 4.2 G/DL (ref 3.5–5.7)
ALBUMIN UR-MCNC: NEGATIVE MG/DL
ALP SERPL-CCNC: 82 U/L (ref 34–104)
ALT SERPL W P-5'-P-CCNC: 17 U/L (ref 7–52)
ANION GAP SERPL CALCULATED.3IONS-SCNC: 8 MMOL/L (ref 3–14)
AST SERPL W P-5'-P-CCNC: 21 U/L (ref 13–39)
BASOPHILS # BLD AUTO: 0.1 10E9/L (ref 0–0.2)
BASOPHILS NFR BLD AUTO: 1.4 %
BILIRUB SERPL-MCNC: 0.3 MG/DL (ref 0.3–1)
BUN SERPL-MCNC: 20 MG/DL (ref 7–25)
CALCIUM SERPL-MCNC: 9.6 MG/DL (ref 8.6–10.3)
CEA SERPL-MCNC: <3 NG/ML
CHLORIDE SERPL-SCNC: 104 MMOL/L (ref 98–107)
CO2 SERPL-SCNC: 26 MMOL/L (ref 21–31)
CREAT SERPL-MCNC: 1.01 MG/DL (ref 0.6–1.2)
DIFFERENTIAL METHOD BLD: NORMAL
EOSINOPHIL # BLD AUTO: 0.4 10E9/L (ref 0–0.7)
EOSINOPHIL NFR BLD AUTO: 4.8 %
ERYTHROCYTE [DISTWIDTH] IN BLOOD BY AUTOMATED COUNT: 13 % (ref 10–15)
GFR SERPL CREATININE-BSD FRML MDRD: 54 ML/MIN/{1.73_M2}
GLUCOSE SERPL-MCNC: 90 MG/DL (ref 70–105)
HCT VFR BLD AUTO: 37.4 % (ref 35–47)
HGB BLD-MCNC: 12.3 G/DL (ref 11.7–15.7)
IMM GRANULOCYTES # BLD: 0 10E9/L (ref 0–0.4)
IMM GRANULOCYTES NFR BLD: 0.5 %
LYMPHOCYTES # BLD AUTO: 1.7 10E9/L (ref 0.8–5.3)
LYMPHOCYTES NFR BLD AUTO: 20.2 %
MCH RBC QN AUTO: 32 PG (ref 26.5–33)
MCHC RBC AUTO-ENTMCNC: 32.9 G/DL (ref 31.5–36.5)
MCV RBC AUTO: 97 FL (ref 78–100)
MONOCYTES # BLD AUTO: 0.8 10E9/L (ref 0–1.3)
MONOCYTES NFR BLD AUTO: 9.9 %
NEUTROPHILS # BLD AUTO: 5.3 10E9/L (ref 1.6–8.3)
NEUTROPHILS NFR BLD AUTO: 63.2 %
PLATELET # BLD AUTO: 266 10E9/L (ref 150–450)
POTASSIUM SERPL-SCNC: 4.4 MMOL/L (ref 3.5–5.1)
PROT SERPL-MCNC: 7.3 G/DL (ref 6.4–8.9)
RBC # BLD AUTO: 3.84 10E12/L (ref 3.8–5.2)
SODIUM SERPL-SCNC: 138 MMOL/L (ref 134–144)
WBC # BLD AUTO: 8.4 10E9/L (ref 4–11)

## 2021-01-06 PROCEDURE — 81003 URINALYSIS AUTO W/O SCOPE: CPT | Performed by: INTERNAL MEDICINE

## 2021-01-06 PROCEDURE — 258N000003 HC RX IP 258 OP 636: Performed by: INTERNAL MEDICINE

## 2021-01-06 PROCEDURE — 82378 CARCINOEMBRYONIC ANTIGEN: CPT | Performed by: INTERNAL MEDICINE

## 2021-01-06 PROCEDURE — 96411 CHEMO IV PUSH ADDL DRUG: CPT

## 2021-01-06 PROCEDURE — 96367 TX/PROPH/DG ADDL SEQ IV INF: CPT

## 2021-01-06 PROCEDURE — 96416 CHEMO PROLONG INFUSE W/PUMP: CPT

## 2021-01-06 PROCEDURE — 96368 THER/DIAG CONCURRENT INF: CPT

## 2021-01-06 PROCEDURE — 85025 COMPLETE CBC W/AUTO DIFF WBC: CPT | Performed by: INTERNAL MEDICINE

## 2021-01-06 PROCEDURE — 96413 CHEMO IV INFUSION 1 HR: CPT

## 2021-01-06 PROCEDURE — 250N000011 HC RX IP 250 OP 636: Performed by: INTERNAL MEDICINE

## 2021-01-06 PROCEDURE — 80053 COMPREHEN METABOLIC PANEL: CPT | Performed by: INTERNAL MEDICINE

## 2021-01-06 RX ORDER — FLUOROURACIL 50 MG/ML
320 INJECTION, SOLUTION INTRAVENOUS ONCE
Status: COMPLETED | OUTPATIENT
Start: 2021-01-06 | End: 2021-01-06

## 2021-01-06 RX ADMIN — FLUOROURACIL 550 MG: 50 INJECTION, SOLUTION INTRAVENOUS at 12:46

## 2021-01-06 RX ADMIN — BEVACIZUMAB-AWWB 500 MG: 400 INJECTION, SOLUTION INTRAVENOUS at 10:41

## 2021-01-06 RX ADMIN — SODIUM CHLORIDE 250 ML: 9 INJECTION, SOLUTION INTRAVENOUS at 10:14

## 2021-01-06 RX ADMIN — LEUCOVORIN CALCIUM 550 MG: 350 INJECTION, POWDER, LYOPHILIZED, FOR SUSPENSION INTRAMUSCULAR; INTRAVENOUS at 11:17

## 2021-01-06 RX ADMIN — DEXAMETHASONE SODIUM PHOSPHATE: 10 INJECTION, SOLUTION INTRAMUSCULAR; INTRAVENOUS at 10:17

## 2021-01-06 NOTE — PROGRESS NOTES
Infusion Nursing Note:  Nadya Flanagan presents today for MVASI/leucovorin/ Fluorouracil   Patient seen by provider today: No   present during visit today: Not Applicable.    Note: spoke with provider about patients concerns with having the pump again, would like information on the oral form, xeloda. Print outs given and telephone appointment with provider set up for next Wednesday..    Intravenous Access:  Labs drawn without difficulty.  Implanted Port.    Treatment Conditions:  Lab Results   Component Value Date    HGB 12.3 01/06/2021     Lab Results   Component Value Date    WBC 8.4 01/06/2021      Lab Results   Component Value Date    ANEU 5.3 01/06/2021     Lab Results   Component Value Date     01/06/2021      Results reviewed, labs MET treatment parameters, ok to proceed with treatment.      Post Infusion Assessment:  Patient tolerated infusion without incident.  Blood return noted pre and post infusion.  Site patent and intact, free from redness, edema or discomfort.  No evidence of extravasations.       Discharge Plan:   Discharge instructions reviewed with: Patient.  Patient and/or family verbalized understanding of discharge instructions and all questions answered.  Copy of AVS reviewed with patient and/or family.  Patient will return 46 hours for disconnect appointment.  Patient discharged in stable condition accompanied by: self.  Departure Mode: Ambulatory.    Jean S. Hammann, RN

## 2021-01-07 DIAGNOSIS — I50.32 DIASTOLIC DYSFUNCTION WITH CHRONIC HEART FAILURE (H): Primary | ICD-10-CM

## 2021-01-07 DIAGNOSIS — I34.0 MITRAL VALVE INSUFFICIENCY, UNSPECIFIED ETIOLOGY: ICD-10-CM

## 2021-01-07 DIAGNOSIS — I34.1 MITRAL VALVE PROLAPSE: ICD-10-CM

## 2021-01-07 DIAGNOSIS — I07.1 MODERATE TRICUSPID REGURGITATION: ICD-10-CM

## 2021-01-08 ENCOUNTER — HOSPITAL ENCOUNTER (OUTPATIENT)
Dept: INFUSION THERAPY | Facility: OTHER | Age: 71
Discharge: HOME | End: 2021-01-08
Attending: INTERNAL MEDICINE | Admitting: INTERNAL MEDICINE
Payer: MEDICARE

## 2021-01-08 DIAGNOSIS — C18.1 CANCER OF APPENDIX METASTATIC TO INTRA-ABDOMINAL LYMPH NODE (H): Primary | ICD-10-CM

## 2021-01-08 DIAGNOSIS — C77.2 CANCER OF APPENDIX METASTATIC TO INTRA-ABDOMINAL LYMPH NODE (H): Primary | ICD-10-CM

## 2021-01-08 PROCEDURE — 250N000011 HC RX IP 250 OP 636: Performed by: INTERNAL MEDICINE

## 2021-01-08 PROCEDURE — 96523 IRRIG DRUG DELIVERY DEVICE: CPT

## 2021-01-08 RX ORDER — HEPARIN SODIUM (PORCINE) LOCK FLUSH IV SOLN 100 UNIT/ML 100 UNIT/ML
5 SOLUTION INTRAVENOUS
Status: DISCONTINUED | OUTPATIENT
Start: 2021-01-08 | End: 2021-01-09 | Stop reason: HOSPADM

## 2021-01-08 RX ADMIN — HEPARIN 5 ML: 100 SYRINGE at 11:09

## 2021-01-11 DIAGNOSIS — C18.1 CANCER OF APPENDIX METASTATIC TO INTRA-ABDOMINAL LYMPH NODE (H): Primary | ICD-10-CM

## 2021-01-11 DIAGNOSIS — C77.2 CANCER OF APPENDIX METASTATIC TO INTRA-ABDOMINAL LYMPH NODE (H): Primary | ICD-10-CM

## 2021-01-11 RX ORDER — HEPARIN SODIUM (PORCINE) LOCK FLUSH IV SOLN 100 UNIT/ML 100 UNIT/ML
5 SOLUTION INTRAVENOUS
Status: CANCELLED | OUTPATIENT
Start: 2021-01-26

## 2021-01-11 RX ORDER — DIPHENHYDRAMINE HYDROCHLORIDE 50 MG/ML
50 INJECTION INTRAMUSCULAR; INTRAVENOUS
Status: CANCELLED
Start: 2021-01-26

## 2021-01-11 RX ORDER — NALOXONE HYDROCHLORIDE 0.4 MG/ML
.1-.4 INJECTION, SOLUTION INTRAMUSCULAR; INTRAVENOUS; SUBCUTANEOUS
Status: CANCELLED | OUTPATIENT
Start: 2021-01-26

## 2021-01-11 RX ORDER — ATROPINE SULFATE 0.4 MG/ML
0.4 AMPUL (ML) INJECTION
Status: CANCELLED
Start: 2021-01-26

## 2021-01-11 RX ORDER — ALBUTEROL SULFATE 0.83 MG/ML
2.5 SOLUTION RESPIRATORY (INHALATION)
Status: CANCELLED | OUTPATIENT
Start: 2021-01-26

## 2021-01-11 RX ORDER — ALBUTEROL SULFATE 90 UG/1
1-2 AEROSOL, METERED RESPIRATORY (INHALATION)
Status: CANCELLED
Start: 2021-01-26

## 2021-01-11 RX ORDER — METHYLPREDNISOLONE SODIUM SUCCINATE 125 MG/2ML
125 INJECTION, POWDER, LYOPHILIZED, FOR SOLUTION INTRAMUSCULAR; INTRAVENOUS
Status: CANCELLED
Start: 2021-01-26

## 2021-01-11 RX ORDER — HEPARIN SODIUM (PORCINE) LOCK FLUSH IV SOLN 100 UNIT/ML 100 UNIT/ML
5 SOLUTION INTRAVENOUS
Status: CANCELLED | OUTPATIENT
Start: 2021-01-28

## 2021-01-11 RX ORDER — HEPARIN SODIUM (PORCINE) LOCK FLUSH IV SOLN 100 UNIT/ML 100 UNIT/ML
500 SOLUTION INTRAVENOUS
Status: CANCELLED
Start: 2021-01-11

## 2021-01-11 RX ORDER — MEPERIDINE HYDROCHLORIDE 25 MG/ML
25 INJECTION INTRAMUSCULAR; INTRAVENOUS; SUBCUTANEOUS EVERY 30 MIN PRN
Status: CANCELLED | OUTPATIENT
Start: 2021-01-26

## 2021-01-11 RX ORDER — FLUOROURACIL 50 MG/ML
320 INJECTION, SOLUTION INTRAVENOUS ONCE
Status: CANCELLED | OUTPATIENT
Start: 2021-01-26

## 2021-01-11 RX ORDER — LORAZEPAM 2 MG/ML
0.5 INJECTION INTRAMUSCULAR EVERY 4 HOURS PRN
Status: CANCELLED
Start: 2021-01-26

## 2021-01-11 RX ORDER — EPINEPHRINE 1 MG/ML
0.3 INJECTION, SOLUTION, CONCENTRATE INTRAVENOUS EVERY 5 MIN PRN
Status: CANCELLED | OUTPATIENT
Start: 2021-01-26

## 2021-01-11 RX ORDER — SODIUM CHLORIDE 9 MG/ML
1000 INJECTION, SOLUTION INTRAVENOUS CONTINUOUS PRN
Status: CANCELLED
Start: 2021-01-26

## 2021-01-13 ENCOUNTER — MYC MEDICAL ADVICE (OUTPATIENT)
Dept: ONCOLOGY | Facility: OTHER | Age: 71
End: 2021-01-13

## 2021-01-13 ENCOUNTER — VIRTUAL VISIT (OUTPATIENT)
Dept: ONCOLOGY | Facility: OTHER | Age: 71
End: 2021-01-13
Attending: INTERNAL MEDICINE
Payer: MEDICARE

## 2021-01-13 DIAGNOSIS — C18.1 MALIGNANT NEOPLASM OF APPENDIX (H): Primary | ICD-10-CM

## 2021-01-13 PROCEDURE — 99443 PR PHYSICIAN TELEPHONE EVALUATION 21-30 MIN: CPT | Mod: 95 | Performed by: INTERNAL MEDICINE

## 2021-01-13 RX ORDER — CAPECITABINE 500 MG/1
1000 TABLET, FILM COATED ORAL 2 TIMES DAILY
Qty: 56 TABLET | Refills: 6 | Status: SHIPPED | OUTPATIENT
Start: 2021-01-13 | End: 2021-06-07

## 2021-01-13 NOTE — PROGRESS NOTES
Nadya is a 70 year old who is being evaluated via a billable telephone visit.      What phone number would you like to be contacted at? [phone]  How would you like to obtain your AVS? None    Tiffanie Pastor CMA ([facility])

## 2021-01-13 NOTE — NURSING NOTE
Chief Complaint   Patient presents with     RECHECK     Medication Reconciliation: complete    Tiffanie Pastor CMA ([facility])

## 2021-01-14 ENCOUNTER — OFFICE VISIT (OUTPATIENT)
Dept: INTERNAL MEDICINE | Facility: OTHER | Age: 71
End: 2021-01-14
Attending: INTERNAL MEDICINE
Payer: MEDICARE

## 2021-01-14 ENCOUNTER — TELEPHONE (OUTPATIENT)
Dept: INTERNAL MEDICINE | Facility: OTHER | Age: 71
End: 2021-01-14

## 2021-01-14 ENCOUNTER — TELEPHONE (OUTPATIENT)
Dept: ONCOLOGY | Facility: OTHER | Age: 71
End: 2021-01-14

## 2021-01-14 ENCOUNTER — OFFICE VISIT (OUTPATIENT)
Dept: SURGERY | Facility: OTHER | Age: 71
End: 2021-01-14
Attending: NURSE PRACTITIONER
Payer: MEDICARE

## 2021-01-14 VITALS
DIASTOLIC BLOOD PRESSURE: 70 MMHG | WEIGHT: 149 LBS | RESPIRATION RATE: 16 BRPM | BODY MASS INDEX: 25.98 KG/M2 | HEART RATE: 78 BPM | OXYGEN SATURATION: 98 % | SYSTOLIC BLOOD PRESSURE: 104 MMHG | TEMPERATURE: 96.5 F

## 2021-01-14 VITALS
TEMPERATURE: 96.5 F | OXYGEN SATURATION: 98 % | WEIGHT: 149 LBS | SYSTOLIC BLOOD PRESSURE: 104 MMHG | HEART RATE: 78 BPM | BODY MASS INDEX: 25.98 KG/M2 | DIASTOLIC BLOOD PRESSURE: 70 MMHG | RESPIRATION RATE: 16 BRPM

## 2021-01-14 DIAGNOSIS — C77.2 CANCER OF APPENDIX METASTATIC TO INTRA-ABDOMINAL LYMPH NODE (H): Primary | ICD-10-CM

## 2021-01-14 DIAGNOSIS — C77.2 CANCER OF APPENDIX METASTATIC TO INTRA-ABDOMINAL LYMPH NODE (H): ICD-10-CM

## 2021-01-14 DIAGNOSIS — C18.1 CANCER OF APPENDIX METASTATIC TO INTRA-ABDOMINAL LYMPH NODE (H): Primary | ICD-10-CM

## 2021-01-14 DIAGNOSIS — R03.0 BORDERLINE BLOOD PRESSURE: ICD-10-CM

## 2021-01-14 DIAGNOSIS — R10.11 RIGHT UPPER QUADRANT PAIN: Primary | ICD-10-CM

## 2021-01-14 DIAGNOSIS — C18.1 CANCER OF APPENDIX METASTATIC TO INTRA-ABDOMINAL LYMPH NODE (H): ICD-10-CM

## 2021-01-14 DIAGNOSIS — R10.11 RIGHT UPPER QUADRANT PAIN: ICD-10-CM

## 2021-01-14 PROCEDURE — G0463 HOSPITAL OUTPT CLINIC VISIT: HCPCS | Mod: 25

## 2021-01-14 PROCEDURE — 99214 OFFICE O/P EST MOD 30 MIN: CPT | Performed by: INTERNAL MEDICINE

## 2021-01-14 PROCEDURE — G0463 HOSPITAL OUTPT CLINIC VISIT: HCPCS

## 2021-01-14 PROCEDURE — 99214 OFFICE O/P EST MOD 30 MIN: CPT | Performed by: SURGERY

## 2021-01-14 PROCEDURE — G0463 HOSPITAL OUTPT CLINIC VISIT: HCPCS | Mod: 25,27

## 2021-01-14 RX ORDER — EPINEPHRINE 1 MG/ML
0.3 INJECTION, SOLUTION, CONCENTRATE INTRAVENOUS EVERY 5 MIN PRN
Status: CANCELLED | OUTPATIENT
Start: 2021-01-19

## 2021-01-14 RX ORDER — SODIUM CHLORIDE 9 MG/ML
1000 INJECTION, SOLUTION INTRAVENOUS CONTINUOUS PRN
Status: CANCELLED
Start: 2021-01-19

## 2021-01-14 RX ORDER — HEPARIN SODIUM (PORCINE) LOCK FLUSH IV SOLN 100 UNIT/ML 100 UNIT/ML
5 SOLUTION INTRAVENOUS
Status: CANCELLED
Start: 2021-01-21

## 2021-01-14 RX ORDER — LORAZEPAM 2 MG/ML
0.5 INJECTION INTRAMUSCULAR EVERY 4 HOURS PRN
Status: CANCELLED
Start: 2021-01-19

## 2021-01-14 RX ORDER — HEPARIN SODIUM (PORCINE) LOCK FLUSH IV SOLN 100 UNIT/ML 100 UNIT/ML
5 SOLUTION INTRAVENOUS
Status: CANCELLED
Start: 2021-01-19

## 2021-01-14 RX ORDER — FLUOROURACIL 50 MG/ML
320 INJECTION, SOLUTION INTRAVENOUS ONCE
Status: CANCELLED | OUTPATIENT
Start: 2021-01-19

## 2021-01-14 RX ORDER — ATROPINE SULFATE 0.4 MG/ML
0.4 AMPUL (ML) INJECTION
Status: CANCELLED | OUTPATIENT
Start: 2021-01-19

## 2021-01-14 RX ORDER — METHYLPREDNISOLONE SODIUM SUCCINATE 125 MG/2ML
125 INJECTION, POWDER, LYOPHILIZED, FOR SOLUTION INTRAMUSCULAR; INTRAVENOUS
Status: CANCELLED
Start: 2021-01-19

## 2021-01-14 RX ORDER — ALBUTEROL SULFATE 0.83 MG/ML
2.5 SOLUTION RESPIRATORY (INHALATION)
Status: CANCELLED | OUTPATIENT
Start: 2021-01-19

## 2021-01-14 RX ORDER — DIPHENHYDRAMINE HYDROCHLORIDE 50 MG/ML
50 INJECTION INTRAMUSCULAR; INTRAVENOUS
Status: CANCELLED
Start: 2021-01-19

## 2021-01-14 RX ORDER — MEPERIDINE HYDROCHLORIDE 50 MG/ML
25 INJECTION INTRAMUSCULAR; INTRAVENOUS; SUBCUTANEOUS EVERY 30 MIN PRN
Status: CANCELLED | OUTPATIENT
Start: 2021-01-19

## 2021-01-14 RX ORDER — ALBUTEROL SULFATE 90 UG/1
1-2 AEROSOL, METERED RESPIRATORY (INHALATION)
Status: CANCELLED
Start: 2021-01-19

## 2021-01-14 RX ORDER — NALOXONE HYDROCHLORIDE 0.4 MG/ML
.1-.4 INJECTION, SOLUTION INTRAMUSCULAR; INTRAVENOUS; SUBCUTANEOUS
Status: CANCELLED | OUTPATIENT
Start: 2021-01-19

## 2021-01-14 ASSESSMENT — PAIN SCALES - GENERAL
PAINLEVEL: MILD PAIN (2)
PAINLEVEL: MILD PAIN (2)

## 2021-01-14 NOTE — PROGRESS NOTES
Visit Date:   01/13/2021      HEMATOLOGY/ONCOLOGY CLINIC NOTE      This is a billable telephone virtual visit performed due the COVID-19 viral pandemic.      HISTORY OF PRESENT ILLNESS:  Ms. 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  For details, please see previous notes.  She is currently receiving 5-FU, leucovorin and Avastin minus irinotecan.  She received her first cycle on 01/06.  She is here to discuss using an oral agent as opposed to infusional 5-FU as she finds it very uncomfortable to wear the pump at night.  She says she cannot sleep.  Otherwise, she is tolerating it reasonably well.  She denies any diarrhea, change in bowel habits, bright red blood per rectum, hematemesis, abdominal pain.      PHYSICAL EXAMINATION:  Could not be performed due the COVID-19 viral pandemic.      LABORATORY DATA:  Laboratories reveal CBC that is within normal limits.  CEA is less than 3.  LFTs are normal.      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  The patient was deemed a candidate for chemotherapy for stage IV disease as per Dr. Speedy Menezes of [facility].  The plan was to initiate FOLFOX chemotherapy.  The patient completed 2 cycles.  Course was complicated by neutropenia.  The patient went to receive 4 cycles, the last 2 requiring a 20% dose reduction due to neutropenia.  She was staged with no evidence of disease after 8 cycles.  CT of the abdomen and pelvis was stable.  PET scan indicated no evidence of disease.  Repeat scans indicated progression of disease.  Diagnostic laparoscopy revealed numerous peritoneal implants with biopsy consistent with poorly differentiated adenocarcinoma metastatic to perineum consistent with recurrence of her disease.  We discussed the case with Dr. Speedy Menezes,  medical oncologist at the [facility], who agreed that the patient is a candidate for FOLFIRI, bevacizumab.  The plan was to treat with 6 cycles and then restage.  MSI testing was intact.  The patient received 3 cycles q.14-day schedule.  Performance status worsened second week.  We elected to change cycles to q.21 days with FOLFIRI, bevacizumab given on an every 21-day basis bevacizumab given at a dose of 7.5 mg/kg every 21 days.  We did reduce the chemotherapy by 20%.  Course was complicated by neutropenia requiring dose reduction 5-FU infusional and bolus.  The plan was to proceed with 6 cycles.  Staging studies as per [place] indicated stable peritoneal disease as per Dr. Speedy Menezes patient would be a candidate for maintenance 5-FU, leucovorin and Avastin.  PET scan indicated some improvement in disease, status.  The patient did consult with General Surgery was not interested in diagnostic laparoscopy  at this time.  We therefore initiate maintenance chemotherapy as per Dr. Speedy Menezes with 5-FU, leucovorin and bevacizumab.  We omitted the irinotecan.  She received 1 cycle.  She would like to receive a second cycle of 5-FU, leucovorin and bevacizumab on  and then transitioned to capecitabine and bevacizumab.  We will treat with Capecitabine at 1000 mg p.o. b.i.d. on days 1 through 14 and bevacizumab 7.5 mg/kg will initiate on on .  Otherwise, we will see the patient with her first cycle of capecitabine and bevacizumab.  We discussed side effects of chemotherapy including hand-foot syndrome, diarrhea, mucositis.  The patient is willing to proceed.  Twenty-four minutes was spent with this patient.         DEEPIKA BARRIOS MD             D: 2021   T: 2021   MT:       Name:     ALMA CORTES   MRN:      [id number]        Account:      [id number]   :      1950           Visit Date:   2021      Document: [id number]       cc: Tresa Pearce DO        Speedy Menezes MD

## 2021-01-14 NOTE — PATIENT INSTRUCTIONS
Will get the US, You don't need to see me afterwards. I'll call you. Continue to watch what you eat-you can try a little more liberalization but be careful with high fat foods. If you have severe

## 2021-01-14 NOTE — NURSING NOTE
Chief Complaint   Patient presents with     RECHECK     3 week      Patient presents to the clinic today for a 3 week follow up.  Medication Reconciliation: completed   Alayna Cameron LPN  1/14/2021 11:16 AM

## 2021-01-14 NOTE — TELEPHONE ENCOUNTER
Request by patient for [facility] testing. Requisition form completed for [facility] testing and sent to [facility] pathology. Pathology report included, along with financial information and demographics. Patient aware.  Selena Solorio RN...........1/14/2021 9:17 AM

## 2021-01-14 NOTE — PROGRESS NOTES
Chief Complaint   Patient presents with     RECHECK     3 week          HPI: Ms. Flanagan is a 70 year old female who presents today for follow up of recent right upper quadrant pain.      She feels that this has improved.  She is feeling good overall.  She was seen by general surgery and it was recommended to consider ultrasound of the abdomen.  She is scheduled for this next week.    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  She does have some questions regarding the imaging studies that have been done for this in the past.  We reviewed them together today and her questions were answered.    She has been noted to have some elevated blood pressures when she has been in prior.  Her blood pressure today is on the low normal side.  She is not currently on any blood pressure medications.    She  reports that she is a non-smoker but has been exposed to tobacco smoke. She has never used smokeless tobacco.    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      ROS  Pertinent ROS was performed and was negative, including for fever, chills, chest pain, shortness of breath, increased lower extremity edema, changes in bowel or bladder. No other concerns, with exception of HPI above.      EXAM:   /70 (BP Location: Right arm, Patient Position: Sitting, Cuff Size: Adult Regular)   Pulse 78   Temp 96.5  F (35.8  C) (Tympanic)   Resp 16   Wt 67.6 kg (149  "lb)   SpO2 98%   BMI 25.98 kg/m      Estimated body mass index is 25.98 kg/m  as calculated from the following:    Height as of 9/17/20: 1.613 m (5' 3.5\").    Weight as of this encounter: 67.6 kg (149 lb).      GEN: Vitals reviewed. Healthy appearing. Patient is in no acute distress. Cooperative with exam.  HEENT: Normocephalic atraumatic.  Eyes grossly normal to inspection.  No discharge or erythema, or obvious scleral/conjunctival abnormalities.   LUNGS: No audible wheeze, cough, or visible cyanosis.  No visible retractions or increased work of breathing.   SKIN: Warm and dry to touch.  Visible skin clear. No significant rash, abnormal pigmentation or lesions.  EXT: No clubbing or cyanosis.  No peripheral edema.  PSYCH: Mood is good.  Mentation appears normal, affect normal/bright, judgement and insight intact, normal speech and appearance well-groomed.     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  All of her questions were answered.  She is to call if she has any new or changing symptoms or additional concerns.    Borderline blood pressure  -At this time her blood pressure appears to be controlled.  She is encouraged to monitor it periodically.  We discussed adequate control and at this time given her metastatic cancer take blood pressure control is not indicated.    Right upper quadrant pain  Right quadrant ultrasound per general surgery.    30 minutes spent on the date of the encounter doing chart review, history and exam, documentation and further activities as noted above      DESI KIM DO   1/14/2021 11:52 AM    This document was prepared using voice generated softwear. While every attempt was made for accuracy, grammatical errors may exist.           "

## 2021-01-14 NOTE — TELEPHONE ENCOUNTER
Wanted to let [facility] know that [facility] is administering the covid-19 vaccine to community members. She said that if [facility] wanted to call them, she might be able to get the person they discussed at pt's appt today, a vaccine. She said to call [phone] and she could talk to Dr. Bogdan Dent. Please call pt with any questions

## 2021-01-14 NOTE — NURSING NOTE
"Chief Complaint   Patient presents with     Consult     right upper quadrant pain       Initial /70 (BP Location: Right arm, Patient Position: Sitting, Cuff Size: Adult Regular)   Pulse 78   Temp 96.5  F (35.8  C)   Resp 16   Wt 67.6 kg (149 lb)   SpO2 98%   BMI 25.98 kg/m   Estimated body mass index is 25.98 kg/m  as calculated from the following:    Height as of 9/17/20: 1.613 m (5' 3.5\").    Weight as of this encounter: 67.6 kg (149 lb).  Medication Reconciliation: complete    Arlet Hallman LPN    "

## 2021-01-14 NOTE — PATIENT INSTRUCTIONS
"Please check your blood pressure daily at various times, record this and bring it to your follow up appointment.  Your blood pressure goal is greater than 110/50 and less than 145/90.  Please call if it is consistently outside this range.       High Blood Pressure: Essential Hypertension   ________________________________________________________________________  KEY POINTS    High blood pressure means that your blood pressure is higher than normal. It is called essential hypertension when no cause for it can be found.    Weight loss, changes in your diet, and exercise may be the only treatment you need. If lifestyle changes don t lower your blood pressure enough, yourhealthcare provider may prescribe medicine. Many people need to take 2 or more medicines to bring their blood pressure down to a healthy level.    Talk to your healthcare provider aboutyour personal and family medical history and your lifestyle habits. This will help you know what you can do to lower your risk for high blood pressure.  ________________________________________________________________________  What is high blood pressure?  Blood pressure is the force of bloodagainst artery walls as the heart pumps blood through the body. You may be told that you have high blood pressure (hypertension) if your blood pressure is higher than normal. Hypertension is called essential when no causefor it can be found. When the cause of hypertension is known, such as from kidney disease or a tumor, it is called secondary hypertension.  Blood pressure can rise and fall with exercise, rest, or emotions.    Normal resting blood pressure ranges up to 120/80 (\"120 over 80\"). The first number (120 in this example) is thepressure when the heart beats and pushes blood out to the rest of the body. The second number (80 in this example) is the pressure when the heart rests between beats.    Blood pressureis borderline high if it is 120/80 or higher but less than " 140/90.    High blood pressure is 140/90 or higher for most people. If you have chronic kidney disease, 130/80 or higher isconsidered high blood pressure.    Why is high blood pressure a problem?  High blood pressure is a problem in many ways.    Your heart has to work harder to pumpblood through your body. The added workload on the heart causes thickening of the heart muscle. Over time, the thickening damages the heart muscle so that it can no longer pump normally. This can lead to a disease calledheart failure.    The higher pressure in your arteries may cause them to weaken and bleed, resulting in a stroke.    As you get older, bloodvessels may become hardened. High blood pressure speeds up this process. Hardened or narrowed arteries may not be able to supply enough blood to all parts of your body.    High bloodpressure may lead to atherosclerosis, which is when deposits of cholesterol, fatty substances, and blood cells clog up an artery. Atherosclerosis is the leading cause of heart attacks. It can also cause strokes.    Your kidneys, brain, and eyes may be damaged.  You may need treatment for high blood pressure for the rest of your life.However, proper treatment can control your blood pressure and help prevent heart disease, heart attack, or stroke. It can also help prevent long-term health problems, such as heart failure, kidney failure, blindness, anddementia.  If you already have some complications, such as breathing problems or chest pain, lowering your blood pressure may make these problems less severe.    What is the cause?  There are no clear causes of essential hypertension. However, many things can increase blood pressure, such as:    Being overweight    Smoking    Eating a diet high in salt    Drinking a lot of alcohol  Other important factors include:    Race.  Americans are more likely to have high blood pressure.    Gender. Males have a greater chance of developing high blood pressure  thanwomen until age 55. After the age of 75, women are more likely to develop high blood pressure than men.    Heredity. If you have parents with high blood pressure, you are more at risk.    Age. The older you get, the more likely you are to have high blood pressure.  Also, some medicines increase bloodpressure.  Stress and drinking caffeine can make blood pressure go up temporarily, but it s not clear that they have any long-term effects on blood pressure.    What are the symptoms?  You may have high blood pressure for a long time without symptoms. You may not be able to tell by the way you feel that your bloodpressure is high. The only way to find out if your blood pressure is high is to have it measured. That's why it s important to have your blood pressure checked at least once a year.  When high blood pressure does cause symptoms, they may include:    Headaches    Nosebleeds    Getting tired easily    Blurred vision    Dizziness    Lightheadedness    Feeling like your heart is racing or fluttering    Shortness of breath    Chest pain    Memory problems    Daytime sleepiness    How is it diagnosed?  Blood pressure is checked at most healthcare visits. High blood pressure is usually discovered during one of these visits. Ifyour blood pressure is high, you will be asked to return for follow-up checks. Your healthcare provider will ask about your personal and family medical history and examine you.  Tests to look for a possible cause of highblood pressure may include:    Urine and blood tests    ChestX-ray    Electrocardiogram (ECG), which measures and records your heartbeat  You may be asked to use a portableblood-pressure measuring device, which will take your pressure at different times during day and night.    How is it treated?  If your bloodpressure is borderline high, you may be able to bring it down to a normal level without medicine. Weight loss, changes in your diet, and exercise may be the only  treatment you need.  If lifestyle changes don t lower your blood pressure enough, your healthcare provider may prescribe medicine. Many people need to take 2 or more medicines to bring their blood pressure down to a healthy level. It may takeseveral weeks or months to find the best treatment for you.    How can I take care of myself?  If you have high blood pressure, there are thingsyou can do now to take care of yourself and to prevent problems in the future:    Follow your treatmentplan and know how to take your medicines.    Work with your healthcare provider to find what lifestylechanges and medicines are right for you.    Follow the directions that come with your medicine, including information about food or alcohol. Make sure you know how and when to take yourmedicine. Do not take more or less than you are supposed to take.    Many medicines have side effects. A side effect is a symptom or problem that is caused by the medicine. Ask yourhealthcare provider or pharmacist what side effects your medicine may cause and what you should do if you have side effects. Ask if you should avoid some nonprescription medicines.    Be careful with nonprescription medicines or herbal supplements. Some can raise blood pressure. This includes diet pills, cold and pain medicines, and energy boosters. Read labels or ask your pharmacist if the medicine orsupplement affects blood pressure. Some illegal drugs, like cocaine, can also affect blood pressure.    Check your blood pressure (or have it checked) as often as your provider advises.Keep a diary of the readings. A diary is also a good place to note your exercise, weight, salt intake, types of food you are eating, and your feelings. This can help you learn how these things can affect your blood pressure.Take your diary with you when you visit your provider. It may help you and your provider manage your blood pressure and adjust your medicines if needed.    Don t smoke.    Eat a  healthy diet that is low in salt, saturated fat, trans fat, andcholesterol. Include lots of fruits, vegetables, and fat-free or low-fat milk and milk products.    Get regular exercise, according to your healthcare provider's advice. For example,you might walk, bike, or swim at least 30 minutes 3 to 5 times a week.    Limit the amount of alcohol you drink. Moderate drinking is up to 1 drink a day for women and up to 2 drinksfor men.    Lose weight if you need to.    Try to reduce the stress in your life or learn how to deal better with situations that make you feelanxious.    Ask your healthcare provider:    How and when youwill get your test results    How long it will take to recover    If there are activities you should avoid and when you can return to your normalactivities    How to take care of yourself at home    What symptoms or problems you should watch for and what to do if you have them    Make sure you know when you should come back for a checkup. Keep all appointments for provider visits or tests.    How can I help prevent high blood pressure?  You can helpprevent this disease with a heart-healthy lifestyle:    Eat a healthy diet and keep a healthy weight.    Stay fit with the right kind of exercise for you.    Decrease stress.    Don t smoke.    Limit your use of alcohol.  Talk to your healthcare provider about your personal and familymedical history and your lifestyle habits. This will help you know what you can do to lower your risk for high blood pressure.    Developed by [employer].  Adult Advisor 2016.3 published by [employer].  Lastmodified: 2016-04-18  Last reviewed: 2016-04-15  This content is reviewed periodically and is subject to change as new health information becomes available. The information is intended to inform and educate and is nota replacement for medical evaluation, advice, diagnosis or treatment by a healthcare professional.  References   Adult Advisor 2016.3 Index    Copyright    2016 [employer], a division of [employer]. All rights reserved.

## 2021-01-17 ENCOUNTER — MYC MEDICAL ADVICE (OUTPATIENT)
Dept: ONCOLOGY | Facility: OTHER | Age: 71
End: 2021-01-17

## 2021-01-18 DIAGNOSIS — C77.2 CANCER OF APPENDIX METASTATIC TO INTRA-ABDOMINAL LYMPH NODE (H): Primary | ICD-10-CM

## 2021-01-18 DIAGNOSIS — C18.1 CANCER OF APPENDIX METASTATIC TO INTRA-ABDOMINAL LYMPH NODE (H): Primary | ICD-10-CM

## 2021-01-18 RX ORDER — NALOXONE HYDROCHLORIDE 0.4 MG/ML
.1-.4 INJECTION, SOLUTION INTRAMUSCULAR; INTRAVENOUS; SUBCUTANEOUS
Status: CANCELLED | OUTPATIENT
Start: 2021-01-22

## 2021-01-18 RX ORDER — MEPERIDINE HYDROCHLORIDE 50 MG/ML
25 INJECTION INTRAMUSCULAR; INTRAVENOUS; SUBCUTANEOUS EVERY 30 MIN PRN
Status: CANCELLED | OUTPATIENT
Start: 2021-01-22

## 2021-01-18 RX ORDER — METHYLPREDNISOLONE SODIUM SUCCINATE 125 MG/2ML
125 INJECTION, POWDER, LYOPHILIZED, FOR SOLUTION INTRAMUSCULAR; INTRAVENOUS
Status: CANCELLED
Start: 2021-01-22

## 2021-01-18 RX ORDER — HEPARIN SODIUM,PORCINE 10 UNIT/ML
5 VIAL (ML) INTRAVENOUS
Status: CANCELLED
Start: 2021-01-20

## 2021-01-18 RX ORDER — EPINEPHRINE 1 MG/ML
0.3 INJECTION, SOLUTION, CONCENTRATE INTRAVENOUS EVERY 5 MIN PRN
Status: CANCELLED | OUTPATIENT
Start: 2021-01-22

## 2021-01-18 RX ORDER — HEPARIN SODIUM (PORCINE) LOCK FLUSH IV SOLN 100 UNIT/ML 100 UNIT/ML
5 SOLUTION INTRAVENOUS
Status: CANCELLED
Start: 2021-01-22

## 2021-01-18 RX ORDER — SODIUM CHLORIDE 9 MG/ML
1000 INJECTION, SOLUTION INTRAVENOUS CONTINUOUS PRN
Status: CANCELLED
Start: 2021-01-22

## 2021-01-18 RX ORDER — DIPHENHYDRAMINE HYDROCHLORIDE 50 MG/ML
50 INJECTION INTRAMUSCULAR; INTRAVENOUS
Status: CANCELLED
Start: 2021-01-22

## 2021-01-18 RX ORDER — ALBUTEROL SULFATE 90 UG/1
1-2 AEROSOL, METERED RESPIRATORY (INHALATION)
Status: CANCELLED
Start: 2021-01-22

## 2021-01-18 RX ORDER — ALBUTEROL SULFATE 0.83 MG/ML
2.5 SOLUTION RESPIRATORY (INHALATION)
Status: CANCELLED | OUTPATIENT
Start: 2021-01-22

## 2021-01-18 RX ORDER — LORAZEPAM 2 MG/ML
0.5 INJECTION INTRAMUSCULAR EVERY 4 HOURS PRN
Status: CANCELLED
Start: 2021-01-22

## 2021-01-18 NOTE — PROGRESS NOTES
Primary Care Physician: Meghan Pearce, DO      HPI:   Patient is here for follow up. 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 Originally diagnosed in 12/2018. She had some chemotherapy and then had diagnostic laparoscopy. There was localized and peritoneal recurrence. She has resumed chemotherapy and the tumors appear less bulky on PET. Lab work has been stable  She presents today to discuss her known gall stone and intermittent ruq pain. She has noted that about 2 weeks after she has a round of chemotherapy she has a couple of nights of severe epigastric and ruq pain along with some nausea and diarrhea. She then feels better until the next round. She just had one of these attacks and has realized that they seem to be associated with eating foods that are fatty. She has a gall stone that has been there since before her diagnosis with the cancer. She is wondering if that might be the cause.      ASSESSMENT AND PLAN/RECOMMENDATIONS:   Cholelithiasis-will get US to further evaluate gb, liver and biliary system try to coordinate with planned appointment to save her travel.  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 Will follow up with PET and labs with oncology as per their plan. Will check on the request to send path slides from July 2020 for sequencing by [facility] as recommended by Dr. Menezes.  Has appointment with Dr. Pearce today.   Will call her with US results. She will call with questions or concerns.  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     Pre-diabetes 2/12/2019     Thyroid disease      Tricuspid regurgitation       Past Surgical History:   Procedure Laterality Date     FRACTURE TX, HIP RT/LT Right 2013     INSERT PORT VASCULAR ACCESS N/A 2/7/2019    Procedure: INSERT PORT VASCULAR ACCESS;  Surgeon: Tresa Evangelista MD;  Location:  OR     INSERT PORT VASCULAR ACCESS Right 7/22/2020    Procedure: power PORT placement;  Surgeon: Tresa Evangelista MD;  Location:  OR     LAPAROSCOPY DIAGNOSTIC (GENERAL) N/A 7/8/2020    Procedure: LAPAROSCOPY, DIAGNOSTIC, BY GENERAL SURGERY;  Surgeon: Tresa Evangelista MD;  Location:  OR     LAPAROTOMY EXPLORATORY N/A 12/18/2018    Procedure: Exploratory Laparotomy with right hemicolectomy;  Surgeon: Tresa Evangelista MD;  Location:  OR     REMOVE CATHETER VASCULAR ACCESS N/A 10/9/2019    Procedure: Remove Port;  Surgeon: Tresa Evangelista MD;  Location:  OR     TONSILLECTOMY  1962     TUBAL LIGATION  1979     Family History   Problem Relation Age of Onset     Tuberculosis Mother      Chronic Obstructive Pulmonary Disease Mother      Heart Disease Father         massive MI     Heart Disease Brother         stents/CABG     Glaucoma Brother      Lung Cancer Brother         mesothelioma     Hypertension Brother      Social History     Socioeconomic History     Marital status:      Spouse name: None     Number of children: None     Years of education: None     Highest education level: None   Occupational History     None   Social Needs     Financial resource strain: None     Food insecurity     Worry: None     Inability: None     Transportation needs     Medical: None     Non-medical: None   Tobacco Use     Smoking status: Passive Smoke Exposure - Never Smoker     Smokeless tobacco: Never Used     Tobacco comment: Lived with a pipe smoker for 15 years   Substance and Sexual Activity      Alcohol use: No     Frequency: Never     Drug use: No     Sexual activity: Yes     Partners: Male     Birth control/protection: Post-menopausal   Lifestyle     Physical activity     Days per week: None     Minutes per session: None     Stress: None   Relationships     Social connections     Talks on phone: None     Gets together: None     Attends Judaism service: None     Active member of club or organization: None     Attends meetings of clubs or organizations: None     Relationship status: None     Intimate partner violence     Fear of current or ex partner: None     Emotionally abused: None     Physically abused: None     Forced sexual activity: None   Other Topics Concern     Parent/sibling w/ CABG, MI or angioplasty before 65F 55M? Not Asked   Social History Narrative    , L.L. ([place]).  Live in [facility].  2 biological son ([place]) and daughter ([place]) and 1 step daughter ([place]).       Current Outpatient Medications   Medication     acetaminophen (TYLENOL) 500 MG tablet     capecitabine (XELODA) 500 MG tablet     cetirizine (ZYRTEC) 10 MG tablet     cyanocobalamin 500 MCG TABS     diclofenac (VOLTAREN) 1 % topical gel     diphenhydrAMINE (BENADRYL) 50 MG capsule     fluocinonide (LIDEX) 0.05 % external gel     ibuprofen (ADVIL/MOTRIN) 200 MG tablet     Lactobacillus (FLORAJEN ACIDOPHILUS) CAPS     levothyroxine (SYNTHROID/LEVOTHROID) 25 MCG tablet     lidocaine-prilocaine (EMLA) 2.5-2.5 % external cream     methylPREDNISolone (MEDROL) 32 MG tablet     Multiple Vitamin (MULTI-VITAMINS) TABS     naproxen sodium 220 MG capsule     polyethylene glycol (MIRALAX/GLYCOLAX) powder     pyridOXINE (VITAMIN B6) 100 MG TABS     trolamine salicylate (ASPERCREME) 10 % external cream     UNABLE TO FIND     UNABLE TO FIND     No current facility-administered medications for this visit.      Allergies   Allergen Reactions     Metrizamide Anaphylaxis     Had contrast dye. Patient reports she woke up in ICU.     Bee Venom       Edema     Pollen Extract      Runny nose     Sulfa Drugs Hives     REVIEW OF SYSTEMS  GENERAL: No fevers or chills. Denies fatigue, recent weight loss.  HEENT: No sinus drainage. No changes with vision or hearing. No difficulty swallowing.   LYMPHATICS:  No swollen nodes in axilla, neck or groin.  CARDIOVASCULAR: Denies chest pain, palpitations and dyspnea on exertion.  PULMONARY: No shortness of breath or cough. No increase in sputum production.  GI: Denies melena, bright red blood in stools. No hematemesis. No constipation. No dark urine or light colored stools.  : No dysuria or hematuria.  SKIN: No recent rashes or ulcers.   HEMATOLOGY:  No history of easy bruising or bleeding.  ENDOCRINE:  No history of diabetes, has some low thyroid  NEUROLOGY:  No history of seizures or headaches. No motor or sensory changes.    PHYSICAL EXAM  Vitals: /70 (BP Location: Right arm, Patient Position: Sitting, Cuff Size: Adult Regular)   Pulse 78   Temp 96.5  F (35.8  C)   Resp 16   Wt 67.6 kg (149 lb)   SpO2 98%   BMI 25.98 kg/m    GENERAL: Healthy appearing patient in no acute distress. Pleasant and cooperative with exam and interview.   HEENT:Head-normocephalic. Eyes-no scleral icterus. Nose-no nasal drainage. No lesions. Mouth-oral mucosa pink and moist, no lesions.  NECK: Supple. No thyroid nodules. Trachea midline.  LYMPHATICS:  No cervical, axillary or supraclavicular adenopathy.  ABDOMEN: Non distended. Bowel sounds active. Soft, no hepatosplenomegaly or hernias. No peritoneal signs.  SKIN: Pink, warm and dry. No jaundice. No rash.  NEURO:  Cranial nerves II-XII grossly intact. Alert and oriented.  PSYCH: Appropriate mood and affect.    IMAGING/LAB  I personally reviewed patient's PET scan from 12/9/2020 with the radiologist, looking at gb, liver, bile ducts and areas of enhancement and thickening in the omentum and rlq.

## 2021-01-20 PROBLEM — N18.30 CHRONIC KIDNEY DISEASE, STAGE 3 (H): Status: RESOLVED | Noted: 2020-12-22 | Resolved: 2021-01-20

## 2021-01-20 PROBLEM — I50.32 DIASTOLIC DYSFUNCTION WITH CHRONIC HEART FAILURE (H): Status: RESOLVED | Noted: 2021-01-07 | Resolved: 2021-01-20

## 2021-01-21 ENCOUNTER — TELEPHONE (OUTPATIENT)
Dept: CARDIOLOGY | Facility: OTHER | Age: 71
End: 2021-01-21

## 2021-01-21 ENCOUNTER — HOSPITAL ENCOUNTER (OUTPATIENT)
Dept: ULTRASOUND IMAGING | Facility: OTHER | Age: 71
End: 2021-01-21
Attending: SURGERY
Payer: MEDICARE

## 2021-01-21 ENCOUNTER — HOSPITAL ENCOUNTER (OUTPATIENT)
Dept: CARDIOLOGY | Facility: OTHER | Age: 71
End: 2021-01-21
Attending: INTERNAL MEDICINE
Payer: MEDICARE

## 2021-01-21 DIAGNOSIS — R10.11 RIGHT UPPER QUADRANT PAIN: ICD-10-CM

## 2021-01-21 DIAGNOSIS — I34.1 MITRAL VALVE PROLAPSE: ICD-10-CM

## 2021-01-21 DIAGNOSIS — I50.32 DIASTOLIC DYSFUNCTION WITH CHRONIC HEART FAILURE (H): ICD-10-CM

## 2021-01-21 DIAGNOSIS — I34.0 MITRAL VALVE INSUFFICIENCY, UNSPECIFIED ETIOLOGY: ICD-10-CM

## 2021-01-21 DIAGNOSIS — I07.1 MODERATE TRICUSPID REGURGITATION: ICD-10-CM

## 2021-01-21 DIAGNOSIS — C18.1 CANCER OF APPENDIX METASTATIC TO INTRA-ABDOMINAL LYMPH NODE (H): ICD-10-CM

## 2021-01-21 DIAGNOSIS — C77.2 CANCER OF APPENDIX METASTATIC TO INTRA-ABDOMINAL LYMPH NODE (H): ICD-10-CM

## 2021-01-21 DIAGNOSIS — Z13.220 SCREENING FOR HYPERLIPIDEMIA: Primary | ICD-10-CM

## 2021-01-21 PROCEDURE — 93306 TTE W/DOPPLER COMPLETE: CPT

## 2021-01-21 PROCEDURE — 76705 ECHO EXAM OF ABDOMEN: CPT

## 2021-01-21 PROCEDURE — 93306 TTE W/DOPPLER COMPLETE: CPT | Mod: 26 | Performed by: STUDENT IN AN ORGANIZED HEALTH CARE EDUCATION/TRAINING PROGRAM

## 2021-01-21 NOTE — TELEPHONE ENCOUNTER
Patient has a consult scheduled with Boby Valadez DO on 01/26/2021 patient is having labs in the infusion center tomorrow patient would like a lipid panel done since they are accessing her port.  Boby Valadez DO is out of office patient would like this sent to Meghan Dalton RN on 1/21/2021 at 5:01 PM                Please call patient.  She believes she may need some cardiology labs drawn and she has chemo tomorrow and would like to get them all done at the same time.      Carol Poole on 1/21/2021 at 4:39 PM

## 2021-01-22 ENCOUNTER — HOSPITAL ENCOUNTER (OUTPATIENT)
Dept: INFUSION THERAPY | Facility: OTHER | Age: 71
End: 2021-01-22
Attending: INTERNAL MEDICINE
Payer: MEDICARE

## 2021-01-22 ENCOUNTER — MYC MEDICAL ADVICE (OUTPATIENT)
Dept: CARDIOLOGY | Facility: OTHER | Age: 71
End: 2021-01-22

## 2021-01-22 ENCOUNTER — ONCOLOGY VISIT (OUTPATIENT)
Dept: ONCOLOGY | Facility: OTHER | Age: 71
End: 2021-01-22
Attending: NURSE PRACTITIONER
Payer: MEDICARE

## 2021-01-22 VITALS
DIASTOLIC BLOOD PRESSURE: 87 MMHG | TEMPERATURE: 97.8 F | WEIGHT: 147.6 LBS | HEART RATE: 69 BPM | BODY MASS INDEX: 25.74 KG/M2 | SYSTOLIC BLOOD PRESSURE: 162 MMHG | RESPIRATION RATE: 16 BRPM

## 2021-01-22 DIAGNOSIS — C77.2 CANCER OF APPENDIX METASTATIC TO INTRA-ABDOMINAL LYMPH NODE (H): Primary | ICD-10-CM

## 2021-01-22 DIAGNOSIS — C18.1 CANCER OF APPENDIX METASTATIC TO INTRA-ABDOMINAL LYMPH NODE (H): Primary | ICD-10-CM

## 2021-01-22 LAB
ALBUMIN UR-MCNC: NEGATIVE MG/DL
BASOPHILS # BLD AUTO: 0.1 10E9/L (ref 0–0.2)
BASOPHILS NFR BLD AUTO: 0.9 %
CREAT SERPL-MCNC: 1.02 MG/DL (ref 0.6–1.2)
DIFFERENTIAL METHOD BLD: NORMAL
EOSINOPHIL # BLD AUTO: 0.2 10E9/L (ref 0–0.7)
EOSINOPHIL NFR BLD AUTO: 2.6 %
ERYTHROCYTE [DISTWIDTH] IN BLOOD BY AUTOMATED COUNT: 13.2 % (ref 10–15)
GFR SERPL CREATININE-BSD FRML MDRD: 54 ML/MIN/{1.73_M2}
HCT VFR BLD AUTO: 38.8 % (ref 35–47)
HGB BLD-MCNC: 12.6 G/DL (ref 11.7–15.7)
IMM GRANULOCYTES # BLD: 0 10E9/L (ref 0–0.4)
IMM GRANULOCYTES NFR BLD: 0.2 %
LYMPHOCYTES # BLD AUTO: 1.2 10E9/L (ref 0.8–5.3)
LYMPHOCYTES NFR BLD AUTO: 20.6 %
MCH RBC QN AUTO: 31.6 PG (ref 26.5–33)
MCHC RBC AUTO-ENTMCNC: 32.5 G/DL (ref 31.5–36.5)
MCV RBC AUTO: 97 FL (ref 78–100)
MONOCYTES # BLD AUTO: 0.6 10E9/L (ref 0–1.3)
MONOCYTES NFR BLD AUTO: 10.4 %
NEUTROPHILS # BLD AUTO: 3.8 10E9/L (ref 1.6–8.3)
NEUTROPHILS NFR BLD AUTO: 65.3 %
PLATELET # BLD AUTO: 263 10E9/L (ref 150–450)
RBC # BLD AUTO: 3.99 10E12/L (ref 3.8–5.2)
WBC # BLD AUTO: 5.9 10E9/L (ref 4–11)

## 2021-01-22 PROCEDURE — 99207 PR NO CHARGE LOS: CPT | Performed by: NURSE PRACTITIONER

## 2021-01-22 PROCEDURE — 82565 ASSAY OF CREATININE: CPT | Performed by: INTERNAL MEDICINE

## 2021-01-22 PROCEDURE — 81003 URINALYSIS AUTO W/O SCOPE: CPT | Performed by: INTERNAL MEDICINE

## 2021-01-22 PROCEDURE — 85025 COMPLETE CBC W/AUTO DIFF WBC: CPT | Performed by: INTERNAL MEDICINE

## 2021-01-22 PROCEDURE — 96413 CHEMO IV INFUSION 1 HR: CPT

## 2021-01-22 PROCEDURE — 258N000003 HC RX IP 258 OP 636: Performed by: INTERNAL MEDICINE

## 2021-01-22 PROCEDURE — 250N000011 HC RX IP 250 OP 636: Performed by: INTERNAL MEDICINE

## 2021-01-22 RX ORDER — HEPARIN SODIUM (PORCINE) LOCK FLUSH IV SOLN 100 UNIT/ML 100 UNIT/ML
5 SOLUTION INTRAVENOUS
Status: DISCONTINUED | OUTPATIENT
Start: 2021-01-22 | End: 2021-01-23 | Stop reason: HOSPADM

## 2021-01-22 RX ADMIN — HEPARIN 5 ML: 100 SYRINGE at 12:17

## 2021-01-22 RX ADMIN — SODIUM CHLORIDE 250 ML: 9 INJECTION, SOLUTION INTRAVENOUS at 11:32

## 2021-01-22 RX ADMIN — BEVACIZUMAB-AWWB 350 MG: 400 INJECTION, SOLUTION INTRAVENOUS at 11:33

## 2021-01-22 NOTE — PROGRESS NOTES
Infusion Nursing Note:  Nadya Flanagan presents today for MVASI day 1 cycle 1.    Patient seen by provider today: Yes: Jenny   present during visit today: Not Applicable.    Note: patient received information on new oral medication from pharmacist and NP.    Intravenous Access:  Labs drawn without difficulty.  Implanted Port.    Treatment Conditions:  Lab Results   Component Value Date    HGB 12.6 01/22/2021     Lab Results   Component Value Date    WBC 5.9 01/22/2021      Lab Results   Component Value Date    ANEU 3.8 01/22/2021     Lab Results   Component Value Date     01/22/2021      Results reviewed, labs MET treatment parameters, ok to proceed with treatment.  Urine negative for protien.      Post Infusion Assessment:  Patient tolerated infusion without incident.  Blood return noted pre and post infusion.  Site patent and intact, free from redness, edema or discomfort.  No evidence of extravasations.  Access discontinued per protocol.       Discharge Plan:   Patient declined prescription refills.  Patient and/or family verbalized understanding of discharge instructions and all questions answered.  Copy of AVS reviewed with patient and/or family.  Patient will return 21 days for next appointment.  Patient discharged in stable condition accompanied by: self.  Departure Mode: Ambulatory.    Jean S. Hammann, RN

## 2021-01-22 NOTE — PHARMACY-MEDICATION TEACHING
Pharmacy: NEW INFUSION MEDICATION EDUCATION    Nadya ÁNGEL Flanagan  [address]   [facility] [address]  [phone] (home)   70 year old female  PCP:Meghan Pearce    Allergies   Allergen Reactions     Metrizamide Anaphylaxis     Had contrast dye. Patient reports she woke up in ICU.     Bee Venom      Edema     Pollen Extract      Runny nose     Sulfa Drugs Hives         Summary of Education:   Met with patient today to review new medications to be administered in infusion as well as home capecitabine. Discussed interaction between capecitabine and ibuprofen / diclofenac, patient states she uses both spareingly. Also discussed patient's multivitamin - advised patient not to take any folic acid supplements while taking capecitabine. Patient also advised to separate calcium / antacids from the time that she takes her capecitabine. Patient advised that folic acid and calcium found in food sources were okay. Patient continuing on MVASI - no further education provided at this time. Patient asked a few questions and all concerns were addressed. Advised patient that pharmacy staff were available any time if other questions came up.     Materials Provided:   Drug information handouts printed from [email or web address] on: capecitabine    Thank you for the consult.     Cecilia Gomez [facility] ....................  1/22/2021   11:44 AM

## 2021-01-22 NOTE — NURSING NOTE
Patient is being seen by the provider in infusion where all required infusion department rooming assessments, screenings, and vital signs were done by infusion RN.   Selena Solorio RN...........1/22/2021 10:41 AM

## 2021-01-22 NOTE — PROGRESS NOTES
Patient was seen in infusion therapy today for a status check. 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 She will be starting oral Xeloda today with the Avastin. Patient states she has had some stressors in her life the past week as her  had a stroke. She has been worried bout him. Patient has been feeling well. She was having some abdominal pain and an abdominal ultrasound was ordered. She states the pain has now resolved. She denies any chest pain or shortness of breath. We did discuss some possible side effects of the Xeloda. We also discussed options moving forward if treatment doesn't work such as hospice care. Patient states graciela has already received some information form hospice. All questions answered. Patient will follow up as planned. She was encouraged to call with any questions or concerns.

## 2021-02-03 ENCOUNTER — MEDICAL CORRESPONDENCE (OUTPATIENT)
Dept: HEALTH INFORMATION MANAGEMENT | Facility: OTHER | Age: 71
End: 2021-02-03

## 2021-02-09 DIAGNOSIS — C18.1 CANCER OF APPENDIX METASTATIC TO INTRA-ABDOMINAL LYMPH NODE (H): Primary | ICD-10-CM

## 2021-02-09 DIAGNOSIS — C77.2 CANCER OF APPENDIX METASTATIC TO INTRA-ABDOMINAL LYMPH NODE (H): Primary | ICD-10-CM

## 2021-02-09 RX ORDER — EPINEPHRINE 1 MG/ML
0.3 INJECTION, SOLUTION, CONCENTRATE INTRAVENOUS EVERY 5 MIN PRN
Status: CANCELLED | OUTPATIENT
Start: 2021-02-12

## 2021-02-09 RX ORDER — HEPARIN SODIUM (PORCINE) LOCK FLUSH IV SOLN 100 UNIT/ML 100 UNIT/ML
5 SOLUTION INTRAVENOUS
Status: CANCELLED
Start: 2021-02-12

## 2021-02-09 RX ORDER — DIPHENHYDRAMINE HYDROCHLORIDE 50 MG/ML
50 INJECTION INTRAMUSCULAR; INTRAVENOUS
Status: CANCELLED
Start: 2021-02-12

## 2021-02-09 RX ORDER — SODIUM CHLORIDE 9 MG/ML
1000 INJECTION, SOLUTION INTRAVENOUS CONTINUOUS PRN
Status: CANCELLED
Start: 2021-02-12

## 2021-02-09 RX ORDER — MEPERIDINE HYDROCHLORIDE 50 MG/ML
25 INJECTION INTRAMUSCULAR; INTRAVENOUS; SUBCUTANEOUS EVERY 30 MIN PRN
Status: CANCELLED | OUTPATIENT
Start: 2021-02-12

## 2021-02-09 RX ORDER — LORAZEPAM 2 MG/ML
0.5 INJECTION INTRAMUSCULAR EVERY 4 HOURS PRN
Status: CANCELLED
Start: 2021-02-12

## 2021-02-09 RX ORDER — METHYLPREDNISOLONE SODIUM SUCCINATE 125 MG/2ML
125 INJECTION, POWDER, LYOPHILIZED, FOR SOLUTION INTRAMUSCULAR; INTRAVENOUS
Status: CANCELLED
Start: 2021-02-12

## 2021-02-09 RX ORDER — ALBUTEROL SULFATE 90 UG/1
1-2 AEROSOL, METERED RESPIRATORY (INHALATION)
Status: CANCELLED
Start: 2021-02-12

## 2021-02-09 RX ORDER — ALBUTEROL SULFATE 0.83 MG/ML
2.5 SOLUTION RESPIRATORY (INHALATION)
Status: CANCELLED | OUTPATIENT
Start: 2021-02-12

## 2021-02-09 RX ORDER — NALOXONE HYDROCHLORIDE 0.4 MG/ML
.1-.4 INJECTION, SOLUTION INTRAMUSCULAR; INTRAVENOUS; SUBCUTANEOUS
Status: CANCELLED | OUTPATIENT
Start: 2021-02-12

## 2021-02-12 ENCOUNTER — ONCOLOGY VISIT (OUTPATIENT)
Dept: ONCOLOGY | Facility: OTHER | Age: 71
End: 2021-02-12
Attending: INTERNAL MEDICINE
Payer: MEDICARE

## 2021-02-12 ENCOUNTER — HOSPITAL ENCOUNTER (OUTPATIENT)
Dept: INFUSION THERAPY | Facility: OTHER | Age: 71
End: 2021-02-12
Attending: INTERNAL MEDICINE
Payer: MEDICARE

## 2021-02-12 VITALS
SYSTOLIC BLOOD PRESSURE: 127 MMHG | WEIGHT: 151 LBS | RESPIRATION RATE: 16 BRPM | HEART RATE: 70 BPM | DIASTOLIC BLOOD PRESSURE: 80 MMHG | TEMPERATURE: 98.6 F | BODY MASS INDEX: 26.33 KG/M2

## 2021-02-12 DIAGNOSIS — C18.1 MALIGNANT NEOPLASM OF APPENDIX (H): Primary | ICD-10-CM

## 2021-02-12 DIAGNOSIS — C77.2 CANCER OF APPENDIX METASTATIC TO INTRA-ABDOMINAL LYMPH NODE (H): Primary | ICD-10-CM

## 2021-02-12 DIAGNOSIS — C18.1 CANCER OF APPENDIX METASTATIC TO INTRA-ABDOMINAL LYMPH NODE (H): Primary | ICD-10-CM

## 2021-02-12 LAB
ALBUMIN UR-MCNC: NEGATIVE MG/DL
BASOPHILS # BLD AUTO: 0.1 10E9/L (ref 0–0.2)
BASOPHILS NFR BLD AUTO: 0.8 %
CREAT SERPL-MCNC: 0.98 MG/DL (ref 0.6–1.2)
DIFFERENTIAL METHOD BLD: ABNORMAL
EOSINOPHIL # BLD AUTO: 0.2 10E9/L (ref 0–0.7)
EOSINOPHIL NFR BLD AUTO: 2.4 %
ERYTHROCYTE [DISTWIDTH] IN BLOOD BY AUTOMATED COUNT: 15.1 % (ref 10–15)
GFR SERPL CREATININE-BSD FRML MDRD: 56 ML/MIN/{1.73_M2}
HCT VFR BLD AUTO: 37.4 % (ref 35–47)
HGB BLD-MCNC: 12.4 G/DL (ref 11.7–15.7)
IMM GRANULOCYTES # BLD: 0 10E9/L (ref 0–0.4)
IMM GRANULOCYTES NFR BLD: 0.5 %
LYMPHOCYTES # BLD AUTO: 1.3 10E9/L (ref 0.8–5.3)
LYMPHOCYTES NFR BLD AUTO: 21 %
MCH RBC QN AUTO: 32.3 PG (ref 26.5–33)
MCHC RBC AUTO-ENTMCNC: 33.2 G/DL (ref 31.5–36.5)
MCV RBC AUTO: 97 FL (ref 78–100)
MONOCYTES # BLD AUTO: 1 10E9/L (ref 0–1.3)
MONOCYTES NFR BLD AUTO: 14.9 %
NEUTROPHILS # BLD AUTO: 3.9 10E9/L (ref 1.6–8.3)
NEUTROPHILS NFR BLD AUTO: 60.4 %
PLATELET # BLD AUTO: 223 10E9/L (ref 150–450)
RBC # BLD AUTO: 3.84 10E12/L (ref 3.8–5.2)
WBC # BLD AUTO: 6.4 10E9/L (ref 4–11)

## 2021-02-12 PROCEDURE — 258N000003 HC RX IP 258 OP 636: Performed by: NURSE PRACTITIONER

## 2021-02-12 PROCEDURE — 99207 PR NO CHARGE LOS: CPT | Performed by: NURSE PRACTITIONER

## 2021-02-12 PROCEDURE — 82565 ASSAY OF CREATININE: CPT | Performed by: NURSE PRACTITIONER

## 2021-02-12 PROCEDURE — 85025 COMPLETE CBC W/AUTO DIFF WBC: CPT | Performed by: NURSE PRACTITIONER

## 2021-02-12 PROCEDURE — 250N000011 HC RX IP 250 OP 636: Performed by: NURSE PRACTITIONER

## 2021-02-12 PROCEDURE — 96365 THER/PROPH/DIAG IV INF INIT: CPT

## 2021-02-12 PROCEDURE — 81003 URINALYSIS AUTO W/O SCOPE: CPT | Performed by: NURSE PRACTITIONER

## 2021-02-12 PROCEDURE — 96413 CHEMO IV INFUSION 1 HR: CPT

## 2021-02-12 RX ORDER — HEPARIN SODIUM (PORCINE) LOCK FLUSH IV SOLN 100 UNIT/ML 100 UNIT/ML
5 SOLUTION INTRAVENOUS
Status: DISCONTINUED | OUTPATIENT
Start: 2021-02-12 | End: 2021-02-13 | Stop reason: HOSPADM

## 2021-02-12 RX ADMIN — SODIUM CHLORIDE 250 ML: 9 INJECTION, SOLUTION INTRAVENOUS at 11:59

## 2021-02-12 RX ADMIN — BEVACIZUMAB-AWWB 500 MG: 400 INJECTION, SOLUTION INTRAVENOUS at 11:59

## 2021-02-12 RX ADMIN — HEPARIN 5 ML: 100 SYRINGE at 12:43

## 2021-02-12 NOTE — PROGRESS NOTES
Patient was seen in infusion therapy today for a status check. She is here for cycle 2 Avastin/Xelodafor her cancer of the appendix. Patient states she has been feeling more fatigued. She has had some increased stress at home with her husbands health. She reports a few mouth sores and has been using salt and soda which has helped. She does have some soft stools and abdominal pain for first few days after treatment. She denies diarrhea. 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 We discussed starting on vitamin B6. Patient was informed that the [facility] C[facility] testing she requested was not able to be done because of insufficient specimen. Labs from today are stable. Patient has no new questions. She will see Dr Thakkar with next treatment. She was encouraged to call with any new questions or concerns.

## 2021-02-12 NOTE — PROGRESS NOTES
Infusion Nursing Note:  Nadya Flanagan presents today for MVASI cycle 2, day 1.    Patient seen by provider today: Yes: Diana Alvarado NP   present during visit today: Not Applicable.    Note: N/A.    Intravenous Access:  Labs drawn without difficulty.  Implanted Port.    Treatment Conditions:  Lab Results   Component Value Date    HGB 12.4 02/12/2021     Lab Results   Component Value Date    WBC 6.4 02/12/2021      Lab Results   Component Value Date    ANEU 3.9 02/12/2021     Lab Results   Component Value Date     02/12/2021      Results reviewed, labs MET treatment parameters, ok to proceed with treatment.  Urine negative.    Post Infusion Assessment:  Patient tolerated infusion without incident.  Blood return noted pre and post infusion.  Site patent and intact, free from redness, edema or discomfort.  No evidence of extravasations.  Access discontinued per protocol.     Discharge Plan:   Patient and/or family verbalized understanding of discharge instructions and all questions answered.  Copy of AVS reviewed with patient and/or family.  Patient will return 3/5/21 for next appointment.  Patient discharged in stable condition accompanied by: self.  Departure Mode: Ambulatory.    Brenda J. Goodell, RN

## 2021-02-12 NOTE — NURSING NOTE
Patient is being seen by the provider in infusion where all required infusion department rooming assessments, screenings, and vital signs were done by infusion RN.   Selena Solorio RN...........2/12/2021 10:54 AM

## 2021-03-03 DIAGNOSIS — C18.1 CANCER OF APPENDIX METASTATIC TO INTRA-ABDOMINAL LYMPH NODE (H): Primary | ICD-10-CM

## 2021-03-03 DIAGNOSIS — C77.2 CANCER OF APPENDIX METASTATIC TO INTRA-ABDOMINAL LYMPH NODE (H): Primary | ICD-10-CM

## 2021-03-03 RX ORDER — MEPERIDINE HYDROCHLORIDE 50 MG/ML
25 INJECTION INTRAMUSCULAR; INTRAVENOUS; SUBCUTANEOUS EVERY 30 MIN PRN
Status: CANCELLED | OUTPATIENT
Start: 2021-03-05

## 2021-03-03 RX ORDER — SODIUM CHLORIDE 9 MG/ML
1000 INJECTION, SOLUTION INTRAVENOUS CONTINUOUS PRN
Status: CANCELLED
Start: 2021-03-05

## 2021-03-03 RX ORDER — NALOXONE HYDROCHLORIDE 0.4 MG/ML
.1-.4 INJECTION, SOLUTION INTRAMUSCULAR; INTRAVENOUS; SUBCUTANEOUS
Status: CANCELLED | OUTPATIENT
Start: 2021-03-05

## 2021-03-03 RX ORDER — ALBUTEROL SULFATE 0.83 MG/ML
2.5 SOLUTION RESPIRATORY (INHALATION)
Status: CANCELLED | OUTPATIENT
Start: 2021-03-05

## 2021-03-03 RX ORDER — DIPHENHYDRAMINE HYDROCHLORIDE 50 MG/ML
50 INJECTION INTRAMUSCULAR; INTRAVENOUS
Status: CANCELLED
Start: 2021-03-05

## 2021-03-03 RX ORDER — EPINEPHRINE 1 MG/ML
0.3 INJECTION, SOLUTION, CONCENTRATE INTRAVENOUS EVERY 5 MIN PRN
Status: CANCELLED | OUTPATIENT
Start: 2021-03-05

## 2021-03-03 RX ORDER — METHYLPREDNISOLONE SODIUM SUCCINATE 125 MG/2ML
125 INJECTION, POWDER, LYOPHILIZED, FOR SOLUTION INTRAMUSCULAR; INTRAVENOUS
Status: CANCELLED
Start: 2021-03-05

## 2021-03-03 RX ORDER — LORAZEPAM 2 MG/ML
0.5 INJECTION INTRAMUSCULAR EVERY 4 HOURS PRN
Status: CANCELLED
Start: 2021-03-05

## 2021-03-03 RX ORDER — ALBUTEROL SULFATE 90 UG/1
1-2 AEROSOL, METERED RESPIRATORY (INHALATION)
Status: CANCELLED
Start: 2021-03-05

## 2021-03-03 RX ORDER — HEPARIN SODIUM (PORCINE) LOCK FLUSH IV SOLN 100 UNIT/ML 100 UNIT/ML
5 SOLUTION INTRAVENOUS
Status: CANCELLED
Start: 2021-03-05

## 2021-03-05 ENCOUNTER — ONCOLOGY VISIT (OUTPATIENT)
Dept: ONCOLOGY | Facility: OTHER | Age: 71
End: 2021-03-05
Attending: INTERNAL MEDICINE
Payer: MEDICARE

## 2021-03-05 ENCOUNTER — HOSPITAL ENCOUNTER (OUTPATIENT)
Dept: INFUSION THERAPY | Facility: OTHER | Age: 71
End: 2021-03-05
Attending: INTERNAL MEDICINE
Payer: MEDICARE

## 2021-03-05 VITALS
SYSTOLIC BLOOD PRESSURE: 139 MMHG | DIASTOLIC BLOOD PRESSURE: 79 MMHG | RESPIRATION RATE: 16 BRPM | HEART RATE: 66 BPM | BODY MASS INDEX: 26.02 KG/M2 | WEIGHT: 149.2 LBS | TEMPERATURE: 97.2 F

## 2021-03-05 DIAGNOSIS — C18.1 MALIGNANT NEOPLASM OF APPENDIX (H): Primary | ICD-10-CM

## 2021-03-05 DIAGNOSIS — C18.1 CANCER OF APPENDIX METASTATIC TO INTRA-ABDOMINAL LYMPH NODE (H): Primary | ICD-10-CM

## 2021-03-05 DIAGNOSIS — C77.2 CANCER OF APPENDIX METASTATIC TO INTRA-ABDOMINAL LYMPH NODE (H): Primary | ICD-10-CM

## 2021-03-05 LAB
ALBUMIN UR-MCNC: NEGATIVE MG/DL
BASOPHILS # BLD AUTO: 0.1 10E9/L (ref 0–0.2)
BASOPHILS NFR BLD AUTO: 1.2 %
CREAT SERPL-MCNC: 0.95 MG/DL (ref 0.6–1.2)
DIFFERENTIAL METHOD BLD: ABNORMAL
EOSINOPHIL # BLD AUTO: 0.3 10E9/L (ref 0–0.7)
EOSINOPHIL NFR BLD AUTO: 5.2 %
ERYTHROCYTE [DISTWIDTH] IN BLOOD BY AUTOMATED COUNT: 17.2 % (ref 10–15)
GFR SERPL CREATININE-BSD FRML MDRD: 58 ML/MIN/{1.73_M2}
HCT VFR BLD AUTO: 36 % (ref 35–47)
HGB BLD-MCNC: 11.9 G/DL (ref 11.7–15.7)
IMM GRANULOCYTES # BLD: 0 10E9/L (ref 0–0.4)
IMM GRANULOCYTES NFR BLD: 0.3 %
LYMPHOCYTES # BLD AUTO: 1.8 10E9/L (ref 0.8–5.3)
LYMPHOCYTES NFR BLD AUTO: 30.4 %
MCH RBC QN AUTO: 33.2 PG (ref 26.5–33)
MCHC RBC AUTO-ENTMCNC: 33.1 G/DL (ref 31.5–36.5)
MCV RBC AUTO: 101 FL (ref 78–100)
MONOCYTES # BLD AUTO: 0.8 10E9/L (ref 0–1.3)
MONOCYTES NFR BLD AUTO: 13.8 %
NEUTROPHILS # BLD AUTO: 2.9 10E9/L (ref 1.6–8.3)
NEUTROPHILS NFR BLD AUTO: 49.1 %
PLATELET # BLD AUTO: 211 10E9/L (ref 150–450)
RBC # BLD AUTO: 3.58 10E12/L (ref 3.8–5.2)
WBC # BLD AUTO: 6 10E9/L (ref 4–11)

## 2021-03-05 PROCEDURE — 81003 URINALYSIS AUTO W/O SCOPE: CPT | Performed by: INTERNAL MEDICINE

## 2021-03-05 PROCEDURE — 258N000003 HC RX IP 258 OP 636: Performed by: INTERNAL MEDICINE

## 2021-03-05 PROCEDURE — 85025 COMPLETE CBC W/AUTO DIFF WBC: CPT | Performed by: INTERNAL MEDICINE

## 2021-03-05 PROCEDURE — 96413 CHEMO IV INFUSION 1 HR: CPT

## 2021-03-05 PROCEDURE — 250N000011 HC RX IP 250 OP 636: Performed by: INTERNAL MEDICINE

## 2021-03-05 PROCEDURE — G0463 HOSPITAL OUTPT CLINIC VISIT: HCPCS | Mod: 25 | Performed by: INTERNAL MEDICINE

## 2021-03-05 PROCEDURE — 99215 OFFICE O/P EST HI 40 MIN: CPT | Performed by: INTERNAL MEDICINE

## 2021-03-05 PROCEDURE — 82565 ASSAY OF CREATININE: CPT | Performed by: INTERNAL MEDICINE

## 2021-03-05 RX ORDER — HEPARIN SODIUM (PORCINE) LOCK FLUSH IV SOLN 100 UNIT/ML 100 UNIT/ML
5 SOLUTION INTRAVENOUS
Status: DISCONTINUED | OUTPATIENT
Start: 2021-03-05 | End: 2021-03-06 | Stop reason: HOSPADM

## 2021-03-05 RX ADMIN — SODIUM CHLORIDE 250 ML: 9 INJECTION, SOLUTION INTRAVENOUS at 11:10

## 2021-03-05 RX ADMIN — HEPARIN 5 ML: 100 SYRINGE at 12:00

## 2021-03-05 RX ADMIN — BEVACIZUMAB-AWWB 500 MG: 400 INJECTION, SOLUTION INTRAVENOUS at 11:16

## 2021-03-05 NOTE — NURSING NOTE
Chief Complaint   Patient presents with     RECHECK     Appendix cancer     Patient is being seen by provider in infusion where all required infusion rooming assessments and vitals were done by the infusion RN.

## 2021-03-05 NOTE — PROGRESS NOTES
Visit Date:   03/05/2021      HEMATOLOGY/ONCOLOGY CLINIC NOTE      HISTORY OF PRESENT ILLNESS:  Ms. 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  For details, see previous notes.  She was recently started on 5-FU, leucovorin and Avastin minus irinotecan.  She received her first cycle on 01/06 and she was uncomfortable using infusional 5-FU as it made her very uncomfortable.  We elected to switch her to capecitabine.  When we saw her on 01/13/2021, we switched her to capecitabine 1000 mg p.o. b.i.d. on days 1-14 and bevacizumab 7.5 mg on day 1 of a 21-day cycle.  She received 2 cycles thus far.  She is here for her third cycle.  She says she is tolerating chemotherapy well.  She gets somewhat fatigued, occasional loose stools, occasional dry hands, but no significant neuropathy, abdominal pain, fevers, night sweats, weight loss.  She denies any abdominal distention.  Overall, she is doing well.      PHYSICAL EXAMINATION:   GENERAL:  She is an elderly white female in no acute distress.   VITAL SIGNS:  Stable.  She is afebrile.   HEENT:  Atraumatic, normocephalic.  Oropharynx nonerythematous.   NECK:  Supple.   LUNGS:  Clear to auscultation and percussion.   HEART:  Regular rhythm, S1, S2 normal.   ABDOMEN:  Soft, normoactive bowel sounds.  No mass, nontender.   BACK:  No cervical, supraclavicular, axillary or inguinal nodes.   EXTREMITIES:  No edema.   NEUROLOGIC:  Nonfocal.      LABORATORY DATA:  Laboratories reveal CBC:  White count 6.0, H and H 11.9 and 36.0, platelet count 211.  Creatinine is 0.95.      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  The patient was deemed a candidate for chemotherapy for stage IV disease as per Dr. Speedy Menezes of [facility].  The plan was to initiate FOLFOX  chemotherapy.  The patient completed 2 cycles.  Course was complicated by neutropenia.  The patient went on to receive 4 cycles, the last 2 requiring 20% dose reduction due to neutropenia.  She was staged with no evidence of disease after 8 cycles.  CT of the abdomen and pelvis was stable.  PET scan indicated no evidence of disease.  Repeat scans indicated progression of disease.  Diagnostic laparoscopy revealed numerous peritoneal implants with biopsy consistent with poorly- differentiated adenocarcinoma metastatic to peritoneum consistent with recurrence of her disease.  I discussed the case with Dr. Speedy Menezes, medical oncologist at the [facility], who agreed the patient is a candidate for FOLFIRI and bevacizumab.  The plan was to treat with 6 cycles and then restage.  MSI testing was intact.  The patient received 3 cycles on a q.14-day schedule.  Performance status worsened in the second week.  We elected to change her cycles to q.21 days with FOLFIRI and bevacizumab with bevacizumab given at a dose of 7.5 mg/kg every 21 days.  We did reduce chemotherapy by 20%.  Course was complicated by neutropenia requiring dose reduction of 5-FU infusional and bolus doses.  The plan was to proceed with 6 cycles.  Staging studies as per [place] indicated stable peritoneal disease.  As per Speedy Menezes, the patient will be a candidate for maintenance 5-FU, leucovorin and Avastin.  PET scan indicated some improvement in disease.  The patient did consult with General Surgery and was not interested in diagnostic laparoscopy at this time.  We therefore initiated maintenance chemotherapy as per Dr. Speedy Menezes with 5-FU, leucovorin and bevacizumab.  We omitted irinotecan.  She received 2 cycles of 5-fluorouracil, leucovorin and bevacizumab, then could not tolerate the infusional 5-FU.  We elected to switch her to capecitabine 1000 mg p.o. b.i.d. on days 1-14 with bevacizumab 7.5 mg/kg on day 1 of a 21-day cycle.  She  received 2 cycles and is here for her third cycle.  No contraindications to therapy.  She tolerated chemotherapy well without significant hand-foot syndrome or diarrhea.  She remains asymptomatic.  There was not enough tissue available to do [employer] testing.  Otherwise, the plan is to proceed with 6 cycles of capecitabine and bevacizumab, then restage.  If she develops symptoms sooner, we will restage her sooner or if she develops abdominal pain or abdominal distention, we will do scans sooner.        Forty-eight minutes was spent on this patient.  We reviewed the chart, reviewed previous imaging, previous lab work, chemotherapy regimens, ordered chemotherapy and followup labs.         DEEPIKA BARRIOS MD             D: 2021   T: 2021   MT: PK      Name:     ALMA CORTES   MRN:      [id number]        Account:      [id number]   :      1950           Visit Date:   2021      Document: [id number]       cc: Tresa Christianson [facility] DO Speedy Menezes MD

## 2021-03-05 NOTE — PROGRESS NOTES
Infusion Nursing Note:  Nadya Flanagan presents today for MVASI cycle 3 day 1.    Patient seen by provider today: Yes: Dr. Thakkar   present during visit today: Not Applicable.    Note: N/A.    Intravenous Access:  Labs drawn without difficulty.  Implanted Port.    Treatment Conditions:  Lab Results   Component Value Date    HGB 11.9 03/05/2021     Lab Results   Component Value Date    WBC 6.0 03/05/2021      Lab Results   Component Value Date    ANEU 2.9 03/05/2021     Lab Results   Component Value Date     03/05/2021      Results reviewed, labs MET treatment parameters, ok to proceed with treatment.  Urine protein negative.      Post Infusion Assessment:  Patient tolerated infusion without incident.  Blood return noted pre and post infusion.  Site patent and intact, free from redness, edema or discomfort.  No evidence of extravasations.  Access discontinued per protocol.  Biologic Infusion Post Education: Call the triage nurse at your clinic or seek medical attention if you have chills and/or temperature greater than or equal to 100.5, uncontrolled nausea/vomiting, diarrhea, constipation, dizziness, shortness of breath, chest pain, heart palpitations, weakness or any other new or concerning symptoms, questions or concerns.  You cannot have any live virus vaccines prior to or during treatment or up to 6 months post infusion.  If you have an upcoming surgery, medical procedure or dental procedure during treatment, this should be discussed with your ordering physician and your surgeon/dentist.  If you are having any concerning symptom, if you are unsure if you should get your next infusion or wish to speak to a provider before your next infusion, please call your care coordinator or triage nurse at your clinic to notify them so we can adequately serve you.       Discharge Plan:   Discharge instructions reviewed with: Patient.  Copy of AVS reviewed with patient, then declined except for lab  results.  Patient will return 3/26/2021 for next appointment.  Patient discharged in stable condition accompanied by: self.  Departure Mode: Ambulatory.    Saumya Alvarado RN

## 2021-03-14 DIAGNOSIS — C77.2 CANCER OF APPENDIX METASTATIC TO INTRA-ABDOMINAL LYMPH NODE (H): Primary | ICD-10-CM

## 2021-03-14 DIAGNOSIS — C18.1 CANCER OF APPENDIX METASTATIC TO INTRA-ABDOMINAL LYMPH NODE (H): Primary | ICD-10-CM

## 2021-03-14 RX ORDER — METHYLPREDNISOLONE SODIUM SUCCINATE 125 MG/2ML
125 INJECTION, POWDER, LYOPHILIZED, FOR SOLUTION INTRAMUSCULAR; INTRAVENOUS
Status: CANCELLED
Start: 2021-03-26

## 2021-03-14 RX ORDER — ALBUTEROL SULFATE 90 UG/1
1-2 AEROSOL, METERED RESPIRATORY (INHALATION)
Status: CANCELLED
Start: 2021-03-26

## 2021-03-14 RX ORDER — SODIUM CHLORIDE 9 MG/ML
1000 INJECTION, SOLUTION INTRAVENOUS CONTINUOUS PRN
Status: CANCELLED
Start: 2021-03-26

## 2021-03-14 RX ORDER — DIPHENHYDRAMINE HYDROCHLORIDE 50 MG/ML
50 INJECTION INTRAMUSCULAR; INTRAVENOUS
Status: CANCELLED
Start: 2021-03-26

## 2021-03-14 RX ORDER — NALOXONE HYDROCHLORIDE 0.4 MG/ML
.1-.4 INJECTION, SOLUTION INTRAMUSCULAR; INTRAVENOUS; SUBCUTANEOUS
Status: CANCELLED | OUTPATIENT
Start: 2021-03-26

## 2021-03-14 RX ORDER — LORAZEPAM 2 MG/ML
0.5 INJECTION INTRAMUSCULAR EVERY 4 HOURS PRN
Status: CANCELLED
Start: 2021-03-26

## 2021-03-14 RX ORDER — ALBUTEROL SULFATE 0.83 MG/ML
2.5 SOLUTION RESPIRATORY (INHALATION)
Status: CANCELLED | OUTPATIENT
Start: 2021-03-26

## 2021-03-14 RX ORDER — HEPARIN SODIUM (PORCINE) LOCK FLUSH IV SOLN 100 UNIT/ML 100 UNIT/ML
5 SOLUTION INTRAVENOUS
Status: CANCELLED
Start: 2021-03-26

## 2021-03-14 RX ORDER — EPINEPHRINE 1 MG/ML
0.3 INJECTION, SOLUTION, CONCENTRATE INTRAVENOUS EVERY 5 MIN PRN
Status: CANCELLED | OUTPATIENT
Start: 2021-03-26

## 2021-03-14 RX ORDER — MEPERIDINE HYDROCHLORIDE 50 MG/ML
25 INJECTION INTRAMUSCULAR; INTRAVENOUS; SUBCUTANEOUS EVERY 30 MIN PRN
Status: CANCELLED | OUTPATIENT
Start: 2021-03-26

## 2021-03-26 ENCOUNTER — ONCOLOGY VISIT (OUTPATIENT)
Dept: ONCOLOGY | Facility: OTHER | Age: 71
End: 2021-03-26
Attending: NURSE PRACTITIONER
Payer: MEDICARE

## 2021-03-26 ENCOUNTER — HOSPITAL ENCOUNTER (OUTPATIENT)
Dept: INFUSION THERAPY | Facility: OTHER | Age: 71
End: 2021-03-26
Attending: INTERNAL MEDICINE
Payer: MEDICARE

## 2021-03-26 VITALS
HEART RATE: 73 BPM | RESPIRATION RATE: 16 BRPM | DIASTOLIC BLOOD PRESSURE: 80 MMHG | TEMPERATURE: 97.4 F | SYSTOLIC BLOOD PRESSURE: 140 MMHG | BODY MASS INDEX: 25.35 KG/M2 | WEIGHT: 145.4 LBS

## 2021-03-26 DIAGNOSIS — Z91.041 CONTRAST MEDIA ALLERGY: ICD-10-CM

## 2021-03-26 DIAGNOSIS — C77.2 CANCER OF APPENDIX METASTATIC TO INTRA-ABDOMINAL LYMPH NODE (H): Primary | ICD-10-CM

## 2021-03-26 DIAGNOSIS — C18.1 CANCER OF APPENDIX METASTATIC TO INTRA-ABDOMINAL LYMPH NODE (H): Primary | ICD-10-CM

## 2021-03-26 LAB
ALBUMIN UR-MCNC: NEGATIVE MG/DL
BASOPHILS # BLD AUTO: 0 10E9/L (ref 0–0.2)
BASOPHILS NFR BLD AUTO: 0.6 %
CREAT SERPL-MCNC: 1.05 MG/DL (ref 0.6–1.2)
DIFFERENTIAL METHOD BLD: ABNORMAL
EOSINOPHIL # BLD AUTO: 0.1 10E9/L (ref 0–0.7)
EOSINOPHIL NFR BLD AUTO: 2.2 %
ERYTHROCYTE [DISTWIDTH] IN BLOOD BY AUTOMATED COUNT: 18.7 % (ref 10–15)
GFR SERPL CREATININE-BSD FRML MDRD: 52 ML/MIN/{1.73_M2}
HCT VFR BLD AUTO: 35.9 % (ref 35–47)
HGB BLD-MCNC: 12.3 G/DL (ref 11.7–15.7)
IMM GRANULOCYTES # BLD: 0 10E9/L (ref 0–0.4)
IMM GRANULOCYTES NFR BLD: 0.3 %
LYMPHOCYTES # BLD AUTO: 1.4 10E9/L (ref 0.8–5.3)
LYMPHOCYTES NFR BLD AUTO: 22.2 %
MCH RBC QN AUTO: 34.8 PG (ref 26.5–33)
MCHC RBC AUTO-ENTMCNC: 34.3 G/DL (ref 31.5–36.5)
MCV RBC AUTO: 102 FL (ref 78–100)
MONOCYTES # BLD AUTO: 0.9 10E9/L (ref 0–1.3)
MONOCYTES NFR BLD AUTO: 13.6 %
NEUTROPHILS # BLD AUTO: 3.8 10E9/L (ref 1.6–8.3)
NEUTROPHILS NFR BLD AUTO: 61.1 %
PLATELET # BLD AUTO: 195 10E9/L (ref 150–450)
RBC # BLD AUTO: 3.53 10E12/L (ref 3.8–5.2)
WBC # BLD AUTO: 6.3 10E9/L (ref 4–11)

## 2021-03-26 PROCEDURE — 82565 ASSAY OF CREATININE: CPT | Performed by: INTERNAL MEDICINE

## 2021-03-26 PROCEDURE — 99207 PR NO CHARGE LOS: CPT | Performed by: NURSE PRACTITIONER

## 2021-03-26 PROCEDURE — 258N000003 HC RX IP 258 OP 636: Performed by: INTERNAL MEDICINE

## 2021-03-26 PROCEDURE — 250N000011 HC RX IP 250 OP 636: Performed by: INTERNAL MEDICINE

## 2021-03-26 PROCEDURE — 96413 CHEMO IV INFUSION 1 HR: CPT

## 2021-03-26 PROCEDURE — 85025 COMPLETE CBC W/AUTO DIFF WBC: CPT | Performed by: INTERNAL MEDICINE

## 2021-03-26 PROCEDURE — 81003 URINALYSIS AUTO W/O SCOPE: CPT | Performed by: INTERNAL MEDICINE

## 2021-03-26 RX ORDER — ALBUTEROL SULFATE 90 UG/1
1-2 AEROSOL, METERED RESPIRATORY (INHALATION)
Status: CANCELLED
Start: 2021-04-16

## 2021-03-26 RX ORDER — HEPARIN SODIUM (PORCINE) LOCK FLUSH IV SOLN 100 UNIT/ML 100 UNIT/ML
5 SOLUTION INTRAVENOUS
Status: CANCELLED
Start: 2021-04-16

## 2021-03-26 RX ORDER — METHYLPREDNISOLONE 32 MG/1
TABLET ORAL
Qty: 2 TABLET | Refills: 0 | Status: SHIPPED | OUTPATIENT
Start: 2021-03-26 | End: 2022-01-01

## 2021-03-26 RX ORDER — MEPERIDINE HYDROCHLORIDE 50 MG/ML
25 INJECTION INTRAMUSCULAR; INTRAVENOUS; SUBCUTANEOUS EVERY 30 MIN PRN
Status: CANCELLED | OUTPATIENT
Start: 2021-04-16

## 2021-03-26 RX ORDER — SODIUM CHLORIDE 9 MG/ML
1000 INJECTION, SOLUTION INTRAVENOUS CONTINUOUS PRN
Status: CANCELLED
Start: 2021-04-16

## 2021-03-26 RX ORDER — LORAZEPAM 2 MG/ML
0.5 INJECTION INTRAMUSCULAR EVERY 4 HOURS PRN
Status: CANCELLED
Start: 2021-04-16

## 2021-03-26 RX ORDER — HEPARIN SODIUM (PORCINE) LOCK FLUSH IV SOLN 100 UNIT/ML 100 UNIT/ML
5 SOLUTION INTRAVENOUS
Status: DISCONTINUED | OUTPATIENT
Start: 2021-03-26 | End: 2021-03-27 | Stop reason: HOSPADM

## 2021-03-26 RX ORDER — EPINEPHRINE 1 MG/ML
0.3 INJECTION, SOLUTION, CONCENTRATE INTRAVENOUS EVERY 5 MIN PRN
Status: CANCELLED | OUTPATIENT
Start: 2021-04-16

## 2021-03-26 RX ORDER — DIPHENHYDRAMINE HCL 50 MG
CAPSULE ORAL
Qty: 1 CAPSULE | Refills: 0 | Status: SHIPPED | OUTPATIENT
Start: 2021-03-26 | End: 2022-01-01

## 2021-03-26 RX ORDER — NALOXONE HYDROCHLORIDE 0.4 MG/ML
.1-.4 INJECTION, SOLUTION INTRAMUSCULAR; INTRAVENOUS; SUBCUTANEOUS
Status: CANCELLED | OUTPATIENT
Start: 2021-04-16

## 2021-03-26 RX ORDER — ALBUTEROL SULFATE 0.83 MG/ML
2.5 SOLUTION RESPIRATORY (INHALATION)
Status: CANCELLED | OUTPATIENT
Start: 2021-04-16

## 2021-03-26 RX ORDER — METHYLPREDNISOLONE SODIUM SUCCINATE 125 MG/2ML
125 INJECTION, POWDER, LYOPHILIZED, FOR SOLUTION INTRAMUSCULAR; INTRAVENOUS
Status: CANCELLED
Start: 2021-04-16

## 2021-03-26 RX ORDER — DIPHENHYDRAMINE HYDROCHLORIDE 50 MG/ML
50 INJECTION INTRAMUSCULAR; INTRAVENOUS
Status: CANCELLED
Start: 2021-04-16

## 2021-03-26 RX ADMIN — BEVACIZUMAB-AWWB 500 MG: 400 INJECTION, SOLUTION INTRAVENOUS at 11:22

## 2021-03-26 RX ADMIN — SODIUM CHLORIDE 250 ML: 9 INJECTION, SOLUTION INTRAVENOUS at 11:22

## 2021-03-26 RX ADMIN — HEPARIN 5 ML: 100 SYRINGE at 12:14

## 2021-03-26 NOTE — PROGRESS NOTES
Infusion Nursing Note:  Nadya T Panchito presents today for MVSI.    Patient seen by provider today: Yes: Jenny   present during visit today: Not Applicable.    Note: N/A.  Patient did not meet criteria for an asymptomatic covid-19 PCR test in infusion today. Patient declined the covid-19 test.    Intravenous Access:  Labs drawn without difficulty.  Implanted Port.    Treatment Conditions:  Lab Results   Component Value Date    HGB 12.3 03/26/2021     Lab Results   Component Value Date    WBC 6.3 03/26/2021      Lab Results   Component Value Date    ANEU 3.8 03/26/2021     Lab Results   Component Value Date     03/26/2021      Lab Results   Component Value Date     01/06/2021                   Lab Results   Component Value Date    POTASSIUM 4.4 01/06/2021           No results found for: MAG         Lab Results   Component Value Date    CR 1.05 03/26/2021                   Lab Results   Component Value Date    STELLA 9.6 01/06/2021                Lab Results   Component Value Date    BILITOTAL 0.3 01/06/2021           Lab Results   Component Value Date    ALBUMIN 4.2 01/06/2021                    Lab Results   Component Value Date    ALT 17 01/06/2021           Lab Results   Component Value Date    AST 21 01/06/2021       Results reviewed, labs MET treatment parameters, ok to proceed with treatment.  Urine negative protien.      Post Infusion Assessment:  Patient tolerated infusion without incident.  Blood return noted pre and post infusion.  Site patent and intact, free from redness, edema or discomfort.  No evidence of extravasations.  Access discontinued per protocol.       Discharge Plan:   Discharge instructions reviewed with: Patient.  Patient and/or family verbalized understanding of discharge instructions and all questions answered.  AVS to patient via [employer].  Patient will return 21 days for next appointment.   Patient discharged in stable condition accompanied by: self.  Departure  Mode: Ambulatory.    Jean S. Hammann, RN

## 2021-03-26 NOTE — PROGRESS NOTES
Patient was seen in infusion therapy today for a status check. She is here for cycle 4 Avastin/Xeloda. Patient has been tolerating the treatment. She does report ongoing fatigue. She also has neuropathy in her finger and toes, which is stable. She reports stable abdominal pain, denies bloating. Patient has had ongoing allergy symptoms. She reports runny nose, sneezing. She has been taking Zyrtec but feels this is no longer helping her. She was told to start Flonase. Patient states she has some prescription strength Flonase at home so she will try this. Patient will speak with her primary provider for follow up on allergies. Labs today are stable. Patient has no other new concerns. She will have scans and see Dr Thakkar for results after cycle 6.

## 2021-03-26 NOTE — NURSING NOTE
Patient is being seen by provider in infusion where all required infusion rooming assessments and vitals were done by the infusion RN.    Danielle Turner RN 3/26/2021   11:05 AM

## 2021-03-31 DIAGNOSIS — C18.1 CANCER OF APPENDIX METASTATIC TO INTRA-ABDOMINAL LYMPH NODE (H): Primary | ICD-10-CM

## 2021-03-31 DIAGNOSIS — C77.2 CANCER OF APPENDIX METASTATIC TO INTRA-ABDOMINAL LYMPH NODE (H): Primary | ICD-10-CM

## 2021-03-31 RX ORDER — ALBUTEROL SULFATE 0.83 MG/ML
2.5 SOLUTION RESPIRATORY (INHALATION)
Status: CANCELLED | OUTPATIENT
Start: 2021-05-07

## 2021-03-31 RX ORDER — EPINEPHRINE 1 MG/ML
0.3 INJECTION, SOLUTION, CONCENTRATE INTRAVENOUS EVERY 5 MIN PRN
Status: CANCELLED | OUTPATIENT
Start: 2021-05-07

## 2021-03-31 RX ORDER — SODIUM CHLORIDE 9 MG/ML
1000 INJECTION, SOLUTION INTRAVENOUS CONTINUOUS PRN
Status: CANCELLED
Start: 2021-05-07

## 2021-03-31 RX ORDER — HEPARIN SODIUM (PORCINE) LOCK FLUSH IV SOLN 100 UNIT/ML 100 UNIT/ML
5 SOLUTION INTRAVENOUS
Status: CANCELLED
Start: 2021-05-07

## 2021-03-31 RX ORDER — NALOXONE HYDROCHLORIDE 0.4 MG/ML
.1-.4 INJECTION, SOLUTION INTRAMUSCULAR; INTRAVENOUS; SUBCUTANEOUS
Status: CANCELLED | OUTPATIENT
Start: 2021-05-07

## 2021-03-31 RX ORDER — DIPHENHYDRAMINE HYDROCHLORIDE 50 MG/ML
50 INJECTION INTRAMUSCULAR; INTRAVENOUS
Status: CANCELLED
Start: 2021-05-07

## 2021-03-31 RX ORDER — LORAZEPAM 2 MG/ML
0.5 INJECTION INTRAMUSCULAR EVERY 4 HOURS PRN
Status: CANCELLED
Start: 2021-05-07

## 2021-03-31 RX ORDER — METHYLPREDNISOLONE SODIUM SUCCINATE 125 MG/2ML
125 INJECTION, POWDER, LYOPHILIZED, FOR SOLUTION INTRAMUSCULAR; INTRAVENOUS
Status: CANCELLED
Start: 2021-05-07

## 2021-03-31 RX ORDER — ALBUTEROL SULFATE 90 UG/1
1-2 AEROSOL, METERED RESPIRATORY (INHALATION)
Status: CANCELLED
Start: 2021-05-07

## 2021-03-31 RX ORDER — MEPERIDINE HYDROCHLORIDE 50 MG/ML
25 INJECTION INTRAMUSCULAR; INTRAVENOUS; SUBCUTANEOUS EVERY 30 MIN PRN
Status: CANCELLED | OUTPATIENT
Start: 2021-05-07

## 2021-04-16 ENCOUNTER — HOSPITAL ENCOUNTER (OUTPATIENT)
Dept: INFUSION THERAPY | Facility: OTHER | Age: 71
Discharge: HOME | End: 2021-04-16
Attending: INTERNAL MEDICINE | Admitting: INTERNAL MEDICINE
Payer: MEDICARE

## 2021-04-16 VITALS
WEIGHT: 144 LBS | SYSTOLIC BLOOD PRESSURE: 137 MMHG | BODY MASS INDEX: 25.11 KG/M2 | HEART RATE: 61 BPM | RESPIRATION RATE: 20 BRPM | DIASTOLIC BLOOD PRESSURE: 66 MMHG | TEMPERATURE: 96.5 F

## 2021-04-16 DIAGNOSIS — C77.2 CANCER OF APPENDIX METASTATIC TO INTRA-ABDOMINAL LYMPH NODE (H): Primary | ICD-10-CM

## 2021-04-16 DIAGNOSIS — C18.1 CANCER OF APPENDIX METASTATIC TO INTRA-ABDOMINAL LYMPH NODE (H): Primary | ICD-10-CM

## 2021-04-16 LAB
ALBUMIN UR-MCNC: NEGATIVE MG/DL
BASOPHILS # BLD AUTO: 0.1 10E9/L (ref 0–0.2)
BASOPHILS NFR BLD AUTO: 1 %
CEA SERPL-MCNC: <3 NG/ML
CREAT SERPL-MCNC: 0.88 MG/DL (ref 0.6–1.2)
DIFFERENTIAL METHOD BLD: ABNORMAL
EOSINOPHIL # BLD AUTO: 0.2 10E9/L (ref 0–0.7)
EOSINOPHIL NFR BLD AUTO: 3.5 %
ERYTHROCYTE [DISTWIDTH] IN BLOOD BY AUTOMATED COUNT: 18.9 % (ref 10–15)
GFR SERPL CREATININE-BSD FRML MDRD: 64 ML/MIN/{1.73_M2}
HCT VFR BLD AUTO: 36.2 % (ref 35–47)
HGB BLD-MCNC: 12.3 G/DL (ref 11.7–15.7)
IMM GRANULOCYTES # BLD: 0 10E9/L (ref 0–0.4)
IMM GRANULOCYTES NFR BLD: 0.2 %
LYMPHOCYTES # BLD AUTO: 1.6 10E9/L (ref 0.8–5.3)
LYMPHOCYTES NFR BLD AUTO: 32.3 %
MCH RBC QN AUTO: 35.1 PG (ref 26.5–33)
MCHC RBC AUTO-ENTMCNC: 34 G/DL (ref 31.5–36.5)
MCV RBC AUTO: 103 FL (ref 78–100)
MONOCYTES # BLD AUTO: 0.8 10E9/L (ref 0–1.3)
MONOCYTES NFR BLD AUTO: 15.3 %
NEUTROPHILS # BLD AUTO: 2.3 10E9/L (ref 1.6–8.3)
NEUTROPHILS NFR BLD AUTO: 47.7 %
PLATELET # BLD AUTO: 204 10E9/L (ref 150–450)
RBC # BLD AUTO: 3.5 10E12/L (ref 3.8–5.2)
WBC # BLD AUTO: 4.9 10E9/L (ref 4–11)

## 2021-04-16 PROCEDURE — 250N000011 HC RX IP 250 OP 636: Performed by: NURSE PRACTITIONER

## 2021-04-16 PROCEDURE — 82378 CARCINOEMBRYONIC ANTIGEN: CPT | Performed by: INTERNAL MEDICINE

## 2021-04-16 PROCEDURE — 81003 URINALYSIS AUTO W/O SCOPE: CPT | Performed by: NURSE PRACTITIONER

## 2021-04-16 PROCEDURE — 96413 CHEMO IV INFUSION 1 HR: CPT

## 2021-04-16 PROCEDURE — 85025 COMPLETE CBC W/AUTO DIFF WBC: CPT | Performed by: INTERNAL MEDICINE

## 2021-04-16 PROCEDURE — 82565 ASSAY OF CREATININE: CPT | Performed by: INTERNAL MEDICINE

## 2021-04-16 PROCEDURE — 258N000003 HC RX IP 258 OP 636: Performed by: NURSE PRACTITIONER

## 2021-04-16 RX ORDER — HEPARIN SODIUM (PORCINE) LOCK FLUSH IV SOLN 100 UNIT/ML 100 UNIT/ML
5 SOLUTION INTRAVENOUS
Status: DISCONTINUED | OUTPATIENT
Start: 2021-04-16 | End: 2021-04-17 | Stop reason: HOSPADM

## 2021-04-16 RX ADMIN — HEPARIN 5 ML: 100 SYRINGE at 12:17

## 2021-04-16 RX ADMIN — SODIUM CHLORIDE 250 ML: 9 INJECTION, SOLUTION INTRAVENOUS at 11:24

## 2021-04-16 RX ADMIN — BEVACIZUMAB-AWWB 500 MG: 400 INJECTION, SOLUTION INTRAVENOUS at 11:40

## 2021-04-16 NOTE — PROGRESS NOTES
Infusion Nursing Note:  Nadya Flanagan presents today for MVASI c5.    Patient seen by provider today: No   present during visit today: Not Applicable.    Note: N/A.  Patient did not meet criteria for an asymptomatic covid-19 PCR test in infusion today.     Intravenous Access:  Lab draw site port, Needle type non coring, Gauge 19.  Labs drawn without difficulty.  Implanted Port.    Treatment Conditions:  Lab Results   Component Value Date    HGB 12.3 04/16/2021     Lab Results   Component Value Date    WBC 4.9 04/16/2021      Lab Results   Component Value Date    ANEU 2.3 04/16/2021     Lab Results   Component Value Date     04/16/2021      Lab Results   Component Value Date     01/06/2021                   Lab Results   Component Value Date    POTASSIUM 4.4 01/06/2021           No results found for: MAG         Lab Results   Component Value Date    CR 0.88 04/16/2021                   Lab Results   Component Value Date    STELLA 9.6 01/06/2021                Lab Results   Component Value Date    BILITOTAL 0.3 01/06/2021           Lab Results   Component Value Date    ALBUMIN 4.2 01/06/2021                    Lab Results   Component Value Date    ALT 17 01/06/2021           Lab Results   Component Value Date    AST 21 01/06/2021     Talked with Alicia Thakkar MD about patients worsening left eye blurriness. He recommended patient see eye doctor. Per patient she has eye appointment scheduled.     Results reviewed, labs MET treatment parameters, ok to proceed with treatment.  Urine negative.  Blood pressures 157/79 and 136/79 .      Post Infusion Assessment:  Patient tolerated infusion without incident.  Blood return noted pre and post infusion.  Site patent and intact, free from redness, edema or discomfort.  No evidence of extravasations.  Access discontinued per protocol.       Discharge Plan:   Discharge instructions reviewed with: Patient.  Patient and/or family verbalized  understanding of discharge instructions and all questions answered.  Copy of AVS reviewed with patient and/or family.    Patient will return 5/7/2021 for next appointment.   Patient discharged in stable condition accompanied by: self.  Departure Mode: Ambulatory.    Rosalee Paz RN

## 2021-04-18 ENCOUNTER — HEALTH MAINTENANCE LETTER (OUTPATIENT)
Age: 71
End: 2021-04-18

## 2021-05-01 ENCOUNTER — MYC MEDICAL ADVICE (OUTPATIENT)
Dept: ONCOLOGY | Facility: OTHER | Age: 71
End: 2021-05-01

## 2021-05-07 ENCOUNTER — ONCOLOGY VISIT (OUTPATIENT)
Dept: ONCOLOGY | Facility: OTHER | Age: 71
End: 2021-05-07
Attending: NURSE PRACTITIONER
Payer: MEDICARE

## 2021-05-07 ENCOUNTER — HOSPITAL ENCOUNTER (OUTPATIENT)
Dept: INFUSION THERAPY | Facility: OTHER | Age: 71
End: 2021-05-07
Attending: INTERNAL MEDICINE
Payer: MEDICARE

## 2021-05-07 VITALS
DIASTOLIC BLOOD PRESSURE: 84 MMHG | TEMPERATURE: 97.3 F | BODY MASS INDEX: 25.81 KG/M2 | SYSTOLIC BLOOD PRESSURE: 146 MMHG | RESPIRATION RATE: 18 BRPM | HEART RATE: 72 BPM | WEIGHT: 148 LBS

## 2021-05-07 DIAGNOSIS — C18.1 CANCER OF APPENDIX METASTATIC TO INTRA-ABDOMINAL LYMPH NODE (H): Primary | ICD-10-CM

## 2021-05-07 DIAGNOSIS — C18.1 MALIGNANT NEOPLASM OF APPENDIX (H): Primary | ICD-10-CM

## 2021-05-07 DIAGNOSIS — Z78.9 UNKNOWN STATUS OF IMMUNITY TO COVID-19 VIRUS: ICD-10-CM

## 2021-05-07 DIAGNOSIS — C77.2 CANCER OF APPENDIX METASTATIC TO INTRA-ABDOMINAL LYMPH NODE (H): Primary | ICD-10-CM

## 2021-05-07 DIAGNOSIS — Z91.041 CONTRAST MEDIA ALLERGY: ICD-10-CM

## 2021-05-07 LAB
ALBUMIN UR-MCNC: NEGATIVE MG/DL
BASOPHILS # BLD AUTO: 0.1 10E9/L (ref 0–0.2)
BASOPHILS NFR BLD AUTO: 1.2 %
CEA SERPL-MCNC: <3 NG/ML
CREAT SERPL-MCNC: 0.89 MG/DL (ref 0.6–1.2)
DIFFERENTIAL METHOD BLD: ABNORMAL
EOSINOPHIL # BLD AUTO: 0.2 10E9/L (ref 0–0.7)
EOSINOPHIL NFR BLD AUTO: 5 %
ERYTHROCYTE [DISTWIDTH] IN BLOOD BY AUTOMATED COUNT: 17.3 % (ref 10–15)
GFR SERPL CREATININE-BSD FRML MDRD: 63 ML/MIN/{1.73_M2}
HCT VFR BLD AUTO: 35.4 % (ref 35–47)
HGB BLD-MCNC: 12.1 G/DL (ref 11.7–15.7)
IMM GRANULOCYTES # BLD: 0 10E9/L (ref 0–0.4)
IMM GRANULOCYTES NFR BLD: 0.4 %
LYMPHOCYTES # BLD AUTO: 1.3 10E9/L (ref 0.8–5.3)
LYMPHOCYTES NFR BLD AUTO: 27.3 %
MCH RBC QN AUTO: 36.4 PG (ref 26.5–33)
MCHC RBC AUTO-ENTMCNC: 34.2 G/DL (ref 31.5–36.5)
MCV RBC AUTO: 107 FL (ref 78–100)
MONOCYTES # BLD AUTO: 1 10E9/L (ref 0–1.3)
MONOCYTES NFR BLD AUTO: 20.2 %
NEUTROPHILS # BLD AUTO: 2.2 10E9/L (ref 1.6–8.3)
NEUTROPHILS NFR BLD AUTO: 45.9 %
PLATELET # BLD AUTO: 193 10E9/L (ref 150–450)
RBC # BLD AUTO: 3.32 10E12/L (ref 3.8–5.2)
WBC # BLD AUTO: 4.8 10E9/L (ref 4–11)

## 2021-05-07 PROCEDURE — 258N000003 HC RX IP 258 OP 636: Performed by: INTERNAL MEDICINE

## 2021-05-07 PROCEDURE — 85025 COMPLETE CBC W/AUTO DIFF WBC: CPT | Performed by: INTERNAL MEDICINE

## 2021-05-07 PROCEDURE — 86769 SARS-COV-2 COVID-19 ANTIBODY: CPT | Performed by: INTERNAL MEDICINE

## 2021-05-07 PROCEDURE — 81003 URINALYSIS AUTO W/O SCOPE: CPT | Performed by: INTERNAL MEDICINE

## 2021-05-07 PROCEDURE — 96413 CHEMO IV INFUSION 1 HR: CPT

## 2021-05-07 PROCEDURE — 82565 ASSAY OF CREATININE: CPT | Performed by: INTERNAL MEDICINE

## 2021-05-07 PROCEDURE — 250N000011 HC RX IP 250 OP 636: Performed by: INTERNAL MEDICINE

## 2021-05-07 PROCEDURE — 82378 CARCINOEMBRYONIC ANTIGEN: CPT | Performed by: INTERNAL MEDICINE

## 2021-05-07 PROCEDURE — 99207 PR NO CHARGE LOS: CPT | Performed by: NURSE PRACTITIONER

## 2021-05-07 RX ORDER — DIPHENHYDRAMINE HCL 50 MG
CAPSULE ORAL
Qty: 1 CAPSULE | Refills: 0 | Status: CANCELLED | OUTPATIENT
Start: 2021-05-07

## 2021-05-07 RX ORDER — METHYLPREDNISOLONE 32 MG/1
TABLET ORAL
Qty: 2 TABLET | Refills: 0 | Status: CANCELLED | OUTPATIENT
Start: 2021-05-07

## 2021-05-07 RX ORDER — HEPARIN SODIUM (PORCINE) LOCK FLUSH IV SOLN 100 UNIT/ML 100 UNIT/ML
5 SOLUTION INTRAVENOUS
Status: DISCONTINUED | OUTPATIENT
Start: 2021-05-07 | End: 2021-05-08 | Stop reason: HOSPADM

## 2021-05-07 RX ORDER — LEVOCETIRIZINE DIHYDROCHLORIDE 5 MG/1
2 TABLET, FILM COATED ORAL
COMMUNITY
End: 2022-01-01

## 2021-05-07 RX ADMIN — SODIUM CHLORIDE 250 ML: 9 INJECTION, SOLUTION INTRAVENOUS at 11:42

## 2021-05-07 RX ADMIN — BEVACIZUMAB-AWWB 500 MG: 400 INJECTION, SOLUTION INTRAVENOUS at 11:47

## 2021-05-07 RX ADMIN — HEPARIN 5 ML: 100 SYRINGE at 12:42

## 2021-05-07 NOTE — PROGRESS NOTES
Infusion Nursing Note:  Nadyaedgar Flanagan presents today for avastin.    Patient seen by provider today: Yes: Jenny   present during visit today: Not Applicable.    Note: N/A.  Patient has been vacinated    Intravenous Access:  Labs drawn without difficulty.  Implanted Port.    Treatment Conditions:  Lab Results   Component Value Date    HGB 12.1 05/07/2021     Lab Results   Component Value Date    WBC 4.8 05/07/2021      Lab Results   Component Value Date    ANEU 2.2 05/07/2021     Lab Results   Component Value Date     05/07/2021      Urine negative for protien.      Post Infusion Assessment:  Patient tolerated infusion without incident.  Blood return noted pre and post infusion.  Site patent and intact, free from redness, edema or discomfort.  No evidence of extravasations.  Access discontinued per protocol.       Discharge Plan:   Patient declined prescription refills.  Discharge instructions reviewed with: Patient.  Patient and/or family verbalized understanding of discharge instructions and all questions answered.  Copy of AVS reviewed with patient and/or family.  Patient will return 21 days for next appointment.  Patient discharged in stable condition accompanied by: self.  Departure Mode: Ambulatory.    Jean S. Hammann, RN

## 2021-05-07 NOTE — PROGRESS NOTES
Patient was seen in infusion therapy today for a status check. She is here for cycle 6 Avastin/Xeloda for her appendix cancer. Patient states she has had some improvement in her energy level after her last treatment. She still feels frustrated about not being able to do what she used to but is overall feeling well. She reports she recently had an eye exam and was found to have loss of vision in her left eye which is felt to be a cataract. She will be seeing an ophthalmologist on 5/25. Patient also reports shooting pain at the base on the right side of neck and up in her head. We discussed upcoming imaging wit CT scans. Will have pet set up for a PET scan instead of CT imaging to assess response to treatment. She will follow up with Dr Thakkar for results of PET scan. Labs are stable. Patient was encouraged to call with any questions or concerns.

## 2021-05-07 NOTE — NURSING NOTE
Chief Complaint   Patient presents with     RECHECK     Appendix cancer     Patient is being seen by provider in infusion where all required infusion rooming assessments and vitals were done by the infusion RN.       Tiffanie Pastor CMA ([facility])

## 2021-05-08 LAB
SARS-COV-2 AB PNL SERPL IA: POSITIVE
SARS-COV-2 IGG SERPL IA-ACNC: NORMAL

## 2021-05-18 DIAGNOSIS — C77.2 CANCER OF APPENDIX METASTATIC TO INTRA-ABDOMINAL LYMPH NODE (H): Primary | ICD-10-CM

## 2021-05-18 DIAGNOSIS — C18.1 CANCER OF APPENDIX METASTATIC TO INTRA-ABDOMINAL LYMPH NODE (H): Primary | ICD-10-CM

## 2021-05-18 RX ORDER — NALOXONE HYDROCHLORIDE 0.4 MG/ML
.1-.4 INJECTION, SOLUTION INTRAMUSCULAR; INTRAVENOUS; SUBCUTANEOUS
Status: CANCELLED | OUTPATIENT
Start: 2021-05-28

## 2021-05-18 RX ORDER — ALBUTEROL SULFATE 90 UG/1
1-2 AEROSOL, METERED RESPIRATORY (INHALATION)
Status: CANCELLED
Start: 2021-05-28

## 2021-05-18 RX ORDER — EPINEPHRINE 1 MG/ML
0.3 INJECTION, SOLUTION, CONCENTRATE INTRAVENOUS EVERY 5 MIN PRN
Status: CANCELLED | OUTPATIENT
Start: 2021-05-28

## 2021-05-18 RX ORDER — LORAZEPAM 2 MG/ML
0.5 INJECTION INTRAMUSCULAR EVERY 4 HOURS PRN
Status: CANCELLED
Start: 2021-05-28

## 2021-05-18 RX ORDER — SODIUM CHLORIDE 9 MG/ML
1000 INJECTION, SOLUTION INTRAVENOUS CONTINUOUS PRN
Status: CANCELLED
Start: 2021-05-28

## 2021-05-18 RX ORDER — DIPHENHYDRAMINE HYDROCHLORIDE 50 MG/ML
50 INJECTION INTRAMUSCULAR; INTRAVENOUS
Status: CANCELLED
Start: 2021-05-28

## 2021-05-18 RX ORDER — HEPARIN SODIUM (PORCINE) LOCK FLUSH IV SOLN 100 UNIT/ML 100 UNIT/ML
5 SOLUTION INTRAVENOUS
Status: CANCELLED
Start: 2021-05-28

## 2021-05-18 RX ORDER — METHYLPREDNISOLONE SODIUM SUCCINATE 125 MG/2ML
125 INJECTION, POWDER, LYOPHILIZED, FOR SOLUTION INTRAMUSCULAR; INTRAVENOUS
Status: CANCELLED
Start: 2021-05-28

## 2021-05-18 RX ORDER — ALBUTEROL SULFATE 0.83 MG/ML
2.5 SOLUTION RESPIRATORY (INHALATION)
Status: CANCELLED | OUTPATIENT
Start: 2021-05-28

## 2021-05-18 RX ORDER — MEPERIDINE HYDROCHLORIDE 25 MG/ML
25 INJECTION INTRAMUSCULAR; INTRAVENOUS; SUBCUTANEOUS EVERY 30 MIN PRN
Status: CANCELLED | OUTPATIENT
Start: 2021-05-28

## 2021-05-19 ENCOUNTER — HOSPITAL ENCOUNTER (OUTPATIENT)
Dept: PET IMAGING | Facility: OTHER | Age: 71
Discharge: HOME | End: 2021-05-19
Attending: NURSE PRACTITIONER | Admitting: NURSE PRACTITIONER
Payer: MEDICARE

## 2021-05-19 DIAGNOSIS — C18.1 MALIGNANT NEOPLASM OF APPENDIX (H): ICD-10-CM

## 2021-05-19 PROCEDURE — 78815 PET IMAGE W/CT SKULL-THIGH: CPT | Mod: PS

## 2021-05-19 PROCEDURE — A9552 F18 FDG: HCPCS | Performed by: NURSE PRACTITIONER

## 2021-05-19 PROCEDURE — 343N000001 HC RX 343: Performed by: NURSE PRACTITIONER

## 2021-05-19 RX ADMIN — FLUDEOXYGLUCOSE F-18 12.48 MCI.: 500 INJECTION, SOLUTION INTRAVENOUS at 15:30

## 2021-05-20 ENCOUNTER — MYC MEDICAL ADVICE (OUTPATIENT)
Dept: INTERNAL MEDICINE | Facility: OTHER | Age: 71
End: 2021-05-20

## 2021-05-20 ENCOUNTER — MYC MEDICAL ADVICE (OUTPATIENT)
Dept: ONCOLOGY | Facility: OTHER | Age: 71
End: 2021-05-20

## 2021-05-20 ENCOUNTER — VIRTUAL VISIT (OUTPATIENT)
Dept: ONCOLOGY | Facility: OTHER | Age: 71
End: 2021-05-20
Attending: INTERNAL MEDICINE
Payer: MEDICARE

## 2021-05-20 DIAGNOSIS — C18.1 CANCER OF APPENDIX METASTATIC TO INTRA-ABDOMINAL LYMPH NODE (H): Primary | ICD-10-CM

## 2021-05-20 DIAGNOSIS — C77.2 CANCER OF APPENDIX METASTATIC TO INTRA-ABDOMINAL LYMPH NODE (H): Primary | ICD-10-CM

## 2021-05-20 PROCEDURE — 99443 PR PHYSICIAN TELEPHONE EVALUATION 21-30 MIN: CPT | Mod: 95 | Performed by: INTERNAL MEDICINE

## 2021-05-20 NOTE — NURSING NOTE
Chief Complaint   Patient presents with     RECHECK     Appendix cancer       Tiffanie Pastor CMA ([facility])

## 2021-05-20 NOTE — PROGRESS NOTES
Nadya is a 70 year old who is being evaluated via a billable telephone visit.      What phone number would you like to be contacted at? [phone]  How would you like to obtain your AVS? None  Vitals:  No vitals were obtained today due to virtual visit.

## 2021-05-21 NOTE — PROGRESS NOTES
Visit Date: 05/20/2021    HEMATOLOGY/ONCOLOGY TELEPHONE VIRTUAL VISIT    HISTORY OF PRESENT ILLNESS:  Mrs. 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  For details, see previous notes.  She was recently started on 5-FU, leucovorin, Avastin, myostatin, irinotecan.  She received her first cycle on 01/06/2021.  Given the fact she found it very uncomfortable using infusional 5-FU we elected to switch her to capecitabine.  When we saw her on 01/13/2021 we switched her to capecitabine 1000 mg p.o. b.i.d. on days 1 through 14, bevacizumab 7.5 mg/kg on day 1 of a 21-day cycle.  She received 2 cycles.  After 3 cycles she was tolerating chemotherapy relatively well.  She went on to complete 6 cycles of capecitabine and bevacizumab, last cycle being on 05/07.  Her major complaint was related to an isolated episode of diarrhea associated with abdominal pain on day 14 of Xeloda.  Otherwise she had no other complaints.  She went on to have a PET scan done yesterday.  This was read by Dr. Garcia as no significant interval change of the PET CT appearance since 12/09/2020.  There was no associated hypermetabolism.  There was omental stranding and nodularity in the omental right lower quadrant, which was similar to the past.  No associated hypermetabolism.  There was also nonspecific activity at the ileocecal valve as well as mild asymmetric activity in the right lingual tonsil.  According to the patient, she does not have ileocecal valve and she also had surgery.  She had a tonsillectomy; therefore, she wants the scans to be read by Dr. Speedy Menezes.  Nonetheless, at worse they were stable, but there was no evidence of uptake and therefore there was no evidence of disease.  The patient is tolerating Xeloda and Avastin.  She would like to continue this for now.  She offers no major complaints of shortness of breath, chest pain, abdominal  pain except for that day 14 of capecitabine when she gets diarrhea and has sharp abdominal pain.  No weight loss.  No rash.    PHYSICAL EXAMINATION:  Could not be performed due to this telephone virtual visit.    LABORATORY DATA:  Reviewed, reveal CBC -- white count 4.8, H and H 12.1 and 35.4, platelet count is 193.  CEA is less than 3.    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  The patient was deemed a candidate for chemotherapy for stage IV disease as per Dr. Speedy Menezes at the [facility].  The plan was to initiate FOLFOX chemotherapy.  The patient completed 2 cycles.  Course was complicated by neutropenia.  The patient went on to receive 4 cycles, the last 2 requiring 20% dose reduction due to neutropenia.  She was staged with no evidence of disease after 8 cycles.  CT abdomen and pelvis was stable.  PET scan indicated no evidence of disease.  Repeat scans indicating progression of disease.  Diagnostic laparoscopy revealed numerous peritoneal implants with biopsy consistent with poorly differentiated adenocarcinoma metastatic to peritoneum consistent with recurrence of her disease.  We discussed the case with Dr. Speedy Menezes, medical oncologist at the [facility] who agreed the patient is a candidate for FOLFIRI and bevacizumab.  The plan was to treat with 6 cycles, then restage.  MSI testing was intact.  The patient received 3 cycles on a q.14 day schedule.  Performance status was worse in the second week.  We elected to changes her cycles to q.21 days with FOLFIRI and bevacizumab.  Bevacizumab was given a dose of 7.5 mg/kg every 21 days.  We did reduce chemotherapy by 20%.  Course was complicated by neutropenia requiring dose reduction in 5-FU infusion on bolus dose.  The plan was to proceed with 6 cycles.  Staging studies at [place] indicated stable peritoneal disease.   As per Dr. Speedy Menezes, the patient would be a candidate for maintenance 5-FU, leucovorin and Avastin.  PET scan indicates some improvement in disease.  The patient states consult with general surgery and was not interested in diagnostic laparoscopy at this time.  We therefore initiated maintenance chemotherapy as per Dr. Speedy Menezes with 5-FU, leucovorin and bevacizumab.  We admitted her for irinotecan.  She received 2 cycles of 5-FU, leucovorin and bevacizumab and could not tolerate the infusion of 5-FU.  We elected to switch her to capecitabine 1000 p.o. b.i.d. on days 1 through 14 and bevacizumab 7.5 mg/kg on day 1 of a 21-day cycle.  She has received 6 cycles thus far.  She tolerated it reasonably well except for an isolated episode of diarrhea and abdominal pain on day 14.  PET scan indicates no evidence of hypermetabolism, at worse stable disease.  She would like to have the PET scan reviewed by Dr. Speedy Menezes.  Nonetheless, there was no obvious evidence of progression; therefore, we will continue with maintenance capecitabine and bevacizumab.  We will likely restage in 6 months.  We will see the patient approximately after 4 cycles.  Obtain CBC, CMP, LDH, CEA.    Twenty-eight minutes were spent on this telephone virtual visit.  Time was spent reviewing history, reviewing PET imaging, performing a history, documenting history, followup chemotherapy and orders.      Alicia Thakkar MD        D: 2021   T: 2021   MT: [facility]    Name:     ALMA CORTES  MRN:      [id number]        Account:    [id number]   :      1950           Visit Date: 2021     Document: [id number]    cc:  MD Meghan Nowak DO Lisa Owens, MD

## 2021-05-28 ENCOUNTER — HOSPITAL ENCOUNTER (OUTPATIENT)
Dept: INFUSION THERAPY | Facility: OTHER | Age: 71
Discharge: HOME | End: 2021-05-28
Attending: INTERNAL MEDICINE | Admitting: INTERNAL MEDICINE
Payer: MEDICARE

## 2021-05-28 VITALS
HEART RATE: 61 BPM | SYSTOLIC BLOOD PRESSURE: 130 MMHG | BODY MASS INDEX: 25.32 KG/M2 | RESPIRATION RATE: 16 BRPM | DIASTOLIC BLOOD PRESSURE: 72 MMHG | TEMPERATURE: 97.2 F | WEIGHT: 145.2 LBS

## 2021-05-28 DIAGNOSIS — C77.2 CANCER OF APPENDIX METASTATIC TO INTRA-ABDOMINAL LYMPH NODE (H): Primary | ICD-10-CM

## 2021-05-28 DIAGNOSIS — C18.1 CANCER OF APPENDIX METASTATIC TO INTRA-ABDOMINAL LYMPH NODE (H): Primary | ICD-10-CM

## 2021-05-28 LAB
ALBUMIN UR-MCNC: NEGATIVE MG/DL
BASOPHILS # BLD AUTO: 0.1 10E9/L (ref 0–0.2)
BASOPHILS NFR BLD AUTO: 1.4 %
CEA SERPL-MCNC: <3 NG/ML
CREAT SERPL-MCNC: 0.97 MG/DL (ref 0.6–1.2)
DIFFERENTIAL METHOD BLD: ABNORMAL
EOSINOPHIL # BLD AUTO: 0.2 10E9/L (ref 0–0.7)
EOSINOPHIL NFR BLD AUTO: 4.3 %
ERYTHROCYTE [DISTWIDTH] IN BLOOD BY AUTOMATED COUNT: 15.9 % (ref 10–15)
GFR SERPL CREATININE-BSD FRML MDRD: 57 ML/MIN/{1.73_M2}
HCT VFR BLD AUTO: 36.4 % (ref 35–47)
HGB BLD-MCNC: 12.4 G/DL (ref 11.7–15.7)
IMM GRANULOCYTES # BLD: 0 10E9/L (ref 0–0.4)
IMM GRANULOCYTES NFR BLD: 0.2 %
LYMPHOCYTES # BLD AUTO: 1.2 10E9/L (ref 0.8–5.3)
LYMPHOCYTES NFR BLD AUTO: 24.9 %
MCH RBC QN AUTO: 36.6 PG (ref 26.5–33)
MCHC RBC AUTO-ENTMCNC: 34.1 G/DL (ref 31.5–36.5)
MCV RBC AUTO: 107 FL (ref 78–100)
MONOCYTES # BLD AUTO: 1 10E9/L (ref 0–1.3)
MONOCYTES NFR BLD AUTO: 19.6 %
NEUTROPHILS # BLD AUTO: 2.4 10E9/L (ref 1.6–8.3)
NEUTROPHILS NFR BLD AUTO: 49.6 %
PLATELET # BLD AUTO: 192 10E9/L (ref 150–450)
RBC # BLD AUTO: 3.39 10E12/L (ref 3.8–5.2)
WBC # BLD AUTO: 4.9 10E9/L (ref 4–11)

## 2021-05-28 PROCEDURE — 250N000011 HC RX IP 250 OP 636: Performed by: INTERNAL MEDICINE

## 2021-05-28 PROCEDURE — 96413 CHEMO IV INFUSION 1 HR: CPT

## 2021-05-28 PROCEDURE — 82378 CARCINOEMBRYONIC ANTIGEN: CPT | Performed by: INTERNAL MEDICINE

## 2021-05-28 PROCEDURE — 81003 URINALYSIS AUTO W/O SCOPE: CPT | Performed by: INTERNAL MEDICINE

## 2021-05-28 PROCEDURE — 85025 COMPLETE CBC W/AUTO DIFF WBC: CPT | Performed by: INTERNAL MEDICINE

## 2021-05-28 PROCEDURE — 82565 ASSAY OF CREATININE: CPT | Performed by: INTERNAL MEDICINE

## 2021-05-28 PROCEDURE — 258N000003 HC RX IP 258 OP 636: Performed by: INTERNAL MEDICINE

## 2021-05-28 RX ORDER — HEPARIN SODIUM (PORCINE) LOCK FLUSH IV SOLN 100 UNIT/ML 100 UNIT/ML
5 SOLUTION INTRAVENOUS
Status: DISCONTINUED | OUTPATIENT
Start: 2021-05-28 | End: 2021-05-29 | Stop reason: HOSPADM

## 2021-05-28 RX ADMIN — SODIUM CHLORIDE 250 ML: 9 INJECTION, SOLUTION INTRAVENOUS at 11:34

## 2021-05-28 RX ADMIN — BEVACIZUMAB-AWWB 500 MG: 400 INJECTION, SOLUTION INTRAVENOUS at 11:38

## 2021-05-28 RX ADMIN — HEPARIN 5 ML: 100 SYRINGE at 12:30

## 2021-05-28 NOTE — PROGRESS NOTES
Infusion Nursing Note:  Nadya Flanagan presents today for MVASI cycle 7.    Patient seen by provider today: No   present during visit today: Not Applicable.    Note: N/A.    Intravenous Access:  Labs drawn without difficulty from port access.  Implanted Port accessed with brisk blood return for ordered labs.    Treatment Conditions:  Lab Results   Component Value Date    HGB 12.4 05/28/2021     Lab Results   Component Value Date    WBC 4.9 05/28/2021      Lab Results   Component Value Date    ANEU 2.4 05/28/2021     Lab Results   Component Value Date     05/28/2021      Lab Results   Component Value Date     01/06/2021                   Lab Results   Component Value Date    POTASSIUM 4.4 01/06/2021           No results found for: MAG         Lab Results   Component Value Date    CR 0.97 05/28/2021                   Lab Results   Component Value Date    STELLA 9.6 01/06/2021                Lab Results   Component Value Date    BILITOTAL 0.3 01/06/2021           Lab Results   Component Value Date    ALBUMIN 4.2 01/06/2021                    Lab Results   Component Value Date    ALT 17 01/06/2021           Lab Results   Component Value Date    AST 21 01/06/2021       Results reviewed, labs MET treatment parameters, ok to proceed with treatment.      Post Infusion Assessment:  Patient tolerated infusion without incident.  Blood return noted pre and post infusion.  Site patent and intact, free from redness, edema or discomfort.  No evidence of extravasations.  Access discontinued per protocol.  Biologic Infusion Post Education: Call the triage nurse at your clinic or seek medical attention if you have chills and/or temperature greater than or equal to 100.5, uncontrolled nausea/vomiting, diarrhea, constipation, dizziness, shortness of breath, chest pain, heart palpitations, weakness or any other new or concerning symptoms, questions or concerns.  You cannot have any live virus vaccines prior to or  during treatment or up to 6 months post infusion.  If you have an upcoming surgery, medical procedure or dental procedure during treatment, this should be discussed with your ordering physician and your surgeon/dentist.  If you are having any concerning symptom, if you are unsure if you should get your next infusion or wish to speak to a provider before your next infusion, please call your care coordinator or triage nurse at your clinic to notify them so we can adequately serve you.       Discharge Plan:   Discharge instructions reviewed with: Patient.  Patient and/or family verbalized understanding of discharge instructions and all questions answered.  Copy of AVS reviewed with patient and/or family.  Patient will return 6/18/21 for next appointment.  Patient discharged in stable condition accompanied by: self.  Departure Mode: Ambulatory.      Steffany Urrutia RN

## 2021-05-31 DIAGNOSIS — C77.2 CANCER OF APPENDIX METASTATIC TO INTRA-ABDOMINAL LYMPH NODE (H): Primary | ICD-10-CM

## 2021-05-31 DIAGNOSIS — C18.1 CANCER OF APPENDIX METASTATIC TO INTRA-ABDOMINAL LYMPH NODE (H): Primary | ICD-10-CM

## 2021-05-31 RX ORDER — MEPERIDINE HYDROCHLORIDE 25 MG/ML
25 INJECTION INTRAMUSCULAR; INTRAVENOUS; SUBCUTANEOUS EVERY 30 MIN PRN
Status: CANCELLED | OUTPATIENT
Start: 2021-06-18

## 2021-05-31 RX ORDER — LORAZEPAM 2 MG/ML
0.5 INJECTION INTRAMUSCULAR EVERY 4 HOURS PRN
Status: CANCELLED
Start: 2021-06-18

## 2021-05-31 RX ORDER — ALBUTEROL SULFATE 90 UG/1
1-2 AEROSOL, METERED RESPIRATORY (INHALATION)
Status: CANCELLED
Start: 2021-06-18

## 2021-05-31 RX ORDER — HEPARIN SODIUM (PORCINE) LOCK FLUSH IV SOLN 100 UNIT/ML 100 UNIT/ML
5 SOLUTION INTRAVENOUS
Status: CANCELLED
Start: 2021-06-18

## 2021-05-31 RX ORDER — METHYLPREDNISOLONE SODIUM SUCCINATE 125 MG/2ML
125 INJECTION, POWDER, LYOPHILIZED, FOR SOLUTION INTRAMUSCULAR; INTRAVENOUS
Status: CANCELLED
Start: 2021-06-18

## 2021-05-31 RX ORDER — DIPHENHYDRAMINE HYDROCHLORIDE 50 MG/ML
50 INJECTION INTRAMUSCULAR; INTRAVENOUS
Status: CANCELLED
Start: 2021-06-18

## 2021-05-31 RX ORDER — ALBUTEROL SULFATE 0.83 MG/ML
2.5 SOLUTION RESPIRATORY (INHALATION)
Status: CANCELLED | OUTPATIENT
Start: 2021-06-18

## 2021-05-31 RX ORDER — SODIUM CHLORIDE 9 MG/ML
1000 INJECTION, SOLUTION INTRAVENOUS CONTINUOUS PRN
Status: CANCELLED
Start: 2021-06-18

## 2021-05-31 RX ORDER — NALOXONE HYDROCHLORIDE 0.4 MG/ML
.1-.4 INJECTION, SOLUTION INTRAMUSCULAR; INTRAVENOUS; SUBCUTANEOUS
Status: CANCELLED | OUTPATIENT
Start: 2021-06-18

## 2021-05-31 RX ORDER — EPINEPHRINE 1 MG/ML
0.3 INJECTION, SOLUTION, CONCENTRATE INTRAVENOUS EVERY 5 MIN PRN
Status: CANCELLED | OUTPATIENT
Start: 2021-06-18

## 2021-06-07 DIAGNOSIS — C18.1 MALIGNANT NEOPLASM OF APPENDIX (H): ICD-10-CM

## 2021-06-07 RX ORDER — CAPECITABINE 500 MG/1
1000 TABLET, FILM COATED ORAL 2 TIMES DAILY
Qty: 56 TABLET | Refills: 6 | Status: SHIPPED | OUTPATIENT
Start: 2021-06-07 | End: 2022-01-01

## 2021-06-07 NOTE — TELEPHONE ENCOUNTER
Needs refill Xeloda to [facility].  Selena Solorio RN...........6/7/2021 10:54 AM

## 2021-06-18 ENCOUNTER — HOSPITAL ENCOUNTER (OUTPATIENT)
Dept: INFUSION THERAPY | Facility: OTHER | Age: 71
Discharge: HOME | End: 2021-06-18
Attending: INTERNAL MEDICINE | Admitting: INTERNAL MEDICINE
Payer: MEDICARE

## 2021-06-18 VITALS
BODY MASS INDEX: 25.28 KG/M2 | WEIGHT: 145 LBS | HEART RATE: 55 BPM | TEMPERATURE: 97.2 F | SYSTOLIC BLOOD PRESSURE: 131 MMHG | DIASTOLIC BLOOD PRESSURE: 72 MMHG

## 2021-06-18 DIAGNOSIS — C18.1 CANCER OF APPENDIX METASTATIC TO INTRA-ABDOMINAL LYMPH NODE (H): Primary | ICD-10-CM

## 2021-06-18 DIAGNOSIS — C77.2 CANCER OF APPENDIX METASTATIC TO INTRA-ABDOMINAL LYMPH NODE (H): Primary | ICD-10-CM

## 2021-06-18 LAB
ALBUMIN UR-MCNC: NEGATIVE MG/DL
BASOPHILS # BLD AUTO: 0.1 10E9/L (ref 0–0.2)
BASOPHILS NFR BLD AUTO: 1.5 %
CEA SERPL-MCNC: 3 NG/ML
CREAT SERPL-MCNC: 0.97 MG/DL (ref 0.6–1.2)
DIFFERENTIAL METHOD BLD: ABNORMAL
EOSINOPHIL # BLD AUTO: 0.2 10E9/L (ref 0–0.7)
EOSINOPHIL NFR BLD AUTO: 4.6 %
ERYTHROCYTE [DISTWIDTH] IN BLOOD BY AUTOMATED COUNT: 15.5 % (ref 10–15)
GFR SERPL CREATININE-BSD FRML MDRD: 57 ML/MIN/{1.73_M2}
HCT VFR BLD AUTO: 36.3 % (ref 35–47)
HGB BLD-MCNC: 12.5 G/DL (ref 11.7–15.7)
IMM GRANULOCYTES # BLD: 0 10E9/L (ref 0–0.4)
IMM GRANULOCYTES NFR BLD: 0.4 %
LYMPHOCYTES # BLD AUTO: 1.4 10E9/L (ref 0.8–5.3)
LYMPHOCYTES NFR BLD AUTO: 27 %
MCH RBC QN AUTO: 37.3 PG (ref 26.5–33)
MCHC RBC AUTO-ENTMCNC: 34.4 G/DL (ref 31.5–36.5)
MCV RBC AUTO: 108 FL (ref 78–100)
MONOCYTES # BLD AUTO: 0.8 10E9/L (ref 0–1.3)
MONOCYTES NFR BLD AUTO: 15.6 %
NEUTROPHILS # BLD AUTO: 2.6 10E9/L (ref 1.6–8.3)
NEUTROPHILS NFR BLD AUTO: 50.9 %
PLATELET # BLD AUTO: 222 10E9/L (ref 150–450)
RBC # BLD AUTO: 3.35 10E12/L (ref 3.8–5.2)
WBC # BLD AUTO: 5.2 10E9/L (ref 4–11)

## 2021-06-18 PROCEDURE — 250N000011 HC RX IP 250 OP 636: Performed by: INTERNAL MEDICINE

## 2021-06-18 PROCEDURE — 96413 CHEMO IV INFUSION 1 HR: CPT

## 2021-06-18 PROCEDURE — 82378 CARCINOEMBRYONIC ANTIGEN: CPT | Performed by: INTERNAL MEDICINE

## 2021-06-18 PROCEDURE — 82565 ASSAY OF CREATININE: CPT | Performed by: INTERNAL MEDICINE

## 2021-06-18 PROCEDURE — 81003 URINALYSIS AUTO W/O SCOPE: CPT | Performed by: INTERNAL MEDICINE

## 2021-06-18 PROCEDURE — 85025 COMPLETE CBC W/AUTO DIFF WBC: CPT | Performed by: INTERNAL MEDICINE

## 2021-06-18 PROCEDURE — 258N000003 HC RX IP 258 OP 636: Performed by: INTERNAL MEDICINE

## 2021-06-18 RX ORDER — EPINEPHRINE 1 MG/ML
0.3 INJECTION, SOLUTION, CONCENTRATE INTRAVENOUS EVERY 5 MIN PRN
Status: CANCELLED | OUTPATIENT
Start: 2021-07-09

## 2021-06-18 RX ORDER — MEPERIDINE HYDROCHLORIDE 50 MG/ML
25 INJECTION INTRAMUSCULAR; INTRAVENOUS; SUBCUTANEOUS EVERY 30 MIN PRN
Status: CANCELLED | OUTPATIENT
Start: 2021-07-09

## 2021-06-18 RX ORDER — SODIUM CHLORIDE 9 MG/ML
1000 INJECTION, SOLUTION INTRAVENOUS CONTINUOUS PRN
Status: CANCELLED
Start: 2021-07-09

## 2021-06-18 RX ORDER — HEPARIN SODIUM (PORCINE) LOCK FLUSH IV SOLN 100 UNIT/ML 100 UNIT/ML
5 SOLUTION INTRAVENOUS
Status: CANCELLED
Start: 2021-07-09

## 2021-06-18 RX ORDER — METHYLPREDNISOLONE SODIUM SUCCINATE 125 MG/2ML
125 INJECTION, POWDER, LYOPHILIZED, FOR SOLUTION INTRAMUSCULAR; INTRAVENOUS
Status: CANCELLED
Start: 2021-07-09

## 2021-06-18 RX ORDER — HEPARIN SODIUM (PORCINE) LOCK FLUSH IV SOLN 100 UNIT/ML 100 UNIT/ML
5 SOLUTION INTRAVENOUS
Status: DISCONTINUED | OUTPATIENT
Start: 2021-06-18 | End: 2021-06-19 | Stop reason: HOSPADM

## 2021-06-18 RX ORDER — PREDNISOLONE ACETATE 10 MG/ML
SUSPENSION/ DROPS OPHTHALMIC
COMMUNITY
Start: 2021-05-27 | End: 2021-01-01

## 2021-06-18 RX ORDER — DIPHENHYDRAMINE HYDROCHLORIDE 50 MG/ML
50 INJECTION INTRAMUSCULAR; INTRAVENOUS
Status: CANCELLED
Start: 2021-07-09

## 2021-06-18 RX ORDER — NALOXONE HYDROCHLORIDE 0.4 MG/ML
.1-.4 INJECTION, SOLUTION INTRAMUSCULAR; INTRAVENOUS; SUBCUTANEOUS
Status: CANCELLED | OUTPATIENT
Start: 2021-07-09

## 2021-06-18 RX ORDER — ALBUTEROL SULFATE 0.83 MG/ML
2.5 SOLUTION RESPIRATORY (INHALATION)
Status: CANCELLED | OUTPATIENT
Start: 2021-07-09

## 2021-06-18 RX ORDER — LORAZEPAM 2 MG/ML
0.5 INJECTION INTRAMUSCULAR EVERY 4 HOURS PRN
Status: CANCELLED
Start: 2021-07-09

## 2021-06-18 RX ORDER — ALBUTEROL SULFATE 90 UG/1
1-2 AEROSOL, METERED RESPIRATORY (INHALATION)
Status: CANCELLED
Start: 2021-07-09

## 2021-06-18 RX ADMIN — BEVACIZUMAB-AWWB 500 MG: 400 INJECTION, SOLUTION INTRAVENOUS at 11:35

## 2021-06-18 RX ADMIN — HEPARIN 5 ML: 100 SYRINGE at 12:18

## 2021-06-18 RX ADMIN — SODIUM CHLORIDE 250 ML: 9 INJECTION, SOLUTION INTRAVENOUS at 11:31

## 2021-06-18 NOTE — PROGRESS NOTES
Infusion Nursing Note:  Nadya Flanagan presents today for MVASI.    Patient seen by provider today: No   present during visit today: Not Applicable.    Note: N/A.  Patient did not meet criteria for an asymptomatic covid-19 PCR test in infusion today.     Intravenous Access:  Labs drawn without difficulty.  Implanted Port.    Treatment Conditions:  Lab Results   Component Value Date    HGB 12.5 06/18/2021     Lab Results   Component Value Date    WBC 5.2 06/18/2021      Lab Results   Component Value Date    ANEU 2.6 06/18/2021     Lab Results   Component Value Date     06/18/2021      Results reviewed, labs MET treatment parameters, ok to proceed with treatment.  Urine negative for protiens.      Post Infusion Assessment:  Patient tolerated infusion without incident.  Blood return noted pre and post infusion.  Site patent and intact, free from redness, edema or discomfort.  No evidence of extravasations.  Access discontinued per protocol.       Discharge Plan:   Discharge instructions reviewed with: Patient.  Patient and/or family verbalized understanding of discharge instructions and all questions answered.  Copy of AVS reviewed with patient and/or family.  Patient will return 21 days for next appointment.  Patient discharged in stable condition accompanied by: self.  Departure Mode: Ambulatory.      Jean S. Hammann, RN                    [facility]

## 2021-07-09 ENCOUNTER — ONCOLOGY VISIT (OUTPATIENT)
Dept: ONCOLOGY | Facility: OTHER | Age: 71
End: 2021-07-09
Attending: NURSE PRACTITIONER
Payer: MEDICARE

## 2021-07-09 ENCOUNTER — HOSPITAL ENCOUNTER (OUTPATIENT)
Dept: INFUSION THERAPY | Facility: OTHER | Age: 71
End: 2021-07-09
Attending: INTERNAL MEDICINE
Payer: MEDICARE

## 2021-07-09 VITALS
TEMPERATURE: 97.5 F | DIASTOLIC BLOOD PRESSURE: 76 MMHG | WEIGHT: 146 LBS | BODY MASS INDEX: 25.46 KG/M2 | HEART RATE: 73 BPM | SYSTOLIC BLOOD PRESSURE: 144 MMHG

## 2021-07-09 DIAGNOSIS — C18.1 CANCER OF APPENDIX METASTATIC TO INTRA-ABDOMINAL LYMPH NODE (H): Primary | ICD-10-CM

## 2021-07-09 DIAGNOSIS — C77.2 CANCER OF APPENDIX METASTATIC TO INTRA-ABDOMINAL LYMPH NODE (H): Primary | ICD-10-CM

## 2021-07-09 LAB
ALBUMIN UR-MCNC: NEGATIVE MG/DL
BASOPHILS # BLD AUTO: 0.1 10E9/L (ref 0–0.2)
BASOPHILS NFR BLD AUTO: 0.9 %
CEA SERPL-MCNC: 3.1 NG/ML
CREAT SERPL-MCNC: 1.06 MG/DL (ref 0.6–1.2)
DIFFERENTIAL METHOD BLD: ABNORMAL
EOSINOPHIL # BLD AUTO: 0.3 10E9/L (ref 0–0.7)
EOSINOPHIL NFR BLD AUTO: 4.4 %
ERYTHROCYTE [DISTWIDTH] IN BLOOD BY AUTOMATED COUNT: 15.6 % (ref 10–15)
GFR SERPL CREATININE-BSD FRML MDRD: 51 ML/MIN/{1.73_M2}
HCT VFR BLD AUTO: 37.5 % (ref 35–47)
HGB BLD-MCNC: 12.9 G/DL (ref 11.7–15.7)
IMM GRANULOCYTES # BLD: 0 10E9/L (ref 0–0.4)
IMM GRANULOCYTES NFR BLD: 0.4 %
LYMPHOCYTES # BLD AUTO: 1.4 10E9/L (ref 0.8–5.3)
LYMPHOCYTES NFR BLD AUTO: 18 %
MCH RBC QN AUTO: 36.9 PG (ref 26.5–33)
MCHC RBC AUTO-ENTMCNC: 34.4 G/DL (ref 31.5–36.5)
MCV RBC AUTO: 107 FL (ref 78–100)
MONOCYTES # BLD AUTO: 1.2 10E9/L (ref 0–1.3)
MONOCYTES NFR BLD AUTO: 15.6 %
NEUTROPHILS # BLD AUTO: 4.6 10E9/L (ref 1.6–8.3)
NEUTROPHILS NFR BLD AUTO: 60.7 %
PLATELET # BLD AUTO: 213 10E9/L (ref 150–450)
RBC # BLD AUTO: 3.5 10E12/L (ref 3.8–5.2)
WBC # BLD AUTO: 7.6 10E9/L (ref 4–11)

## 2021-07-09 PROCEDURE — 250N000011 HC RX IP 250 OP 636: Performed by: INTERNAL MEDICINE

## 2021-07-09 PROCEDURE — 81003 URINALYSIS AUTO W/O SCOPE: CPT | Performed by: INTERNAL MEDICINE

## 2021-07-09 PROCEDURE — 82565 ASSAY OF CREATININE: CPT | Performed by: INTERNAL MEDICINE

## 2021-07-09 PROCEDURE — 258N000003 HC RX IP 258 OP 636: Performed by: INTERNAL MEDICINE

## 2021-07-09 PROCEDURE — 99207 PR NO CHARGE LOS: CPT | Performed by: NURSE PRACTITIONER

## 2021-07-09 PROCEDURE — 96413 CHEMO IV INFUSION 1 HR: CPT

## 2021-07-09 PROCEDURE — 82378 CARCINOEMBRYONIC ANTIGEN: CPT | Performed by: INTERNAL MEDICINE

## 2021-07-09 PROCEDURE — 85025 COMPLETE CBC W/AUTO DIFF WBC: CPT | Performed by: INTERNAL MEDICINE

## 2021-07-09 RX ORDER — HEPARIN SODIUM (PORCINE) LOCK FLUSH IV SOLN 100 UNIT/ML 100 UNIT/ML
5 SOLUTION INTRAVENOUS
Status: DISCONTINUED | OUTPATIENT
Start: 2021-07-09 | End: 2021-07-10 | Stop reason: HOSPADM

## 2021-07-09 RX ADMIN — SODIUM CHLORIDE 500 MG: 9 INJECTION, SOLUTION INTRAVENOUS at 11:55

## 2021-07-09 RX ADMIN — SODIUM CHLORIDE 250 ML: 9 INJECTION, SOLUTION INTRAVENOUS at 11:28

## 2021-07-09 RX ADMIN — HEPARIN 5 ML: 100 SYRINGE at 12:34

## 2021-07-09 NOTE — PROGRESS NOTES
Patient was seen in infusion therapy today for a status check. She is here for cycle 9 bevacizumab/Xeloda for her appendix cancer. Patient reports she has been feeling well. 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 She states she has aching in her toes at night. She also notes some ongoing vision changes. She has vision loss in her left eye due to cataract. Patient states she is starting to have similar issues with her right eye. It was felt this could be related to the Avastin. She has an appointment with ophthalmology on 7/27. Labs today are stable. CEA is just slightly elevated at 3.1. Will proceed with treatment today as planned. Patient will see Dr Thakkar with next visit.

## 2021-07-09 NOTE — NURSING NOTE
Patient is being seen by the provider in infusion where all required infusion department rooming assessments, screenings, and vital signs were done by infusion RN.   Selena Solorio RN...........7/9/2021 10:43 AM

## 2021-07-09 NOTE — PROGRESS NOTES
Infusion Nursing Note:  Nadya Flanagan presents today for Avastin.    Patient seen by provider today: Yes: Jenny   present during visit today: Not Applicable.    Note: N/A.    Intravenous Access:  Labs drawn without difficulty.  Implanted Port.    Treatment Conditions:  Lab Results   Component Value Date    HGB 12.9 07/09/2021     Lab Results   Component Value Date    WBC 7.6 07/09/2021      Lab Results   Component Value Date    ANEU 4.6 07/09/2021     Lab Results   Component Value Date     07/09/2021      Lab Results   Component Value Date     01/06/2021                   Lab Results   Component Value Date    POTASSIUM 4.4 01/06/2021           No results found for: MAG         Lab Results   Component Value Date    CR 1.06 07/09/2021                   Lab Results   Component Value Date    STELLA 9.6 01/06/2021                Lab Results   Component Value Date    BILITOTAL 0.3 01/06/2021           Lab Results   Component Value Date    ALBUMIN 4.2 01/06/2021                    Lab Results   Component Value Date    ALT 17 01/06/2021           Lab Results   Component Value Date    AST 21 01/06/2021       Urine negative for protien.      Post Infusion Assessment:  Patient tolerated infusion without incident.  Blood return noted pre and post infusion.  Site patent and intact, free from redness, edema or discomfort.  No evidence of extravasations.  Access discontinued per protocol.       Discharge Plan:   Discharge instructions reviewed with: Patient.  Patient and/or family verbalized understanding of discharge instructions and all questions answered.  Copy of AVS reviewed with patient and/or family.  Patient will return 21 days for next appointment.  Patient discharged in stable condition accompanied by: self.  Departure Mode: Ambulatory.      Jean S. Hammann, RN

## 2021-07-13 DIAGNOSIS — C18.1 CANCER OF APPENDIX METASTATIC TO INTRA-ABDOMINAL LYMPH NODE (H): Primary | ICD-10-CM

## 2021-07-13 DIAGNOSIS — C77.2 CANCER OF APPENDIX METASTATIC TO INTRA-ABDOMINAL LYMPH NODE (H): Primary | ICD-10-CM

## 2021-07-13 RX ORDER — MEPERIDINE HYDROCHLORIDE 25 MG/ML
25 INJECTION INTRAMUSCULAR; INTRAVENOUS; SUBCUTANEOUS EVERY 30 MIN PRN
Status: CANCELLED | OUTPATIENT
Start: 2021-01-01

## 2021-07-13 RX ORDER — ALBUTEROL SULFATE 90 UG/1
1-2 AEROSOL, METERED RESPIRATORY (INHALATION)
Status: CANCELLED
Start: 2021-01-01

## 2021-07-13 RX ORDER — DIPHENHYDRAMINE HYDROCHLORIDE 50 MG/ML
50 INJECTION INTRAMUSCULAR; INTRAVENOUS
Status: CANCELLED
Start: 2021-01-01

## 2021-07-13 RX ORDER — SODIUM CHLORIDE 9 MG/ML
1000 INJECTION, SOLUTION INTRAVENOUS CONTINUOUS PRN
Status: CANCELLED
Start: 2021-01-01

## 2021-07-13 RX ORDER — NALOXONE HYDROCHLORIDE 0.4 MG/ML
.1-.4 INJECTION, SOLUTION INTRAMUSCULAR; INTRAVENOUS; SUBCUTANEOUS
Status: CANCELLED | OUTPATIENT
Start: 2021-01-01

## 2021-07-13 RX ORDER — ALBUTEROL SULFATE 0.83 MG/ML
2.5 SOLUTION RESPIRATORY (INHALATION)
Status: CANCELLED | OUTPATIENT
Start: 2021-01-01

## 2021-07-13 RX ORDER — EPINEPHRINE 1 MG/ML
0.3 INJECTION, SOLUTION, CONCENTRATE INTRAVENOUS EVERY 5 MIN PRN
Status: CANCELLED | OUTPATIENT
Start: 2021-01-01

## 2021-07-13 RX ORDER — HEPARIN SODIUM (PORCINE) LOCK FLUSH IV SOLN 100 UNIT/ML 100 UNIT/ML
5 SOLUTION INTRAVENOUS
Status: CANCELLED
Start: 2021-01-01

## 2021-07-13 RX ORDER — LORAZEPAM 2 MG/ML
0.5 INJECTION INTRAMUSCULAR EVERY 4 HOURS PRN
Status: CANCELLED
Start: 2021-01-01

## 2021-07-13 RX ORDER — METHYLPREDNISOLONE SODIUM SUCCINATE 125 MG/2ML
125 INJECTION, POWDER, LYOPHILIZED, FOR SOLUTION INTRAMUSCULAR; INTRAVENOUS
Status: CANCELLED
Start: 2021-01-01

## 2021-07-30 NOTE — NURSING NOTE
Infusion Nursing Note:  Nadya Flanagan presents today for Zirabev cycle 10, day 1.    Patient seen by provider today: No   present during visit today: Not Applicable.    Note: N/A.    Intravenous Access:  Labs drawn without difficulty.  Implanted Port.    Treatment Conditions:  Lab Results   Component Value Date    HGB 12.3 07/30/2021    HGB 12.9 07/09/2021     Lab Results   Component Value Date    WBC 5.0 07/30/2021    WBC 7.6 07/09/2021      Lab Results   Component Value Date    ANEU 4.6 07/09/2021     Lab Results   Component Value Date     07/30/2021     07/09/2021      Lab Results   Component Value Date     01/06/2021                   Lab Results   Component Value Date    POTASSIUM 4.4 01/06/2021           No results found for: MAG         Lab Results   Component Value Date    CR 0.96 07/30/2021    CR 1.06 07/09/2021                   Lab Results   Component Value Date    STELLA 9.6 01/06/2021                Lab Results   Component Value Date    BILITOTAL 0.3 01/06/2021           Lab Results   Component Value Date    ALBUMIN 4.2 01/06/2021                    Lab Results   Component Value Date    ALT 17 01/06/2021           Lab Results   Component Value Date    AST 21 01/06/2021     Results reviewed, labs MET treatment parameters, ok to proceed with treatment.  Urine negative.    Post Infusion Assessment:  Patient tolerated infusion without incident.  Blood return noted pre and post infusion.  Site patent and intact, free from redness, edema or discomfort.  No evidence of extravasations.  Access discontinued per protocol.     Discharge Plan:   Patient and/or family verbalized understanding of discharge instructions and all questions answered.  Copy of AVS reviewed with patient and/or family.  Patient will return 8/20/21 for next appointment.  Patient discharged in stable condition accompanied by: self.  Departure Mode: Ambulatory.    Brenda J. Goodell, RN

## 2021-08-19 NOTE — PLAN OF CARE
NG draining clear yellow/green fluid. Bowel sounds active, not passing gas. No nausea/vomiting. Some complaint of pain - heat packs applied and PRN dilaudid given at 2330. Walked halls. VSS, lung sounds clear. Gordon Kim RN on 12/18/2018 at 1:53 AM     no

## 2021-08-20 NOTE — NURSING NOTE
Infusion Nursing Note:  Nadya Flanagan presents today for Zirabev cycle 11, day 1.    Patient seen by provider today: Yes: Dr. Thakkar   present during visit today: Not Applicable.    Note: N/A.    Intravenous Access:  Labs drawn without difficulty.  Implanted Port.    Treatment Conditions:  Urine negative.    Post Infusion Assessment:  Patient tolerated infusion without incident.  Blood return noted pre and post infusion.  Site patent and intact, free from redness, edema or discomfort.  No evidence of extravasations.  Access discontinued per protocol.     Discharge Plan:   Patient and/or family verbalized understanding of discharge instructions and all questions answered.  Refused AVS  Patient discharged in stable condition accompanied by: self.  Departure Mode: Ambulatory.  Will return 9/10/21    Brenda J. Goodell, RN

## 2021-08-21 NOTE — PROGRESS NOTES
Visit Date: 08/20/2021    HISTORY OF PRESENT ILLNESS:  Mrs. 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  For details, see previous notes.  She was initially started with 5-FU, leucovorin, Avastin and irinotecan.  She received first cycle on 01/06/2021.  Given the fact she found it very uncomfortable using infusional 5-FU, we elected to switch her to capecitabine.  When we saw her on 01/13/2021, we switched her to capecitabine 1000 p.o. b.i.d. on days 1-14, bevacizumab 7.5 mg/kg on day 1 of a 21-day cycle.  She received 2 cycles.  After 3 cycles, she was tolerating chemotherapy relatively well.  She went on to complete 6 cycles of capecitabine and bevacizumab, with last cycle being on 05/07/2021.  Her major complaint was related to an isolated episode of diarrhea associated with abdominal pain on day 14 of Xeloda.  Otherwise, she had no other complaints.  She went on to have a PET scan done on 05/19/2021, and there was no significant interval change on the PET scan appearance since 12/09/2020.  There was no associated hypermetabolism.  There was omental stranding and nodularity in the omental right lower quadrant, which was similar to the past.  No associated hypermetabolism.  There was nonspecific activity at the ileocecal valve as well as mild asymmetric activity in the right lingual tonsil.  According to the patient, she did not have an ileocecal valve, as she had had surgery.  She had a tonsillectomy, and she wanted the scans to be read by Dr. Speedy Menezes, who noted that at worst, they were stable.  There was no evidence of uptake, and therefore there was no evidence of disease.  The patient was tolerating Xeloda and Avastin.  In the interim, she has continued to receive Xeloda and Avastin, and she is here now for day 1 of cycle 11 of Xeloda and Avastin.  She says she is tolerating it reasonably well.  She does have  some hand-foot symptoms, particularly in her foot.  The soles of her feet are rather red and erythematous and painful at times.  She does have a mild weight loss.  She says she gets some cramping in her abdomen at times, and it is painful, does have some cramping particularly when she defecates but otherwise no dolores diarrhea, no bright red blood per rectum or hematemesis.  She is scheduled to have imaging after cycle 12 of Xeloda and Avastin in approximately 3 weeks from now.  Otherwise, no other complaints.    PHYSICAL EXAMINATION:    GENERAL:  She is a thin middle-aged white female in no acute distress.  ECOG performance status is 0.   VITAL SIGNS:  Stable.  She is afebrile.  HEENT:  Atraumatic, normocephalic.  Oropharynx nonerythematous.  NECK:  Supple, no thyromegaly.  LUNGS:  Clear to auscultation and percussion.  HEART:  Regular rhythm.  S1, S2 normal.  ABDOMEN:  Mildly distended.  There are normoactive bowel sounds.  There is some minimal tenderness to palpation in the periumbilical region.  No rebound.  LYMPHATICS:  No cervical or supraclavicular nodes.  EXTREMITIES:  Reveal that she has palmar/plantar erythema noted in the soles of both feet, likely grade 1, grade 2.  NEUROLOGIC:  Nonfocal.    LABORATORY DATA:  Reveal CBC -- white count 7.1, H and H 12.6 and 36.8, platelet count is 210.  CEA is less than 3, creatinine 1.02.    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  The patient was deemed a candidate for chemotherapy for stage IV disease as per Dr. Speedy Menezes at the [facility].  The plan was to initiate FOLFOX chemotherapy.  The patient completed 2 cycles.  Course was complicated by neutropenia.  The patient went on to receive 4 cycles, last 2 requiring 20% dose reduction due to neutropenia.  She was staged with no evidence of disease after 8 cycles.  CT abdomen and  pelvis was stable.  PET scan indicated no evidence of disease.  Repeat scans indicate progression of disease.  Diagnostic laparoscopy revealed numerous peritoneal implants, with biopsy consistent with poorly differentiated adenocarcinoma metastatic to peritoneum, consistent with recurrence of her disease.  We discussed the case with Dr. Speedy Menezes, Medical Oncology at the [facility], who agreed the patient is a candidate for FOLFIRI and bevacizumab.  The plan was to treat her with 6 cycles and then restage.  MSI testing was intact.  The patient received 3 cycles on a every 14-day schedule.  Performance status was worse in the second week.  We elected to change her cycle to every 21 days with FOLFIRI and bevacizumab.  Bevacizumab was given at a dose of 7.5 mg/kg every 21 days.  We did reduce chemotherapy by 20%.  Course was complicated by neutropenia, requiring dose reduction of 5-FU infusion as well as bolus.  Plan was to proceed with 6 cycles.  Staging studies at the [place] indicated stable peritoneal disease.  As per Dr. Speedy Menezes, the patient would be a candidate for maintenance 5-FU, leucovorin and Avastin.  PET scan indicated some improvement in disease.  The patient did consult with General Surgery and was not interested in diagnostic laparoscopy.  We therefore initiated maintenance chemotherapy as per Dr. Speedy Menezes with 5-FU, leucovorin and bevacizumab.  We omitted the irinotecan.  She received 2 cycles of 5-FU, leucovorin and bevacizumab, could not tolerate the infusion of 5-FU.  We elected to switch her to capecitabine 1000 p.o. b.i.d. on days 1-14 and bevacizumab 7.5 mg/kg on day 1 of a 21-day cycle.  She has received 10 cycles thus far.  After 6 cycles, she had a PET scan which revealed no evidence of hypermetabolism, and at worst stable disease.  The patient now is here for her 11th cycle of capecitabine and bevacizumab.  Course was complicated by grade 1 palmar/plantar dysesthesia of both  soles of her feet.  We will treat empirically with 20% urea cream applied 3 times daily to affected area.  We will otherwise restage the patient with a PET scan after her 12th cycle of capecitabine and bevacizumab.    TIME SPENT:  Eighty minutes was spent with this patient.  Time was spent reviewing previous provider notes, lab work, imaging studies, performing history and physical, documenting history and physical, ordering chemotherapy, ordering lab work and urea cream.    Alicia Thakkar MD        D: 2021   T: 2021   MT: [facility]    Name:     ALMA CORTES  MRN:      [id number]        Account:    [id number]   :      1950           Visit Date: 2021     Document: [id number]    cc:  MD Meghan Nowak DO Lisa Owens, MD

## 2021-09-10 NOTE — NURSING NOTE
Infusion Nursing Note:  Nadya Flanagan presents today for Zirabev cycle 12.    Patient seen by provider today: No   present during visit today: Not Applicable.    Note: N/A.      Intravenous Access:  Implanted Port.    Treatment Conditions:  Lab Results   Component Value Date    HGB 12.3 09/10/2021    WBC 7.7 09/10/2021    ANEU 4.6 07/09/2021    ANEUTAUTO 5.6 09/10/2021     09/10/2021      Lab Results   Component Value Date     09/10/2021    POTASSIUM 4.4 09/10/2021    CR 0.98 09/10/2021    STELLA 9.4 09/10/2021    BILITOTAL 0.9 09/10/2021    ALBUMIN 4.1 09/10/2021    ALT 29 09/10/2021    AST 36 09/10/2021     Results reviewed, labs MET treatment parameters, ok to proceed with treatment.      Post Infusion Assessment:  Patient tolerated infusion without incident.  Blood return noted pre and post infusion.  Site patent and intact, free from redness, edema or discomfort.  No evidence of extravasations.  Access discontinued per protocol.  Biologic Infusion Post Education: Call the triage nurse at your clinic or seek medical attention if you have chills and/or temperature greater than or equal to 100.5, uncontrolled nausea/vomiting, diarrhea, constipation, dizziness, shortness of breath, chest pain, heart palpitations, weakness or any other new or concerning symptoms, questions or concerns.  You cannot have any live virus vaccines prior to or during treatment or up to 6 months post infusion.  If you have an upcoming surgery, medical procedure or dental procedure during treatment, this should be discussed with your ordering physician and your surgeon/dentist.  If you are having any concerning symptom, if you are unsure if you should get your next infusion or wish to speak to a provider before your next infusion, please call your care coordinator or triage nurse at your clinic to notify them so we can adequately serve you.       Discharge Plan:   Discharge instructions reviewed with:  Patient.  Patient and/or family verbalized understanding of discharge instructions and all questions answered.  Copy of AVS reviewed with patient and/or family.  Patient will return 10/1/21 for next appointment.  Patient discharged in stable condition accompanied by: self.  Departure Mode: Ambulatory.      Steffany Urrutia RN

## 2021-09-29 NOTE — PROGRESS NOTES
Writer requested for RN IV start in MRI department.     Tourniquet applied to left upper extremity.     Skin cleansed per protocol using Chlorhexidine     INTRAVENOUS ACCESS by a trained Registered Nurse:  Peripheral IV: Site left hand; Gauge 20g; Needle type BD    Saline Lock. Sterile blue clave attached and flushed with 10 mL sterile normal saline.     Tegaderm applied to secure IV.    Patient tolerated well.. See flowsheet and MAR for details.

## 2021-10-01 NOTE — PROGRESS NOTES
Infusion Nursing Note:  Nadya Flanagan presents today for avastin.    Patient seen by provider today: Yes: Provatas   present during visit today: Not Applicable.    Note: patient is wanting to take a holiday for one cycle and talked with provider about it, she will take next cycle off and then return in November.      Intravenous Access:  Labs drawn without difficulty.  Implanted Port.    Treatment Conditions:  Lab Results   Component Value Date    HGB 12.2 10/01/2021    WBC 4.8 10/01/2021    ANEU 4.6 07/09/2021    ANEUTAUTO 2.9 10/01/2021     10/01/2021      Lab Results   Component Value Date     09/10/2021    POTASSIUM 4.4 09/10/2021    CR 1.01 10/01/2021    STLELA 9.4 09/10/2021    BILITOTAL 0.9 09/10/2021    ALBUMIN 4.1 09/10/2021    ALT 29 09/10/2021    AST 36 09/10/2021     Urine negative protiens.      Post Infusion Assessment:  Patient tolerated infusion without incident.  Blood return noted pre and post infusion.  Site patent and intact, free from redness, edema or discomfort.  No evidence of extravasations.  Access discontinued per protocol.       Discharge Plan:   Discharge instructions reviewed with: Patient.  Patient and/or family verbalized understanding of discharge instructions and all questions answered.  Copy of AVS reviewed with patient and/or family.  Patient will return Novmber for next appointment.  Patient discharged in stable condition accompanied by: self.  Departure Mode: Ambulatory.      Jean S. Hammann, RN

## 2021-10-01 NOTE — PROGRESS NOTES
Visit Date: 10/01/2021    HISTORY OF PRESENT ILLNESS:  Mrs. 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  For details, see previous notes.  She was initially started with 5-FU, leucovorin, Avastin and irinotecan.  She received the first cycle on 01/06/2021.  Given the fact that she found it very uncomfortable using infusional 5-FU, we elected to switch her to capecitabine.  When we saw her on 01/13/2021, we switched her to gemcitabine 1000 p.o. b.i.d. on days 1-14 and bevacizumab 7.5 mg/kg on day 1 of a 21-day cycle.  She received 2 cycles of 3 cycles.  She was tolerating chemotherapy relatively well.  She went on to complete 6 cycles of capecitabine and bevacizumab, with the last cycle being on 05/07/2021.  Her major complaint was related to an isolated episode of diarrhea associated with abdominal pain on day 14 of Xeloda; otherwise, she had no other complaints.  She went on to have a PET scan on 05/19/2021.  There was no significant interval change of the PET scan appearance since 12/09/2020.  There was no associated hypermetabolism.  There was omental stranding and nodularity in the omental right lower quadrant, which was similar to the past.  No associated hypermetabolism.  There was nonspecific activity at the ileocecal valve as well as mild asymmetric activity in the right lingual tonsil.  According to the patient, she did not have an ileocecal valve.  She had had surgery.  She had a tonsillectomy.  She wanted the scans to be read by Dr. Speedy Menezes, who noted that at the worst they were stable.  There was no evidence of uptake and therefore there was no evidence of disease.  The patient was tolerating capecitabine, or Xeloda, and Avastin.      In the interim, she continued to receive Xeloda and Avastin.  She went on to complete 12 cycles of Xeloda and Avastin and had imaging studies done including a PET scan  09/29/2021, which was read as activity noted at the ileocecal surgical anastomosis.  We discussed the case with Dr. Ananda Sinha who felt this was postoperative change and had not really changed from previous PET scan results.  There was also no hypermetabolic implant disease with omental mesenteric stranding that was present, which Dr. Sinha felt was likely postsurgical scarring, as it was not hypermetabolic.  The patient therefore at worst has stable disease.  She says she is tolerating chemotherapy well.  She gets significant neuropathy in her feet when she walks, but this is tolerable.  She also feels like she has a nodular right lower quadrant, but she says this has been present before.  No diarrhea, no hand-foot symptoms.  No headaches.  No fevers, night sweats or weight loss.  She does get some fatigue after starting Xeloda.  She would like to omit her next cycle, as she has plans with the family.  Otherwise, no other complaints of bright red blood per rectum, hematemesis, fevers, night sweats or weight loss.    PHYSICAL EXAMINATION:    GENERAL:  She is well-developed, well-nourished white female, in no acute distress.  VITAL SIGNS:  Stable.  She is afebrile.  HEENT:  Atraumatic, normocephalic.  Oropharynx nonerythematous.  NECK:  Supple.  LUNGS:  Clear to auscultation and percussion.  HEART:  Regular rhythm.  S1, S2 normal.  ABDOMEN:  Soft, normoactive bowel sounds.  No masses, nontender.  LYMPHATICS:  No cervical, supraclavicular or inguinal nodes.  EXTREMITIES:  Without edema.  NEUROLOGIC:  Nonfocal.    LABORATORY DATA:  Reveal CBC with white count 7.7, H and H 12.3 and 36.5, platelet count is 188,000.  BUN 16, creatinine 0.98.  LFTs are normal.  CEA is less than 3.    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  The patient was deemed a candidate for chemotherapy  for stage IV disease as per Dr. Speedy Menezes at the [facility].  The plan was to initiate FOLFOX chemotherapy.  The patient completed 2 cycles.  Course was complicated by neutropenia.  The patient went on to receive 4 cycles, the last 2 requiring a 20% dose reduction due to neutropenia.  She was staged with no evidence of disease after 8 cycles.  CT abdomen and pelvis was stable.  PET scan indicated no evidence of disease.  Repeat scans indicate progression of disease.  Diagnostic laparoscopy revealed numerous peritoneal implants with biopsy consistent with poorly differentiated adenocarcinoma metastatic to peritoneum consistent with a recurrence of her disease.  We discussed the case with Dr. Speedy Menezes of Medical Oncology at [facility], who agreed the patient is a candidate for FOLFIRI and bevacizumab.  The plan was to treat her with 6 cycles and then restage.  MSI testing came back intact; therefore, the patient would not be a candidate for pembrolizumab.  The patient received 3 cycles on an every 14-day schedule.  Performance status was worse in the second week.  We elected to change her cycle to every 21 days with FOLFIRI and bevacizumab.  Bevacizumab was given at a dose of 7.5 mg/kg every 21 days.  We did reduce her chemotherapy by 20%.  Course was complicated by neutropenia, requiring a dose reduction of 5-FU infusion as well as bolus.  Plan was to proceed with 6 cycles.  Staging studies at the [facility] indicated stable peritoneal disease.  As per Dr. Speedy Menezes, the patient would be a candidate for maintenance 5-FU, leucovorin and Avastin.  PET scan indicates some improvement in disease.  The patient did consult with General Surgery and was not interested in diagnostic laparoscopy.  Therefore, initiate maintenance chemotherapy as per Dr. Speedy Menezes with 5-FU, leucovorin and bevacizumab.  We omitted the irinotecan.  She received 2 cycles of 5-FU, leucovorin and bevacizumab.  She could not tolerate  the infusion of 5-FU.  We elected to switch her to capecitabine 1,000 mg p.o. b.i.d. on days 1-14 and bevacizumab 7.5 mg/kg on day 1 of a 21-day cycle.  After 6 cycles she had a PET scan, which revealed no evidence of hypermetabolism and at worst stable disease.      The patient now has completed 12 cycles of bevacizumab and still has evidence of stable disease, no evidence of hypermetabolism in the peritoneum.  Plan is to continue capecitabine and bevacizumab.  We will restage after 18 cycles with a PET scan.  The patient would like to omit cycle 14.  Otherwise, we will see the patient after cycle 18 and obtain a CBC, CMP, LDH and CEA.    Sixty-eight minutes was spent on this patient.  Time was spent reviewing imaging results, discussing imaging results with Radiology, reviewing lab results, performing history and physical, documenting history and physical and ordering followup PET scans and ordering chemotherapy.    Alicia Thakkar MD        D: 10/01/2021   T: 10/01/2021   MT: TOÑO    Name:     ALMA CORTES  MRN:      [id number]        Account:    [id number]   :      1950           Visit Date: 10/01/2021     Document: [id number]    cc:  MD Meghan Nowak DO

## 2021-10-01 NOTE — NURSING NOTE
Patient is being seen by the provider in infusion where all required infusion department rooming assessments, screenings, and vital signs were done by infusion RN.   Selena Solorio RN...........10/1/2021 10:06 AM

## 2021-11-12 NOTE — NURSING NOTE
Infusion Nursing Note:  Nadya Flanagan presents today for Zirabev cycle 15.    Patient seen by provider today: No   present during visit today: Not Applicable.    Note: Patients urine protein was high at 100 today so order for 24 hour urine collection was ordered. Patient did not want to do 24 hour urine collection. Patient requested recheck on lab be done. Ok'd by Diana Alvarado NP to recheck lab and if less then 100 ok to cancel 24 hour urine. Second urine test came back negative so 24 hour urine test cancelled per Diana Alvarado NP.      Intravenous Access:  Labs drawn without difficulty.  Implanted Port.    Treatment Conditions:  Lab Results   Component Value Date    HGB 12.9 11/12/2021    WBC 6.1 11/12/2021    ANEU 4.6 07/09/2021    ANEUTAUTO 3.7 11/12/2021     11/12/2021      Lab Results   Component Value Date     09/10/2021    POTASSIUM 4.4 09/10/2021    CR 0.92 11/12/2021    STELLA 9.4 09/10/2021    BILITOTAL 0.9 09/10/2021    ALBUMIN 4.1 09/10/2021    ALT 29 09/10/2021    AST 36 09/10/2021     Results reviewed, labs MET treatment parameters, ok to proceed with treatment.  Urine protein 100, then Negative .      Post Infusion Assessment:  Patient tolerated infusion without incident.  Blood return noted pre and post infusion.  Site patent and intact, free from redness, edema or discomfort.  No evidence of extravasations.  Access discontinued per protocol.  Biologic Infusion Post Education: Call the triage nurse at your clinic or seek medical attention if you have chills and/or temperature greater than or equal to 100.5, uncontrolled nausea/vomiting, diarrhea, constipation, dizziness, shortness of breath, chest pain, heart palpitations, weakness or any other new or concerning symptoms, questions or concerns.  You cannot have any live virus vaccines prior to or during treatment or up to 6 months post infusion.  If you have an upcoming surgery, medical procedure or dental procedure  during treatment, this should be discussed with your ordering physician and your surgeon/dentist.  If you are having any concerning symptom, if you are unsure if you should get your next infusion or wish to speak to a provider before your next infusion, please call your care coordinator or triage nurse at your clinic to notify them so we can adequately serve you.       Discharge Plan:   Discharge instructions reviewed with: Patient.  Copy of AVS declined by patient as uses my chart.  Patient will return 12/3/21 for next appointment.  Patient discharged in stable condition accompanied by: self.  Departure Mode: Ambulatory.      Saumya Alvarado RN

## 2021-11-29 NOTE — TELEPHONE ENCOUNTER
Oncology/Hematology Care Coordination - Telephone Call    Concern:  Elevated blood pressure around low 160's/100's. States she has had headache also.    History:  Recently restarted oral chemotherapy    Recommendation:  Needs to be seen. Patient will not see anyone other than Dr. Pearce or Marge Maldonado at [facility]. Soonest available with Marge Wednesday. Nadya will call if she is able to see Dr. Dent sooner or will come in to ED or clinic ASAP if worsening headache or blood pressure.     Selena Solorio RN on 11/29/2021 at 4:03 PM

## 2021-12-03 NOTE — NURSING NOTE
Infusion Nursing Note:  Nadyaedgar Flanagan presents today for Zirabev c16.    Patient seen by provider today: No   present during visit today: Not Applicable.    Note: N/A.    Intravenous Access:  Labs drawn without difficulty.  Implanted Port.    Treatment Conditions:  Lab Results   Component Value Date    HGB 12.2 12/03/2021    WBC 7.3 12/03/2021    ANEU 4.6 07/09/2021    ANEUTAUTO 4.7 12/03/2021     12/03/2021      Lab Results   Component Value Date     09/10/2021    POTASSIUM 4.4 09/10/2021    CR 0.92 11/12/2021    STELLA 9.4 09/10/2021    BILITOTAL 0.9 09/10/2021    ALBUMIN 4.1 09/10/2021    ALT 29 09/10/2021    AST 36 09/10/2021     Results reviewed, labs MET treatment parameters, ok to proceed with treatment.  Urine Negative.    Post Infusion Assessment:  Patient tolerated infusion without incident.  Blood return noted pre and post infusion.  Site patent and intact, free from redness, edema or discomfort.  No evidence of extravasations.  Access discontinued per protocol.     Discharge Plan:   Discharge instructions reviewed with: Patient.  Patient and/or family verbalized understanding of discharge instructions and all questions answered.  Copy of AVS reviewed with patient and/or family.  Patient will return 12/24/2021 for next appointment.  AVS to patient via [employer].    Patient discharged in stable condition accompanied by: self.  Departure Mode: Ambulatory.      Rosalee Paz RN

## 2021-12-24 NOTE — NURSING NOTE
Infusion Nursing Note:  Nadya Flanagan presents today for Zirabev Cycle 17 Day 1.    Patient seen by provider today: No   present during visit today: Not Applicable.    Note: N/A.      Intravenous Access:  Labs drawn without difficulty.  Implanted Port.    Treatment Conditions:  Urine protein: negative  Results reviewed, labs MET treatment parameters, ok to proceed with treatment.      Post Infusion Assessment:  Patient tolerated infusion without incident.  Blood return noted pre and post infusion.  Site patent and intact, free from redness, edema or discomfort.  No evidence of extravasations.  Access discontinued per protocol.       Discharge Plan:   Discharge instructions reviewed with: Patient.  Patient and/or family verbalized understanding of discharge instructions and all questions answered.  AVS to patient via [employer].  Patient will return 1/14/2022 for next appointment.   Patient discharged in stable condition accompanied by: self.  Departure Mode: Ambulatory.      Tita Schuster RN

## 2022-01-01 ENCOUNTER — HOSPITAL ENCOUNTER (OUTPATIENT)
Dept: INFUSION THERAPY | Facility: OTHER | Age: 72
Discharge: HOME | End: 2022-04-08
Attending: INTERNAL MEDICINE | Admitting: INTERNAL MEDICINE
Payer: MEDICARE

## 2022-01-01 ENCOUNTER — MYC MEDICAL ADVICE (OUTPATIENT)
Dept: ONCOLOGY | Facility: OTHER | Age: 72
End: 2022-01-01
Payer: MEDICARE

## 2022-01-01 ENCOUNTER — HOSPITAL ENCOUNTER (OUTPATIENT)
Dept: INFUSION THERAPY | Facility: OTHER | Age: 72
Discharge: HOME | End: 2022-01-14
Attending: INTERNAL MEDICINE | Admitting: INTERNAL MEDICINE
Payer: MEDICARE

## 2022-01-01 ENCOUNTER — HOSPITAL ENCOUNTER (OUTPATIENT)
Dept: CT IMAGING | Facility: OTHER | Age: 72
Discharge: HOME | End: 2022-04-13
Attending: INTERNAL MEDICINE | Admitting: INTERNAL MEDICINE
Payer: MEDICARE

## 2022-01-01 ENCOUNTER — VIRTUAL VISIT (OUTPATIENT)
Dept: SURGERY | Facility: OTHER | Age: 72
End: 2022-01-01
Attending: SURGERY
Payer: MEDICARE

## 2022-01-01 ENCOUNTER — DOCUMENTATION ONLY (OUTPATIENT)
Dept: ONCOLOGY | Facility: CLINIC | Age: 72
End: 2022-01-01

## 2022-01-01 ENCOUNTER — NURSE TRIAGE (OUTPATIENT)
Dept: INTERNAL MEDICINE | Facility: OTHER | Age: 72
End: 2022-01-01
Payer: MEDICARE

## 2022-01-01 ENCOUNTER — VIRTUAL VISIT (OUTPATIENT)
Dept: INTERNAL MEDICINE | Facility: OTHER | Age: 72
End: 2022-01-01
Attending: INTERNAL MEDICINE
Payer: MEDICARE

## 2022-01-01 ENCOUNTER — MYC MEDICAL ADVICE (OUTPATIENT)
Dept: INTERNAL MEDICINE | Facility: OTHER | Age: 72
End: 2022-01-01
Payer: MEDICARE

## 2022-01-01 ENCOUNTER — APPOINTMENT (OUTPATIENT)
Dept: CT IMAGING | Facility: OTHER | Age: 72
DRG: 374 | End: 2022-01-01
Attending: EMERGENCY MEDICINE
Payer: MEDICARE

## 2022-01-01 ENCOUNTER — HOSPITAL ENCOUNTER (OUTPATIENT)
Dept: INFUSION THERAPY | Facility: OTHER | Age: 72
Discharge: HOME | End: 2022-02-25
Attending: INTERNAL MEDICINE | Admitting: INTERNAL MEDICINE
Payer: MEDICARE

## 2022-01-01 ENCOUNTER — HOSPITAL ENCOUNTER (INPATIENT)
Facility: OTHER | Age: 72
LOS: 3 days | Discharge: HOSPICE | DRG: 374 | End: 2022-07-14
Attending: EMERGENCY MEDICINE | Admitting: INTERNAL MEDICINE
Payer: MEDICARE

## 2022-01-01 ENCOUNTER — HOSPITAL ENCOUNTER (INPATIENT)
Facility: OTHER | Age: 72
LOS: 3 days | Discharge: HOSPICE | DRG: 388 | End: 2022-05-25
Attending: PHYSICIAN ASSISTANT | Admitting: INTERNAL MEDICINE
Payer: MEDICARE

## 2022-01-01 ENCOUNTER — VIRTUAL VISIT (OUTPATIENT)
Dept: ONCOLOGY | Facility: OTHER | Age: 72
End: 2022-01-01
Attending: INTERNAL MEDICINE
Payer: MEDICARE

## 2022-01-01 ENCOUNTER — PATIENT OUTREACH (OUTPATIENT)
Dept: CARE COORDINATION | Facility: CLINIC | Age: 72
End: 2022-01-01

## 2022-01-01 ENCOUNTER — VIRTUAL VISIT (OUTPATIENT)
Dept: INTERNAL MEDICINE | Facility: OTHER | Age: 72
End: 2022-01-01
Attending: NURSE PRACTITIONER
Payer: MEDICARE

## 2022-01-01 ENCOUNTER — HOSPITAL ENCOUNTER (OUTPATIENT)
Dept: INFUSION THERAPY | Facility: OTHER | Age: 72
Discharge: HOME | End: 2022-03-18
Attending: INTERNAL MEDICINE | Admitting: INTERNAL MEDICINE
Payer: MEDICARE

## 2022-01-01 ENCOUNTER — TELEPHONE (OUTPATIENT)
Dept: PET IMAGING | Facility: HOSPITAL | Age: 72
End: 2022-01-01
Payer: MEDICARE

## 2022-01-01 ENCOUNTER — PATIENT OUTREACH (OUTPATIENT)
Dept: CARE COORDINATION | Facility: CLINIC | Age: 72
End: 2022-01-01
Payer: MEDICARE

## 2022-01-01 ENCOUNTER — HOSPITAL ENCOUNTER (OUTPATIENT)
Dept: PET IMAGING | Facility: HOSPITAL | Age: 72
Discharge: HOME | End: 2022-03-16
Attending: INTERNAL MEDICINE | Admitting: INTERNAL MEDICINE
Payer: MEDICARE

## 2022-01-01 ENCOUNTER — HOSPITAL ENCOUNTER (INPATIENT)
Facility: OTHER | Age: 72
LOS: 1 days | Discharge: HOSPICE | DRG: 375 | End: 2022-06-01
Attending: INTERNAL MEDICINE | Admitting: INTERNAL MEDICINE
Payer: MEDICARE

## 2022-01-01 ENCOUNTER — TELEPHONE (OUTPATIENT)
Dept: INTERNAL MEDICINE | Facility: OTHER | Age: 72
End: 2022-01-01

## 2022-01-01 ENCOUNTER — HOSPITAL ENCOUNTER (OUTPATIENT)
Dept: ULTRASOUND IMAGING | Facility: OTHER | Age: 72
Discharge: HOME | End: 2022-04-05
Attending: INTERNAL MEDICINE | Admitting: INTERNAL MEDICINE
Payer: MEDICARE

## 2022-01-01 ENCOUNTER — MEDICAL CORRESPONDENCE (OUTPATIENT)
Dept: HEALTH INFORMATION MANAGEMENT | Facility: OTHER | Age: 72
End: 2022-01-01

## 2022-01-01 ENCOUNTER — TELEPHONE (OUTPATIENT)
Dept: ONCOLOGY | Facility: OTHER | Age: 72
End: 2022-01-01
Payer: MEDICARE

## 2022-01-01 ENCOUNTER — HOSPITAL ENCOUNTER (OUTPATIENT)
Dept: INFUSION THERAPY | Facility: OTHER | Age: 72
Discharge: HOME | End: 2022-02-04
Attending: INTERNAL MEDICINE | Admitting: INTERNAL MEDICINE
Payer: MEDICARE

## 2022-01-01 ENCOUNTER — TELEPHONE (OUTPATIENT)
Dept: INTERNAL MEDICINE | Facility: OTHER | Age: 72
End: 2022-01-01
Payer: MEDICARE

## 2022-01-01 ENCOUNTER — OFFICE VISIT (OUTPATIENT)
Dept: FAMILY MEDICINE | Facility: OTHER | Age: 72
End: 2022-01-01
Attending: NURSE PRACTITIONER
Payer: MEDICARE

## 2022-01-01 ENCOUNTER — MEDICAL CORRESPONDENCE (OUTPATIENT)
Dept: HEALTH INFORMATION MANAGEMENT | Facility: OTHER | Age: 72
End: 2022-01-01
Payer: MEDICARE

## 2022-01-01 ENCOUNTER — APPOINTMENT (OUTPATIENT)
Dept: ULTRASOUND IMAGING | Facility: OTHER | Age: 72
DRG: 388 | End: 2022-01-01
Attending: PHYSICIAN ASSISTANT
Payer: MEDICARE

## 2022-01-01 ENCOUNTER — APPOINTMENT (OUTPATIENT)
Dept: CT IMAGING | Facility: OTHER | Age: 72
DRG: 388 | End: 2022-01-01
Attending: PHYSICIAN ASSISTANT
Payer: MEDICARE

## 2022-01-01 ENCOUNTER — HEALTH MAINTENANCE LETTER (OUTPATIENT)
Age: 72
End: 2022-01-01

## 2022-01-01 ENCOUNTER — DOCUMENTATION ONLY (OUTPATIENT)
Dept: ONCOLOGY | Facility: OTHER | Age: 72
End: 2022-01-01

## 2022-01-01 ENCOUNTER — APPOINTMENT (OUTPATIENT)
Dept: GENERAL RADIOLOGY | Facility: OTHER | Age: 72
DRG: 388 | End: 2022-01-01
Attending: FAMILY MEDICINE
Payer: MEDICARE

## 2022-01-01 ENCOUNTER — MYC MEDICAL ADVICE (OUTPATIENT)
Dept: ONCOLOGY | Facility: OTHER | Age: 72
End: 2022-01-01

## 2022-01-01 ENCOUNTER — DOCUMENTATION ONLY (OUTPATIENT)
Dept: OTHER | Facility: CLINIC | Age: 72
End: 2022-01-01
Payer: MEDICARE

## 2022-01-01 VITALS
DIASTOLIC BLOOD PRESSURE: 68 MMHG | HEART RATE: 90 BPM | RESPIRATION RATE: 16 BRPM | BODY MASS INDEX: 21.97 KG/M2 | OXYGEN SATURATION: 95 % | TEMPERATURE: 96.6 F | WEIGHT: 126 LBS | SYSTOLIC BLOOD PRESSURE: 110 MMHG

## 2022-01-01 VITALS
DIASTOLIC BLOOD PRESSURE: 81 MMHG | BODY MASS INDEX: 24.78 KG/M2 | TEMPERATURE: 97.4 F | HEART RATE: 65 BPM | RESPIRATION RATE: 18 BRPM | WEIGHT: 142.1 LBS | SYSTOLIC BLOOD PRESSURE: 150 MMHG

## 2022-01-01 VITALS
HEIGHT: 64 IN | SYSTOLIC BLOOD PRESSURE: 134 MMHG | TEMPERATURE: 96.7 F | RESPIRATION RATE: 16 BRPM | WEIGHT: 135.5 LBS | BODY MASS INDEX: 23.13 KG/M2 | DIASTOLIC BLOOD PRESSURE: 86 MMHG | HEART RATE: 88 BPM | OXYGEN SATURATION: 97 %

## 2022-01-01 VITALS
SYSTOLIC BLOOD PRESSURE: 145 MMHG | HEART RATE: 91 BPM | WEIGHT: 122.7 LBS | OXYGEN SATURATION: 95 % | DIASTOLIC BLOOD PRESSURE: 88 MMHG | HEIGHT: 63 IN | BODY MASS INDEX: 21.74 KG/M2 | TEMPERATURE: 97.7 F | RESPIRATION RATE: 16 BRPM

## 2022-01-01 VITALS
BODY MASS INDEX: 23.54 KG/M2 | WEIGHT: 135 LBS | HEART RATE: 77 BPM | TEMPERATURE: 98.6 F | DIASTOLIC BLOOD PRESSURE: 74 MMHG | SYSTOLIC BLOOD PRESSURE: 109 MMHG

## 2022-01-01 VITALS
SYSTOLIC BLOOD PRESSURE: 130 MMHG | OXYGEN SATURATION: 95 % | HEART RATE: 91 BPM | TEMPERATURE: 97.4 F | DIASTOLIC BLOOD PRESSURE: 83 MMHG | RESPIRATION RATE: 18 BRPM | WEIGHT: 115 LBS | BODY MASS INDEX: 20.37 KG/M2

## 2022-01-01 VITALS
WEIGHT: 125.6 LBS | HEART RATE: 67 BPM | SYSTOLIC BLOOD PRESSURE: 137 MMHG | OXYGEN SATURATION: 96 % | DIASTOLIC BLOOD PRESSURE: 68 MMHG | HEIGHT: 63 IN | RESPIRATION RATE: 18 BRPM | TEMPERATURE: 98.7 F | BODY MASS INDEX: 22.25 KG/M2

## 2022-01-01 VITALS — WEIGHT: 124 LBS | HEIGHT: 64 IN | BODY MASS INDEX: 21.17 KG/M2

## 2022-01-01 VITALS
HEART RATE: 108 BPM | WEIGHT: 135.2 LBS | OXYGEN SATURATION: 97 % | TEMPERATURE: 98.1 F | DIASTOLIC BLOOD PRESSURE: 90 MMHG | RESPIRATION RATE: 16 BRPM | SYSTOLIC BLOOD PRESSURE: 138 MMHG | BODY MASS INDEX: 23.57 KG/M2

## 2022-01-01 VITALS
RESPIRATION RATE: 18 BRPM | SYSTOLIC BLOOD PRESSURE: 122 MMHG | HEART RATE: 71 BPM | TEMPERATURE: 97.4 F | DIASTOLIC BLOOD PRESSURE: 69 MMHG | WEIGHT: 141.8 LBS | BODY MASS INDEX: 24.72 KG/M2

## 2022-01-01 VITALS
WEIGHT: 128 LBS | BODY MASS INDEX: 22.32 KG/M2 | HEART RATE: 88 BPM | TEMPERATURE: 98 F | DIASTOLIC BLOOD PRESSURE: 67 MMHG | SYSTOLIC BLOOD PRESSURE: 112 MMHG

## 2022-01-01 VITALS
TEMPERATURE: 97.5 F | DIASTOLIC BLOOD PRESSURE: 78 MMHG | RESPIRATION RATE: 16 BRPM | SYSTOLIC BLOOD PRESSURE: 134 MMHG | WEIGHT: 136.8 LBS | BODY MASS INDEX: 23.85 KG/M2 | HEART RATE: 78 BPM

## 2022-01-01 VITALS
RESPIRATION RATE: 16 BRPM | BODY MASS INDEX: 23.02 KG/M2 | DIASTOLIC BLOOD PRESSURE: 82 MMHG | OXYGEN SATURATION: 100 % | HEART RATE: 88 BPM | WEIGHT: 132 LBS | SYSTOLIC BLOOD PRESSURE: 134 MMHG | TEMPERATURE: 97.1 F

## 2022-01-01 VITALS
HEART RATE: 63 BPM | WEIGHT: 138.4 LBS | DIASTOLIC BLOOD PRESSURE: 69 MMHG | SYSTOLIC BLOOD PRESSURE: 133 MMHG | BODY MASS INDEX: 24.13 KG/M2

## 2022-01-01 VITALS — DIASTOLIC BLOOD PRESSURE: 84 MMHG | SYSTOLIC BLOOD PRESSURE: 137 MMHG

## 2022-01-01 VITALS
HEART RATE: 64 BPM | SYSTOLIC BLOOD PRESSURE: 134 MMHG | TEMPERATURE: 99.1 F | DIASTOLIC BLOOD PRESSURE: 71 MMHG | WEIGHT: 132.8 LBS | RESPIRATION RATE: 16 BRPM | BODY MASS INDEX: 23.16 KG/M2

## 2022-01-01 VITALS — WEIGHT: 126.2 LBS | BODY MASS INDEX: 22 KG/M2

## 2022-01-01 DIAGNOSIS — C18.1 CANCER OF APPENDIX METASTATIC TO INTRA-ABDOMINAL LYMPH NODE (H): ICD-10-CM

## 2022-01-01 DIAGNOSIS — C77.2 CANCER OF APPENDIX METASTATIC TO INTRA-ABDOMINAL LYMPH NODE (H): Primary | ICD-10-CM

## 2022-01-01 DIAGNOSIS — C77.2 CANCER OF APPENDIX METASTATIC TO INTRA-ABDOMINAL LYMPH NODE (H): ICD-10-CM

## 2022-01-01 DIAGNOSIS — J69.0 ASPIRATION PNEUMONITIS (H): ICD-10-CM

## 2022-01-01 DIAGNOSIS — D64.9 ANEMIA, UNSPECIFIED TYPE: ICD-10-CM

## 2022-01-01 DIAGNOSIS — N18.31 CHRONIC KIDNEY DISEASE, STAGE 3A (H): ICD-10-CM

## 2022-01-01 DIAGNOSIS — J69.0 ASPIRATION PNEUMONIA OF LEFT LOWER LOBE, UNSPECIFIED ASPIRATION PNEUMONIA TYPE (H): ICD-10-CM

## 2022-01-01 DIAGNOSIS — R73.03 PRE-DIABETES: ICD-10-CM

## 2022-01-01 DIAGNOSIS — C18.1 CANCER OF APPENDIX METASTATIC TO INTRA-ABDOMINAL LYMPH NODE (H): Primary | ICD-10-CM

## 2022-01-01 DIAGNOSIS — K56.2 VOLVULUS (H): ICD-10-CM

## 2022-01-01 DIAGNOSIS — R18.8 OTHER ASCITES: ICD-10-CM

## 2022-01-01 DIAGNOSIS — R10.31 RIGHT LOWER QUADRANT ABDOMINAL PAIN: ICD-10-CM

## 2022-01-01 DIAGNOSIS — E03.9 HYPOTHYROIDISM, UNSPECIFIED TYPE: ICD-10-CM

## 2022-01-01 DIAGNOSIS — C18.1 MALIGNANT NEOPLASM OF APPENDIX (H): ICD-10-CM

## 2022-01-01 DIAGNOSIS — B35.1 ONYCHOMYCOSIS OF TOENAIL: ICD-10-CM

## 2022-01-01 DIAGNOSIS — C78.6 MALIGNANT NEOPLASM METASTATIC TO PERITONEUM (H): Primary | ICD-10-CM

## 2022-01-01 DIAGNOSIS — R63.4 WEIGHT LOSS: ICD-10-CM

## 2022-01-01 DIAGNOSIS — Z91.041 CONTRAST MEDIA ALLERGY: ICD-10-CM

## 2022-01-01 DIAGNOSIS — K56.600 PARTIAL SMALL BOWEL OBSTRUCTION (H): ICD-10-CM

## 2022-01-01 DIAGNOSIS — R63.4 WEIGHT LOSS: Primary | ICD-10-CM

## 2022-01-01 DIAGNOSIS — K56.609 SBO (SMALL BOWEL OBSTRUCTION) (H): Primary | ICD-10-CM

## 2022-01-01 DIAGNOSIS — K59.03 DRUG-INDUCED CONSTIPATION: ICD-10-CM

## 2022-01-01 DIAGNOSIS — R11.0 NAUSEA: Primary | ICD-10-CM

## 2022-01-01 DIAGNOSIS — M79.A3 NONTRAUMATIC COMPARTMENT SYNDROME OF ABDOMEN: ICD-10-CM

## 2022-01-01 DIAGNOSIS — R19.03 RIGHT LOWER QUADRANT ABDOMINAL MASS: ICD-10-CM

## 2022-01-01 DIAGNOSIS — R11.2 NAUSEA AND VOMITING, INTRACTABILITY OF VOMITING NOT SPECIFIED, UNSPECIFIED VOMITING TYPE: ICD-10-CM

## 2022-01-01 DIAGNOSIS — K59.00 CONSTIPATION, UNSPECIFIED CONSTIPATION TYPE: ICD-10-CM

## 2022-01-01 DIAGNOSIS — E03.9 HYPOTHYROIDISM, UNSPECIFIED TYPE: Primary | ICD-10-CM

## 2022-01-01 DIAGNOSIS — Z11.52 ENCOUNTER FOR SCREENING LABORATORY TESTING FOR COVID-19 VIRUS: ICD-10-CM

## 2022-01-01 DIAGNOSIS — E07.9 THYROID CONDITION: ICD-10-CM

## 2022-01-01 DIAGNOSIS — Z71.89 ADVANCED DIRECTIVES, COUNSELING/DISCUSSION: ICD-10-CM

## 2022-01-01 DIAGNOSIS — R03.0 BORDERLINE BLOOD PRESSURE: Primary | ICD-10-CM

## 2022-01-01 DIAGNOSIS — N18.31 CHRONIC KIDNEY DISEASE, STAGE 3A (H): Primary | ICD-10-CM

## 2022-01-01 DIAGNOSIS — C78.6 MALIGNANT NEOPLASM METASTATIC TO PERITONEUM (H): ICD-10-CM

## 2022-01-01 DIAGNOSIS — R14.0 ABDOMINAL DISTENTION: ICD-10-CM

## 2022-01-01 DIAGNOSIS — M79.A3 NON-TRAUMATIC COMPARTMENT SYNDROME OF ABDOMEN: ICD-10-CM

## 2022-01-01 DIAGNOSIS — C18.1 MALIGNANT NEOPLASM OF APPENDIX VERMIFORMIS (H): ICD-10-CM

## 2022-01-01 DIAGNOSIS — R11.0 NAUSEA: ICD-10-CM

## 2022-01-01 DIAGNOSIS — K56.2 SMALL BOWEL VOLVULUS (H): ICD-10-CM

## 2022-01-01 DIAGNOSIS — R11.2 NON-INTRACTABLE VOMITING WITH NAUSEA, UNSPECIFIED VOMITING TYPE: Primary | ICD-10-CM

## 2022-01-01 DIAGNOSIS — R11.2 NON-INTRACTABLE VOMITING WITH NAUSEA, UNSPECIFIED VOMITING TYPE: ICD-10-CM

## 2022-01-01 DIAGNOSIS — I10 ESSENTIAL HYPERTENSION: ICD-10-CM

## 2022-01-01 LAB
% MONONUCLEAR CELLS, BODY FLUID: 79 %
ALBUMIN SERPL-MCNC: 3.3 G/DL (ref 3.5–5.7)
ALBUMIN SERPL-MCNC: 3.3 G/DL (ref 3.5–5.7)
ALBUMIN SERPL-MCNC: 3.9 G/DL (ref 3.5–5.7)
ALBUMIN UR-MCNC: 20 MG/DL
ALBUMIN UR-MCNC: NEGATIVE MG/DL
ALP SERPL-CCNC: 118 U/L (ref 34–104)
ALP SERPL-CCNC: 140 U/L (ref 34–104)
ALP SERPL-CCNC: 83 U/L (ref 34–104)
ALT SERPL W P-5'-P-CCNC: 21 U/L (ref 7–52)
ALT SERPL W P-5'-P-CCNC: 24 U/L (ref 7–52)
ALT SERPL W P-5'-P-CCNC: 9 U/L (ref 7–52)
ANION GAP SERPL CALCULATED.3IONS-SCNC: 11 MMOL/L (ref 3–14)
ANION GAP SERPL CALCULATED.3IONS-SCNC: 15 MMOL/L (ref 3–14)
ANION GAP SERPL CALCULATED.3IONS-SCNC: 8 MMOL/L (ref 3–14)
APPEARANCE FLD: CLEAR
APPEARANCE UR: CLEAR
AST SERPL W P-5'-P-CCNC: 22 U/L (ref 13–39)
AST SERPL W P-5'-P-CCNC: 28 U/L (ref 13–39)
AST SERPL W P-5'-P-CCNC: 34 U/L (ref 13–39)
ATRIAL RATE - MUSE: 89 BPM
BACTERIA BLD CULT: NO GROWTH
BACTERIA BLD CULT: NO GROWTH
BACTERIA PRT CULT: NO GROWTH
BASOPHILS # BLD AUTO: 0 10E3/UL (ref 0–0.2)
BASOPHILS # BLD AUTO: 0.1 10E3/UL (ref 0–0.2)
BASOPHILS NFR BLD AUTO: 0 %
BASOPHILS NFR BLD AUTO: 1 %
BILIRUB SERPL-MCNC: 0.4 MG/DL (ref 0.3–1)
BILIRUB SERPL-MCNC: 0.5 MG/DL (ref 0.3–1)
BILIRUB SERPL-MCNC: 0.8 MG/DL (ref 0.3–1)
BILIRUB UR QL STRIP: NEGATIVE
BUN SERPL-MCNC: 16 MG/DL (ref 7–25)
BUN SERPL-MCNC: 28 MG/DL (ref 7–25)
BUN SERPL-MCNC: 35 MG/DL (ref 7–25)
CALCIUM SERPL-MCNC: 9.4 MG/DL (ref 8.6–10.3)
CALCIUM SERPL-MCNC: 9.6 MG/DL (ref 8.6–10.3)
CALCIUM SERPL-MCNC: 9.6 MG/DL (ref 8.6–10.3)
CEA SERPL-MCNC: <3 NG/ML (ref 0–3)
CELL COUNT BODY FLUID SOURCE: NORMAL
CHLORIDE BLD-SCNC: 101 MMOL/L (ref 98–107)
CHLORIDE BLD-SCNC: 97 MMOL/L (ref 98–107)
CHLORIDE BLD-SCNC: 97 MMOL/L (ref 98–107)
CO2 SERPL-SCNC: 22 MMOL/L (ref 21–31)
CO2 SERPL-SCNC: 24 MMOL/L (ref 21–31)
CO2 SERPL-SCNC: 26 MMOL/L (ref 21–31)
COLOR FLD: YELLOW
COLOR UR AUTO: ABNORMAL
CREAT SERPL-MCNC: 0.87 MG/DL (ref 0.6–1.2)
CREAT SERPL-MCNC: 0.94 MG/DL (ref 0.6–1.2)
CREAT SERPL-MCNC: 0.98 MG/DL (ref 0.6–1.2)
CREAT SERPL-MCNC: 0.99 MG/DL (ref 0.6–1.2)
CREAT SERPL-MCNC: 1.03 MG/DL (ref 0.6–1.2)
CREAT SERPL-MCNC: 1.18 MG/DL (ref 0.6–1.2)
CREAT SERPL-MCNC: 1.93 MG/DL (ref 0.6–1.2)
CREAT UR-MCNC: 96 MG/DL
CRP SERPL-MCNC: 128.8 MG/L
DIASTOLIC BLOOD PRESSURE - MUSE: NORMAL MMHG
EOSINOPHIL # BLD AUTO: 0 10E3/UL (ref 0–0.7)
EOSINOPHIL # BLD AUTO: 0.1 10E3/UL (ref 0–0.7)
EOSINOPHIL # BLD AUTO: 0.2 10E3/UL (ref 0–0.7)
EOSINOPHIL NFR BLD AUTO: 0 %
EOSINOPHIL NFR BLD AUTO: 1 %
EOSINOPHIL NFR BLD AUTO: 2 %
EOSINOPHIL NFR BLD AUTO: 3 %
ERYTHROCYTE [DISTWIDTH] IN BLOOD BY AUTOMATED COUNT: 16 % (ref 10–15)
ERYTHROCYTE [DISTWIDTH] IN BLOOD BY AUTOMATED COUNT: 16 % (ref 10–15)
ERYTHROCYTE [DISTWIDTH] IN BLOOD BY AUTOMATED COUNT: 16.2 % (ref 10–15)
ERYTHROCYTE [DISTWIDTH] IN BLOOD BY AUTOMATED COUNT: 16.5 % (ref 10–15)
ERYTHROCYTE [DISTWIDTH] IN BLOOD BY AUTOMATED COUNT: 16.7 % (ref 10–15)
ERYTHROCYTE [DISTWIDTH] IN BLOOD BY AUTOMATED COUNT: 16.9 % (ref 10–15)
ERYTHROCYTE [DISTWIDTH] IN BLOOD BY AUTOMATED COUNT: 17.1 % (ref 10–15)
ERYTHROCYTE [DISTWIDTH] IN BLOOD BY AUTOMATED COUNT: 17.5 % (ref 10–15)
ERYTHROCYTE [DISTWIDTH] IN BLOOD BY AUTOMATED COUNT: 17.9 % (ref 10–15)
FLUAV RNA SPEC QL NAA+PROBE: NEGATIVE
FLUBV RNA RESP QL NAA+PROBE: NEGATIVE
GFR SERPL CREATININE-BSD FRML MDRD: 27 ML/MIN/1.73M2
GFR SERPL CREATININE-BSD FRML MDRD: 49 ML/MIN/1.73M2
GFR SERPL CREATININE-BSD FRML MDRD: 58 ML/MIN/1.73M2
GFR SERPL CREATININE-BSD FRML MDRD: 61 ML/MIN/1.73M2
GFR SERPL CREATININE-BSD FRML MDRD: 61 ML/MIN/1.73M2
GFR SERPL CREATININE-BSD FRML MDRD: 65 ML/MIN/1.73M2
GFR SERPL CREATININE-BSD FRML MDRD: 71 ML/MIN/1.73M2
GLUCOSE BLD-MCNC: 106 MG/DL (ref 70–105)
GLUCOSE BLD-MCNC: 148 MG/DL (ref 70–105)
GLUCOSE BLD-MCNC: 98 MG/DL (ref 70–105)
GLUCOSE UR STRIP-MCNC: NEGATIVE MG/DL
GRAM STAIN RESULT: ABNORMAL
GRAM STAIN RESULT: ABNORMAL
HCT VFR BLD AUTO: 28.3 % (ref 35–47)
HCT VFR BLD AUTO: 31.9 % (ref 35–47)
HCT VFR BLD AUTO: 32.7 % (ref 35–47)
HCT VFR BLD AUTO: 33.6 % (ref 35–47)
HCT VFR BLD AUTO: 34.8 % (ref 35–47)
HCT VFR BLD AUTO: 35 % (ref 35–47)
HCT VFR BLD AUTO: 35.6 % (ref 35–47)
HCT VFR BLD AUTO: 36.1 % (ref 35–47)
HCT VFR BLD AUTO: 37 % (ref 35–47)
HGB BLD-MCNC: 10.4 G/DL (ref 11.7–15.7)
HGB BLD-MCNC: 10.4 G/DL (ref 11.7–15.7)
HGB BLD-MCNC: 11.3 G/DL (ref 11.7–15.7)
HGB BLD-MCNC: 11.8 G/DL (ref 11.7–15.7)
HGB BLD-MCNC: 11.9 G/DL (ref 11.7–15.7)
HGB BLD-MCNC: 11.9 G/DL (ref 11.7–15.7)
HGB BLD-MCNC: 12.2 G/DL (ref 11.7–15.7)
HGB BLD-MCNC: 12.4 G/DL (ref 11.7–15.7)
HGB BLD-MCNC: 9.3 G/DL (ref 11.7–15.7)
HGB UR QL STRIP: NEGATIVE
IMM GRANULOCYTES # BLD: 0 10E3/UL
IMM GRANULOCYTES # BLD: 0.1 10E3/UL
IMM GRANULOCYTES NFR BLD: 0 %
IMM GRANULOCYTES NFR BLD: 0 %
IMM GRANULOCYTES NFR BLD: 1 %
INTERPRETATION ECG - MUSE: NORMAL
KETONES UR STRIP-MCNC: NEGATIVE MG/DL
LACTATE SERPL-SCNC: 1 MMOL/L (ref 0.7–2)
LEUKOCYTE ESTERASE UR QL STRIP: NEGATIVE
LIPASE SERPL-CCNC: 31 U/L (ref 11–82)
LYMPHOCYTES # BLD AUTO: 0.3 10E3/UL (ref 0.8–5.3)
LYMPHOCYTES # BLD AUTO: 1.2 10E3/UL (ref 0.8–5.3)
LYMPHOCYTES # BLD AUTO: 1.3 10E3/UL (ref 0.8–5.3)
LYMPHOCYTES # BLD AUTO: 1.3 10E3/UL (ref 0.8–5.3)
LYMPHOCYTES # BLD AUTO: 1.4 10E3/UL (ref 0.8–5.3)
LYMPHOCYTES # BLD AUTO: 1.4 10E3/UL (ref 0.8–5.3)
LYMPHOCYTES # BLD AUTO: 1.5 10E3/UL (ref 0.8–5.3)
LYMPHOCYTES # BLD AUTO: 1.5 10E3/UL (ref 0.8–5.3)
LYMPHOCYTES NFR BLD AUTO: 11 %
LYMPHOCYTES NFR BLD AUTO: 16 %
LYMPHOCYTES NFR BLD AUTO: 19 %
LYMPHOCYTES NFR BLD AUTO: 2 %
LYMPHOCYTES NFR BLD AUTO: 22 %
LYMPHOCYTES NFR BLD AUTO: 23 %
LYMPHOCYTES NFR BLD AUTO: 23 %
LYMPHOCYTES NFR BLD AUTO: 9 %
MAGNESIUM SERPL-MCNC: 1.9 MG/DL (ref 1.9–2.7)
MAGNESIUM SERPL-MCNC: 2 MG/DL (ref 1.9–2.7)
MCH RBC QN AUTO: 30 PG (ref 26.5–33)
MCH RBC QN AUTO: 33.8 PG (ref 26.5–33)
MCH RBC QN AUTO: 34.8 PG (ref 26.5–33)
MCH RBC QN AUTO: 35.1 PG (ref 26.5–33)
MCH RBC QN AUTO: 35.5 PG (ref 26.5–33)
MCH RBC QN AUTO: 35.6 PG (ref 26.5–33)
MCH RBC QN AUTO: 36.3 PG (ref 26.5–33)
MCH RBC QN AUTO: 36.6 PG (ref 26.5–33)
MCH RBC QN AUTO: 36.8 PG (ref 26.5–33)
MCHC RBC AUTO-ENTMCNC: 31.8 G/DL (ref 31.5–36.5)
MCHC RBC AUTO-ENTMCNC: 32.6 G/DL (ref 31.5–36.5)
MCHC RBC AUTO-ENTMCNC: 32.9 G/DL (ref 31.5–36.5)
MCHC RBC AUTO-ENTMCNC: 33.4 G/DL (ref 31.5–36.5)
MCHC RBC AUTO-ENTMCNC: 33.5 G/DL (ref 31.5–36.5)
MCHC RBC AUTO-ENTMCNC: 33.6 G/DL (ref 31.5–36.5)
MCHC RBC AUTO-ENTMCNC: 33.8 G/DL (ref 31.5–36.5)
MCHC RBC AUTO-ENTMCNC: 33.9 G/DL (ref 31.5–36.5)
MCHC RBC AUTO-ENTMCNC: 34 G/DL (ref 31.5–36.5)
MCV RBC AUTO: 104 FL (ref 78–100)
MCV RBC AUTO: 105 FL (ref 78–100)
MCV RBC AUTO: 105 FL (ref 78–100)
MCV RBC AUTO: 106 FL (ref 78–100)
MCV RBC AUTO: 106 FL (ref 78–100)
MCV RBC AUTO: 108 FL (ref 78–100)
MCV RBC AUTO: 94 FL (ref 78–100)
MICROALBUMIN UR-MCNC: 100 MG/L
MICROALBUMIN/CREAT UR: 104.17 MG/G CR (ref 0–25)
MONOCYTES # BLD AUTO: 0.6 10E3/UL (ref 0–1.3)
MONOCYTES # BLD AUTO: 0.9 10E3/UL (ref 0–1.3)
MONOCYTES # BLD AUTO: 1 10E3/UL (ref 0–1.3)
MONOCYTES # BLD AUTO: 1.1 10E3/UL (ref 0–1.3)
MONOCYTES NFR BLD AUTO: 10 %
MONOCYTES NFR BLD AUTO: 13 %
MONOCYTES NFR BLD AUTO: 14 %
MONOCYTES NFR BLD AUTO: 14 %
MONOCYTES NFR BLD AUTO: 16 %
MONOCYTES NFR BLD AUTO: 16 %
MONOCYTES NFR BLD AUTO: 4 %
MONOCYTES NFR BLD AUTO: 8 %
MUCOUS THREADS #/AREA URNS LPF: PRESENT /LPF
NEUTROPHILS # BLD AUTO: 11.4 10E3/UL (ref 1.6–8.3)
NEUTROPHILS # BLD AUTO: 12.7 10E3/UL (ref 1.6–8.3)
NEUTROPHILS # BLD AUTO: 3.6 10E3/UL (ref 1.6–8.3)
NEUTROPHILS # BLD AUTO: 3.6 10E3/UL (ref 1.6–8.3)
NEUTROPHILS # BLD AUTO: 3.8 10E3/UL (ref 1.6–8.3)
NEUTROPHILS # BLD AUTO: 4.8 10E3/UL (ref 1.6–8.3)
NEUTROPHILS # BLD AUTO: 5.2 10E3/UL (ref 1.6–8.3)
NEUTROPHILS # BLD AUTO: 8.5 10E3/UL (ref 1.6–8.3)
NEUTROPHILS NFR BLD AUTO: 56 %
NEUTROPHILS NFR BLD AUTO: 57 %
NEUTROPHILS NFR BLD AUTO: 59 %
NEUTROPHILS NFR BLD AUTO: 62 %
NEUTROPHILS NFR BLD AUTO: 66 %
NEUTROPHILS NFR BLD AUTO: 76 %
NEUTROPHILS NFR BLD AUTO: 81 %
NEUTROPHILS NFR BLD AUTO: 94 %
NEUTS BAND NFR FLD MANUAL: 21 %
NITRATE UR QL: NEGATIVE
NRBC # BLD AUTO: 0 10E3/UL
NRBC BLD AUTO-RTO: 0 /100
P AXIS - MUSE: 60 DEGREES
PH UR STRIP: 5.5 [PH] (ref 5–9)
PLATELET # BLD AUTO: 212 10E3/UL (ref 150–450)
PLATELET # BLD AUTO: 220 10E3/UL (ref 150–450)
PLATELET # BLD AUTO: 249 10E3/UL (ref 150–450)
PLATELET # BLD AUTO: 264 10E3/UL (ref 150–450)
PLATELET # BLD AUTO: 275 10E3/UL (ref 150–450)
PLATELET # BLD AUTO: 304 10E3/UL (ref 150–450)
PLATELET # BLD AUTO: 307 10E3/UL (ref 150–450)
PLATELET # BLD AUTO: 433 10E3/UL (ref 150–450)
PLATELET # BLD AUTO: 496 10E3/UL (ref 150–450)
POTASSIUM BLD-SCNC: 4.6 MMOL/L (ref 3.5–5.1)
PR INTERVAL - MUSE: 146 MS
PROT FLD-MCNC: 4 G/DL
PROT SERPL-MCNC: 6.7 G/DL (ref 6.4–8.9)
PROT SERPL-MCNC: 7.5 G/DL (ref 6.4–8.9)
PROT SERPL-MCNC: 7.5 G/DL (ref 6.4–8.9)
PROTEIN BODY FLUID SOURCE: NORMAL
QRS DURATION - MUSE: 70 MS
QT - MUSE: 356 MS
QTC - MUSE: 433 MS
R AXIS - MUSE: -36 DEGREES
RBC # BLD AUTO: 2.67 10E6/UL (ref 3.8–5.2)
RBC # BLD AUTO: 3.08 10E6/UL (ref 3.8–5.2)
RBC # BLD AUTO: 3.11 10E6/UL (ref 3.8–5.2)
RBC # BLD AUTO: 3.22 10E6/UL (ref 3.8–5.2)
RBC # BLD AUTO: 3.23 10E6/UL (ref 3.8–5.2)
RBC # BLD AUTO: 3.35 10E6/UL (ref 3.8–5.2)
RBC # BLD AUTO: 3.43 10E6/UL (ref 3.8–5.2)
RBC # BLD AUTO: 3.47 10E6/UL (ref 3.8–5.2)
RBC # BLD AUTO: 3.53 10E6/UL (ref 3.8–5.2)
RBC # FLD: <2000 /UL
RBC URINE: 0 /HPF
RSV RNA SPEC NAA+PROBE: NEGATIVE
SARS-COV-2 RNA RESP QL NAA+PROBE: NEGATIVE
SODIUM SERPL-SCNC: 132 MMOL/L (ref 134–144)
SODIUM SERPL-SCNC: 134 MMOL/L (ref 134–144)
SODIUM SERPL-SCNC: 135 MMOL/L (ref 134–144)
SP GR UR STRIP: 1.02 (ref 1–1.03)
SYSTOLIC BLOOD PRESSURE - MUSE: NORMAL MMHG
T AXIS - MUSE: 59 DEGREES
T4 FREE SERPL-MCNC: 0.83 NG/DL (ref 0.6–1.6)
T4 FREE SERPL-MCNC: 0.83 NG/DL (ref 0.6–1.6)
TSH SERPL DL<=0.005 MIU/L-ACNC: 8.14 MU/L (ref 0.4–4)
TSH SERPL DL<=0.005 MIU/L-ACNC: 9.91 MU/L (ref 0.4–4)
UROBILINOGEN UR STRIP-MCNC: NORMAL MG/DL
VENTRICULAR RATE- MUSE: 89 BPM
WBC # BLD AUTO: 11.2 10E3/UL (ref 4–11)
WBC # BLD AUTO: 13.6 10E3/UL (ref 4–11)
WBC # BLD AUTO: 14 10E3/UL (ref 4–11)
WBC # BLD AUTO: 6.2 10E3/UL (ref 4–11)
WBC # BLD AUTO: 6.4 10E3/UL (ref 4–11)
WBC # BLD AUTO: 6.4 10E3/UL (ref 4–11)
WBC # BLD AUTO: 7.3 10E3/UL (ref 4–11)
WBC # BLD AUTO: 7.7 10E3/UL (ref 4–11)
WBC # BLD AUTO: 7.8 10E3/UL (ref 4–11)
WBC # FLD AUTO: <100 /UL
WBC URINE: 2 /HPF

## 2022-01-01 PROCEDURE — 96413 CHEMO IV INFUSION 1 HR: CPT

## 2022-01-01 PROCEDURE — 250N000011 HC RX IP 250 OP 636: Performed by: INTERNAL MEDICINE

## 2022-01-01 PROCEDURE — 250N000011 HC RX IP 250 OP 636: Performed by: FAMILY MEDICINE

## 2022-01-01 PROCEDURE — 85025 COMPLETE CBC W/AUTO DIFF WBC: CPT | Performed by: PHYSICIAN ASSISTANT

## 2022-01-01 PROCEDURE — 250N000011 HC RX IP 250 OP 636: Performed by: PHYSICIAN ASSISTANT

## 2022-01-01 PROCEDURE — 250N000013 HC RX MED GY IP 250 OP 250 PS 637: Performed by: INTERNAL MEDICINE

## 2022-01-01 PROCEDURE — 258N000003 HC RX IP 258 OP 636: Performed by: INTERNAL MEDICINE

## 2022-01-01 PROCEDURE — 99223 1ST HOSP IP/OBS HIGH 75: CPT | Performed by: INTERNAL MEDICINE

## 2022-01-01 PROCEDURE — 82565 ASSAY OF CREATININE: CPT | Performed by: INTERNAL MEDICINE

## 2022-01-01 PROCEDURE — G0463 HOSPITAL OUTPT CLINIC VISIT: HCPCS

## 2022-01-01 PROCEDURE — 84439 ASSAY OF FREE THYROXINE: CPT

## 2022-01-01 PROCEDURE — 258N000003 HC RX IP 258 OP 636: Performed by: PHYSICIAN ASSISTANT

## 2022-01-01 PROCEDURE — 250N000011 HC RX IP 250 OP 636: Performed by: EMERGENCY MEDICINE

## 2022-01-01 PROCEDURE — 99285 EMERGENCY DEPT VISIT HI MDM: CPT | Mod: 25 | Performed by: PHYSICIAN ASSISTANT

## 2022-01-01 PROCEDURE — 93010 ELECTROCARDIOGRAM REPORT: CPT | Performed by: INTERNAL MEDICINE

## 2022-01-01 PROCEDURE — 83690 ASSAY OF LIPASE: CPT | Performed by: EMERGENCY MEDICINE

## 2022-01-01 PROCEDURE — 99231 SBSQ HOSP IP/OBS SF/LOW 25: CPT | Performed by: FAMILY MEDICINE

## 2022-01-01 PROCEDURE — C9803 HOPD COVID-19 SPEC COLLECT: HCPCS | Performed by: EMERGENCY MEDICINE

## 2022-01-01 PROCEDURE — 99232 SBSQ HOSP IP/OBS MODERATE 35: CPT | Performed by: INTERNAL MEDICINE

## 2022-01-01 PROCEDURE — 74177 CT ABD & PELVIS W/CONTRAST: CPT | Mod: TC,ME

## 2022-01-01 PROCEDURE — 250N000013 HC RX MED GY IP 250 OP 250 PS 637: Performed by: FAMILY MEDICINE

## 2022-01-01 PROCEDURE — A9552 F18 FDG: HCPCS | Performed by: INTERNAL MEDICINE

## 2022-01-01 PROCEDURE — 120N000001 HC R&B MED SURG/OB

## 2022-01-01 PROCEDURE — 85025 COMPLETE CBC W/AUTO DIFF WBC: CPT | Performed by: EMERGENCY MEDICINE

## 2022-01-01 PROCEDURE — 96366 THER/PROPH/DIAG IV INF ADDON: CPT | Performed by: PHYSICIAN ASSISTANT

## 2022-01-01 PROCEDURE — 82378 CARCINOEMBRYONIC ANTIGEN: CPT

## 2022-01-01 PROCEDURE — C9803 HOPD COVID-19 SPEC COLLECT: HCPCS | Performed by: PHYSICIAN ASSISTANT

## 2022-01-01 PROCEDURE — 99285 EMERGENCY DEPT VISIT HI MDM: CPT | Mod: 25 | Performed by: EMERGENCY MEDICINE

## 2022-01-01 PROCEDURE — 99239 HOSP IP/OBS DSCHRG MGMT >30: CPT | Performed by: INTERNAL MEDICINE

## 2022-01-01 PROCEDURE — 85025 COMPLETE CBC W/AUTO DIFF WBC: CPT

## 2022-01-01 PROCEDURE — 96375 TX/PRO/DX INJ NEW DRUG ADDON: CPT | Mod: XU | Performed by: EMERGENCY MEDICINE

## 2022-01-01 PROCEDURE — 83605 ASSAY OF LACTIC ACID: CPT | Performed by: PHYSICIAN ASSISTANT

## 2022-01-01 PROCEDURE — 99214 OFFICE O/P EST MOD 30 MIN: CPT | Mod: 95 | Performed by: NURSE PRACTITIONER

## 2022-01-01 PROCEDURE — 36415 COLL VENOUS BLD VENIPUNCTURE: CPT

## 2022-01-01 PROCEDURE — 85025 COMPLETE CBC W/AUTO DIFF WBC: CPT | Performed by: INTERNAL MEDICINE

## 2022-01-01 PROCEDURE — 250N000009 HC RX 250: Performed by: INTERNAL MEDICINE

## 2022-01-01 PROCEDURE — 99442 PR PHYSICIAN TELEPHONE EVALUATION 11-20 MIN: CPT | Mod: 95 | Performed by: INTERNAL MEDICINE

## 2022-01-01 PROCEDURE — 99238 HOSP IP/OBS DSCHRG MGMT 30/<: CPT | Mod: GV | Performed by: FAMILY MEDICINE

## 2022-01-01 PROCEDURE — 81001 URINALYSIS AUTO W/SCOPE: CPT

## 2022-01-01 PROCEDURE — G1010 CDSM STANSON: HCPCS | Mod: TC

## 2022-01-01 PROCEDURE — 96365 THER/PROPH/DIAG IV INF INIT: CPT | Mod: XU | Performed by: PHYSICIAN ASSISTANT

## 2022-01-01 PROCEDURE — 81003 URINALYSIS AUTO W/O SCOPE: CPT | Performed by: INTERNAL MEDICINE

## 2022-01-01 PROCEDURE — 82040 ASSAY OF SERUM ALBUMIN: CPT | Performed by: EMERGENCY MEDICINE

## 2022-01-01 PROCEDURE — 87637 SARSCOV2&INF A&B&RSV AMP PRB: CPT | Performed by: PHYSICIAN ASSISTANT

## 2022-01-01 PROCEDURE — 87637 SARSCOV2&INF A&B&RSV AMP PRB: CPT | Performed by: EMERGENCY MEDICINE

## 2022-01-01 PROCEDURE — 85025 COMPLETE CBC W/AUTO DIFF WBC: CPT | Performed by: FAMILY MEDICINE

## 2022-01-01 PROCEDURE — 99207 PR NO CHARGE LOS: CPT | Performed by: SURGERY

## 2022-01-01 PROCEDURE — 80053 COMPREHEN METABOLIC PANEL: CPT

## 2022-01-01 PROCEDURE — 76705 ECHO EXAM OF ABDOMEN: CPT

## 2022-01-01 PROCEDURE — 83735 ASSAY OF MAGNESIUM: CPT | Performed by: PHYSICIAN ASSISTANT

## 2022-01-01 PROCEDURE — 250N000009 HC RX 250: Performed by: PHYSICIAN ASSISTANT

## 2022-01-01 PROCEDURE — 84443 ASSAY THYROID STIM HORMONE: CPT

## 2022-01-01 PROCEDURE — 82043 UR ALBUMIN QUANTITATIVE: CPT

## 2022-01-01 PROCEDURE — 99417 PROLNG OP E/M EACH 15 MIN: CPT | Performed by: INTERNAL MEDICINE

## 2022-01-01 PROCEDURE — 49083 ABD PARACENTESIS W/IMAGING: CPT | Performed by: EMERGENCY MEDICINE

## 2022-01-01 PROCEDURE — 96361 HYDRATE IV INFUSION ADD-ON: CPT | Mod: XU | Performed by: EMERGENCY MEDICINE

## 2022-01-01 PROCEDURE — 87637 SARSCOV2&INF A&B&RSV AMP PRB: CPT | Performed by: INTERNAL MEDICINE

## 2022-01-01 PROCEDURE — 99215 OFFICE O/P EST HI 40 MIN: CPT | Performed by: INTERNAL MEDICINE

## 2022-01-01 PROCEDURE — 96376 TX/PRO/DX INJ SAME DRUG ADON: CPT | Performed by: PHYSICIAN ASSISTANT

## 2022-01-01 PROCEDURE — 78815 PET IMAGE W/CT SKULL-THIGH: CPT | Mod: PS

## 2022-01-01 PROCEDURE — 99285 EMERGENCY DEPT VISIT HI MDM: CPT | Performed by: PHYSICIAN ASSISTANT

## 2022-01-01 PROCEDURE — 93005 ELECTROCARDIOGRAM TRACING: CPT | Performed by: PHYSICIAN ASSISTANT

## 2022-01-01 PROCEDURE — 36591 DRAW BLOOD OFF VENOUS DEVICE: CPT | Performed by: PHYSICIAN ASSISTANT

## 2022-01-01 PROCEDURE — 74018 RADEX ABDOMEN 1 VIEW: CPT

## 2022-01-01 PROCEDURE — 99443 PR PHYSICIAN TELEPHONE EVALUATION 21-30 MIN: CPT | Mod: 95 | Performed by: INTERNAL MEDICINE

## 2022-01-01 PROCEDURE — 258N000003 HC RX IP 258 OP 636: Performed by: EMERGENCY MEDICINE

## 2022-01-01 PROCEDURE — 84157 ASSAY OF PROTEIN OTHER: CPT | Performed by: EMERGENCY MEDICINE

## 2022-01-01 PROCEDURE — 80053 COMPREHEN METABOLIC PANEL: CPT | Performed by: PHYSICIAN ASSISTANT

## 2022-01-01 PROCEDURE — 80053 COMPREHEN METABOLIC PANEL: CPT | Performed by: EMERGENCY MEDICINE

## 2022-01-01 PROCEDURE — 250N000009 HC RX 250: Performed by: SURGERY

## 2022-01-01 PROCEDURE — 85027 COMPLETE CBC AUTOMATED: CPT | Performed by: INTERNAL MEDICINE

## 2022-01-01 PROCEDURE — 89051 BODY FLUID CELL COUNT: CPT | Performed by: EMERGENCY MEDICINE

## 2022-01-01 PROCEDURE — 97802 MEDICAL NUTRITION INDIV IN: CPT | Performed by: DIETITIAN, REGISTERED

## 2022-01-01 PROCEDURE — 99239 HOSP IP/OBS DSCHRG MGMT >30: CPT | Performed by: FAMILY MEDICINE

## 2022-01-01 PROCEDURE — 36415 COLL VENOUS BLD VENIPUNCTURE: CPT | Performed by: INTERNAL MEDICINE

## 2022-01-01 PROCEDURE — 87205 SMEAR GRAM STAIN: CPT | Performed by: EMERGENCY MEDICINE

## 2022-01-01 PROCEDURE — 81003 URINALYSIS AUTO W/O SCOPE: CPT | Mod: XU | Performed by: INTERNAL MEDICINE

## 2022-01-01 PROCEDURE — 76700 US EXAM ABDOM COMPLETE: CPT

## 2022-01-01 PROCEDURE — 36591 DRAW BLOOD OFF VENOUS DEVICE: CPT | Performed by: INTERNAL MEDICINE

## 2022-01-01 PROCEDURE — 87040 BLOOD CULTURE FOR BACTERIA: CPT | Mod: 91 | Performed by: PHYSICIAN ASSISTANT

## 2022-01-01 PROCEDURE — 343N000001 HC RX 343: Performed by: INTERNAL MEDICINE

## 2022-01-01 PROCEDURE — 96375 TX/PRO/DX INJ NEW DRUG ADDON: CPT | Performed by: PHYSICIAN ASSISTANT

## 2022-01-01 PROCEDURE — 96374 THER/PROPH/DIAG INJ IV PUSH: CPT | Mod: XU | Performed by: EMERGENCY MEDICINE

## 2022-01-01 PROCEDURE — 86140 C-REACTIVE PROTEIN: CPT | Performed by: PHYSICIAN ASSISTANT

## 2022-01-01 PROCEDURE — 0W9G3ZZ DRAINAGE OF PERITONEAL CAVITY, PERCUTANEOUS APPROACH: ICD-10-PCS | Performed by: EMERGENCY MEDICINE

## 2022-01-01 PROCEDURE — 99223 1ST HOSP IP/OBS HIGH 75: CPT | Mod: GV | Performed by: INTERNAL MEDICINE

## 2022-01-01 PROCEDURE — 83735 ASSAY OF MAGNESIUM: CPT | Performed by: EMERGENCY MEDICINE

## 2022-01-01 PROCEDURE — 74177 CT ABD & PELVIS W/CONTRAST: CPT | Mod: MG

## 2022-01-01 RX ORDER — ALBUTEROL SULFATE 0.83 MG/ML
2.5 SOLUTION RESPIRATORY (INHALATION)
Status: CANCELLED | OUTPATIENT
Start: 2022-01-01

## 2022-01-01 RX ORDER — NALOXONE HYDROCHLORIDE 0.4 MG/ML
.1-.4 INJECTION, SOLUTION INTRAMUSCULAR; INTRAVENOUS; SUBCUTANEOUS
Status: CANCELLED | OUTPATIENT
Start: 2022-01-01

## 2022-01-01 RX ORDER — LORAZEPAM 2 MG/ML
0.5 INJECTION INTRAMUSCULAR EVERY 4 HOURS PRN
Status: DISCONTINUED | OUTPATIENT
Start: 2022-01-01 | End: 2022-01-01 | Stop reason: HOSPADM

## 2022-01-01 RX ORDER — EPINEPHRINE 1 MG/ML
0.3 INJECTION, SOLUTION, CONCENTRATE INTRAVENOUS EVERY 5 MIN PRN
Status: CANCELLED | OUTPATIENT
Start: 2022-01-01

## 2022-01-01 RX ORDER — SODIUM CHLORIDE 9 MG/ML
INJECTION, SOLUTION INTRAVENOUS CONTINUOUS
Status: DISCONTINUED | OUTPATIENT
Start: 2022-01-01 | End: 2022-01-01 | Stop reason: HOSPADM

## 2022-01-01 RX ORDER — OXYCODONE AND ACETAMINOPHEN 5; 325 MG/1; MG/1
1-2 TABLET ORAL EVERY 6 HOURS PRN
Qty: 75 TABLET | Refills: 0 | Status: ON HOLD | OUTPATIENT
Start: 2022-01-01 | End: 2022-01-01

## 2022-01-01 RX ORDER — LORAZEPAM 1 MG/1
1 TABLET ORAL
Status: DISCONTINUED | OUTPATIENT
Start: 2022-01-01 | End: 2022-01-01 | Stop reason: HOSPADM

## 2022-01-01 RX ORDER — METOCLOPRAMIDE 10 MG/1
10 TABLET ORAL 3 TIMES DAILY
Status: DISCONTINUED | OUTPATIENT
Start: 2022-01-01 | End: 2022-01-01 | Stop reason: ALTCHOICE

## 2022-01-01 RX ORDER — LIDOCAINE 40 MG/G
CREAM TOPICAL
Status: DISCONTINUED | OUTPATIENT
Start: 2022-01-01 | End: 2022-01-01 | Stop reason: HOSPADM

## 2022-01-01 RX ORDER — ONDANSETRON 4 MG/1
4 TABLET, ORALLY DISINTEGRATING ORAL
Status: DISCONTINUED | OUTPATIENT
Start: 2022-01-01 | End: 2022-01-01

## 2022-01-01 RX ORDER — SENNOSIDES 8.6 MG
1 TABLET ORAL 2 TIMES DAILY
Status: DISCONTINUED | OUTPATIENT
Start: 2022-01-01 | End: 2022-01-01 | Stop reason: HOSPADM

## 2022-01-01 RX ORDER — OXYCODONE AND ACETAMINOPHEN 5; 325 MG/1; MG/1
1-2 TABLET ORAL EVERY 6 HOURS PRN
Status: DISCONTINUED | OUTPATIENT
Start: 2022-01-01 | End: 2022-01-01

## 2022-01-01 RX ORDER — DEXAMETHASONE 4 MG/1
4 TABLET ORAL
COMMUNITY
Start: 2022-01-01

## 2022-01-01 RX ORDER — OXYCODONE AND ACETAMINOPHEN 5; 325 MG/1; MG/1
1-2 TABLET ORAL EVERY 6 HOURS PRN
Qty: 60 TABLET | Refills: 0 | Status: SHIPPED | OUTPATIENT
Start: 2022-01-01 | End: 2022-01-01

## 2022-01-01 RX ORDER — OXYCODONE AND ACETAMINOPHEN 5; 325 MG/1; MG/1
1-2 TABLET ORAL EVERY 6 HOURS PRN
Qty: 60 TABLET | Refills: 0 | OUTPATIENT
Start: 2022-01-01

## 2022-01-01 RX ORDER — NALOXONE HYDROCHLORIDE 0.4 MG/ML
0.2 INJECTION, SOLUTION INTRAMUSCULAR; INTRAVENOUS; SUBCUTANEOUS
Status: DISCONTINUED | OUTPATIENT
Start: 2022-01-01 | End: 2022-01-01

## 2022-01-01 RX ORDER — HYOSCYAMINE SULFATE 0.125 MG
0.12 TABLET ORAL EVERY 4 HOURS PRN
COMMUNITY

## 2022-01-01 RX ORDER — ALBUTEROL SULFATE 90 UG/1
1-2 AEROSOL, METERED RESPIRATORY (INHALATION)
Status: CANCELLED
Start: 2022-01-01

## 2022-01-01 RX ORDER — LORAZEPAM 2 MG/ML
0.5 INJECTION INTRAMUSCULAR EVERY 4 HOURS PRN
Status: CANCELLED
Start: 2022-01-01

## 2022-01-01 RX ORDER — ACETAMINOPHEN 650 MG/1
650 SUPPOSITORY RECTAL EVERY 6 HOURS PRN
Status: DISCONTINUED | OUTPATIENT
Start: 2022-01-01 | End: 2022-01-01 | Stop reason: HOSPADM

## 2022-01-01 RX ORDER — HEPARIN SODIUM (PORCINE) LOCK FLUSH IV SOLN 100 UNIT/ML 100 UNIT/ML
5 SOLUTION INTRAVENOUS
Status: CANCELLED
Start: 2022-01-01

## 2022-01-01 RX ORDER — HYDROMORPHONE HYDROCHLORIDE 2 MG/1
2 TABLET ORAL
Status: DISCONTINUED | OUTPATIENT
Start: 2022-01-01 | End: 2022-01-01 | Stop reason: HOSPADM

## 2022-01-01 RX ORDER — HYDROMORPHONE HYDROCHLORIDE 1 MG/ML
0.5 INJECTION, SOLUTION INTRAMUSCULAR; INTRAVENOUS; SUBCUTANEOUS
Status: DISCONTINUED | OUTPATIENT
Start: 2022-01-01 | End: 2022-01-01 | Stop reason: HOSPADM

## 2022-01-01 RX ORDER — DIPHENHYDRAMINE HYDROCHLORIDE 50 MG/ML
50 INJECTION INTRAMUSCULAR; INTRAVENOUS
Status: CANCELLED
Start: 2022-01-01

## 2022-01-01 RX ORDER — NALOXONE HYDROCHLORIDE 0.4 MG/ML
0.1 INJECTION, SOLUTION INTRAMUSCULAR; INTRAVENOUS; SUBCUTANEOUS
Status: DISCONTINUED | OUTPATIENT
Start: 2022-01-01 | End: 2022-01-01 | Stop reason: HOSPADM

## 2022-01-01 RX ORDER — LORAZEPAM 0.5 MG/1
0.25 TABLET ORAL EVERY 4 HOURS PRN
Status: DISCONTINUED | OUTPATIENT
Start: 2022-01-01 | End: 2022-01-01 | Stop reason: HOSPADM

## 2022-01-01 RX ORDER — BISACODYL 10 MG
10 SUPPOSITORY, RECTAL RECTAL DAILY PRN
Qty: 16 SUPPOSITORY | Refills: 1 | Status: ON HOLD | OUTPATIENT
Start: 2022-01-01 | End: 2022-01-01 | Stop reason: DRUGHIGH

## 2022-01-01 RX ORDER — HYDROMORPHONE HYDROCHLORIDE 1 MG/ML
.3-.5 INJECTION, SOLUTION INTRAMUSCULAR; INTRAVENOUS; SUBCUTANEOUS
Status: DISCONTINUED | OUTPATIENT
Start: 2022-01-01 | End: 2022-01-01

## 2022-01-01 RX ORDER — BACLOFEN 10 MG/1
10 TABLET ORAL 3 TIMES DAILY PRN
Qty: 15 TABLET | Refills: 0 | Status: SHIPPED | OUTPATIENT
Start: 2022-01-01

## 2022-01-01 RX ORDER — HEPARIN SODIUM (PORCINE) LOCK FLUSH IV SOLN 100 UNIT/ML 100 UNIT/ML
5 SOLUTION INTRAVENOUS
Status: DISCONTINUED | OUTPATIENT
Start: 2022-01-01 | End: 2022-01-01 | Stop reason: HOSPADM

## 2022-01-01 RX ORDER — MORPHINE SULFATE 20 MG/ML
5-10 SOLUTION ORAL
Qty: 30 ML | Refills: 0 | Status: ON HOLD | OUTPATIENT
Start: 2022-01-01 | End: 2022-01-01

## 2022-01-01 RX ORDER — IOPAMIDOL 755 MG/ML
71 INJECTION, SOLUTION INTRAVASCULAR ONCE
Status: COMPLETED | OUTPATIENT
Start: 2022-01-01 | End: 2022-01-01

## 2022-01-01 RX ORDER — BACLOFEN 10 MG/1
10 TABLET ORAL 3 TIMES DAILY
Status: DISCONTINUED | OUTPATIENT
Start: 2022-01-01 | End: 2022-01-01

## 2022-01-01 RX ORDER — ONDANSETRON 4 MG/1
4 TABLET, ORALLY DISINTEGRATING ORAL EVERY 4 HOURS
Status: DISCONTINUED | OUTPATIENT
Start: 2022-01-01 | End: 2022-01-01 | Stop reason: HOSPADM

## 2022-01-01 RX ORDER — METHYLPREDNISOLONE SODIUM SUCCINATE 125 MG/2ML
125 INJECTION, POWDER, LYOPHILIZED, FOR SOLUTION INTRAMUSCULAR; INTRAVENOUS
Status: CANCELLED
Start: 2022-01-01

## 2022-01-01 RX ORDER — MEPERIDINE HYDROCHLORIDE 50 MG/ML
25 INJECTION INTRAMUSCULAR; INTRAVENOUS; SUBCUTANEOUS EVERY 30 MIN PRN
Status: CANCELLED | OUTPATIENT
Start: 2022-01-01

## 2022-01-01 RX ORDER — ONDANSETRON 2 MG/ML
4 INJECTION INTRAMUSCULAR; INTRAVENOUS ONCE
Status: COMPLETED | OUTPATIENT
Start: 2022-01-01 | End: 2022-01-01

## 2022-01-01 RX ORDER — LIDOCAINE 40 MG/G
CREAM TOPICAL
Status: DISCONTINUED | OUTPATIENT
Start: 2022-01-01 | End: 2022-01-01

## 2022-01-01 RX ORDER — SENNOSIDES 8.6 MG
1 TABLET ORAL 2 TIMES DAILY
COMMUNITY

## 2022-01-01 RX ORDER — LEVOTHYROXINE SODIUM 25 UG/1
25 TABLET ORAL DAILY
Status: DISCONTINUED | OUTPATIENT
Start: 2022-01-01 | End: 2022-01-01 | Stop reason: HOSPADM

## 2022-01-01 RX ORDER — LORAZEPAM 0.5 MG/1
0.25 TABLET ORAL EVERY 4 HOURS PRN
COMMUNITY

## 2022-01-01 RX ORDER — PROCHLORPERAZINE 25 MG
12.5 SUPPOSITORY, RECTAL RECTAL EVERY 12 HOURS PRN
Status: DISCONTINUED | OUTPATIENT
Start: 2022-01-01 | End: 2022-01-01

## 2022-01-01 RX ORDER — LIDOCAINE HYDROCHLORIDE 20 MG/ML
JELLY TOPICAL ONCE
Status: COMPLETED | OUTPATIENT
Start: 2022-01-01 | End: 2022-01-01

## 2022-01-01 RX ORDER — PROCHLORPERAZINE MALEATE 5 MG
5 TABLET ORAL EVERY 6 HOURS PRN
Status: DISCONTINUED | OUTPATIENT
Start: 2022-01-01 | End: 2022-01-01 | Stop reason: HOSPADM

## 2022-01-01 RX ORDER — BUPRENORPHINE 20 UG/H
1 PATCH TRANSDERMAL
Status: DISCONTINUED | OUTPATIENT
Start: 2022-01-01 | End: 2022-01-01 | Stop reason: HOSPADM

## 2022-01-01 RX ORDER — PROCHLORPERAZINE 25 MG
12.5 SUPPOSITORY, RECTAL RECTAL EVERY 12 HOURS PRN
Status: DISCONTINUED | OUTPATIENT
Start: 2022-01-01 | End: 2022-01-01 | Stop reason: HOSPADM

## 2022-01-01 RX ORDER — HALOPERIDOL 0.5 MG/1
0.5 TABLET ORAL EVERY 4 HOURS PRN
Status: DISCONTINUED | OUTPATIENT
Start: 2022-01-01 | End: 2022-01-01 | Stop reason: HOSPADM

## 2022-01-01 RX ORDER — BUPRENORPHINE 10 UG/H
2 PATCH TRANSDERMAL WEEKLY
Status: DISCONTINUED | OUTPATIENT
Start: 2022-01-01 | End: 2022-01-01 | Stop reason: HOSPADM

## 2022-01-01 RX ORDER — HYDROMORPHONE HYDROCHLORIDE 2 MG/1
2 TABLET ORAL
COMMUNITY

## 2022-01-01 RX ORDER — SODIUM CHLORIDE 9 MG/ML
1000 INJECTION, SOLUTION INTRAVENOUS CONTINUOUS PRN
Status: CANCELLED
Start: 2022-01-01

## 2022-01-01 RX ORDER — METOCLOPRAMIDE HYDROCHLORIDE 5 MG/ML
5 INJECTION INTRAMUSCULAR; INTRAVENOUS 3 TIMES DAILY
Status: DISCONTINUED | OUTPATIENT
Start: 2022-01-01 | End: 2022-01-01

## 2022-01-01 RX ORDER — ONDANSETRON 2 MG/ML
4 INJECTION INTRAMUSCULAR; INTRAVENOUS EVERY 6 HOURS PRN
Status: DISCONTINUED | OUTPATIENT
Start: 2022-01-01 | End: 2022-01-01 | Stop reason: HOSPADM

## 2022-01-01 RX ORDER — MORPHINE SULFATE 20 MG/ML
5-10 SOLUTION ORAL
Status: DISCONTINUED | OUTPATIENT
Start: 2022-01-01 | End: 2022-01-01 | Stop reason: HOSPADM

## 2022-01-01 RX ORDER — MORPHINE SULFATE 20 MG/ML
5-10 SOLUTION ORAL
Status: DISCONTINUED | OUTPATIENT
Start: 2022-01-01 | End: 2022-01-01

## 2022-01-01 RX ORDER — AMPICILLIN AND SULBACTAM 2; 1 G/1; G/1
3 INJECTION, POWDER, FOR SOLUTION INTRAMUSCULAR; INTRAVENOUS ONCE
Status: COMPLETED | OUTPATIENT
Start: 2022-01-01 | End: 2022-01-01

## 2022-01-01 RX ORDER — MORPHINE SULFATE 20 MG/ML
5-10 SOLUTION ORAL
Refills: 0 | Status: ON HOLD | COMMUNITY
Start: 2022-01-01 | End: 2022-01-01

## 2022-01-01 RX ORDER — LIDOCAINE 40 MG/G
CREAM TOPICAL
Status: COMPLETED | OUTPATIENT
Start: 2022-01-01 | End: 2022-01-01

## 2022-01-01 RX ORDER — HYDROMORPHONE HYDROCHLORIDE 1 MG/ML
0.5 INJECTION, SOLUTION INTRAMUSCULAR; INTRAVENOUS; SUBCUTANEOUS EVERY 30 MIN PRN
Status: DISCONTINUED | OUTPATIENT
Start: 2022-01-01 | End: 2022-01-01

## 2022-01-01 RX ORDER — BISACODYL 10 MG
10 SUPPOSITORY, RECTAL RECTAL DAILY PRN
COMMUNITY

## 2022-01-01 RX ORDER — METOCLOPRAMIDE 5 MG/1
5 TABLET ORAL
Status: DISCONTINUED | OUTPATIENT
Start: 2022-01-01 | End: 2022-01-01 | Stop reason: HOSPADM

## 2022-01-01 RX ORDER — DEXAMETHASONE 4 MG/1
4 TABLET ORAL
Status: DISCONTINUED | OUTPATIENT
Start: 2022-01-01 | End: 2022-01-01

## 2022-01-01 RX ORDER — ACETAMINOPHEN 650 MG/20.3ML
650 LIQUID ORAL EVERY 6 HOURS PRN
Status: DISCONTINUED | OUTPATIENT
Start: 2022-01-01 | End: 2022-01-01 | Stop reason: HOSPADM

## 2022-01-01 RX ORDER — ONDANSETRON 4 MG/1
4 TABLET, ORALLY DISINTEGRATING ORAL EVERY 6 HOURS PRN
Status: DISCONTINUED | OUTPATIENT
Start: 2022-01-01 | End: 2022-01-01 | Stop reason: HOSPADM

## 2022-01-01 RX ORDER — NALOXONE HYDROCHLORIDE 0.4 MG/ML
0.2 INJECTION, SOLUTION INTRAMUSCULAR; INTRAVENOUS; SUBCUTANEOUS
Status: DISCONTINUED | OUTPATIENT
Start: 2022-01-01 | End: 2022-01-01 | Stop reason: HOSPADM

## 2022-01-01 RX ORDER — DEXAMETHASONE SODIUM PHOSPHATE 4 MG/ML
4 INJECTION, SOLUTION INTRA-ARTICULAR; INTRALESIONAL; INTRAMUSCULAR; INTRAVENOUS; SOFT TISSUE EVERY 24 HOURS
Status: DISCONTINUED | OUTPATIENT
Start: 2022-01-01 | End: 2022-01-01

## 2022-01-01 RX ORDER — NALOXONE HYDROCHLORIDE 0.4 MG/ML
0.4 INJECTION, SOLUTION INTRAMUSCULAR; INTRAVENOUS; SUBCUTANEOUS
Status: DISCONTINUED | OUTPATIENT
Start: 2022-01-01 | End: 2022-01-01 | Stop reason: HOSPADM

## 2022-01-01 RX ORDER — FLUOCINONIDE GEL 0.5 MG/G
GEL TOPICAL PRN
COMMUNITY

## 2022-01-01 RX ORDER — LEVOTHYROXINE SODIUM 25 UG/1
25 TABLET ORAL DAILY
Qty: 90 TABLET | Refills: 1 | Status: SHIPPED | OUTPATIENT
Start: 2022-01-01

## 2022-01-01 RX ORDER — NALOXONE HYDROCHLORIDE 0.4 MG/ML
0.4 INJECTION, SOLUTION INTRAMUSCULAR; INTRAVENOUS; SUBCUTANEOUS
Status: DISCONTINUED | OUTPATIENT
Start: 2022-01-01 | End: 2022-01-01

## 2022-01-01 RX ORDER — HEPARIN SODIUM (PORCINE) LOCK FLUSH IV SOLN 100 UNIT/ML 100 UNIT/ML
5 SOLUTION INTRAVENOUS ONCE
Status: COMPLETED | OUTPATIENT
Start: 2022-01-01 | End: 2022-01-01

## 2022-01-01 RX ORDER — ACETAMINOPHEN 325 MG/1
650 TABLET ORAL EVERY 6 HOURS PRN
Status: DISCONTINUED | OUTPATIENT
Start: 2022-01-01 | End: 2022-01-01 | Stop reason: HOSPADM

## 2022-01-01 RX ORDER — AMLODIPINE BESYLATE 5 MG/1
5 TABLET ORAL DAILY PRN
Qty: 30 TABLET | Refills: 1 | Status: SHIPPED | OUTPATIENT
Start: 2022-01-01 | End: 2022-01-01

## 2022-01-01 RX ORDER — DEXAMETHASONE 4 MG/1
4 TABLET ORAL
Status: ON HOLD | COMMUNITY
End: 2022-01-01

## 2022-01-01 RX ORDER — ONDANSETRON 4 MG/1
4 TABLET, ORALLY DISINTEGRATING ORAL EVERY 6 HOURS PRN
COMMUNITY

## 2022-01-01 RX ORDER — OXYCODONE AND ACETAMINOPHEN 5; 325 MG/1; MG/1
.5-2 TABLET ORAL EVERY 6 HOURS PRN
Qty: 24 TABLET | Refills: 0 | Status: CANCELLED | OUTPATIENT
Start: 2022-01-01

## 2022-01-01 RX ORDER — ACETAMINOPHEN 500 MG
500-1000 TABLET ORAL EVERY 6 HOURS PRN
COMMUNITY

## 2022-01-01 RX ORDER — ONDANSETRON 2 MG/ML
4 INJECTION INTRAMUSCULAR; INTRAVENOUS EVERY 30 MIN PRN
Status: DISCONTINUED | OUTPATIENT
Start: 2022-01-01 | End: 2022-01-01

## 2022-01-01 RX ORDER — HEPARIN SODIUM (PORCINE) LOCK FLUSH IV SOLN 100 UNIT/ML 100 UNIT/ML
500 SOLUTION INTRAVENOUS ONCE
Status: COMPLETED | OUTPATIENT
Start: 2022-01-01 | End: 2022-01-01

## 2022-01-01 RX ORDER — PROCHLORPERAZINE MALEATE 5 MG
5 TABLET ORAL EVERY 6 HOURS PRN
Status: ON HOLD | COMMUNITY
Start: 2022-01-01 | End: 2022-01-01

## 2022-01-01 RX ORDER — GLIPIZIDE 10 MG/1
1 TABLET ORAL
Status: DISCONTINUED | OUTPATIENT
Start: 2022-01-01 | End: 2022-01-01 | Stop reason: HOSPADM

## 2022-01-01 RX ORDER — MORPHINE SULFATE 2 MG/ML
1 INJECTION, SOLUTION INTRAMUSCULAR; INTRAVENOUS EVERY 30 MIN PRN
Status: DISCONTINUED | OUTPATIENT
Start: 2022-01-01 | End: 2022-01-01

## 2022-01-01 RX ORDER — CALCIUM CARBONATE 500 MG/1
1 TABLET, CHEWABLE ORAL 2 TIMES DAILY PRN
COMMUNITY

## 2022-01-01 RX ORDER — HEPARIN SODIUM (PORCINE) LOCK FLUSH IV SOLN 100 UNIT/ML 100 UNIT/ML
500 SOLUTION INTRAVENOUS
COMMUNITY

## 2022-01-01 RX ORDER — SODIUM CHLORIDE 9 MG/ML
INJECTION, SOLUTION INTRAVENOUS CONTINUOUS
Status: DISCONTINUED | OUTPATIENT
Start: 2022-01-01 | End: 2022-01-01 | Stop reason: DRUGHIGH

## 2022-01-01 RX ORDER — PROCHLORPERAZINE MALEATE 5 MG
5 TABLET ORAL
Status: DISCONTINUED | OUTPATIENT
Start: 2022-01-01 | End: 2022-01-01 | Stop reason: HOSPADM

## 2022-01-01 RX ORDER — SODIUM CHLORIDE, SODIUM LACTATE, POTASSIUM CHLORIDE, CALCIUM CHLORIDE 600; 310; 30; 20 MG/100ML; MG/100ML; MG/100ML; MG/100ML
1000 INJECTION, SOLUTION INTRAVENOUS CONTINUOUS
Status: DISCONTINUED | OUTPATIENT
Start: 2022-01-01 | End: 2022-01-01

## 2022-01-01 RX ORDER — METHYLPHENIDATE HYDROCHLORIDE 5 MG/1
5 TABLET ORAL 2 TIMES DAILY PRN
COMMUNITY

## 2022-01-01 RX ORDER — PROCHLORPERAZINE 25 MG
12.5 SUPPOSITORY, RECTAL RECTAL EVERY 12 HOURS PRN
Qty: 30 SUPPOSITORY | Refills: 0 | Status: ON HOLD | OUTPATIENT
Start: 2022-01-01 | End: 2022-01-01

## 2022-01-01 RX ORDER — DIPHENHYDRAMINE HCL 25 MG
25 CAPSULE ORAL ONCE
Qty: 2 CAPSULE | Refills: 0 | Status: SHIPPED | OUTPATIENT
Start: 2022-01-01 | End: 2022-01-01

## 2022-01-01 RX ORDER — OXYCODONE AND ACETAMINOPHEN 5; 325 MG/1; MG/1
.5-2 TABLET ORAL EVERY 6 HOURS PRN
Qty: 24 TABLET | Refills: 0 | Status: SHIPPED | OUTPATIENT
Start: 2022-01-01 | End: 2022-01-01

## 2022-01-01 RX ORDER — LORATADINE 10 MG/1
10 TABLET ORAL DAILY PRN
COMMUNITY
End: 2022-01-01

## 2022-01-01 RX ORDER — MEPERIDINE HYDROCHLORIDE 25 MG/ML
25 INJECTION INTRAMUSCULAR; INTRAVENOUS; SUBCUTANEOUS EVERY 30 MIN PRN
Status: CANCELLED | OUTPATIENT
Start: 2022-01-01

## 2022-01-01 RX ORDER — BISACODYL 10 MG
10 SUPPOSITORY, RECTAL RECTAL DAILY PRN
Status: DISCONTINUED | OUTPATIENT
Start: 2022-01-01 | End: 2022-01-01 | Stop reason: HOSPADM

## 2022-01-01 RX ORDER — BUPRENORPHINE 20 UG/H
1 PATCH TRANSDERMAL
COMMUNITY

## 2022-01-01 RX ORDER — BISACODYL 10 MG
10 SUPPOSITORY, RECTAL RECTAL DAILY PRN
Status: DISCONTINUED | OUTPATIENT
Start: 2022-01-01 | End: 2022-01-01

## 2022-01-01 RX ORDER — LORAZEPAM 0.5 MG/1
0.5 TABLET ORAL EVERY 4 HOURS PRN
Status: DISCONTINUED | OUTPATIENT
Start: 2022-01-01 | End: 2022-01-01

## 2022-01-01 RX ORDER — HALOPERIDOL 0.5 MG/1
0.5 TABLET ORAL EVERY 4 HOURS PRN
COMMUNITY

## 2022-01-01 RX ORDER — POLYETHYLENE GLYCOL 3350 17 G/17G
17 POWDER, FOR SOLUTION ORAL DAILY
Status: DISCONTINUED | OUTPATIENT
Start: 2022-01-01 | End: 2022-01-01 | Stop reason: HOSPADM

## 2022-01-01 RX ORDER — ONDANSETRON 2 MG/ML
4 INJECTION INTRAMUSCULAR; INTRAVENOUS EVERY 6 HOURS PRN
Status: DISCONTINUED | OUTPATIENT
Start: 2022-01-01 | End: 2022-01-01

## 2022-01-01 RX ORDER — ONDANSETRON 4 MG/1
4 TABLET, ORALLY DISINTEGRATING ORAL 4 TIMES DAILY PRN
Status: DISCONTINUED | OUTPATIENT
Start: 2022-01-01 | End: 2022-01-01 | Stop reason: HOSPADM

## 2022-01-01 RX ORDER — HYDROMORPHONE HYDROCHLORIDE 4 MG/1
4 TABLET ORAL
Status: DISCONTINUED | OUTPATIENT
Start: 2022-01-01 | End: 2022-01-01 | Stop reason: HOSPADM

## 2022-01-01 RX ORDER — ONDANSETRON 4 MG/1
4 TABLET, ORALLY DISINTEGRATING ORAL EVERY 6 HOURS PRN
Qty: 25 TABLET | Refills: 0 | Status: ON HOLD | OUTPATIENT
Start: 2022-01-01 | End: 2022-01-01

## 2022-01-01 RX ORDER — LORAZEPAM 2 MG/ML
1 INJECTION INTRAMUSCULAR
Status: DISCONTINUED | OUTPATIENT
Start: 2022-01-01 | End: 2022-01-01 | Stop reason: HOSPADM

## 2022-01-01 RX ORDER — CEFTRIAXONE SODIUM 1 G/50ML
1 INJECTION, SOLUTION INTRAVENOUS ONCE
Status: COMPLETED | OUTPATIENT
Start: 2022-01-01 | End: 2022-01-01

## 2022-01-01 RX ORDER — SCOLOPAMINE TRANSDERMAL SYSTEM 1 MG/1
1 PATCH, EXTENDED RELEASE TRANSDERMAL
Qty: 3 PATCH | Refills: 0 | Status: ON HOLD | OUTPATIENT
Start: 2022-01-01 | End: 2022-01-01

## 2022-01-01 RX ORDER — IOPAMIDOL 755 MG/ML
76 INJECTION, SOLUTION INTRAVASCULAR ONCE
Status: COMPLETED | OUTPATIENT
Start: 2022-01-01 | End: 2022-01-01

## 2022-01-01 RX ORDER — METOCLOPRAMIDE 10 MG/1
10 TABLET ORAL 3 TIMES DAILY
COMMUNITY

## 2022-01-01 RX ORDER — DEXAMETHASONE 4 MG/1
4 TABLET ORAL DAILY
Status: DISCONTINUED | OUTPATIENT
Start: 2022-01-01 | End: 2022-01-01 | Stop reason: HOSPADM

## 2022-01-01 RX ORDER — AMLODIPINE BESYLATE 5 MG/1
TABLET ORAL
Status: ON HOLD | COMMUNITY
Start: 2022-01-01 | End: 2022-01-01

## 2022-01-01 RX ORDER — ONDANSETRON 8 MG/1
8 TABLET, FILM COATED ORAL EVERY 8 HOURS PRN
Qty: 25 TABLET | Refills: 3 | Status: ON HOLD | OUTPATIENT
Start: 2022-01-01 | End: 2022-01-01

## 2022-01-01 RX ORDER — SCOLOPAMINE TRANSDERMAL SYSTEM 1 MG/1
1 PATCH, EXTENDED RELEASE TRANSDERMAL
Status: DISCONTINUED | OUTPATIENT
Start: 2022-01-01 | End: 2022-01-01 | Stop reason: HOSPADM

## 2022-01-01 RX ORDER — CAPECITABINE 500 MG/1
625 TABLET, FILM COATED ORAL 2 TIMES DAILY
Qty: 56 TABLET | Refills: 11 | Status: SHIPPED | OUTPATIENT
Start: 2022-01-01 | End: 2022-01-01

## 2022-01-01 RX ORDER — PANTOPRAZOLE SODIUM 40 MG/1
40 TABLET, DELAYED RELEASE ORAL
Status: DISCONTINUED | OUTPATIENT
Start: 2022-01-01 | End: 2022-01-01 | Stop reason: HOSPADM

## 2022-01-01 RX ORDER — LORAZEPAM 2 MG/ML
0.5 INJECTION INTRAMUSCULAR EVERY 4 HOURS PRN
Status: DISCONTINUED | OUTPATIENT
Start: 2022-01-01 | End: 2022-01-01

## 2022-01-01 RX ORDER — METHYLPREDNISOLONE 16 MG/1
16 TABLET ORAL ONCE
Qty: 1 TABLET | Refills: 0 | Status: SHIPPED | OUTPATIENT
Start: 2022-01-01 | End: 2022-01-01

## 2022-01-01 RX ORDER — LIDOCAINE/PRILOCAINE 2.5 %-2.5%
CREAM (GRAM) TOPICAL
Qty: 30 G | Refills: 3 | OUTPATIENT
Start: 2022-01-01

## 2022-01-01 RX ORDER — LISINOPRIL 10 MG/1
10 TABLET ORAL DAILY
Qty: 90 TABLET | Refills: 0 | Status: SHIPPED | OUTPATIENT
Start: 2022-01-01 | End: 2022-01-01

## 2022-01-01 RX ORDER — SODIUM CHLORIDE 9 MG/ML
INJECTION, SOLUTION INTRAVENOUS CONTINUOUS
Status: DISCONTINUED | OUTPATIENT
Start: 2022-01-01 | End: 2022-01-01

## 2022-01-01 RX ADMIN — SODIUM CHLORIDE: 900 INJECTION, SOLUTION INTRAVENOUS at 11:38

## 2022-01-01 RX ADMIN — PROCHLORPERAZINE EDISYLATE 5 MG: 5 INJECTION INTRAMUSCULAR; INTRAVENOUS at 01:26

## 2022-01-01 RX ADMIN — HYDROMORPHONE HYDROCHLORIDE 0.5 MG: 1 INJECTION, SOLUTION INTRAMUSCULAR; INTRAVENOUS; SUBCUTANEOUS at 09:58

## 2022-01-01 RX ADMIN — HEPARIN 5 ML: 100 SYRINGE at 12:19

## 2022-01-01 RX ADMIN — PROCHLORPERAZINE MALEATE 5 MG: 5 TABLET ORAL at 16:08

## 2022-01-01 RX ADMIN — ONDANSETRON 4 MG: 4 TABLET, ORALLY DISINTEGRATING ORAL at 20:26

## 2022-01-01 RX ADMIN — LIDOCAINE: 40 CREAM TOPICAL at 16:36

## 2022-01-01 RX ADMIN — STANDARDIZED SENNA CONCENTRATE 1 TABLET: 8.6 TABLET ORAL at 21:40

## 2022-01-01 RX ADMIN — PROCHLORPERAZINE MALEATE 5 MG: 5 TABLET ORAL at 13:35

## 2022-01-01 RX ADMIN — SODIUM CHLORIDE 250 ML: 9 INJECTION, SOLUTION INTRAVENOUS at 11:47

## 2022-01-01 RX ADMIN — IOPAMIDOL 71 ML: 755 INJECTION, SOLUTION INTRAVENOUS at 21:32

## 2022-01-01 RX ADMIN — HYDROMORPHONE HYDROCHLORIDE 0.5 MG: 1 INJECTION, SOLUTION INTRAMUSCULAR; INTRAVENOUS; SUBCUTANEOUS at 20:26

## 2022-01-01 RX ADMIN — METOCLOPRAMIDE 10 MG: 10 TABLET ORAL at 21:40

## 2022-01-01 RX ADMIN — PROCHLORPERAZINE MALEATE 5 MG: 5 TABLET ORAL at 13:29

## 2022-01-01 RX ADMIN — HYDROMORPHONE HYDROCHLORIDE 0.5 MG: 1 INJECTION, SOLUTION INTRAMUSCULAR; INTRAVENOUS; SUBCUTANEOUS at 19:38

## 2022-01-01 RX ADMIN — BACLOFEN 10 MG: 10 TABLET ORAL at 14:52

## 2022-01-01 RX ADMIN — ONDANSETRON 4 MG: 2 INJECTION INTRAMUSCULAR; INTRAVENOUS at 19:55

## 2022-01-01 RX ADMIN — PANTOPRAZOLE SODIUM 40 MG: 40 TABLET, DELAYED RELEASE ORAL at 14:52

## 2022-01-01 RX ADMIN — SODIUM CHLORIDE 250 ML: 9 INJECTION, SOLUTION INTRAVENOUS at 11:40

## 2022-01-01 RX ADMIN — LORAZEPAM 0.25 MG: 0.5 TABLET ORAL at 10:33

## 2022-01-01 RX ADMIN — ONDANSETRON 4 MG: 4 TABLET, ORALLY DISINTEGRATING ORAL at 09:03

## 2022-01-01 RX ADMIN — AMPICILLIN SODIUM AND SULBACTAM SODIUM 3 G: 2; 1 INJECTION, POWDER, FOR SOLUTION INTRAMUSCULAR; INTRAVENOUS at 04:01

## 2022-01-01 RX ADMIN — HYDROMORPHONE HYDROCHLORIDE 0.5 MG: 1 INJECTION, SOLUTION INTRAMUSCULAR; INTRAVENOUS; SUBCUTANEOUS at 23:53

## 2022-01-01 RX ADMIN — METOCLOPRAMIDE HYDROCHLORIDE 5 MG: 5 INJECTION INTRAMUSCULAR; INTRAVENOUS at 06:03

## 2022-01-01 RX ADMIN — HYDROMORPHONE HYDROCHLORIDE 1 MG: 1 INJECTION, SOLUTION INTRAMUSCULAR; INTRAVENOUS; SUBCUTANEOUS at 20:02

## 2022-01-01 RX ADMIN — ONDANSETRON 4 MG: 2 INJECTION INTRAMUSCULAR; INTRAVENOUS at 02:36

## 2022-01-01 RX ADMIN — HYDROMORPHONE HYDROCHLORIDE 0.5 MG: 1 INJECTION, SOLUTION INTRAMUSCULAR; INTRAVENOUS; SUBCUTANEOUS at 10:21

## 2022-01-01 RX ADMIN — SODIUM CHLORIDE, POTASSIUM CHLORIDE, SODIUM LACTATE AND CALCIUM CHLORIDE 1000 ML: 600; 310; 30; 20 INJECTION, SOLUTION INTRAVENOUS at 06:51

## 2022-01-01 RX ADMIN — STANDARDIZED SENNA CONCENTRATE 1 TABLET: 8.6 TABLET ORAL at 21:36

## 2022-01-01 RX ADMIN — HYDROMORPHONE HYDROCHLORIDE 0.5 MG: 1 INJECTION, SOLUTION INTRAMUSCULAR; INTRAVENOUS; SUBCUTANEOUS at 18:20

## 2022-01-01 RX ADMIN — FAMOTIDINE 20 MG: 10 INJECTION, SOLUTION INTRAVENOUS at 23:43

## 2022-01-01 RX ADMIN — Medication 500 UNITS: at 16:20

## 2022-01-01 RX ADMIN — MORPHINE SULFATE 1 MG: 2 INJECTION, SOLUTION INTRAMUSCULAR; INTRAVENOUS at 18:58

## 2022-01-01 RX ADMIN — ONDANSETRON 4 MG: 4 TABLET, ORALLY DISINTEGRATING ORAL at 05:37

## 2022-01-01 RX ADMIN — Medication 5 ML: at 10:29

## 2022-01-01 RX ADMIN — SODIUM CHLORIDE 500 MG: 9 INJECTION, SOLUTION INTRAVENOUS at 12:23

## 2022-01-01 RX ADMIN — ONDANSETRON 4 MG: 2 INJECTION INTRAMUSCULAR; INTRAVENOUS at 20:01

## 2022-01-01 RX ADMIN — ONDANSETRON 4 MG: 2 INJECTION INTRAMUSCULAR; INTRAVENOUS at 20:39

## 2022-01-01 RX ADMIN — HYDROMORPHONE HYDROCHLORIDE 1 MG: 1 INJECTION, SOLUTION INTRAMUSCULAR; INTRAVENOUS; SUBCUTANEOUS at 08:19

## 2022-01-01 RX ADMIN — HYDROMORPHONE HYDROCHLORIDE 0.5 MG: 1 INJECTION, SOLUTION INTRAMUSCULAR; INTRAVENOUS; SUBCUTANEOUS at 19:54

## 2022-01-01 RX ADMIN — SODIUM CHLORIDE 1000 ML: 9 INJECTION, SOLUTION INTRAVENOUS at 18:59

## 2022-01-01 RX ADMIN — HYDROMORPHONE HYDROCHLORIDE 0.5 MG: 1 INJECTION, SOLUTION INTRAMUSCULAR; INTRAVENOUS; SUBCUTANEOUS at 13:34

## 2022-01-01 RX ADMIN — STANDARDIZED SENNA CONCENTRATE 1 TABLET: 8.6 TABLET ORAL at 11:16

## 2022-01-01 RX ADMIN — ONDANSETRON 4 MG: 2 INJECTION INTRAMUSCULAR; INTRAVENOUS at 18:45

## 2022-01-01 RX ADMIN — HYDROMORPHONE HYDROCHLORIDE 0.5 MG: 1 INJECTION, SOLUTION INTRAMUSCULAR; INTRAVENOUS; SUBCUTANEOUS at 05:37

## 2022-01-01 RX ADMIN — MORPHINE SULFATE 1 MG: 2 INJECTION, SOLUTION INTRAMUSCULAR; INTRAVENOUS at 20:14

## 2022-01-01 RX ADMIN — HYDROMORPHONE HYDROCHLORIDE 2 MG: 2 TABLET ORAL at 19:46

## 2022-01-01 RX ADMIN — PROCHLORPERAZINE MALEATE 5 MG: 5 TABLET ORAL at 04:25

## 2022-01-01 RX ADMIN — HYDROMORPHONE HYDROCHLORIDE 0.5 MG: 1 INJECTION, SOLUTION INTRAMUSCULAR; INTRAVENOUS; SUBCUTANEOUS at 11:36

## 2022-01-01 RX ADMIN — LEVOTHYROXINE SODIUM 25 MCG: 0.03 TABLET ORAL at 11:16

## 2022-01-01 RX ADMIN — HYDROMORPHONE HYDROCHLORIDE 0.5 MG: 1 INJECTION, SOLUTION INTRAMUSCULAR; INTRAVENOUS; SUBCUTANEOUS at 06:48

## 2022-01-01 RX ADMIN — ONDANSETRON 4 MG: 4 TABLET, ORALLY DISINTEGRATING ORAL at 13:43

## 2022-01-01 RX ADMIN — SODIUM CHLORIDE 250 ML: 9 INJECTION, SOLUTION INTRAVENOUS at 11:05

## 2022-01-01 RX ADMIN — PROCHLORPERAZINE EDISYLATE 5 MG: 5 INJECTION INTRAMUSCULAR; INTRAVENOUS at 20:42

## 2022-01-01 RX ADMIN — TAZOBACTAM SODIUM AND PIPERACILLIN SODIUM 3.38 G: 375; 3 INJECTION, SOLUTION INTRAVENOUS at 23:55

## 2022-01-01 RX ADMIN — Medication 10 MG: at 10:47

## 2022-01-01 RX ADMIN — SODIUM CHLORIDE 500 MG: 9 INJECTION, SOLUTION INTRAVENOUS at 11:29

## 2022-01-01 RX ADMIN — METOCLOPRAMIDE HYDROCHLORIDE 5 MG: 5 INJECTION INTRAMUSCULAR; INTRAVENOUS at 21:54

## 2022-01-01 RX ADMIN — LEVOTHYROXINE SODIUM 25 MCG: 0.03 TABLET ORAL at 09:48

## 2022-01-01 RX ADMIN — LORAZEPAM 0.25 MG: 0.5 TABLET ORAL at 15:00

## 2022-01-01 RX ADMIN — HYDROMORPHONE HYDROCHLORIDE 0.5 MG: 1 INJECTION, SOLUTION INTRAMUSCULAR; INTRAVENOUS; SUBCUTANEOUS at 02:07

## 2022-01-01 RX ADMIN — PROCHLORPERAZINE MALEATE 5 MG: 5 TABLET ORAL at 18:30

## 2022-01-01 RX ADMIN — SODIUM CHLORIDE 500 MG: 9 INJECTION, SOLUTION INTRAVENOUS at 11:25

## 2022-01-01 RX ADMIN — HYDROMORPHONE HYDROCHLORIDE 0.5 MG: 1 INJECTION, SOLUTION INTRAMUSCULAR; INTRAVENOUS; SUBCUTANEOUS at 05:49

## 2022-01-01 RX ADMIN — LORAZEPAM 0.25 MG: 0.5 TABLET ORAL at 02:36

## 2022-01-01 RX ADMIN — TAZOBACTAM SODIUM AND PIPERACILLIN SODIUM 3.38 G: 375; 3 INJECTION, SOLUTION INTRAVENOUS at 05:01

## 2022-01-01 RX ADMIN — ONDANSETRON 4 MG: 2 INJECTION INTRAMUSCULAR; INTRAVENOUS at 08:18

## 2022-01-01 RX ADMIN — HEPARIN 5 ML: 100 SYRINGE at 12:04

## 2022-01-01 RX ADMIN — SODIUM CHLORIDE, POTASSIUM CHLORIDE, SODIUM LACTATE AND CALCIUM CHLORIDE 1000 ML: 600; 310; 30; 20 INJECTION, SOLUTION INTRAVENOUS at 21:02

## 2022-01-01 RX ADMIN — FLUDEOXYGLUCOSE F-18 11.73 MCI.: 500 INJECTION, SOLUTION INTRAVENOUS at 10:02

## 2022-01-01 RX ADMIN — DEXAMETHASONE SODIUM PHOSPHATE 4 MG: 4 INJECTION, SOLUTION INTRAMUSCULAR; INTRAVENOUS at 08:20

## 2022-01-01 RX ADMIN — PROCHLORPERAZINE EDISYLATE 5 MG: 5 INJECTION INTRAMUSCULAR; INTRAVENOUS at 03:51

## 2022-01-01 RX ADMIN — ONDANSETRON 4 MG: 2 INJECTION INTRAMUSCULAR; INTRAVENOUS at 19:54

## 2022-01-01 RX ADMIN — HYDROMORPHONE HYDROCHLORIDE 4 MG: 4 TABLET ORAL at 15:08

## 2022-01-01 RX ADMIN — HYDROMORPHONE HYDROCHLORIDE 0.5 MG: 1 INJECTION, SOLUTION INTRAMUSCULAR; INTRAVENOUS; SUBCUTANEOUS at 03:05

## 2022-01-01 RX ADMIN — PROCHLORPERAZINE EDISYLATE 5 MG: 5 INJECTION INTRAMUSCULAR; INTRAVENOUS at 19:35

## 2022-01-01 RX ADMIN — Medication 5 ML: at 13:04

## 2022-01-01 RX ADMIN — SALINE NASAL SPRAY 1 SPRAY: 1.5 SOLUTION NASAL at 19:49

## 2022-01-01 RX ADMIN — HYDROMORPHONE HYDROCHLORIDE 0.5 MG: 1 INJECTION, SOLUTION INTRAMUSCULAR; INTRAVENOUS; SUBCUTANEOUS at 22:42

## 2022-01-01 RX ADMIN — HYDROMORPHONE HYDROCHLORIDE 1 MG: 1 INJECTION, SOLUTION INTRAMUSCULAR; INTRAVENOUS; SUBCUTANEOUS at 04:51

## 2022-01-01 RX ADMIN — METOCLOPRAMIDE HYDROCHLORIDE 5 MG: 5 INJECTION INTRAMUSCULAR; INTRAVENOUS at 14:54

## 2022-01-01 RX ADMIN — POLYETHYLENE GLYCOL 3350 17 G: 17 POWDER, FOR SOLUTION ORAL at 09:03

## 2022-01-01 RX ADMIN — ONDANSETRON 4 MG: 4 TABLET, ORALLY DISINTEGRATING ORAL at 02:03

## 2022-01-01 RX ADMIN — HYDROMORPHONE HYDROCHLORIDE 0.5 MG: 1 INJECTION, SOLUTION INTRAMUSCULAR; INTRAVENOUS; SUBCUTANEOUS at 14:58

## 2022-01-01 RX ADMIN — BACLOFEN 10 MG: 10 TABLET ORAL at 09:48

## 2022-01-01 RX ADMIN — SODIUM CHLORIDE, POTASSIUM CHLORIDE, SODIUM LACTATE AND CALCIUM CHLORIDE 1000 ML: 600; 310; 30; 20 INJECTION, SOLUTION INTRAVENOUS at 20:02

## 2022-01-01 RX ADMIN — SODIUM CHLORIDE: 9 INJECTION, SOLUTION INTRAVENOUS at 20:42

## 2022-01-01 RX ADMIN — METOCLOPRAMIDE 5 MG: 5 TABLET ORAL at 17:09

## 2022-01-01 RX ADMIN — HEPARIN 5 ML: 100 SYRINGE at 12:18

## 2022-01-01 RX ADMIN — SODIUM CHLORIDE, POTASSIUM CHLORIDE, SODIUM LACTATE AND CALCIUM CHLORIDE 1000 ML: 600; 310; 30; 20 INJECTION, SOLUTION INTRAVENOUS at 04:43

## 2022-01-01 RX ADMIN — HYDROMORPHONE HYDROCHLORIDE 0.5 MG: 1 INJECTION, SOLUTION INTRAMUSCULAR; INTRAVENOUS; SUBCUTANEOUS at 00:32

## 2022-01-01 RX ADMIN — HYDROMORPHONE HYDROCHLORIDE 0.5 MG: 1 INJECTION, SOLUTION INTRAMUSCULAR; INTRAVENOUS; SUBCUTANEOUS at 21:54

## 2022-01-01 RX ADMIN — LORAZEPAM 0.25 MG: 0.5 TABLET ORAL at 19:28

## 2022-01-01 RX ADMIN — HYDROMORPHONE HYDROCHLORIDE 0.5 MG: 1 INJECTION, SOLUTION INTRAMUSCULAR; INTRAVENOUS; SUBCUTANEOUS at 16:12

## 2022-01-01 RX ADMIN — HYDROMORPHONE HYDROCHLORIDE 1 MG: 1 INJECTION, SOLUTION INTRAMUSCULAR; INTRAVENOUS; SUBCUTANEOUS at 22:08

## 2022-01-01 RX ADMIN — HYDROMORPHONE HYDROCHLORIDE 0.5 MG: 1 INJECTION, SOLUTION INTRAMUSCULAR; INTRAVENOUS; SUBCUTANEOUS at 01:25

## 2022-01-01 RX ADMIN — METOCLOPRAMIDE 5 MG: 5 TABLET ORAL at 21:37

## 2022-01-01 RX ADMIN — Medication 1 ML: at 14:07

## 2022-01-01 RX ADMIN — SODIUM CHLORIDE 500 MG: 9 INJECTION, SOLUTION INTRAVENOUS at 11:59

## 2022-01-01 RX ADMIN — HYDROMORPHONE HYDROCHLORIDE 0.5 MG: 1 INJECTION, SOLUTION INTRAMUSCULAR; INTRAVENOUS; SUBCUTANEOUS at 04:25

## 2022-01-01 RX ADMIN — STANDARDIZED SENNA CONCENTRATE 1 TABLET: 8.6 TABLET ORAL at 21:55

## 2022-01-01 RX ADMIN — STANDARDIZED SENNA CONCENTRATE 1 TABLET: 8.6 TABLET ORAL at 09:48

## 2022-01-01 RX ADMIN — SODIUM CHLORIDE 250 ML: 9 INJECTION, SOLUTION INTRAVENOUS at 12:09

## 2022-01-01 RX ADMIN — SODIUM CHLORIDE 250 ML: 9 INJECTION, SOLUTION INTRAVENOUS at 11:23

## 2022-01-01 RX ADMIN — ONDANSETRON 4 MG: 4 TABLET, ORALLY DISINTEGRATING ORAL at 17:04

## 2022-01-01 RX ADMIN — Medication 5 ML: at 12:45

## 2022-01-01 RX ADMIN — DEXAMETHASONE SODIUM PHOSPHATE 4 MG: 4 INJECTION, SOLUTION INTRAMUSCULAR; INTRAVENOUS at 08:18

## 2022-01-01 RX ADMIN — STANDARDIZED SENNA CONCENTRATE 1 TABLET: 8.6 TABLET ORAL at 22:35

## 2022-01-01 RX ADMIN — SCOPALAMINE 1 PATCH: 1 PATCH, EXTENDED RELEASE TRANSDERMAL at 23:00

## 2022-01-01 RX ADMIN — PROCHLORPERAZINE MALEATE 5 MG: 5 TABLET ORAL at 07:19

## 2022-01-01 RX ADMIN — LORAZEPAM 0.5 MG: 2 INJECTION, SOLUTION INTRAMUSCULAR; INTRAVENOUS at 18:39

## 2022-01-01 RX ADMIN — CEFTRIAXONE SODIUM 1 G: 1 INJECTION, SOLUTION INTRAVENOUS at 21:08

## 2022-01-01 RX ADMIN — METOCLOPRAMIDE 10 MG: 10 TABLET ORAL at 14:59

## 2022-01-01 RX ADMIN — LIDOCAINE HYDROCHLORIDE: 20 JELLY TOPICAL at 13:34

## 2022-01-01 RX ADMIN — HYDROMORPHONE HYDROCHLORIDE 0.5 MG: 1 INJECTION, SOLUTION INTRAMUSCULAR; INTRAVENOUS; SUBCUTANEOUS at 08:29

## 2022-01-01 RX ADMIN — HYDROMORPHONE HYDROCHLORIDE 1 MG: 1 INJECTION, SOLUTION INTRAMUSCULAR; INTRAVENOUS; SUBCUTANEOUS at 08:48

## 2022-01-01 RX ADMIN — HALOPERIDOL 0.5 MG: 0.5 TABLET ORAL at 22:01

## 2022-01-01 RX ADMIN — LORAZEPAM 0.25 MG: 0.5 TABLET ORAL at 23:04

## 2022-01-01 RX ADMIN — LORAZEPAM 0.25 MG: 0.5 TABLET ORAL at 10:52

## 2022-01-01 RX ADMIN — HYDROMORPHONE HYDROCHLORIDE 0.5 MG: 1 INJECTION, SOLUTION INTRAMUSCULAR; INTRAVENOUS; SUBCUTANEOUS at 07:19

## 2022-01-01 RX ADMIN — IOPAMIDOL 76 ML: 755 INJECTION, SOLUTION INTRAVENOUS at 14:50

## 2022-01-01 RX ADMIN — SODIUM CHLORIDE 500 MG: 9 INJECTION, SOLUTION INTRAVENOUS at 11:21

## 2022-01-01 RX ADMIN — STANDARDIZED SENNA CONCENTRATE 1 TABLET: 8.6 TABLET ORAL at 09:03

## 2022-01-01 RX ADMIN — PROCHLORPERAZINE MALEATE 5 MG: 5 TABLET ORAL at 00:31

## 2022-01-01 RX ADMIN — LEVOTHYROXINE SODIUM 25 MCG: 0.03 TABLET ORAL at 09:03

## 2022-01-01 RX ADMIN — ONDANSETRON 4 MG: 4 TABLET, ORALLY DISINTEGRATING ORAL at 22:41

## 2022-01-01 RX ADMIN — HYDROMORPHONE HYDROCHLORIDE 4 MG: 4 TABLET ORAL at 10:23

## 2022-01-01 ASSESSMENT — ACTIVITIES OF DAILY LIVING (ADL)
TOILETING_ISSUES: NO
ADLS_ACUITY_SCORE: 31
ADLS_ACUITY_SCORE: 34
ADLS_ACUITY_SCORE: 31
ADLS_ACUITY_SCORE: 27
ADLS_ACUITY_SCORE: 35
ADLS_ACUITY_SCORE: 31
ADLS_ACUITY_SCORE: 31
EATING: 0-->ASSISTANCE NEEDED (DEVELOPMENTALLY APPROPRIATE)
ADLS_ACUITY_SCORE: 27
ADLS_ACUITY_SCORE: 31
ADLS_ACUITY_SCORE: 31
ADLS_ACUITY_SCORE: 28
ADLS_ACUITY_SCORE: 27
ADLS_ACUITY_SCORE: 34
ADLS_ACUITY_SCORE: 34
TOILETING_ISSUES: NO
ADLS_ACUITY_SCORE: 34
CHANGE_IN_FUNCTIONAL_STATUS_SINCE_ONSET_OF_CURRENT_ILLNESS/INJURY: YES
FALL_HISTORY_WITHIN_LAST_SIX_MONTHS: NO
ADLS_ACUITY_SCORE: 34
CHANGE_IN_FUNCTIONAL_STATUS_SINCE_ONSET_OF_CURRENT_ILLNESS/INJURY: NO
ADLS_ACUITY_SCORE: 31
DIFFICULTY_COMMUNICATING: NO
ADLS_ACUITY_SCORE: 31
ADLS_ACUITY_SCORE: 31
ADLS_ACUITY_SCORE: 34
WEAR_GLASSES_OR_BLIND: YES
EATING: 0-->INDEPENDENT
DIFFICULTY_EATING/SWALLOWING: YES
DRESSING/BATHING_DIFFICULTY: NO
ADLS_ACUITY_SCORE: 31
ADLS_ACUITY_SCORE: 27
ADLS_ACUITY_SCORE: 28
SWALLOWING: 2-->DIFFICULTY SWALLOWING LIQUIDS/FOODS
ADLS_ACUITY_SCORE: 34
ADLS_ACUITY_SCORE: 28
ADLS_ACUITY_SCORE: 34
ADLS_ACUITY_SCORE: 34
WALKING_OR_CLIMBING_STAIRS_DIFFICULTY: YES
ADLS_ACUITY_SCORE: 34
ADLS_ACUITY_SCORE: 34
DOING_ERRANDS_INDEPENDENTLY_DIFFICULTY: YES
SWALLOWING: 2-->DIFFICULTY SWALLOWING LIQUIDS/FOODS
ADLS_ACUITY_SCORE: 34
DIFFICULTY_EATING/SWALLOWING: YES
ADLS_ACUITY_SCORE: 34
ADLS_ACUITY_SCORE: 31
ADLS_ACUITY_SCORE: 28
VISION_MANAGEMENT: ALL THE TIME
ADLS_ACUITY_SCORE: 27
ADLS_ACUITY_SCORE: 34
HEARING_DIFFICULTY_OR_DEAF: NO
ADLS_ACUITY_SCORE: 27
ADLS_ACUITY_SCORE: 34
ADLS_ACUITY_SCORE: 34
EATING/SWALLOWING: EATING;SWALLOWING SOLID FOOD;SWALLOWING LIQUIDS
ADLS_ACUITY_SCORE: 27
ADLS_ACUITY_SCORE: 34
ADLS_ACUITY_SCORE: 28
ADLS_ACUITY_SCORE: 27
ADLS_ACUITY_SCORE: 34
WALKING_OR_CLIMBING_STAIRS_DIFFICULTY: NO
WALKING_OR_CLIMBING_STAIRS: AMBULATION DIFFICULTY, ASSISTANCE 1 PERSON;STAIR CLIMBING DIFFICULTY, ASSISTANCE 1 PERSON;TRANSFERRING DIFFICULTY, ASSISTANCE 1 PERSON
ADLS_ACUITY_SCORE: 31
ADLS_ACUITY_SCORE: 34
ADLS_ACUITY_SCORE: 31
ADLS_ACUITY_SCORE: 27
ADLS_ACUITY_SCORE: 31
CONCENTRATING,_REMEMBERING_OR_MAKING_DECISIONS_DIFFICULTY: NO
ADLS_ACUITY_SCORE: 28
ADLS_ACUITY_SCORE: 27
ADLS_ACUITY_SCORE: 27
WEAR_GLASSES_OR_BLIND: YES
ADLS_ACUITY_SCORE: 31
ADLS_ACUITY_SCORE: 27
ADLS_ACUITY_SCORE: 34
ADLS_ACUITY_SCORE: 34
ADLS_ACUITY_SCORE: 35
ADLS_ACUITY_SCORE: 27
ADLS_ACUITY_SCORE: 31
FALL_HISTORY_WITHIN_LAST_SIX_MONTHS: NO
ADLS_ACUITY_SCORE: 27
ADLS_ACUITY_SCORE: 27
CONCENTRATING,_REMEMBERING_OR_MAKING_DECISIONS_DIFFICULTY: YES
ADLS_ACUITY_SCORE: 28
ADLS_ACUITY_SCORE: 28
DRESSING/BATHING_DIFFICULTY: NO
ADLS_ACUITY_SCORE: 34
ADLS_ACUITY_SCORE: 31
ADLS_ACUITY_SCORE: 28
ADLS_ACUITY_SCORE: 34
ADLS_ACUITY_SCORE: 34
ADLS_ACUITY_SCORE: 31
ADLS_ACUITY_SCORE: 27
ADLS_ACUITY_SCORE: 31
ADLS_ACUITY_SCORE: 28
ADLS_ACUITY_SCORE: 34
ADLS_ACUITY_SCORE: 27
ADLS_ACUITY_SCORE: 34
DOING_ERRANDS_INDEPENDENTLY_DIFFICULTY: YES

## 2022-01-01 ASSESSMENT — ENCOUNTER SYMPTOMS
FEVER: 0
ABDOMINAL DISTENTION: 1
AGITATION: 0
WHEEZING: 0
FACIAL SWELLING: 0
EYE PAIN: 0
SORE THROAT: 0
STRIDOR: 0
FLANK PAIN: 0
NECK PAIN: 0
SEIZURES: 0
ABDOMINAL PAIN: 1
VOMITING: 1
APPETITE CHANGE: 1
ACTIVITY CHANGE: 1
TREMORS: 0
NAUSEA: 1
BACK PAIN: 0

## 2022-01-01 ASSESSMENT — PATIENT HEALTH QUESTIONNAIRE - PHQ9
SUM OF ALL RESPONSES TO PHQ QUESTIONS 1-9: 13
SUM OF ALL RESPONSES TO PHQ QUESTIONS 1-9: 8
SUM OF ALL RESPONSES TO PHQ QUESTIONS 1-9: 13
10. IF YOU CHECKED OFF ANY PROBLEMS, HOW DIFFICULT HAVE THESE PROBLEMS MADE IT FOR YOU TO DO YOUR WORK, TAKE CARE OF THINGS AT HOME, OR GET ALONG WITH OTHER PEOPLE: SOMEWHAT DIFFICULT
SUM OF ALL RESPONSES TO PHQ QUESTIONS 1-9: 13
SUM OF ALL RESPONSES TO PHQ QUESTIONS 1-9: 8

## 2022-01-01 ASSESSMENT — PAIN SCALES - GENERAL
PAINLEVEL: MILD PAIN (3)
PAINLEVEL: MODERATE PAIN (4)
PAINLEVEL: NO PAIN (0)
PAINLEVEL: MODERATE PAIN (5)
PAINLEVEL: NO PAIN (0)
PAINLEVEL: NO PAIN (0)
PAINLEVEL: MODERATE PAIN (5)
PAINLEVEL: MILD PAIN (3)

## 2022-01-01 NOTE — PROGRESS NOTES
Mother and father in-and-out throughout shift; plan of care reviewed and infant status update given.  Parents updated at length at bedside by DAVE Mejia.  Parents expressed concern about lodging near hospital - voicemail left for KELLIE Luis.    Infant remains on 4L VT.  Infant received on 36% FiO2; able to quickly wean to 21%.  Remained at 21-23% for remainder of shift.  WOB remains comfortable; RR <60.  No apnea/bradycardia this shift.  Desaturations noted with agitation; infant is slow to recover to baseline.    R hand PIV remains intact with starter TPN infusing as ordered.  Infant tolerating q3h feeds of Similac Advance 20 via NGT.  Mother pumping; colostrum drops used for oral care.  Voiding well; no stool thus far this shift.  See MAR for meds.  Weight loss = 30g.   Pt hand carried a letter for us to fax to [place] with her images when we were done. I faxed all images from today and faxed the letter to [place].

## 2022-01-14 NOTE — NURSING NOTE
Infusion Nursing Note:  Nadya Flanagan presents today for Zirabev cycle 18.    Patient seen by provider today: No   present during visit today: Not Applicable.    Note: N/A.      Intravenous Access:  Labs drawn without difficulty.  Implanted Port.    Treatment Conditions:  Lab Results   Component Value Date    HGB 12.4 01/14/2022    WBC 6.4 01/14/2022    ANEU 4.6 07/09/2021    ANEUTAUTO 3.6 01/14/2022     01/14/2022      Lab Results   Component Value Date     09/10/2021    POTASSIUM 4.4 09/10/2021    CR 1.18 01/14/2022    STELLA 9.4 09/10/2021    BILITOTAL 0.9 09/10/2021    ALBUMIN 4.1 09/10/2021    ALT 29 09/10/2021    AST 36 09/10/2021     Results reviewed, labs MET treatment parameters, ok to proceed with treatment.      Post Infusion Assessment:  Patient tolerated infusion without incident.  Blood return noted pre and post infusion.  Site patent and intact, free from redness, edema or discomfort.  No evidence of extravasations.  Access discontinued per protocol.  Biologic Infusion Post Education: Call the triage nurse at your clinic or seek medical attention if you have chills and/or temperature greater than or equal to 100.5, uncontrolled nausea/vomiting, diarrhea, constipation, dizziness, shortness of breath, chest pain, heart palpitations, weakness or any other new or concerning symptoms, questions or concerns.  You cannot have any live virus vaccines prior to or during treatment or up to 6 months post infusion.  If you have an upcoming surgery, medical procedure or dental procedure during treatment, this should be discussed with your ordering physician and your surgeon/dentist.  If you are having any concerning symptom, if you are unsure if you should get your next infusion or wish to speak to a provider before your next infusion, please call your care coordinator or triage nurse at your clinic to notify them so we can adequately serve you.       Discharge Plan:   Discharge  instructions reviewed with: Patient.  Patient and/or family verbalized understanding of discharge instructions and all questions answered.  AVS to patient via [employer].  Patient will return 3 weeks for next appointment.   Patient discharged in stable condition accompanied by: self.  Departure Mode: Ambulatory.      Steffany Urrutia RN

## 2022-01-24 NOTE — TELEPHONE ENCOUNTER
Patient has appointment for 3/9 but says she will need a refill of her BP medication before that so was either hoping to get in earlier or get a refill to get her to appointment. Thank you!    Arun Greene on 1/24/2022 at 10:58 AM

## 2022-01-25 NOTE — TELEPHONE ENCOUNTER
Spoke with patient regarding supplement. Informed patient that I have not heard of this particular supplement but did look into it. Some of the ingredients seem to be beneficial but I am cautious about using new supplements on the market. Patient will do some more research. We can definitely look into this more if it is something patient decides she would like to try.

## 2022-01-31 NOTE — TELEPHONE ENCOUNTER
States abdominal pain 4/10. Can be severe at times. Started about 2 weeks ago. Feels could be related to Lisinopril. Would need to speak with Meghan Pearce regarding Lisinopril and whether to stop. Advised to be seen ASAP for the abdominal pain.   Selena Solorio RN...........1/31/2022 1:29 PM

## 2022-01-31 NOTE — TELEPHONE ENCOUNTER
Nadya called and is having abdominal pain and stated that it is the same kind of pain that she was having before when she was admitted to the hospital.  Please call patient back asap.  [phone].    Thank you,  Juliane Shelby on 1/31/2022 at 11:41 AM

## 2022-01-31 NOTE — TELEPHONE ENCOUNTER
Patient would like a call back when you have time at   [phone]  Heavenly Mortensen LPN on 1/31/2022 at 3:38 PM

## 2022-02-02 PROBLEM — N18.31 CHRONIC KIDNEY DISEASE, STAGE 3A (H): Status: ACTIVE | Noted: 2022-01-01

## 2022-02-02 NOTE — NURSING NOTE
Patient requests telephone visit today for follow up on the Lisinopril prescription she originally was given by Dr Dent in [place]. She didn't take it today yet and skipped it yesterday. Feels better, except feels a bit constipated today.  Medication list reviewed.  Jeimy Santos CMA([facility])..................2/2/2022   9:58 AM

## 2022-02-02 NOTE — PROGRESS NOTES
Nadya is a 71 year old who is being evaluated via a billable telephone visit.      What phone number would you like to be contacted at? [phone]  How would you like to obtain your AVS? MyChart    Assessment & Plan     Borderline blood pressure  -At this time we discussed that her abdominal discomfort may be related to angioedema of the GI tract.  She is to stay off of lisinopril.  We will trial amlodipine as needed for blood pressures the day before and day of her infusions.  If she finds she is needing this regularly she may need to take the pill on a daily basis.  Follow-up as scheduled at the end of the month.  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  Monitor for any worsening belly pain or other concerns.  Can use suppositories if this is not successful.    Follow-up in 3 weeks.    Meghan Pearce,   [facility]    Subjective   Nadya is a 71 year old who presents for the following health issues:    She has had some issues with low blood pressures.  In order for her to have her cancer treatment she does have to have a blood pressure less than 150/85.  Today it was 129/87.  She was started on lisinopril at the end of November however started having loose stools, abdominal discomfort, exhaustion and headaches.  After talking with similar people in reading she was curious if this was due to her lisinopril.  She stopped this yesterday and today and has found slight improvement.  She is however now concerned because she will struggle to control her blood pressure for her infusions.    History of Present Illness       Hypertension: She presents for follow  up of hypertension.  She does check blood pressure  regularly outside of the clinic. Outpatient blood pressures have not been over 140/90. She follows a low salt diet.     She eats 4 or more servings of fruits and vegetables daily.She consumes 3 sweetened beverage(s) daily.She exercises with enough effort to increase her heart rate 9 or less minutes per day.  She exercises with enough effort to increase her heart rate 3 or less days per week.   She is taking medications regularly.           Review of Systems   Denies any fevers, chills.         Objective    Vitals - Patient Reported  Systolic (Patient Reported): (!) 161  Diastolic (Patient Reported): (!) 107  Pain Score: No Pain (0)        Physical Exam   healthy, alert and no distress  PSYCH: Alert and oriented times 3; coherent speech, normal   rate and volume, able to articulate logical thoughts, able   to abstract reason, no tangential thoughts, no hallucinations   or delusions  Her affect is normal  RESP: No cough, no audible wheezing, able to talk in full sentences  Remainder of exam unable to be completed due to telephone visits        Phone call duration: 12 minutes

## 2022-02-04 NOTE — NURSING NOTE
Infusion Nursing Note:  Nadya Flanagan presents today for Zirabev cycle 19.    Patient seen by provider today: No   present during visit today: Not Applicable.    Note: N/A.      Intravenous Access:  Labs drawn without difficulty.  Implanted Port.    Treatment Conditions:  Lab Results   Component Value Date    HGB 12.2 02/04/2022    WBC 6.2 02/04/2022    ANEU 4.6 07/09/2021    ANEUTAUTO 3.6 02/04/2022     02/04/2022      Lab Results   Component Value Date     09/10/2021    POTASSIUM 4.4 09/10/2021    CR 0.99 02/04/2022    STELLA 9.4 09/10/2021    BILITOTAL 0.9 09/10/2021    ALBUMIN 4.1 09/10/2021    ALT 29 09/10/2021    AST 36 09/10/2021     Results reviewed, labs MET treatment parameters, ok to proceed with treatment.      Post Infusion Assessment:  Patient tolerated infusion without incident.  Blood return noted pre and post infusion.  Site patent and intact, free from redness, edema or discomfort.  Access discontinued per protocol.  Biologic Infusion Post Education: Call the triage nurse at your clinic or seek medical attention if you have chills and/or temperature greater than or equal to 100.5, uncontrolled nausea/vomiting, diarrhea, constipation, dizziness, shortness of breath, chest pain, heart palpitations, weakness or any other new or concerning symptoms, questions or concerns.  You cannot have any live virus vaccines prior to or during treatment or up to 6 months post infusion.  If you have an upcoming surgery, medical procedure or dental procedure during treatment, this should be discussed with your ordering physician and your surgeon/dentist.  If you are having any concerning symptom, if you are unsure if you should get your next infusion or wish to speak to a provider before your next infusion, please call your care coordinator or triage nurse at your clinic to notify them so we can adequately serve you.       Discharge Plan:   Discharge instructions reviewed with:  Patient.  Patient and/or family verbalized understanding of discharge instructions and all questions answered.  Copy of AVS reviewed with patient and/or family.  Patient will return 3 weeks for next appointment.  Patient discharged in stable condition accompanied by: self.  Departure Mode: Ambulatory.      Steffany Urrutia RN

## 2022-02-22 NOTE — TELEPHONE ENCOUNTER
She would like a phone visit, if labs are needed, she can do them later; has lab appointment Friday, 2/25/22.   Appointment type changed.  Will cover these questions at visit:  Blood pressure, kidney disease, toe fungus.   Jeimy Santos CMA([facility])..................2/22/2022   12:21 PM

## 2022-02-22 NOTE — TELEPHONE ENCOUNTER
Portia-patient would like to do a telephone visit instead of in person for up coming appt on 02.24.22 patent does know Portia is not in clinic 02.22.22    Please call and advise    Thank You    Natasha Walker on 2/22/2022 at 12:06 PM

## 2022-02-24 NOTE — PROGRESS NOTES
Nadya is a 71 year old who is being evaluated via a billable telephone visit.      What phone number would you like to be contacted at? [phone]  How would you like to obtain your AVS? MyChart    Assessment & Plan     Borderline blood pressure  -Blood pressure appears to have improved at this time.  She is to monitor for worsening should she need antihistamines in the future    Chronic kidney disease, stage 3a (H)  -Reassurance given.  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    Follow-up after her PET scan is complete    Meghan Pearce,   [facility]    Subjective   Nadya is a 71 year old who presents for the following health issues:    She has been having some issues with elevated blood pressures prior to her infusions.  Most recently her blood pressure has been much better controlled and she reports reading of 118/77.  She did stop some antihistamines and has found that perhaps this was the cause.    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  She was supposed to have a PET scan however this got canceled because of weather.  She has rescheduled later in March.    She has noted chronic kidney disease stage IIIa.  She does have questions regarding this diagnosis and we discussed in depth today.      History of Present Illness       CKD: She uses over the counter pain medication, including Tylenol, Voltaren, a few times a week.    Hypertension: She presents for follow up of hypertension.  She regularly outside of the clinic. Outside blood pressures have been over 140/90. She follows a low salt diet.     Hypothyroidism:     Since last visit, patient describes the following symptoms::  Fatigue, Loose stools and Weight loss    Weight loss::  6-10 lbs.  Reason for  visit:  Review labs  and PET scan, discuss BP and kidney diagnosis, re  Symptom onset:  More than a month  Symptom progression:  Staying the same  Had these symptoms before:  Yes  Has tried/received treatment for these symptoms:  Yes  Previous treatment was successful:  Yes  Prior treatment description:  Chemo    She eats 2-3 servings of fruits and vegetables daily.She consumes 2 sweetened beverage(s) daily.She exercises with enough effort to increase her heart rate 20 to 29 minutes per day.  She exercises with enough effort to increase her heart rate 4 days per week.   She is taking medications regularly.     PHQ-2 Score: 1    Review of Systems   She denies any fevers or chills      Objective             Physical Exam   healthy, alert and no distress  PSYCH: Alert and oriented times 3; coherent speech, normal   rate and volume, able to articulate logical thoughts, able   to abstract reason, no tangential thoughts, no hallucinations   or delusions  Her affect is normal  RESP: No cough, no audible wheezing, able to talk in full sentences  Remainder of exam unable to be completed due to telephone visits            Phone call duration: 15 minutes

## 2022-02-25 NOTE — NURSING NOTE
Infusion Nursing Note:  Nadya Flanagan presents today for Zirabev cycle 20.    Patient seen by provider today: No   present during visit today: Not Applicable.    Note: N/A.      Intravenous Access:  Labs drawn without difficulty.  Implanted Port.    Treatment Conditions:  Lab Results   Component Value Date    HGB 11.9 02/25/2022    WBC 6.4 02/25/2022    ANEU 4.6 07/09/2021    ANEUTAUTO 3.8 02/25/2022     02/25/2022      Results reviewed, labs MET treatment parameters, ok to proceed with treatment.  Urine negative for protein.      Post Infusion Assessment:  Patient tolerated infusion without incident.  Blood return noted pre and post infusion.  Site patent and intact, free from redness, edema or discomfort.  No evidence of extravasations.  Access discontinued per protocol.       Discharge Plan:   Discharge instructions reviewed with: Patient.  Patient and/or family verbalized understanding of discharge instructions and all questions answered.  AVS to patient via "[employer]HART.  Patient will return 21 days for next appointment.   Patient discharged in stable condition accompanied by: self.  Departure Mode: Ambulatory.      Jean S. Hammann, RN

## 2022-03-04 NOTE — TELEPHONE ENCOUNTER
Appointment given  Jeimy Santos CMA([facility])..................3/4/2022   1:06 PM

## 2022-03-04 NOTE — NURSING NOTE
Patient requests a video visit today for vaccine reaction. She had her 4th vaccine on Monday and woke up in bad shape on Tuesday. She is feeling a little bit better today, but still has abdominal cramping.  Medication list reviewed.  Jeimy Santos CMA([facility])..................3/4/2022   1:10 PM

## 2022-03-04 NOTE — PROGRESS NOTES
"Nadya is a 71 year old who is being evaluated via a billable video visit.      How would you like to obtain your AVS? MyChart  If the video visit is dropped, the invitation should be resent by: Send to e-mail at: [email or web address]  Will anyone else be joining your video visit? No    Video Start Time: 1:31 PM    Nursing Notes:   Jeimy Santos CMA  3/4/2022  1:13 PM  Signed  Patient requests a video visit today for vaccine reaction. She had her 4th vaccine on Monday and woke up in bad shape on Tuesday. She is feeling a little bit better today, but still has abdominal cramping.  Medication list reviewed.  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 Reassurance given      Other Visit Diagnoses     Abdominal distention        Relevant Orders    US Abdomen Complete - patient may cancel if she starts to feel better over the weekend. Reassurance given         Ordering of each unique test  32 minutes spent on the date of the encounter doing chart review, history and exam, documentation and further activities per the note     Return if symptoms worsen or fail to improve.    Marge Maldonado NP  [facility]    Subjective   Nadya is a 71 year old who presents for the following health issues     Medication Problem    Patient had her 4th COVID vaccine/booster on this past Monday and reports that Tuesday she woke up in \"bad shape\". She started oral chemo on Friday prior in addition to an infusion for her cancer. She has never had a reaction to her chemo such as this so she attributes her symptoms with the COVID booster.  She feels she has improved, has had no vomiting since Wednesday morning. She still has nausea " "related to abdominal pain.    She feels she has visual changes, is due for cataract surgery but it has been prolonged due to COVID. She feels her vision has been more blurry for past couple of days. She denies any fever. Reports she had a solid stool with a \"gush\" of diarrhea afterwards. She is able to keep some fluids down and has been able to eat very small amounts.    Meds from Friday in relation to her cancer are as follows:  bevacizumab-bvzr (ZIRABEV) 500 mg in sodium chloride 0.9 % 120 mL infusion  Take 2 tablets (1,000 mg) by mouth 2 times daily Take on days 1-14 of 21 day cycle. Start day 1 of each cycle    She has concerns for ascites and would like an ultrasound to ensure there is no fluid buildup.    Review of Systems   Constitutional, HEENT, cardiovascular, pulmonary, gi and gu systems are negative, except as otherwise noted.      Objective       Vitals:  No vitals were obtained today due to virtual visit.    Physical Exam   GENERAL: Healthy, alert and no distress  PSYCH: Mentation appears normal, affect normal/bright, judgement and insight intact, normal speech and appearance well-groomed.    Hospital Outpatient Visit on 02/25/2022   Component Date Value Ref Range Status     CEA 02/25/2022 <3.0  0.0 - 3.0 ng/mL Final     Creatinine 02/25/2022 0.98  0.60 - 1.20 mg/dL Final     GFR Estimate 02/25/2022 61  >60 mL/min/1.73m2 Final    Effective December 21, 2021 eGFRcr in adults is calculated using the 2021 CKD-EPI creatinine equation which includes age and gender (Chevy et al., NEJ, DOI: 10.1056/MPZQdm8100903)     Protein Albumin Urine 02/25/2022 Negative  Negative mg/dL Final     WBC Count 02/25/2022 6.4  4.0 - 11.0 10e3/uL Final     RBC Count 02/25/2022 3.35 (A) 3.80 - 5.20 10e6/uL Final     Hemoglobin 02/25/2022 11.9  11.7 - 15.7 g/dL Final     Hematocrit 02/25/2022 35.6  35.0 - 47.0 % Final     MCV 02/25/2022 106 (A) 78 - 100 fL Final     MCH 02/25/2022 35.5 (A) 26.5 - 33.0 pg Final     [facility] 02/25/2022 " 33.4  31.5 - 36.5 g/dL Final     RDW 02/25/2022 17.9 (A) 10.0 - 15.0 % Final     Platelet Count 02/25/2022 220  150 - 450 10e3/uL Final     % Neutrophils 02/25/2022 57  % Final     % Lymphocytes 02/25/2022 22  % Final     % Monocytes 02/25/2022 16  % Final     % Eosinophils 02/25/2022 3  % Final     % Basophils 02/25/2022 1  % Final     % Immature Granulocytes 02/25/2022 1  % Final     NRBCs per 100 WBC 02/25/2022 0  <1 /100 Final     Absolute Neutrophils 02/25/2022 3.8  1.6 - 8.3 10e3/uL Final     Absolute Lymphocytes 02/25/2022 1.4  0.8 - 5.3 10e3/uL Final     Absolute Monocytes 02/25/2022 1.0  0.0 - 1.3 10e3/uL Final     Absolute Eosinophils 02/25/2022 0.2  0.0 - 0.7 10e3/uL Final     Absolute Basophils 02/25/2022 0.0  0.0 - 0.2 10e3/uL Final     Absolute Immature Granulocytes 02/25/2022 0.0  <=0.4 10e3/uL Final     Absolute NRBCs 02/25/2022 0.0  10e3/uL Final           Video-Visit Details    Type of service:  Video Visit    Video End Time:1342    Originating Location (pt. Location): Home    Distant Location (provider location):  [facility]     Platform used for Video Visit: [employer]

## 2022-03-18 NOTE — NURSING NOTE
Infusion Nursing Note:  Nadya Flanagan presents today for Zirabev cycle 21.    Patient seen by provider today: No   present during visit today: Not Applicable.    Note: N/A.    Intravenous Access:  Labs drawn without difficulty.  Implanted Port.    Treatment Conditions:  Lab Results   Component Value Date    HGB 11.3 (L) 03/18/2022    WBC 7.8 03/18/2022    ANEU 4.6 07/09/2021    ANEUTAUTO 5.2 03/18/2022     03/18/2022      Lab Results   Component Value Date     09/10/2021    POTASSIUM 4.4 09/10/2021    CR 0.94 03/18/2022    STELLA 9.4 09/10/2021    BILITOTAL 0.9 09/10/2021    ALBUMIN 4.1 09/10/2021    ALT 29 09/10/2021    AST 36 09/10/2021     Results reviewed, labs MET treatment parameters, ok to proceed with treatment.  Urine <20 mg/dl.    Post Infusion Assessment:  Patient tolerated infusion without incident.  Blood return noted pre and post infusion.  Site patent and intact, free from redness, edema or discomfort.  No evidence of extravasations.  Access discontinued per protocol.     Discharge Plan:   Patient and/or family verbalized understanding of discharge instructions and all questions answered.  Copy of AVS reviewed with patient and/or family.  Patient will return 4/8/22 for next appointment.  Patient discharged in stable condition accompanied by: self.  Departure Mode: Ambulatory.    Brenda J. Goodell, RN

## 2022-03-18 NOTE — PROGRESS NOTES
Oral Chemotherapy Monitoring Program  Lab Follow Up    Reviewed lab results from 3/18/22.    ORAL CHEMOTHERAPY 2/4/2022 3/18/2022   Assessment Type Lab Monitoring Lab Monitoring   Diagnosis Code Appendix Cancer Appendix Cancer   Providers Bijan Thakkar   Clinic Name/Location [facility]   Drug Name Xeloda (capecitabine) Xeloda (capecitabine)   Dose 1,000 mg 1,000 mg   Current Schedule BID BID   Cycle Details 2 weeks on, 1 week off 2 weeks on, 1 week off   Adverse Effects - No AE identified during assessment       Labs:  No results found for NA within last 30 days.     No results found for K within last 30 days.     No results found for CA within last 30 days.     No results found for Mag within last 30 days.     No results found for Phos within last 30 days.     No results found for ALBUMIN within last 30 days.     No results found for BUN within last 30 days.     _  Result Component Current Result Ref Range   Creatinine 0.94 (3/18/2022) 0.60 - 1.20 mg/dL     No results found for AST within last 30 days.     No results found for ALT within last 30 days.     No results found for BILITOTAL within last 30 days.     _  Result Component Current Result Ref Range   WBC Count 7.8 (3/18/2022) 4.0 - 11.0 10e3/uL     _  Result Component Current Result Ref Range   Hemoglobin 11.3 (L) (3/18/2022) 11.7 - 15.7 g/dL     _  Result Component Current Result Ref Range   Platelet Count 249 (3/18/2022) 150 - 450 10e3/uL     No results found for ANC within last 30 days.     _  Result Component Current Result Ref Range   Absolute Neutrophils 5.2 (3/18/2022) 1.6 - 8.3 10e3/uL        Assessment & Plan:  No concerning abnormalities.    Follow-Up:  3/23: appt with Dr. Bijan Piña, PharmD, MS  Hematology/Oncology Clinical Pharmacist  [facility]  [phone]

## 2022-03-23 NOTE — NURSING NOTE
"Chief Complaint   Patient presents with     Oncology Clinic Visit     follow up PET scan 3/16         Initial /86   Pulse 88   Temp (!) 96.7  F (35.9  C) (Tympanic)   Resp 16   Ht 1.613 m (5' 3.5\")   Wt 61.5 kg (135 lb 8 oz)   SpO2 97%   Breastfeeding No   BMI 23.63 kg/m   Estimated body mass index is 23.63 kg/m  as calculated from the following:    Height as of this encounter: 1.613 m (5' 3.5\").    Weight as of this encounter: 61.5 kg (135 lb 8 oz).         Norma J. Gosselin, JAZLYN   "

## 2022-03-23 NOTE — PROGRESS NOTES
Visit Date: 03/23/2022    HISTORY OF PRESENT ILLNESS:  Mrs. 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  For details, see previous notes.  She was initially started with 5-FU, leucovorin, Avastin and irinotecan.  She received her first cycle on 01/06/2021.  Given the fact she found it very uncomfortable using infusional 5-FU, we elected to switch her to capecitabine.  When we saw her on 01/13/2021, we switched her to capecitabine 1000 p.o. b.i.d. on days 1-14 and bevacizumab 7.5 mg/kg on day 1 of a 21-day cycle.  She received 2 cycles.  After 8 cycles and tolerating chemotherapy relatively well, she went on to complete 6 cycles of capecitabine and bevacizumab with the last cycle being on 05/07/2021.  Her major complaint was related to an isolated episode of diarrhea associated with abdominal pain on day 14 of Xeloda.  Otherwise she had no other complaints.  She went on to have a PET scan on 05/19/2021.  There was no significant interval change.  On PET scan appearance 12/09/2020, there was no associated hypermetabolism.  There was omental stranding and nodularity in the omental right lower quadrant, which was similar to the past.  No associated hypermetabolism.  There was nonspecific activity at the ileocecal valve as well as mild asymmetric activity in the right lingual tonsil.  According to the patient, she did not have an ileocecal valve.  She had previous surgery surgery.  She had had a tonsillectomy.  She wanted the scans to be read by Dr. Speedy Menezes, who noted that at the worst they were stable with no evidence of uptake, and therefore there was no evidence of disease.  The patient was tolerating capecitabine, or Xeloda, and Avastin.  In the interim, she continued to receive Xeloda and Avastin.  She went on to complete 12 cycles of Xeloda and Avastin.  She had imaging studies done including a PET scan 09/29/2021, which was read as  activity noted at the ileocecal surgical anastomosis.  We discussed the case with Dr. Ananda Sinha, who felt this was postoperative change and had not really changed from previous PET scan results.  There was also no hypermetabolic implant disease with omental mesenteric stranding that was present, which Dr. Sinha felt was likely postsurgical scarring as it was not hypermetabolic.  The patient therefore at worst had stable disease.  She said she was tolerating chemotherapy well.  She gets significant neuropathy in her feet when she walks, but this is tolerable.  She also feels like she has a nodular right lower quadrant, but she said this has been present before.  No diarrhea, no hand-foot symptoms.  When we saw her last on 10/01/2021, we felt she had stable disease.  Plan was to restage after 18 cycles with PET scan.  She went on to complete 20 cycles and had a PET scan done on 03/16/2021, and the findings were that there was no evidence of abnormal FDG uptake that would suggest residual or recurrent disease.  There was a small-volume ascites since the previous PET/CT.  There appeared to be localized inflammation a the small-bowel loop located posteriorly in the upper pelvis.  We discussed the case with Dr. Marshall of Radiology, who felt there was no evidence of PET-avid disease.  She felt the patient had no evidence of recurrence of metastatic disease.  The patient is concerned, because she states she feels a mass in the right lower quadrant that has been present over the last month or so.  She is convinced it is a tumor We had the PET scan reviewed again by Radiology, Dr. Marshall, and there was no evidence of a subcutaneous mass or evidence of a tumor in the right lower quadrant.  In the past, she had uptake in the right pericolic gutter, and this has disappeared.  Otherwise, other than the occasional abdominal discomfort in the right lower quadrant, she offers no complaints of shortness of breath,  fevers, night sweats, weight loss, bright red blood per rectum or diarrhea.  Overall, she is tolerating capecitabine and pembrolizumab.    PHYSICAL EXAMINATION:    GENERAL:  She is a well-developed, well-nourished white female, in no acute distress.  ECOG performance status is 0.  VITAL SIGNS:  Reveal blood pressure 134/86, pulse 88, respirations 16, temperature 96.7.  HEENT:  Atraumatic, normocephalic.  Oropharynx nonerythematous.  NECK:  Supple.  LUNGS:  Clear to auscultation and percussion.  HEART:  Regular rhythm.  S1, S2 normal.  ABDOMEN:  Soft.  There is a palpable lipomatous mass in the right lower quadrant, approximately less than 2 cm, nontender.  No rebound.  Normoactive bowel sounds.  LYMPHATICS:  No cervical, supraclavicular, axillary or inguinal nodes.  EXTREMITIES:  No edema.  NEUROLOGIC:  Nonfocal.    LABORATORY DATA:  Reveal CBC -- white count 7.8, H and H 11.3 and 33.6.  Platelet count is 249.  CEA is less than 3.  TSH is 8.14.  Free T4 is 0.83.    ASSESSMENT AND PLAN:    1.  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 Speedy Menezes at the [facility].  The plan was to initiate FOLFOX chemotherapy.  The patient completed 2 cycles.  Course was complicated by neutropenia.  The patient went on to receive 4 cycles, last 2 requiring 20% dose reduction due to neutropenia.  She was staged with no evidence of disease after 8 cycles.  CT abdomen and pelvis was stable.  PET scan indicated no evidence of disease.  Repeat scans indicate progression of disease.  Diagnostic laparoscopy revealed numerous peritoneal implants with biopsy consistent with poorly differentiated adenocarcinoma metastatic to peritoneum consistent with recurrence of disease.  We discussed the case with Dr. Speedy Menezes of Medical Oncology at  the [facility], who agreed the patient will be a candidate for FOLFIRI and bevacizumab.  Plan was to treat her with 6 cycles and then restage.  MSI testing came back intact; therefore, the patient would not be a candidate for pembrolizumab.  The patient received 3 cycles on a every-14-day schedule.  Performance status was worse in the second week.  We elected to change her cycle to every 21 days with FOLFIRI and bevacizumab.  Bevacizumab was given at a dose of 7.5 mg/kg every 21 days.  We did reduce her chemotherapy by 20%.  Course was complicated by neutropenia, requiring dose reduction in 5-FU infusion as well as bolus.  Plan was to proceed with 6 cycles.  Staging studies at the [facility] indicated stable peritoneal disease.  As per Dr. Speedy Menezes, the patient would be a candidate for maintenance 5-FU, leucovorin and Avastin.  PET scan indicated some improvement in disease.  The patient did consult with General Surgery and was not interested in diagnostic laparoscopy.  Therefore, we initiated maintenance chemotherapy as per Dr. Speedy Menezes with 5-FU, leucovorin and bevacizumab.  We omitted the irinotecan.  She received 2 cycles of 5-FU, leucovorin and bevacizumab.  She could not tolerate the infusion of 5-FU.  We elected to switch her to capecitabine 1000 p.o. b.i.d. on days 1-14 and bevacizumab 7.5 mg/kg on day 1 of a 21-day cycle.  After 6 cycles, she had a PET scan, which revealed no evidence of hypermetabolism and, at worst, stable disease.  The patient went on to complete 12 cycles of capecitabine and bevacizumab and still had evidence of stable disease.  The patient went on to complete 20 cycles of bevacizumab and capecitabine.  PET scan was essentially negative for metastatic disease.  There is no evidence of hypermetabolic disease or recurrence in the peritoneum.  There was new onset ascites, but this was not hypermetabolic.  Therefore, the plan is to continue capecitabine and bevacizumab.  2.  Right  abdominal wall mass, rule out lipoma:  We will obtain ultrasound of the abdomen to rule out lipoma of the right lower quadrant, otherwise continue as above.  We will see the patient after the ultrasound to discuss the results.  Otherwise, she will continue chemotherapy as planned.    TIME SPENT:  Seventy-two minutes were spent on this patient.  Time was spent reviewing multiple physician provider notes, lab results, imaging results, discussing imaging results with Radiology, Dr. Marshall, performing history and physical, documenting history and physical, and ordering followup labs and scans.    Alicia Thakkar MD        D: 2022   T: 2022   MT: [facility]    Name:     ALMA CORTES  MRN:      [id number]        Account:    [id number]   :      1950           Visit Date: 2022     Document: [id number]    cc:  MD Meghan Nowak,

## 2022-03-24 PROBLEM — M25.512 ACUTE PAIN OF LEFT SHOULDER: Status: RESOLVED | Noted: 2020-05-31 | Resolved: 2022-01-01

## 2022-03-24 PROBLEM — Z91.81 PERSONAL HISTORY OF FALL: Status: RESOLVED | Noted: 2020-06-17 | Resolved: 2022-01-01

## 2022-03-24 PROBLEM — R10.11 RIGHT UPPER QUADRANT PAIN: Status: RESOLVED | Noted: 2020-12-22 | Resolved: 2022-01-01

## 2022-03-24 NOTE — NURSING NOTE
Patient presents to clinic today for toe fungus. Also follow up on blood pressure. Also has a mass in her lower right abdomen. Dr Thakkar has ordered an Ultrasound. Scheduled on 4/5/22.    Medication reconciliation completed.    ACP on file? Yes, but has a new one to review.    FOOD SECURITY SCREENING QUESTIONS    The next two questions are to help us understand your food security.  If you are feeling you need any assistance in this area, we have resources available to support you today.    Hunger Vital Signs:  Within the past 12 months we worried whether our food would run out before we got money to buy more. Never  Within the past 12 months the food we bought just didn't last and we didn't have money to get more. Never    Jeimy Santos CMA([facility])..................3/24/2022   3:30 PM

## 2022-03-24 NOTE — PROGRESS NOTES
Assessment & Plan     Chronic kidney disease, stage 3a (H)  -Plan for recheck of her renal function with upcoming labs.  - Comprehensive Metabolic Panel; Future    Other ascites  -Small volume seen on PET scan may be physiologic versus indicative of underlining malignancy.  Will evaluate liver and renal function.  - Comprehensive Metabolic Panel; Future    Anemia, unspecified type  Plan for recheck of her blood counts with upcoming labs  - CBC W PLT No Diff; Future    Thyroid condition  We discussed today that if needed her levothyroxine would be a necessary medication.  She will plan to continue to hold until labs in 2 weeks however if her TSH and/or T4 are normal would recommend restarting.  - TSH Reflex GH; Future    Onychomycosis of toenail  We discussed options for her nails.  At this time given her underlining issues and insurance barriers likely the best option would be Vicks vapor rub    Essential hypertension  Blood pressure at home has averaged in the 120/70 range.  Here it is just slightly elevated.  Continue to monitor.    Malignant neoplasm metastatic to peritoneum (H)  We have a long discussion today regarding her malignancy.  She does ask about her prognosis.  We discussed awaiting the results of her upcoming ultrasound and repeating blood work.  Continue with infusion at this time.    Follow-up as needed after additional testing.    44 minutes spent on the date of the encounter doing chart review, history and exam, documentation and further activities as noted above      Meghan Pearce,   [facility]    Ashley Covington is a 71 year old who presents for the following health issues:    She has several concerns as listed below.  She reports today that she stopped several of her medications.  She has been holding her levothyroxine and stopped all of her vitamins.  She is taking her Xeloda, probiotic and MiraLAX.    She recently had a PET scan done and questions the  interpretation of it overall given prior comparison.  We discussed that her cancer is difficult to identify on PET scans.  She does have a lump in the right lower abdomen in the area where her cancer has previously been detected.  She does have an ultrasound scheduled for 2 weeks from now.    History of Present Illness       CKD: She uses over the counter pain medication, including Tylenol, Voltaren, a few times a week.    COPD:  She presents for follow up of COPD.  Overall, COPD symptoms are stable since last visit. She has more than usual fatigue or shortness of breath with exertion and no shortness of breath at rest.She often coughs and does have change in sputum. No recent fever. She can walk 2-5 blocks without stopping to rest. She can walk 1 flights of stairs without resting.The patient has had no ED, urgent care, or hospital admissions because of COPD since the last visit.     Hypertension: She presents for follow up of hypertension.  She does check blood pressure  regularly outside of the clinic. Outpatient blood pressures have not been over 140/90. She follows a low salt diet.     Hypothyroidism:     Since last visit, patient describes the following symptoms::  Constipation, Dry skin, Fatigue, Loose stools and Weight loss    Weight loss::  6-10 lbs.    Reason for visit:  Follow up on PET scan, dry eyes, diagnosed fungal infection, and other health issues  Symptom onset:  More than a month  Symptoms include:  Sneezing and sinus issues, abdominal pain, and fatigue  Symptom intensity:  Moderate  Symptom progression:  Worsening  Had these symptoms before:  Yes  Has tried/received treatment for these symptoms:  Yes  Previous treatment was successful:  No  What makes it worse:  Eating, exercise  What makes it better:  Drinking warm milk, warm baths, heating pad, warm compresses for eyes,    She eats 2-3 servings of fruits and vegetables daily.She consumes 4 sweetened beverage(s) daily.She exercises with enough  effort to increase her heart rate 20 to 29 minutes per day.  She exercises with enough effort to increase her heart rate 5 days per week.   She is taking medications regularly.     Review of Systems   Denies any fever or chills      Objective    BP (!) 138/90 (BP Location: Right arm, Patient Position: Sitting, Cuff Size: Adult Regular)   Pulse 108   Temp 98.1  F (36.7  C) (Tympanic)   Resp 16   Wt 61.3 kg (135 lb 3.2 oz)   SpO2 97%   BMI 23.57 kg/m    Body mass index is 23.57 kg/m .  Physical Exam   GEN: Vitals reviewed. Healthy appearing. Patient is in no acute distress. Cooperative with exam.  HEENT: Normocephalic atraumatic.  Eyes grossly normal to inspection.  No discharge or erythema, or obvious scleral/conjunctival abnormalities.   LUNGS: No audible wheeze, cough, or visible cyanosis.  No visible retractions or increased work of breathing.    SKIN: Warm and dry to touch.  Visible skin clear. No significant rash, abnormal pigmentation or lesions.

## 2022-04-08 NOTE — NURSING NOTE
Infusion Nursing Note:  Nadya ÁNGEL Panchito presents today for Zirabev.    Patient seen by provider today: No   present during visit today: Not Applicable.    Note: N/A.    Intravenous Access:  Labs drawn without difficulty.  Implanted Port.    Treatment Conditions:  Lab Results   Component Value Date    HGB 11.9 04/08/2022    WBC 7.7 04/08/2022    ANEU 4.6 07/09/2021    ANEUTAUTO 4.8 04/08/2022     04/08/2022      Lab Results   Component Value Date     04/08/2022    POTASSIUM 4.6 04/08/2022    CR 0.87 04/08/2022    STELLA 9.6 04/08/2022    BILITOTAL 0.4 04/08/2022    ALBUMIN 3.9 04/08/2022    ALT 24 04/08/2022    AST 34 04/08/2022     Results reviewed, labs MET treatment parameters, ok to proceed with treatment.  Urine protein <20 mg/dl.    Post Infusion Assessment:  Patient tolerated infusion without incident.  Blood return noted pre and post infusion.  Site patent and intact, free from redness, edema or discomfort.  No evidence of extravasations.  Access discontinued per protocol.     Discharge Plan:   Patient and/or family verbalized understanding of discharge instructions and all questions answered.  Copy of AVS reviewed with patient and/or family.  Patient will return 4/29/22 for next appointment.  Patient discharged in stable condition accompanied by: self.  Departure Mode: Ambulatory.    Brenda J. Goodell, RN

## 2022-04-11 NOTE — TELEPHONE ENCOUNTER
If patient feels she needs to be seen - ok to put in my schedule at 4pm otherwise we can order a CT of the abd/pelv and see if it can show better what is going on with the mass felt and then follow up after.    If she has recurrent/persistent vomiting and concerns for dehydration I would recommend being seen in the ER

## 2022-04-11 NOTE — TELEPHONE ENCOUNTER
S-(situation): patient calling and states continues to have this abdominal pain but is getting surges of it now    B-(background): patient has appendix cancer with mets, has history of hiatal hernia and hemangioma of liver.    A-(assessment): patient state abdominal pain constant 3-4 and when these surges that come on its 7/10 and last seconds.  State pain starts under sternum and then surges all the way down to lower abdominal.  States this morning it was so bad that she vomited green bile and had watery stool.  States can eat but then a couple hours later the surges start and they happen quite often.  States she has lost 17 lbs. Since this started  States that yesterday she had burning with urination and the last couple times has had protein urine sample, otherwise no other urine symptoms.  States last healthy BM was last Thursday.      R-(recommendations): informed patient will route message to Dr. Pearce for review and consideration.    Valery Flanagan RN on 4/11/2022 at 9:52 AM      Reason for Disposition    Vomiting bile (green color)    Additional Information    Negative: Passed out (i.e., fainted, collapsed and was not responding)    Negative: Shock suspected (e.g., cold/pale/clammy skin, too weak to stand, low BP, rapid pulse)    Negative: Sounds like a life-threatening emergency to the triager    Negative: Chest pain    Negative: Pain is mainly in upper abdomen (if needed ask: 'is it mainly above the belly button?')    Negative: Abdominal pain and pregnant > 20 weeks    Negative: Abdominal pain and pregnant < 20 weeks    Negative: SEVERE abdominal pain (e.g., excruciating)    Negative: Vomiting red blood or black (coffee ground) material    Negative: Bloody, black, or tarry bowel movements (Exception: chronic-unchanged black-grey bowel movements and is taking iron pills or Pepto-bismol)    Negative: Constant abdominal pain lasting > 2 hours    Answer Assessment - Initial Assessment Questions  1. LOCATION:  "\"Where does it hurt?\"       Starts under sternum and shoots down  2. RADIATION: \"Does the pain shoot anywhere else?\" (e.g., chest, back)      Upper abd all the way down  3. ONSET: \"When did the pain begin?\" (e.g., minutes, hours or days ago)       weeks  4. SUDDEN: \"Gradual or sudden onset?\"      gradual  5. PATTERN \"Does the pain come and go, or is it constant?\"     - If constant: \"Is it getting better, staying the same, or worsening?\"       (Note: Constant means the pain never goes away completely; most serious pain is constant and it progresses)      - If intermittent: \"How long does it last?\" \"Do you have pain now?\"      (Note: Intermittent means the pain goes away completely between bouts)      Come and goes quite frequently  6. SEVERITY: \"How bad is the pain?\"  (e.g., Scale 1-10; mild, moderate, or severe)    - MILD (1-3): doesn't interfere with normal activities, abdomen soft and not tender to touch     - MODERATE (4-7): interferes with normal activities or awakens from sleep, tender to touch     - SEVERE (8-10): excruciating pain, doubled over, unable to do any normal activities       7/10  7. RECURRENT SYMPTOM: \"Have you ever had this type of abdominal pain before?\" If so, ask: \"When was the last time?\" and \"What happened that time?\"       Yes, diagnosed with appendix cancer  8. CAUSE: \"What do you think is causing the abdominal pain?\"      unsure  9. RELIEVING/AGGRAVATING FACTORS: \"What makes it better or worse?\" (e.g., movement, antacids, bowel movement)      denies  10. OTHER SYMPTOMS: \"Has there been any vomiting, diarrhea, constipation, or urine problems?\"        States has not had healthy BM since last Thursday, vomited x 1 today, water BM this morning  11. PREGNANCY: \"Is there any chance you are pregnant?\" \"When was your last menstrual period?\"        n/a    Protocols used: ABDOMINAL PAIN - FEMALE-A-OH    "

## 2022-04-11 NOTE — NURSING NOTE
Patient presents to clinic today for follow up on abdominal pain and vomiting.     Medication reconciliation completed.    ACP on file? yes    FOOD SECURITY SCREENING QUESTIONS    The next two questions are to help us understand your food security.  If you are feeling you need any assistance in this area, we have resources available to support you today.    Hunger Vital Signs:  Within the past 12 months we worried whether our food would run out before we got money to buy more. Never  Within the past 12 months the food we bought just didn't last and we didn't have money to get more. Never    Jeimy Santos CMA([facility])..................4/11/2022   4:07 PM

## 2022-04-11 NOTE — TELEPHONE ENCOUNTER
Patient states that she will see Dr. Pearce today at 4 pm to discuss things because she know if things continue like this she will not be able to drink the dye for CT scan.    states she has not taken her oral chemo yet today as she has to eat before she take it.    Reviewed with Dr. Pearce and she states yes patient should try to eat something lite but question on when to take chemo medication should go through.    Spoke with Diana Alvarado and she states if patient is able to eat something and keep it down to take chemo med if not able missing a day will be fine.    Patient will continue her routine as she has but will follow up in ED if continues with vomiting.    Called and informed patient of above and patient verbalized understanding.  Patient will come in for appointment at 4 to see .    Valery Flanagan RN on 4/11/2022 at 11:05 AM

## 2022-04-11 NOTE — PROGRESS NOTES
Assessment & Plan     Hypothyroidism, unspecified type  Restarted on low-dose levothyroxine.  Plan for recheck in 8 to 12 weeks.  - levothyroxine (SYNTHROID/LEVOTHROID) 25 MCG tablet; Take 1 tablet (25 mcg) by mouth in the morning.    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  - Adult Oncology/Hematology Referral; Future  - oxyCODONE-acetaminophen (PERCOCET) 5-325 MG tablet; Take 0.5-2 tablets by mouth every 6 hours as needed for pain Max Tylenol of 4000mg daily    Right lower quadrant abdominal mass  -Given palpable mass with no obvious etiology on ultrasound we will obtain a CT of the abdomen and pelvis.  - CT Abdomen Pelvis w Contrast; Future    Contrast media allergy  Orders placed for contrast media allergy  - diphenhydrAMINE (BENADRYL ALLERGY) 25 MG capsule; Take 1 capsule (25 mg) by mouth once for 1 dose The night before CT image and then 2 hours prior to IV contrast injection  - methylPREDNISolone (MEDROL) 16 MG tablet; Take 1 tablet (16 mg) by mouth once for 1 dose 12 hours prior to the procedure with IV contrast    Nausea  Given ongoing nausea vomiting she is provided a new prescription for ondansetron  - ondansetron (ZOFRAN-ODT) 4 MG ODT tab; Take 1 tablet (4 mg) by mouth every 6 hours as needed for nausea    Right lower quadrant abdominal pain  With her significant pain we will provide pain medications.  She will need to follow-up for additional refills.  - oxyCODONE-acetaminophen (PERCOCET) 5-325 MG tablet; Take 0.5-2 tablets by mouth every 6 hours as needed for pain Max Tylenol of 4000mg daily     Depression Screening Follow Up    PHQ 4/11/2022   PHQ-9 Total Score 13   Q9: Thoughts of better off dead/self-harm past 2 weeks Not at all     Last PHQ-9 4/11/2022   1.  Little interest or pleasure in doing things 0   2.  Feeling down, depressed, or hopeless 2   3.  Trouble falling or staying  asleep, or sleeping too much 3   4.  Feeling tired or having little energy 3   5.  Poor appetite or overeating 3   6.  Feeling bad about yourself 0   7.  Trouble concentrating 1   8.  Moving slowly or restless 1   Q9: Thoughts of better off dead/self-harm past 2 weeks 0   PHQ-9 Total Score 13       Follow Up Actions Taken  Patient has experienced a recent significant life event.  Patient counseled, no additional follow up at this time.     We will follow up after her CT scan when more information is available.    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  She recently has not been feeling well and has been having increased abdominal pain.  She feels the pain is worse over the last 2 weeks however believes it has been present at least in some capacity since last October.  She has also been noted to have an increased palpable mass in the right lower quadrant.  She does feel the mass has increased in size.  Additionally she has been having some vomiting.  She does have some frustrations with her recent cancer care.    She recently had an ultrasound that was unable to identify the source of her palpable mass.  A CT of the abdomen and pelvis was recommended.    She has a history of hypothyroidism and has not recently been on levothyroxine.  With this her TSH is gradually increased.  We discussed today that she likely needs to restart this.    History of Present Illness       Mental Health Follow-up:                    Today's PHQ-9         PHQ-9 Total Score: 13  PHQ-9 Q9 Thoughts of better off dead/self-harm past 2 weeks :   (P) Not at all    How difficult have these problems made it for  you to do your work, take care of things at home, or get along with other people: Somewhat difficult        Reason for visit:  Abdominal pain, diarrhea and vomiting  Symptom onset:  More than a month  Symptoms include:  Worsening since last visit  Symptom intensity:  Moderate  Symptom progression:  Worsening  Had these symptoms before:  Yes  Has tried/received treatment for these symptoms:  Yes  Previous treatment was successful:  No  What makes it worse:  Food  What makes it better:  Hot milk, warm bath and heating pad    She eats 2-3 servings of fruits and vegetables daily.She consumes 0 sweetened beverage(s) daily.She exercises with enough effort to increase her heart rate 9 or less minutes per day.  She exercises with enough effort to increase her heart rate 3 or less days per week.   She is taking medications regularly.     Review of Systems   She denies any fevers.      Objective    /82 (BP Location: Right arm, Patient Position: Sitting, Cuff Size: Adult Regular)   Pulse 88   Temp 97.1  F (36.2  C) (Tympanic)   Resp 16   Wt 59.9 kg (132 lb)   SpO2 100%   BMI 23.02 kg/m    Body mass index is 23.02 kg/m .  Physical Exam   GEN: Vitals reviewed. Healthy appearing. Patient is in no acute distress. Cooperative with exam.  HEENT: Normocephalic atraumatic.  Eyes grossly normal to inspection.  No discharge or erythema, or obvious scleral/conjunctival abnormalities.   LUNGS: No audible wheeze, cough, or visible cyanosis.  No visible retractions or increased work of breathing.    SKIN: Warm and dry to touch.  Visible skin clear. No significant rash, abnormal pigmentation or lesions.

## 2022-04-11 NOTE — TELEPHONE ENCOUNTER
Pt states that she is in so much pain she is throwing up.  Please call KODY Morales on 4/11/2022 at 9:23 AM

## 2022-04-13 NOTE — PROGRESS NOTES
1.  Has the patient had a previous reaction to IV contrast? Yes - patient has had multiple CT scans with IV contrast and has been pre-medicated. Allergy happened years ago - that contrast is no longer used.    2.  Does the patient have kidney disease? no    3.  Is the patient on dialysis? no    If YES to any of these questions, exam will be reviewed with a Radiologist before administering contrast.    IV Contrast- Discharge Instructions After Your CT Scan      The IV contrast you received today will be filtered from your bloodstream by your kidneys during the next 24 hours and pass from the body in urine.  You will not be aware of this process and your urine will not change in color.  To help this process you should drink at least 4 additional glasses of water or juice today.  This reduces stress on your kidneys.    Most contrast reactions are immediate.  Should you develop symptoms of concern after discharge, contact the department at the number below.  After hours you should contact your personal physician.  If you develop breathing distress or wheezing, call 911.

## 2022-04-13 NOTE — RESULT ENCOUNTER NOTE
Touch base with me on Nadya when you have a moment.  She has a palpable mass in the right lower quadrant that feels concerning along with increased ascites however no obvious abnormality is identified on imaging.  AMY

## 2022-04-14 NOTE — NURSING NOTE
Alicia Thakkar MD called Nadya with results of Ct scan. Patient complains of pain. Alicia Thakkar MD offered fluids and Nadya declined. Should be seen ASAP if pain continues or worsens.  No charge per Alicia Solorio RN...........4/14/2022 10:24 AM

## 2022-04-14 NOTE — PROGRESS NOTES
Nadya is a 71 year old who is being evaluated via a billable telephone visit.      What phone number would you like to be contacted at?   How would you like to obtain your AVS?   Vitals:  No vitals were obtained today due to virtual visit.    Called and results of Ct scan discussed. Patient offered fluids. Nadya declined fluids. No acute intra-abdominal findings. No clear evidence of mass or  Lymphadenopathy. Should be seen ASAP if continued pain or if pain worsens.  Continue current treatment plan. No charge for phone call per Alicia Thakkar MD

## 2022-04-20 NOTE — PROGRESS NOTES
Nadya is a 71 year old who is being evaluated via a billable telephone visit.      What phone number would you like to be contacted at? [phone]  How would you like to obtain your AVS? 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  She is typically taking 1 tablet every 6-8 hours.  She does feel that this helps her pain.  She denies any side effects other than constipation.  She will reach out to [facility] to have them look at her imaging and we will plan to follow-up with a plan on May 2.  - oxyCODONE-acetaminophen (PERCOCET) 5-325 MG tablet; Take 1-2 tablets by mouth every 6 hours as needed for pain Max Tylenol of 4000mg daily    Right lower quadrant abdominal pain  See above  - oxyCODONE-acetaminophen (PERCOCET) 5-325 MG tablet; Take 1-2 tablets by mouth every 6 hours as needed for pain Max Tylenol of 4000mg daily    Drug-induced constipation  -Encouraged to increase MiraLAX to twice daily.  Additionally if needed we can send in some suppositories.    Follow-up on May 2    Meghan Pearce,   [facility]    Subjective   Nadya is a 71 year old who presents for the following health issues:     She continues to have right lower quadrant pain.  Her case was discussed with general surgery who is familiar with her and feels overall that her symptoms are due to recurrent metastatic disease based on her imaging.  This has been confusing for the patient as other people have said they do not see any present on her imaging.  She does still have connections at [facility] and is interested in reaching out to them.    She does have some constipation which we discussed is likely due to her medications.    Review of Systems   Denies any fevers      Objective    Vitals - Patient Reported  Pain Score: Moderate Pain (5)  Pain Loc: Abdomen      Physical Exam   healthy, alert and no distress  PSYCH: Alert and  oriented times 3; coherent speech, normal   rate and volume, able to articulate logical thoughts, able   to abstract reason, no tangential thoughts, no hallucinations   or delusions  Her affect is normal  RESP: No cough, no audible wheezing, able to talk in full sentences  Remainder of exam unable to be completed due to telephone visits      Phone call duration: 14 minutes

## 2022-04-20 NOTE — TELEPHONE ENCOUNTER
See telephone visit.   Jeimy Santos CMA([facility])..................4/20/2022   4:43 PM

## 2022-04-20 NOTE — NURSING NOTE
Patient requests telephone visit today for follow up on abdominal pain. She was looking for a refill on the Oxycodone.  Medication list reviewed.  Jeimy Santos CMA([facility])..................4/20/2022   4:50 PM

## 2022-04-20 NOTE — TELEPHONE ENCOUNTER
Routing refill request to provider for review/approval because:    LOV: 4/11/22    Valery Flanagan RN on 4/20/2022 at 2:17 PM

## 2022-05-02 NOTE — NURSING NOTE
Patient requests telephone visit today for follow up on pain and medication review.  Medication list reviewed.  Jeimy Santos CMA([facility])..................5/2/2022   4:25 PM

## 2022-05-02 NOTE — PROGRESS NOTES
Nadya is a 71 year old who is being evaluated via a billable telephone visit.      What phone number would you like to be contacted at? [phone]  How would you like to obtain your AVS? 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  Plan to follow-up with [facility] surgery subspecialty.  She is to keep me updated on their recommendations  - oxyCODONE-acetaminophen (PERCOCET) 5-325 MG tablet; Take 1-2 tablets by mouth every 6 hours as needed for pain Max Tylenol of 4000mg daily    Right lower quadrant abdominal pain  She is to monitor for worsening issues.  Continue on pain medications.  - oxyCODONE-acetaminophen (PERCOCET) 5-325 MG tablet; Take 1-2 tablets by mouth every 6 hours as needed for pain Max Tylenol of 4000mg daily    Drug-induced constipation  Continue with MiraLAX and adjust as needed.  Suppository sent in in case this is needed.  - bisacodyl (DULCOLAX) 10 MG suppository; Place 1 suppository (10 mg) rectally daily as needed for constipation    Partial small bowel obstruction (H)  Monitor for worsening    Follow-up as needed following recommendations from [facility].    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  She has been having issues with right lower quadrant pain and has noted a bulge in the area.  Imaging has not been definitive however she did talk with [facility] who reviewed her imaging and do feel it is consistent with her prior carcinomatosis with possible partial bowel obstruction.    We had a long discussion today regarding her visit with [facility].  We reviewed the imaging over read that they did there.  We discussed options going forward.    She has continued to have some pain.  She continues on Percocet and is  using this 3-4 times daily.  She has also continued to have some constipation related to her pain medications.       Review of Systems   She has not had any fever      Objective           Vitals:  No vitals were obtained today due to virtual visit.    Physical Exam   healthy, alert and no distress  PSYCH: Alert and oriented times 3; coherent speech, normal   rate and volume, able to articulate logical thoughts, able   to abstract reason, no tangential thoughts, no hallucinations   or delusions  Her affect is normal  RESP: No cough, no audible wheezing, able to talk in full sentences  Remainder of exam unable to be completed due to telephone visits              Phone call duration: 25 minutes

## 2022-05-11 NOTE — TELEPHONE ENCOUNTER
Reviewed with Dr. Thakkar and patient to schedule a follow up appointment .    Called and informed patient and patient verbalized understanding.    Valery Flanagan RN on 5/11/2022 at 1:43 PM

## 2022-05-12 NOTE — PROGRESS NOTES
Visit Date: 05/12/2022    ONCOLOGY CLINIC NOTE    HISTORY OF PRESENT ILLNESS:  Mrs. Flanagan is seen on an emergent basis.  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   She is currently on capecitabine and bevacizumab.  Apparently, she has had ongoing pain, nausea.  She had a CT of the abdomen and pelvis done on 04/13/2022, and this was read as increasing volume of ascites.  There was a dilated single short segment of distal small bowel that demonstrates this relatively well, had been seen previously.  No abnormal distended loops of bowel were seen.  The patient required Percocet for pain management as well as MiraLAX for drug-induced constipation.  It was felt the patient was progressing, and she elected to be seen at the [facility].  When they reviewed the scans, and the feeling was that she was progressing.  She was seen initially by Dr. Menezes via telehealth, a GI medical oncologist at the [facility] on 04/28.  His feeling was the patient should see a surgeon for possible intervention who is well-experienced peritoneal  surgery .  He also recommend she continue to avoid solids.  She already is a liquid diet and pain and nutritional consult.  The patient was seen by Dr. Vic eBlla, who is a GI surgeon who deals with peritoneal disease.  He felt that she was progressing on imaging, with increased peritoneal thickening, ascites, increased bowel wall thickening and what looked like multifocal partial obstructions.  He was concerned about her developing complete obstruction in that she would no longer be able to have chemotherapy.  She also saw Dr. Menezes, who felt that a surgical option would be the ideal option.  He was able to talk to Dr. Bella and after discussing the case with Dr. Bella, the feeling was that they could perform a laparoscopic evaluation to see if this was a focal  obstruction that could be palliated.  We also discussed the case with Dr. Speedy Menezes and he felt this was the best option for her, that she should go down to [facility] to have this surgery to alleviate any partial obstruction, or she may end up with a complete obstruction.  He also felt she should continue nutritional support and avoid solids and obtain a dietary consult.  He felt once surgery was performed, then we could consider treatment with capecitabine, oxaliplatin with  capecitabine being given at a standard dose 825 mg/m2 p.o. b.i.d. on days 1-14 and oxaliplatin 130 mg/m2 on days 1 of a 21-day cycle with dose reduction of oxaliplatin by 20%.     The patient's major complaint right now is that she has been losing weight.  She still attempts to eat solids, although when she eats solids, she gets more diarrhea and abdominal discomfort.  Apparently, she is currently on Percocet 5-10 mg every 6 hours as needed for pain.  She says this seems to help, but she still has ongoing abdominal pain.  She denies any nausea, vomiting, currently, or fevers, night sweats, just the weight loss.    PHYSICAL EXAM:    GENERAL:  She is an elderly white female.  ECOG performance status is a 1.  VITAL SIGNS:  Reveal blood pressure 110/68, pulse 90, respirations 16, temp 96.6.  HEENT:  Significant for some temporal wasting.  Oropharynx is nonerythematous.  NECK:  Supple, no thyromegaly.  LUNGS:  Clear to auscultation and percussion.  HEART:  Regular rhythm.  S1, S2 normal.  ABDOMEN:  Mildly distended.  There is some tenderness over the right lower quadrant and some mild tenderness of the periumbilical region.  No rebound.  LYMPHATICS:  No cervical, supraclavicular, axillary or inguinal nodes.  EXTREMITIES:  Trace ankle edema.  NEUROLOGIC:  Nonfocal.    LABORATORY DATA:  CBC with white count 7.7, H and H 11.9 and 35.0.  Platelet count is 304.  CEA is less than 3.  T4 is 0.83.  LFTs are normal.    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  The patient was deemed a candidate for chemotherapy, stage IV disease as per Dr. Speedy Menezes at the [facility].  The plan was to initiate FOLFOX chemotherapy.  The patient completed 2 cycles.  Course was complicated by neutropenia.  The patient went on to receive 4 cycles, the last 2 requiring 20% dose reduction due to her neutropenia.  She was staged with no evidence of disease after 8 cycles.  CT abdomen and pelvis was stable.  PET scan indicated no evidence of disease.     Repeat scans indicate progression of disease.  Diagnostic laparoscopy revealed numerous peritoneal implants with biopsy consistent with poorly differentiated adenocarcinoma metastatic to peritoneum, consistent with recurrence of disease.  We discussed the case with Dr. Speedy Menezes of Medical Oncology at the [facility], who agreed the patient would be a candidate for FOLFIRI and bevacizumab.  Plan was to treat for 6 cycles and then restage.  MSI testing came back intact; therefore, the patient would not be a candidate for pembrolizumab.  The patient received 3 cycles on an every 14-day cycle.  Performance status has worsened during the second week.  We, therefore, elected to change her to an every 21-day cycle with FOLFIRI and bevacizumab.  Bevacizumab given at a dose of 7.5 mg/kg every 21 days.  We did reduce her chemotherapy by 20%.  Course was complicated by neutropenia, requiring dose reduction in 5-FU infusion as well as bolus.  Plan was to proceed with 6 cycles.  Staging studies at the [facility] indicated stable peritoneal disease.  As per Dr. Speedy Menezes, the patient would be a candidate for maintenance 5-FU, leucovorin and Avastin.  PET scan indicated some improvement in disease.  The patient did consult with General Surgery and was not interested in diagnostic laparoscopy.  Therefore,  initiated maintenance chemotherapy with Dr. Speedy Menezes with 5-FU, leucovorin and bevacizumab.  We omitted the irinotecan.  She received 2 cycles of IV leucovorin and bevacizumab, could not tolerate the infusional 5-FU.  We elected to switch her to capecitabine 1000 p.o. b.i.d. days 1-14, bevacizumab 7.5 mg/kg on day 1 of a 21-day cycle.  After 6 cycles, she had a PET scan which revealed no evidence of hypermetabolism and, at worst, stable disease.  The patient went on to complete 12 cycles of capecitabine and bevacizumab and still had evidence of stable disease.  The patient went on to complete 20 cycles of bevacizumab and PET scan was, essentially, negative for metastatic disease.  There was no evidence of hypermetabolic disease or recurrence to peritoneum.  There was new onset ascites, but this was not hypermetabolic, and most recently, she had completed 21 cycles and then developed increasing abdominal pain, nausea.  Imaging studies indicate progression with new onset ascites, thickening, evidence of partial bowel obstruction.  The patient did complete cycle 22 with the Avastin, but cycle 23 was completed without the Avastin.      After discussion with Dr. Speedy Menezes, she agrees that she would like to be seen by General Surgery at the [facility] for debulking procedure and relief of partial bowel obstruction.  This would be followed potentially with chemotherapy with gemcitabine and oxaliplatin.     The patient has had declining performance status.  We will obtain a dietary consult and refer the patient back to General Surgery at the [facility].  We will refer the patient to Dr. Vic Bella.  She says she has mail contact with him and will set up an appointment for surgery.  We will see the patient after she completes surgery, 2-3 weeks afterwards, then proceed with further therapy.  She was encouraged to increase p.o. liquids and nutritional supplements, avoid solids as per Dr. Menezes.    TIME SPENT:   175 minutes were spent on this patient.  Time was spent discussing the case with Dr. Menezes reviewing multiple physician provider notes, imaging results, discussing imaging results with Radiology, performing history and physical, documenting history and physical, ordering followup with Dr. Menezes and Dr. Bella.    Alicia Thakkar MD        D: 2022   T: 2022   MT: FRANCIS/CRISTA    Name:     ALMA CORTESCassi  MRN:      [id number]        Account:    [id number]   :      1950           Visit Date: 2022     Document: [id number]

## 2022-05-12 NOTE — NURSING NOTE
Chief Complaint   Patient presents with     Oncology Clinic Visit     F/U Metastatic appendix cancer     Medication Reconciliation: complete  PHQ screening done last week.    Tiffanie Pastor CMA ([facility])

## 2022-05-17 NOTE — PROGRESS NOTES
"Nutrition Assessment    Patient seen for outpatient MNT initial assessment.    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 Will vomit several times after eating due to nausea. Was told by her surgeon to eat a low fiber, liquid diet.  Surgery Jonathan 15, 2022.    She lives at home with her . Has been trying to have Boost or Instant Breakfast. Eats soup for lunch and dinner. Takes miralax daily.     Bowels: hard stool, followed by diarrhea daily      Nutrition intervention that addressed medical nutrition therapy for above diagnosis: High calorie, high protein liquid diet. Goal: 2500 calories, 65 grams protein.    RD reviewed recipes for soft high calorie items, daily liquid diet plan    Education materials provided: AND liquid diet, high calorie diet     Goals: (1) track approx intake of calories  (2) daily weights  (3) phone visit RD in 1 week    Patient had no barriers to learning, verbalized understanding, and compliance is expected.    Phone number provided for questions. Recommended follow-up in 1 weeks.    Time spent: 60 min    KRISTIN K. KLINEFELTER, RD on 5/17/2022 at 2:34 PM              ROS      Physical Exam      "

## 2022-05-19 NOTE — TELEPHONE ENCOUNTER
Has telephone visit today. Patient has not seen the [employer] message.    Her  will be in town around 11:00 today.  Jeimy Santos CMA([facility])..................5/19/2022   8:20 AM

## 2022-05-19 NOTE — PROGRESS NOTES
Nadya is a 71 year old who is being evaluated via a billable telephone visit.      What phone number would you like to be contacted at? [phone]  How would you like to obtain your AVS? MyChart    {Vitals - Patient Reported  Systolic (Patient Reported): 135  Diastolic (Patient Reported): 79  Weight (Patient Reported): 56.7 kg (125 lb)  Pulse (Patient Reported): 84  Pain Score: Severe Pain (6)  Pain Loc: Abdomen (upper abdomen, sternum area)      See Dr. Jones's note

## 2022-05-19 NOTE — PROGRESS NOTES
Nadya is a 71 year old who is being evaluated via a billable telephone visit.      What phone number would you like to be contacted at? [phone]  How would you like to obtain your AVS? 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  At this time we discussed that it would be reasonable to consider hospice.  She does plan to talk again with her surgical oncologist at [facility].  She will let us know at any point she would like a referral for this.  In the meantime she will continue on her pain medications.  New prescription sent in.  She also requests Zofran which is renewed.  She is to call with any new or changing symptoms.  - oxyCODONE-acetaminophen (PERCOCET) 5-325 MG tablet; Take 1-2 tablets by mouth every 6 hours as needed for pain Max Tylenol of 4000mg daily  - ondansetron (ZOFRAN) 8 MG tablet; Take 1 tablet (8 mg) by mouth every 8 hours as needed for nausea    Right lower quadrant abdominal pain  - oxyCODONE-acetaminophen (PERCOCET) 5-325 MG tablet; Take 1-2 tablets by mouth every 6 hours as needed for pain Max Tylenol of 4000mg daily    Nausea  - ondansetron (ZOFRAN) 8 MG tablet; Take 1 tablet (8 mg) by mouth every 8 hours as needed for nausea    Follow-up in approximately 15 days for renewal of pain medications if she has not set up with hospice by then.    Meghan Pearce, [facility]    Subjective   Nadya is a 71 year old who presents for the following health issues     She has continued to require pain medications.  She is taking these approximately 5 times daily.  She reports that the majority of her pain is still in the right lower quadrant of her abdomen.  She did have a visit with [place] oncology and they are recommending exploratory surgery with intent to try to relieve her partial bowel obstruction.  She has continued to have bowel  movements and pass gas.  She is having some nausea however minimal vomiting as long as she controls her pain.  She has not had any blood in the stool.    She is curious about her overall prognosis which we discussed in depth today.  She is curious about options should she not pursue surgery and we discussed hospice.  We also discussed her nutrition status overall.    Review of Systems   She denies any fevers      Objective    Vitals - Patient Reported  Pain Score: Severe Pain (6)  Pain Loc:  (sternum - can' breathe deeply)        Physical Exam   healthy, alert and no distress  PSYCH: Alert and oriented times 3; coherent speech, normal   rate and volume, able to articulate logical thoughts, able   to abstract reason, no tangential thoughts, no hallucinations   or delusions  Her affect is normal  RESP: No cough, no audible wheezing, able to talk in full sentences  Remainder of exam unable to be completed due to telephone visits       Phone call duration: 31 minutes

## 2022-05-22 PROBLEM — K56.609 SBO (SMALL BOWEL OBSTRUCTION) (H): Status: ACTIVE | Noted: 2022-01-01

## 2022-05-22 PROBLEM — R11.2 NON-INTRACTABLE VOMITING WITH NAUSEA, UNSPECIFIED VOMITING TYPE: Status: ACTIVE | Noted: 2022-01-01

## 2022-05-22 PROBLEM — K56.2 SMALL BOWEL VOLVULUS (H): Status: ACTIVE | Noted: 2022-01-01

## 2022-05-22 NOTE — ED TRIAGE NOTES
Nausea and vomiting since Thursday 5/19/22. Increased pain today unable to take medication for pain control. Accities developed Saturday. Stage 4 cancer patient.      Triage Assessment     Row Name 05/22/22 4763       Triage Assessment (Adult)    Airway WDL WDL       Respiratory WDL    Respiratory WDL WDL       Skin Circulation/Temperature WDL    Skin Circulation/Temperature WDL WDL       Cardiac WDL    Cardiac WDL WDL       Peripheral/Neurovascular WDL    Peripheral Neurovascular WDL WDL       Cognitive/Neuro/Behavioral WDL    Cognitive/Neuro/Behavioral WDL WDL

## 2022-05-23 PROBLEM — D64.9 ANEMIA: Status: ACTIVE | Noted: 2022-01-01

## 2022-05-23 PROBLEM — E87.1 HYPONATREMIA: Status: ACTIVE | Noted: 2022-01-01

## 2022-05-23 PROBLEM — E43 SEVERE PROTEIN-CALORIE MALNUTRITION (H): Status: ACTIVE | Noted: 2022-01-01

## 2022-05-23 NOTE — PROGRESS NOTES
Patient slept through the night between doses of Dilaudid for pain.  Rating pain 7/10 to upper, bilateral abdomen and right side.  Dilaudid worked well for pain control.  Nausea resolves with pain resolution.  VSS.

## 2022-05-23 NOTE — PROGRESS NOTES
:     Met with patient in room to discuss discharge planning needs. Patient stated that her primary care provider sent a referral to [facility] on 5-. Spoke with Maricruz at [facility] and she stated that they did not receive a referral. Patient stated that she would like a referral sent over to [facility]. Patient stated that she would like to meet with [facility] while she is in the hospital to discuss questions that she has.     Pt/family was given the Medicare Compare list for Hospice, with associated star ratings to assist with choice for referrals/discharge planning Yes    Education was given to pt/family that star ratings are updated/maintained by Medicare and can be reviewed by visiting www.medicare.gov Yes      A referral has been sent to [facility]. Phone call made to Maricruz at [facility] to make her aware of referral.     ERAN Rosa on 5/23/2022 at 10:43 AM     :     Spoke with Maricruz from [facility] and she stated that she will set up an informational meeting with patient tomorrow.     ERAN Rosa on 5/23/2022 at 3:50 PM

## 2022-05-23 NOTE — PHARMACY-CONSULT NOTE
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)     Thyroid condition     Malignant neoplasm of appendix (H)     Pleuritic pain     Mitral valve prolapse     Moderate tricuspid regurgitation     Mitral valve insufficiency, unspecified etiology     Chronic kidney disease, stage 3a (H)     SBO (small bowel obstruction) (H)     Small bowel volvulus (H)     Non-intractable vomiting with nausea, unspecified vomiting type        Relevant Labs:  Recent Labs   Lab Test 05/23/22  0609 05/22/22  1859   WBC 11.2* 14.0*   HGB 9.3* 10.4*    496*        CrCl: 45 mL/min      Intake/Output Summary (Last 24 hours) at 5/23/2022 0719  Last data filed at 5/23/2022 0312  Gross per 24 hour   Intake --   Output 195 ml   Net -195 ml          Per Renal Dose Adjustment Protocol, will adjust:  Famotidine 20 mg IV Q24H      Will continue to follow and make adjustments accordingly. Thank You.    Emma Phillip [facility] ....................  5/23/2022   7:19 AM

## 2022-05-23 NOTE — PROGRESS NOTES
Patient has upper, bilateral abdominal pain and pain to her right side.  Provided heat packs, as she states that this helps her at home.  Also administered dilaudid.  Fluids initiated.  Her nausea is intermittent.  Had a nauseous episode that she was able to breathe through and then it resolved.  Lights dimmed in room and patient rest encouraged.  Will continue to monitor.

## 2022-05-23 NOTE — ED PROVIDER NOTES
History     Chief Complaint   Patient presents with     Nausea, Vomiting, & Diarrhea     HPI  Nadya Flanagan is a 71 year old female who has stage IV cancer of the appendix.  She has been following Dr. Nix, Dr. Evangelista and a group of oncologist and surgeons at [facility].  The patient has been taking Zofran for nausea but now she has had increasing pain and nausea.  She texted Dr. Pearce and was informed to come to the ER.  The patient has developed significant ascites yesterday and has not been able to keep down any fluids due to her nausea and vomiting.  She does have an IV port in the right subclavian area.  Patient reports that she is hoping just to receive some fluids to get ahead of her nausea as she would like to go home.  She is open to comfort cares and hospice needs have been offered to her..  Dr. Pearce made a referral on 5/20/2022.    Allergies:  Allergies   Allergen Reactions     Metrizamide Anaphylaxis     Had contrast dye. Patient reports she woke up in ICU.     Lisinopril GI Disturbance     ? 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) 01/30/2019     Priority: Medium     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 1/7/2021     Fracture of femoral neck, right (H) 2/15/2013     Laceration of left thumb without foreign body without damage to nail 6/17/2020     Migraines      Mitral valve prolapse      Positive TB test      Pre-diabetes 2/12/2019     Thyroid disease      Tricuspid regurgitation        Past Surgical History:    Past Surgical History:   Procedure Laterality Date     FRACTURE TX, HIP RT/LT Right 2013     INSERT PORT VASCULAR ACCESS N/A 2/7/2019    Procedure: INSERT PORT VASCULAR ACCESS;  Surgeon: Tresa Evangelista MD;  Location:  OR     INSERT PORT VASCULAR ACCESS Right 7/22/2020    Procedure: power PORT placement;  Surgeon: Tresa Evangelista MD;  Location:  OR     LAPAROSCOPY DIAGNOSTIC (GENERAL) N/A 7/8/2020    Procedure: LAPAROSCOPY, DIAGNOSTIC, BY GENERAL SURGERY;  Surgeon: Tresa Evangelista MD;  Location:  OR     LAPAROTOMY EXPLORATORY N/A 12/18/2018    Procedure: Exploratory Laparotomy with right hemicolectomy;  Surgeon: Tresa Evangelista MD;  Location:  OR     REMOVE CATHETER VASCULAR ACCESS N/A 10/9/2019    Procedure: Remove Port;  Surgeon: Tresa Evangelista MD;  Location:  OR     TONSILLECTOMY  1962     TUBAL LIGATION  1979       Family History:    Family History   Problem Relation Age of Onset     Tuberculosis Mother      Chronic Obstructive Pulmonary Disease Mother      Heart Disease Father         massive MI     Heart Disease Brother         stents/CABG     Glaucoma Brother      Lung Cancer Brother         mesothelioma     Hypertension Brother        Social History:  Marital Status:   [2]  Social History     Tobacco Use     Smoking status: Passive  "Smoke Exposure - Never Smoker     Smokeless tobacco: Never Used     Tobacco comment: Lived with a pipe smoker for 15 years   Vaping Use     Vaping Use: Never used   Substance Use Topics     Alcohol use: No     Drug use: No        Medications:    amLODIPine (NORVASC) 5 MG tablet  bisacodyl (DULCOLAX) 10 MG suppository  Lactobacillus (FLORAJEN ACIDOPHILUS) CAPS  levothyroxine (SYNTHROID/LEVOTHROID) 25 MCG tablet  ondansetron (ZOFRAN) 8 MG tablet  ondansetron (ZOFRAN-ODT) 4 MG ODT tab  oxyCODONE-acetaminophen (PERCOCET) 5-325 MG tablet  polyethylene glycol (MIRALAX/GLYCOLAX) powder          Review of Systems   Constitutional: Positive for activity change and appetite change. Negative for fever.   HENT: Negative for facial swelling and sore throat.    Eyes: Negative for pain.   Respiratory: Negative for wheezing and stridor.    Cardiovascular: Negative for chest pain.   Gastrointestinal: Positive for abdominal distention, abdominal pain, nausea and vomiting.   Genitourinary: Negative for flank pain.   Musculoskeletal: Negative for back pain and neck pain.   Skin: Negative for pallor.   Neurological: Negative for tremors and seizures.   Psychiatric/Behavioral: Negative for agitation.   All other systems reviewed and are negative.      Physical Exam   BP: 134/77  Pulse: 108  Temp: 97.1  F (36.2  C)  Resp: 20  Height: 160 cm (5' 3\")  Weight: 55.8 kg (123 lb)  SpO2: 97 %      Physical Exam  Vitals and nursing note reviewed.   Constitutional:       General: She is not in acute distress.     Appearance: Normal appearance. She is not ill-appearing or toxic-appearing.   HENT:      Head: Normocephalic. No raccoon eyes, right periorbital erythema or left periorbital erythema.      Right Ear: No drainage or tenderness.      Left Ear: No drainage or tenderness.      Nose: Nose normal.   Eyes:      General: Lids are normal. Gaze aligned appropriately. No scleral icterus.     Extraocular Movements: Extraocular movements intact. "   Neck:      Trachea: No tracheal deviation.   Cardiovascular:      Rate and Rhythm: Normal rate.   Pulmonary:      Effort: Pulmonary effort is normal. No respiratory distress.      Breath sounds: No stridor.      Comments: SaO2 is 98% on room air.  She does not appear to be in any respiratory distress.  No tachypnea.  Abdominal:      General: There is distension.      Tenderness: There is abdominal tenderness.      Comments: Abdomen is distended with tenderness throughout.  I am unable to appreciate any significant bowel sounds.  No rebound or masses.   Musculoskeletal:         General: No deformity or signs of injury. Normal range of motion.      Cervical back: Normal range of motion. No signs of trauma.   Skin:     General: Skin is warm and dry.      Coloration: Skin is not jaundiced or pale.   Neurological:      General: No focal deficit present.      Mental Status: She is alert and oriented to person, place, and time.      GCS: GCS eye subscore is 4. GCS verbal subscore is 5. GCS motor subscore is 6.      Motor: No tremor or seizure activity.   Psychiatric:         Attention and Perception: Attention normal.         Mood and Affect: Mood normal.         ED Course     EKG shows normal sinus rhythm.  Left axis deviation.  Heart rate is 89.    Results for orders placed or performed during the hospital encounter of 05/22/22 (from the past 24 hour(s))   CBC with platelets differential    Narrative    The following orders were created for panel order CBC with platelets differential.  Procedure                               Abnormality         Status                     ---------                               -----------         ------                     CBC with platelets and d...[[id number]]  Abnormal            Final result                 Please view results for these tests on the individual orders.   Comprehensive metabolic panel   Result Value Ref Range    Sodium 132 (L) 134 - 144 mmol/L    Potassium 4.6 3.5 -  5.1 mmol/L    Chloride 97 (L) 98 - 107 mmol/L    Carbon Dioxide (CO2) 24 21 - 31 mmol/L    Anion Gap 11 3 - 14 mmol/L    Urea Nitrogen 28 (H) 7 - 25 mg/dL    Creatinine 1.03 0.60 - 1.20 mg/dL    Calcium 9.4 8.6 - 10.3 mg/dL    Glucose 106 (H) 70 - 105 mg/dL    Alkaline Phosphatase 118 (H) 34 - 104 U/L    AST 22 13 - 39 U/L    ALT 9 7 - 52 U/L    Protein Total 7.5 6.4 - 8.9 g/dL    Albumin 3.3 (L) 3.5 - 5.7 g/dL    Bilirubin Total 0.5 0.3 - 1.0 mg/dL    GFR Estimate 58 (L) >60 mL/min/1.73m2   Lactic acid whole blood   Result Value Ref Range    Lactic Acid 1.0 0.7 - 2.0 mmol/L   Magnesium   Result Value Ref Range    Magnesium 2.0 1.9 - 2.7 mg/dL   CRP inflammation   Result Value Ref Range    CRP Inflammation 128.8 (H) <10.0 mg/L   CBC with platelets and differential   Result Value Ref Range    WBC Count 14.0 (H) 4.0 - 11.0 10e3/uL    RBC Count 3.08 (L) 3.80 - 5.20 10e6/uL    Hemoglobin 10.4 (L) 11.7 - 15.7 g/dL    Hematocrit 31.9 (L) 35.0 - 47.0 %     (H) 78 - 100 fL    MCH 33.8 (H) 26.5 - 33.0 pg    MCHC 32.6 31.5 - 36.5 g/dL    RDW 16.5 (H) 10.0 - 15.0 %    Platelet Count 496 (H) 150 - 450 10e3/uL    % Neutrophils 81 %    % Lymphocytes 9 %    % Monocytes 8 %    % Eosinophils 1 %    % Basophils 0 %    % Immature Granulocytes 1 %    NRBCs per 100 WBC 0 <1 /100    Absolute Neutrophils 11.4 (H) 1.6 - 8.3 10e3/uL    Absolute Lymphocytes 1.2 0.8 - 5.3 10e3/uL    Absolute Monocytes 1.1 0.0 - 1.3 10e3/uL    Absolute Eosinophils 0.1 0.0 - 0.7 10e3/uL    Absolute Basophils 0.0 0.0 - 0.2 10e3/uL    Absolute Immature Granulocytes 0.1 <=0.4 10e3/uL    Absolute NRBCs 0.0 10e3/uL   US Abdomen Limited    Narrative    PROCEDURE INFORMATION:   Exam: US Abdomen; Limited   Exam date and time: 5/22/2022 8:25 PM   Age: 71 years old   Clinical indication: Abdominal pain; Epigastric; Additional info: Ascites     TECHNIQUE:   Imaging protocol: US abdomen. Real time ultrasound with image documentation.   Limited exam focused on the  region of clinical interest.     COMPARISON:   US ABDOMEN COMPLETE 4/5/2022 11:50 AM     FINDINGS:   Moderate amount of ascites is seen within all 4 quadrants.  Amount of ascites   is grossly unchanged when compared to prior CT scan.  Multiple fluid-filled loops of small bowel again noted.      Impression    IMPRESSION:   Moderate amount of ascites is grossly unchanged when compared to prior CT scan.    THIS DOCUMENT HAS BEEN ELECTRONICALLY SIGNED BY MONICA GUIDO MD   CT Abdomen Pelvis w Contrast    Addendum: 5/22/2022    Addendum created by Monica Guido MD on 5/22/2022 10:11:19 PM CDT:  THIS REPORT CONTAINS FINDINGS THAT MAY BE CRITICAL TO PATIENT CARE. The   findings were verbally communicated via telephone conference with CARMEN CALDWELL at 10:11 PM CDT on 5/22/2022. The findings were acknowledged and   understood.        Narrative    Initial report created on 5/22/2022 9:56:01 PM CDT:    PROCEDURE INFORMATION:   Exam: CT Abdomen And Pelvis With Contrast   Exam date and time: 5/22/2022 9:26 PM   Age: 71 years old   Clinical indication: Other: Ascites     TECHNIQUE:   Imaging protocol: Computed tomography of the abdomen and pelvis with contrast.   Radiation optimization: All CT scans at this facility use at least one of these   dose optimization techniques: automated exposure control; mA and/or kV   adjustment per patient size (includes targeted exams where dose is matched to   clinical indication); or iterative reconstruction.   Contrast material: ISOVUE 370; Contrast volume: 100 ml; Contrast route:   INTRAVENOUS (IV);      COMPARISON:   CT ABDOMEN PELVIS W CONTRAST 4/13/2022 2:25 PM     FINDINGS:   Liver: Fatty metamorphosis of the liver is noted. No mass.   Gallbladder and bile ducts: Normal. No calcified stones. No ductal dilation.   Pancreas: Normal. No ductal dilation.   Spleen: Normal. No splenomegaly.   Adrenal glands: Normal. No mass.   Kidneys and ureters: New right-sided hydronephrosis.  Etiology of the   hydronephrosis cannot be determined. Cannot exclude a stricture of the right   ureter. No left-sided hydronephrosis.   Stomach and bowel: Postoperative change of the right lower quadrant again   noted.Multiple air and fluid-filled loops of small bowel within the abdomen and   pelvis. The loops of small bowel measure up to 4.4 cm. The downstream loops of   small bowel are more normal size with a transition point seen in the right   lower quadrant in the area of prior surgery. Colon is normal size.   Appendix: No evidence of appendicitis.     Intraperitoneal space: Ascites within the upper abdomen and pelvis is slightly   worse compared to prior CT.   Vasculature: Alteration of the course of the superior mesenteric artery and   superior mesenteric vein on the current examination with a swirling type   pattern of the bowel mesentery. These findings are most easily appreciated on   the coronal images.   Lymph nodes: Unremarkable. No enlarged lymph nodes.   Urinary bladder: Unremarkable as visualized.   Reproductive: Unremarkable as visualized.   Bones/joints: Postoperative change of the right femur again noted.   Soft tissues: Unremarkable.       Impression    IMPRESSION:   Small bowel obstruction.  Probable small bowel volvulus is noted as described   above.  Recommend correlation with patient's serum lactate levels to assess for   possible bowel ischemia.    Ascites has increased slightly compared to prior CT scan.    New right-sided hydronephrosis.  Cannot exclude a stricture of the right ureter.      THIS DOCUMENT HAS BEEN ELECTRONICALLY SIGNED BY MONICA LEWIS MD       Medications   0.9% sodium chloride BOLUS (0 mLs Intravenous Stopped 5/22/22 2004)     Followed by   sodium chloride 0.9% infusion ( Intravenous New Bag 5/22/22 2042)   ondansetron (ZOFRAN) injection 4 mg (has no administration in time range)   morphine (PF) injection 1 mg (1 mg Intravenous Given 5/22/22 2014)   scopolamine  (TRANSDERM) 72 hr patch 1 patch (has no administration in time range)   scopolamine (TRANSDERM-SCOP) Patch in Place (has no administration in time range)   HYDROmorphone (PF) (DILAUDID) injection 0.5 mg (0.5 mg Intravenous Given 5/22/22 2154)   piperacillin-tazobactam (ZOSYN) infusion 3.375 g (has no administration in time range)   sodium chloride 0.9% infusion (has no administration in time range)   famotidine (PEPCID) injection 20 mg (has no administration in time range)   naloxone (NARCAN) injection 0.2 mg (has no administration in time range)     Or   naloxone (NARCAN) injection 0.4 mg (has no administration in time range)     Or   naloxone (NARCAN) injection 0.2 mg (has no administration in time range)     Or   naloxone (NARCAN) injection 0.4 mg (has no administration in time range)   HYDROmorphone (PF) (DILAUDID) injection 0.5 mg (has no administration in time range)   LORazepam (ATIVAN) injection 0.5 mg (has no administration in time range)     Or   LORazepam (ATIVAN) tablet 0.5 mg (has no administration in time range)   ondansetron (ZOFRAN) injection 4 mg (4 mg Intravenous Given 5/22/22 1845)   prochlorperazine (COMPAZINE) injection 5 mg (5 mg Intravenous Given 5/22/22 2042)   cefTRIAXone in d5w (ROCEPHIN) intermittent infusion 1 g (1 g Intravenous New Bag 5/22/22 2108)   iopamidol (ISOVUE-370) solution 71 mL (71 mLs Intravenous Given 5/22/22 2132)       Assessments & Plan (with Medical Decision Making)     I have reviewed the nursing notes.    I have reviewed the findings, diagnosis, plan and need for follow up with the patient.       ED to Inpatient Handoff:    Discussed with Dr. Staley, Dr. Evangelista at [facility]  Patient accepted for Inpatient Stay  Pending studies include blood cultures  Code Status: DNR/DNI           New Prescriptions    No medications on file       Final diagnoses:   SBO (small bowel obstruction) (H)   Small bowel volvulus (H)   Non-intractable vomiting with nausea, unspecified vomiting type    Malignant neoplasm of appendix (H)     Afebrile.  Vital signs stable.   Patient with a history of stage IV cancer to her appendix.  Recent discussions with her primary care provider and surgeons for comfort care and hospice.  Referral was initiated but this has not taken place.  Was prescribed Zofran for nausea which she has continued nausea and vomiting and she is here for further evaluation at this time.  Furthermore the patient reports her belly has just become enlarged since yesterday.  She has some minimal tenderness throughout.  She is concerned about ascites.  Patient has an implanted port.  IV established and she was given fluids and Zofran initially.  Her nausea persisted and she was given Compazine as well.  CBC shows elevated white blood cells at 14 with a left shift.  her hemoglobin was 7.4.  CMP shows. GFR 58, creatinine is 1.03 and BUN is 28 these numbers are only slightly worse given her history of CKD.  Alk phos is elevated at 118 but the rest of her LFTs remain normal.   The patient's abdominal pain persisted and she was given morphine IV.  Lactic acid is normal.  Blood cultures are pending.  Magnesium returns normal.  Her CRP is elevated at 128.8.  EKG shows normal sinus rhythm.  Left axis deviation.  Heart rate is 89.  Ultrasound of her abdomen shows a Moderate amount of ascites is grossly unchanged when compared to prior CT scan.  The patient's nausea and vomiting persisted as well as her abdominal pain and she was given Compazine and switched to Dilaudid for pain control.  She was given Rocephin IV initially.  Her ultrasound had a fair amount of gas and concerns for SBO.  Therefore CT of her abdomen pelvis was performed with contrast and this shows  A Small bowel obstruction.  Probable small bowel volvulus is noted as described above.  Recommend correlation with patient's serum lactate levels to assess for possible bowel ischemia.  Ascites has increased slightly compared to prior CT scan.  New  right-sided hydronephrosis.  Cannot exclude a stricture of the right ureter.   As stated earlier the patient's lactic acid was normal at 1.0 which is reassuring.  I discussed NG tube placement and the patient is feeling better with the above treatment and declines the need for this.  This seems reasonable.  I discussed this case with Dr. Evangelista as well as Dr. Staley.  Patient was started on Zosyn IV.  The patient is set up to initiate hospice tomorrow.  I informed her that this could be done through the hospital as well.  She will be admitted at this time with bowel rest.  Orders for Dilaudid and Ativan for pain control.  As stated earlier she declined the NG tube.  Influenza, RSV and COVID tests are pending.    5/22/2022   [facility]     Hira Feldman PA-C  05/22/22 5445

## 2022-05-23 NOTE — PROVIDER NOTIFICATION
05/22/22 2316   Valuables   Patient Belongings remains with patient   Patient Belongings Remaining with Patient ring;clothing;glasses;cell phone/electronics;shoes   Did you bring any home meds/supplements to the hospital?  No     A               Admission:  I am responsible for any personal items that are not sent to the safe or pharmacy.  [facility] is not responsible for loss, theft or damage of any property in my possession.    Signature:  _________________________________ Date: _______  Time: _____                                              Staff Signature:  ____________________________ Date: ________  Time: _____      2nd Staff person, if patient is unable/unwilling to sign:    Signature: ________________________________ Date: ________  Time: _____     Discharge:  [facility] has returned all of my personal belongings:    Signature: _________________________________ Date: ________  Time: _____                                          Staff Signature:  ____________________________ Date: ________  Time: _____

## 2022-05-23 NOTE — PROGRESS NOTES
Patient requested NG tube for comfort. Unable to insert due to blockage in back of nares. Ordered saline spray to attempt to soften dry passages and will attempt again later. Lamar Faustin RN 5/23/2022 6:34 PM

## 2022-05-23 NOTE — ED NOTES
Pt continues to have nausea and vomiting MD ordered compazine to help control better vs zofran. Pt to have CT scan US complete . Pain better after second dose of morphine. Report given to Katharine DOMINGUEZ RN to assume care

## 2022-05-23 NOTE — PLAN OF CARE
Problem: Plan of Care - These are the overarching goals to be used throughout the patient stay.    Goal: Absence of Hospital-Acquired Illness or Injury  Intervention: Identify and Manage Fall Risk  Recent Flowsheet Documentation  Taken 5/22/2022 2317 by Natasha Felton RN  Safety Promotion/Fall Prevention: safety round/check completed  Intervention: Prevent Skin Injury  Recent Flowsheet Documentation  Taken 5/22/2022 2317 by Natasha Felton RN  Body Position: position changed independently  Intervention: Prevent and Manage VTE (Venous Thromboembolism) Risk  Recent Flowsheet Documentation  Taken 5/22/2022 2317 by Natasha Felton RN  VTE Prevention/Management: SCDs (sequential compression devices) off  Activity Management:   up ad francis   ambulated to bathroom  Goal: Readiness for Transition of Care  Intervention: Mutually Develop Transition Plan  Recent Flowsheet Documentation  Taken 5/22/2022 2300 by Natasha Felton, RN  Equipment Currently Used at Home: none   Goal Outcome Evaluation:

## 2022-05-23 NOTE — H&P
[facility]    History and Physical  Hospitalist       Date of Admission:  5/22/2022    Assessment & Plan   Nadya Flanagan is a 71 year old female who presents with small bowel obstruction with possible developing small bowel volvulus.    Clinically Significant Risk Factors Present on Admission         # Hyponatremia: Na = 132 mmol/L (Ref range: 134 - 144 mmol/L) on admission, will monitor as appropriate     # Hypoalbuminemia: Albumin = 3.3 g/dL (Ref range: 3.5 - 5.7 g/dL) on admission, will monitor as appropriate           Principal Problem:    SBO (small bowel obstruction) (H)    Assessment: Very pleasant goals of care discussion and at this point does not want to consider palliative surgical interventions after discussion with Dr. Bella of surgical oncology on 5/20.  Her goal is to live until the weekend when her children from out of state are coming, she would like to return home when able on hospice and wants to pursue comfort care only going forward.    Plan:   -IV and p.o. 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    Plan: - comfort care orderset      Severe protein-calorie malnutrition (H)    Assessment: ongoing and not able to tolerate po intake at this point and has deferred TPN      Hyponatremia    Assessment: poa and likely from dehyrdation    Plan:   - no further monitoring      Anemia    Assessment: Slight macrocytosis, no  evidence acute blood loss, likely from chronic disease    Plan:   -No further monitoring    CKD stage 3a   Assessment: Stable  Plan:  -Further monitoring      DVT Prophylaxis: N/A  Code Status: No CPR- Do NOT Intubate    Nolan Guerin MD    Primary Care Physician   Meghan Pearce    Chief Complaint   Abdominal pain    History is obtained from the patient and chart review.    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  She has been having progressive obstructive symptoms has met with her oncologist primary care provider and surgical oncologist at [facility] last on 5/20.  She was offered palliative diverting ileostomy but declined.  Last 24 hours she has had increasing abdominal distention abdominal pain nausea and vomiting she presented to the ER.    In the ER she underwent CT scan notable for small bowel obstruction and possible small bowel volvulus, was made n.p.o. and was subsequently admitted for further management.    Past Medical History    I have reviewed this patient's medical history and updated it with pertinent information if needed.   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  Past Surgical History:   Procedure Laterality Date     FRACTURE TX,  HIP RT/LT Right 2013     INSERT PORT VASCULAR ACCESS N/A 2/7/2019    Procedure: INSERT PORT VASCULAR ACCESS;  Surgeon: Tresa Evangelista MD;  Location:  OR     INSERT PORT VASCULAR ACCESS Right 7/22/2020    Procedure: power PORT placement;  Surgeon: Tresa Evangelista MD;  Location:  OR     LAPAROSCOPY DIAGNOSTIC (GENERAL) N/A 7/8/2020    Procedure: LAPAROSCOPY, DIAGNOSTIC, BY GENERAL SURGERY;  Surgeon: Tresa Evangelista MD;  Location:  OR     LAPAROTOMY EXPLORATORY N/A 12/18/2018    Procedure: Exploratory Laparotomy with right hemicolectomy;  Surgeon: Tresa Evangelista MD;  Location:  OR     REMOVE CATHETER VASCULAR ACCESS N/A 10/9/2019    Procedure: Remove Port;  Surgeon: Tresa Evangelista MD;  Location:  OR     TONSILLECTOMY  1962     TUBAL LIGATION  1979       Prior to Admission Medications   Prior to Admission Medications   Prescriptions Last Dose Informant Patient Reported? Taking?   Lactobacillus (FLORAJEN ACIDOPHILUS) CAPS   Yes No   amLODIPine (NORVASC) 5 MG tablet   Yes No   Sig: (Only takes if BP higher than 150/90)   bisacodyl (DULCOLAX) 10 MG suppository   No No   Sig: Place 1 suppository (10 mg) rectally daily as needed for constipation   levothyroxine (SYNTHROID/LEVOTHROID) 25 MCG tablet   No No   Sig: Take 1 tablet (25 mcg) by mouth in the morning.   ondansetron (ZOFRAN) 8 MG tablet   No No   Sig: Take 1 tablet (8 mg) by mouth every 8 hours as needed for nausea   ondansetron (ZOFRAN-ODT) 4 MG ODT tab   No No   Sig: Take 1 tablet (4 mg) by mouth every 6 hours as needed for nausea   oxyCODONE-acetaminophen (PERCOCET) 5-325 MG tablet   No No   Sig: Take 1-2 tablets by mouth every 6 hours as needed for pain Max Tylenol of 4000mg daily   polyethylene glycol (MIRALAX/GLYCOLAX) powder   Yes No   Sig: Take 1 capful by mouth 2 times daily as needed      Facility-Administered Medications: None     Allergies   Allergies   Allergen Reactions     Metrizamide Anaphylaxis     Had contrast dye. Patient reports she woke up in ICU.      Lisinopril GI Disturbance     ? angioedema     Bee Venom      Edema     Pollen Extract      Runny nose     Sulfa Drugs Hives       Social History   I have reviewed this patient's social history and updated it with pertinent information if needed. Nadya Flanagan  reports that she is a non-smoker but has been exposed to tobacco smoke. She has never used smokeless tobacco. She reports that she does not drink alcohol and does not use drugs.    Family History   I have reviewed this patient's family history and updated it with pertinent information if needed.   Family History   Problem Relation Age of Onset     Tuberculosis Mother      Chronic Obstructive Pulmonary Disease Mother      Heart Disease Father         massive MI     Heart Disease Brother         stents/CABG     Glaucoma Brother      Lung Cancer Brother         mesothelioma     Hypertension Brother        Review of Systems   Constitutional: Poor energy and appetite, no recent sick contacts  Eyes: no changes in vision  Ears, nose, mouth, throat, and face: no mouth sores, dysphagia, or odynophagia  Respiratory: no shortness of breath, cough, or wheezing. No aspiration symptoms.   Cardiovascular: no chest pain, palpitations, orthopnea, increased lower extremity edema, or syncope.   Gastrointestinal: She feels constantly nauseated and thinks an NG tube might actually help her symptoms but she wants to think about it, only intermittent diffuse cramping abdominal pain, not passing any gas, not moving her bowels.    Genitourinary: no dysuria, hematuria, urgency or frequency.   Hematologic/lymphatic: She is lost over 43 pounds in the last number of months.  Musculoskeletal: no pain to extremities or falls.   Neurological: no new weakness, tingling, numbness.   Endocrine: not a known diabetic.     Physical Exam   Temp: 97.9  F (36.6  C) Temp src: Tympanic BP: (!) 150/78 Pulse: 85   Resp: 18 SpO2: 96 % O2 Device: None (Room air)    Vital Signs with Ranges  Temp:   [97.1  F (36.2  C)-98.5  F (36.9  C)] 97.9  F (36.6  C)  Pulse:  [] 85  Resp:  [16-20] 18  BP: (134-157)/() 150/78  SpO2:  [95 %-100 %] 96 %  126 lbs 15.76 oz    Exam:  GENERAL: Talkative, sitting in bed, cachectic appearing, in no apparent distress.  Head: normocephalic and atraumatic  Eyes: anicteric and non-injected sclera  Nose: no rhinorrhea or epistaxis.   Throat: moist mucous membranes with no active oral lesions.  NECK: Supple, jugular venous distension not present.  CARDIOVASCULAR: regular rate and rhythm, no murmurs, rubs, or gallops. Normal S1/S2. No lower extremity edema.   RESPIRATORY: clear to auscultation bilaterally, no wheezes, no crackles.   GI: Diffuse distention with mild palpation with gentle palpation in all quadrants, hyperactive bowel sounds.  No peritoneal signs of rebound or guarding.   MUSCULOSKELETAL: warm and well perfused, 2+ dorsalis pedis pulses.    SKIN: no pallor, jaundice or rashes.  NEUROLOGY: AAOx3, follows commands, speech and language without focal deficits.        Data   Data reviewed today:  I personally reviewed no images or EKG's today.  Recent Labs   Lab 05/23/22  0609 05/22/22  1859   WBC 11.2* 14.0*   HGB 9.3* 10.4*   * 104*    496*   NA  --  132*   POTASSIUM  --  4.6   CHLORIDE  --  97*   CO2  --  24   BUN  --  28*   CR  --  1.03   ANIONGAP  --  11   STELLA  --  9.4   GLC  --  106*   ALBUMIN  --  3.3*   PROTTOTAL  --  7.5   BILITOTAL  --  0.5   ALKPHOS  --  118*   ALT  --  9   AST  --  22       Recent Results (from the past 24 hour(s))   US Abdomen Limited    Narrative    PROCEDURE INFORMATION:   Exam: US Abdomen; Limited   Exam date and time: 5/22/2022 8:25 PM   Age: 71 years old   Clinical indication: Abdominal pain; Epigastric; Additional info: Ascites     TECHNIQUE:   Imaging protocol: US abdomen. Real time ultrasound with image documentation.   Limited exam focused on the region of clinical interest.     COMPARISON:   US ABDOMEN COMPLETE  4/5/2022 11:50 AM     FINDINGS:   Moderate amount of ascites is seen within all 4 quadrants.  Amount of ascites   is grossly unchanged when compared to prior CT scan.  Multiple fluid-filled loops of small bowel again noted.      Impression    IMPRESSION:   Moderate amount of ascites is grossly unchanged when compared to prior CT scan.    THIS DOCUMENT HAS BEEN ELECTRONICALLY SIGNED BY MONICA GUIDO MD   CT Abdomen Pelvis w Contrast    Addendum: 5/22/2022    Addendum created by Monica Guido MD on 5/22/2022 10:11:19 PM CDT:  THIS REPORT CONTAINS FINDINGS THAT MAY BE CRITICAL TO PATIENT CARE. The   findings were verbally communicated via telephone conference with CARMEN CALDWELL at 10:11 PM CDT on 5/22/2022. The findings were acknowledged and   understood.        Narrative    Initial report created on 5/22/2022 9:56:01 PM CDT:    PROCEDURE INFORMATION:   Exam: CT Abdomen And Pelvis With Contrast   Exam date and time: 5/22/2022 9:26 PM   Age: 71 years old   Clinical indication: Other: Ascites     TECHNIQUE:   Imaging protocol: Computed tomography of the abdomen and pelvis with contrast.   Radiation optimization: All CT scans at this facility use at least one of these   dose optimization techniques: automated exposure control; mA and/or kV   adjustment per patient size (includes targeted exams where dose is matched to   clinical indication); or iterative reconstruction.   Contrast material: ISOVUE 370; Contrast volume: 100 ml; Contrast route:   INTRAVENOUS (IV);      COMPARISON:   CT ABDOMEN PELVIS W CONTRAST 4/13/2022 2:25 PM     FINDINGS:   Liver: Fatty metamorphosis of the liver is noted. No mass.   Gallbladder and bile ducts: Normal. No calcified stones. No ductal dilation.   Pancreas: Normal. No ductal dilation.   Spleen: Normal. No splenomegaly.   Adrenal glands: Normal. No mass.   Kidneys and ureters: New right-sided hydronephrosis. Etiology of the   hydronephrosis cannot be determined. Cannot  exclude a stricture of the right   ureter. No left-sided hydronephrosis.   Stomach and bowel: Postoperative change of the right lower quadrant again   noted.Multiple air and fluid-filled loops of small bowel within the abdomen and   pelvis. The loops of small bowel measure up to 4.4 cm. The downstream loops of   small bowel are more normal size with a transition point seen in the right   lower quadrant in the area of prior surgery. Colon is normal size.   Appendix: No evidence of appendicitis.     Intraperitoneal space: Ascites within the upper abdomen and pelvis is slightly   worse compared to prior CT.   Vasculature: Alteration of the course of the superior mesenteric artery and   superior mesenteric vein on the current examination with a swirling type   pattern of the bowel mesentery. These findings are most easily appreciated on   the coronal images.   Lymph nodes: Unremarkable. No enlarged lymph nodes.   Urinary bladder: Unremarkable as visualized.   Reproductive: Unremarkable as visualized.   Bones/joints: Postoperative change of the right femur again noted.   Soft tissues: Unremarkable.       Impression    IMPRESSION:   Small bowel obstruction.  Probable small bowel volvulus is noted as described   above.  Recommend correlation with patient's serum lactate levels to assess for   possible bowel ischemia.    Ascites has increased slightly compared to prior CT scan.    New right-sided hydronephrosis.  Cannot exclude a stricture of the right ureter.      THIS DOCUMENT HAS BEEN ELECTRONICALLY SIGNED BY MONICA LEWIS MD

## 2022-05-23 NOTE — PROGRESS NOTES
Weights reviewed per MST screen: pt has had ~10# wt loss in past 2 months, ~7%. Currently on comfort cares, plans to discharge home with hospice services. Will plan to provide food/fluids as desired and tolerated. Follow up prn.   Wt Readings from Last 10 Encounters:   05/22/22 57.6 kg (126 lb 15.8 oz)   05/19/22 57.2 kg (126 lb 3.2 oz)   05/17/22 56.2 kg (124 lb)   05/12/22 57.2 kg (126 lb)   05/02/22 58.1 kg (128 lb)   04/11/22 59.9 kg (132 lb)   04/08/22 60.2 kg (132 lb 12.8 oz)   03/24/22 61.3 kg (135 lb 3.2 oz)   03/23/22 61.5 kg (135 lb 8 oz)   03/18/22 62.1 kg (136 lb 12.8 oz)   Barbi Lopez RD on 5/23/2022 at 11:42 AM

## 2022-05-24 NOTE — PROGRESS NOTES
[facility]    Medicine Progress Note - Hospitalist Service    Date of Admission:  5/22/2022    Assessment & Plan          Nadya Flanagan is a 71 year old female who presents with small bowel obstruction with possible developing small bowel volvulus.    SBO (small bowel obstruction) (H)    Assessment: Very pleasant goals of care discussion and at this point does not want to consider palliative surgical interventions after discussion with Dr. Bella of surgical oncology on 5/20.  Her goal is to live until the weekend when her children from out of state are coming, she would like to return home when able on hospice and wants to pursue comfort care only going forward.    Plan:   -IV and p.o. pain control  -Consider NG placement for decompression and nausea and distention improvement, unsuccessful x4. Patient doing well on ice chips and sips only.  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    Plan: - comfort care orderset       Severe protein-calorie malnutrition (H)    Assessment: ongoing and not able to tolerate po intake at this point and has deferred TPN       Hyponatremia    Assessment: poa and likely from dehyrdation    Plan:   - no further monitoring       Anemia    Assessment: Slight macrocytosis, no evidence acute blood loss, likely from chronic disease    Plan:   -No further monitoring     CKD stage 3a   Assessment: Stable  Plan:  -Further monitoring       Diet: NPO for Medical/Clinical Reasons Except for: Meds, Ice Chips     DVT Prophylaxis: comfort cares  Nava Catheter: Not present  Central Lines: PRESENT     Cardiac Monitoring: None  Code Status: No CPR- Do NOT Intubate      Disposition Plan   Expected Discharge:  today or tomorrow   Anticipated discharge location: home with family    Delays:   hospice.         The patient's care was discussed with the Patient and Patient's Family.    Janny Aguilar DO  Hospitalist Service  [facility]  Securely message with the Vocera Web Console (learn more here)  Text page via [employer] Paging/Directory         Clinically Significant Risk Factors Present on Admission                 ______________________________________________________________________    Interval History   Generally actually doing well this morning.  Just got her pain medication and as long as she is not too active, feels well enough.  Son got in last night.  She is hopeful to go home today or tomorrow on hospice.  Currently not feeling nausea or vomiting.  Pain well controlled.    Data reviewed today: I reviewed all medications, new labs and imaging results over the last 24 hours. I personally reviewed no images or EKG's today.    Physical Exam   Vital Signs: Temp: 98.2  F (36.8  C) Temp src: Tympanic BP: (!) 153/77 Pulse: 83   Resp: 18 SpO2: 94 % O2 Device: None (Room air)    Weight: 127 lbs 11.2 oz  General Appearance: AAO. Chronically ill. In no distress.   Respiratory: CTA B/L -w/r/r  Cardiovascular: RR  GI: distended  Skin: warm, dry  Other:      Data   Recent Labs   Lab 05/23/22  0609 05/22/22  1859   WBC 11.2* 14.0*   HGB 9.3* 10.4*   * 104*    496*   NA  --  132*   POTASSIUM  --  4.6   CHLORIDE  --  97*   CO2  --  24   BUN  --  28*   CR  --  1.03   ANIONGAP  --  11   STELLA  --  9.4   GLC  --  106*   ALBUMIN  --  3.3*   PROTTOTAL  --  7.5   BILITOTAL  --  0.5   ALKPHOS  --  118*   ALT  --  9   AST  --  22     No results found for this or any previous visit (from the past 24 hour(s)).

## 2022-05-24 NOTE — PROGRESS NOTES
Hospice nurse was here to visit with PT. All questions asked and answered, plan is to have NG placed under sedation with Dr. Evangelista if time today then discharge to home when able with follow up. Tresa Doyle RN on 5/24/2022 at 1:16 PM

## 2022-05-24 NOTE — PROGRESS NOTES
SAFETY CHECKLIST  ID Bands and Risk clasps correct and in place (DNR, Fall risk, Allergy, Latex, Limb):  Yes  All Lines Reconciled and labeled correctly: Yes  Whiteboard updated:Yes  Environmental interventions (bed/chair alarm on, call light, side rails, restraints, sitter....): Yes  Verify Tele #: none ordered

## 2022-05-24 NOTE — PLAN OF CARE
"Patient admitted with Small bowel obstruction; malignant neoplasm of appendix; Pain 8/10; NPO except ice chips; Failed NG tube placement; nasal saline provide to soften nasal cavity; Comfort cares;     Goal Outcome Evaluation:  Problem: Plan of Care - These are the overarching goals to be used throughout the patient stay.    Goal: Plan of Care Review/Shift Note  Description: The Plan of Care Review/Shift note should be completed every shift.  The Outcome Evaluation is a brief statement about your assessment that the patient is improving, declining, or no change.  This information will be displayed automatically on your shift note.  Outcome: Ongoing, Progressing  Goal: Patient-Specific Goal (Individualized)  Description: You can add care plan individualizations to a care plan. Examples of Individualization might be:  \"Parent requests to be called daily at 9am for status\", \"I have a hard time hearing out of my right ear\", or \"Do not touch me to wake me up as it startles me\".  Outcome: Ongoing, Progressing  Goal: Absence of Hospital-Acquired Illness or Injury  Outcome: Ongoing, Progressing  Intervention: Identify and Manage Fall Risk  Recent Flowsheet Documentation  Taken 5/23/2022 0834 by Cee Holly RN  Safety Promotion/Fall Prevention: safety round/check completed  Intervention: Prevent Skin Injury  Recent Flowsheet Documentation  Taken 5/23/2022 0834 by Cee Holly RN  Body Position: position changed independently  Intervention: Prevent and Manage VTE (Venous Thromboembolism) Risk  Recent Flowsheet Documentation  Taken 5/23/2022 0834 by Cee Holly RN  VTE Prevention/Management: SCDs (sequential compression devices) off  Activity Management: activity adjusted per tolerance  Goal: Optimal Comfort and Wellbeing  Outcome: Ongoing, Progressing  Intervention: Monitor Pain and Promote Comfort  Recent Flowsheet Documentation  Taken 5/23/2022 1737 by Cee Holly RN  Pain Management Interventions: " medication (see MAR)  Taken 5/23/2022 1151 by Cee Holly, RN  Pain Management Interventions: medication (see MAR)  Taken 5/23/2022 0834 by Cee Holly, RN  Pain Management Interventions: medication (see MAR)  Goal: Readiness for Transition of Care  Outcome: Ongoing, Progressing

## 2022-05-24 NOTE — PLAN OF CARE
"Pt is A/O x 4, has had moderate to severe pain this shift that has been managed by PRN dilaudid. Pt's bowel sounds have been present but hypoactive this shift. Attempted to insert an NG tube, only able to insert about 8 cm before running into a stop.    BP (!) 153/77 (BP Location: Right arm)   Pulse 84   Temp 98.6  F (37  C) (Tympanic)   Resp 16   Ht 1.6 m (5' 3\")   Wt 57.9 kg (127 lb 11.2 oz)   SpO2 95%   BMI 22.62 kg/m        Goal Outcome Evaluation:    Plan of Care Reviewed With: patient     Overall Patient Progress: no change           Problem: Plan of Care - These are the overarching goals to be used throughout the patient stay.    Goal: Plan of Care Review/Shift Note  Description: The Plan of Care Review/Shift note should be completed every shift.  The Outcome Evaluation is a brief statement about your assessment that the patient is improving, declining, or no change.  This information will be displayed automatically on your shift note.  Outcome: Ongoing, Not Progressing  Flowsheets (Taken 5/24/2022 0552)  Plan of Care Reviewed With: patient  Overall Patient Progress: no change  Goal: Patient-Specific Goal (Individualized)  Description: You can add care plan individualizations to a care plan. Examples of Individualization might be:  \"Parent requests to be called daily at 9am for status\", \"I have a hard time hearing out of my right ear\", or \"Do not touch me to wake me up as it startles me\".  Outcome: Ongoing, Not Progressing  Flowsheets (Taken 5/24/2022 0552)  Individualized Care Needs: Pain management  Anxieties, Fears or Concerns: Nervous about NG tube placement  Goal: Absence of Hospital-Acquired Illness or Injury  Outcome: Ongoing, Not Progressing  Intervention: Identify and Manage Fall Risk  Recent Flowsheet Documentation  Taken 5/23/2022 1954 by Sal Douglass RN  Safety Promotion/Fall Prevention: safety round/check completed  Goal: Optimal Comfort and Wellbeing  Outcome: Ongoing, Not " Progressing  Intervention: Monitor Pain and Promote Comfort  Recent Flowsheet Documentation  Taken 5/24/2022 0442 by Sal Douglass RN  Pain Management Interventions: medication (see MAR)  Taken 5/24/2022 0118 by Sal Douglass RN  Pain Management Interventions: medication (see MAR)  Taken 5/23/2022 2309 by Sal Douglass RN  Pain Management Interventions: medication (see MAR)  Taken 5/23/2022 2113 by Sal Douglass RN  Pain Management Interventions: medication (see MAR)  Taken 5/23/2022 1954 by Sal Douglass RN  Pain Management Interventions: medication (see MAR)  Goal: Readiness for Transition of Care  Outcome: Ongoing, Not Progressing     Problem: Pain Acute  Goal: Acceptable Pain Control and Functional Ability  Outcome: Ongoing, Not Progressing  Intervention: Develop Pain Management Plan  Recent Flowsheet Documentation  Taken 5/24/2022 0442 by Sal Douglass RN  Pain Management Interventions: medication (see MAR)  Taken 5/24/2022 0118 by Sal Douglass RN  Pain Management Interventions: medication (see MAR)  Taken 5/23/2022 2309 by Sal Douglass RN  Pain Management Interventions: medication (see MAR)  Taken 5/23/2022 2113 by Sal Douglass RN  Pain Management Interventions: medication (see MAR)  Taken 5/23/2022 1954 by Sal Douglass RN  Pain Management Interventions: medication (see MAR)  Intervention: Prevent or Manage Pain  Recent Flowsheet Documentation  Taken 5/23/2022 1954 by Sal Douglass RN  Medication Review/Management: medications reviewed     Problem: Fluid Deficit (Intestinal Obstruction)  Goal: Fluid Balance  Outcome: Ongoing, Not Progressing     Problem: Infection (Intestinal Obstruction)  Goal: Absence of Infection Signs and Symptoms  Outcome: Ongoing, Not Progressing     Problem: Nausea and Vomiting (Intestinal Obstruction)  Goal: Nausea and Vomiting Relief  Outcome: Ongoing, Not Progressing  Intervention: Prevent and Manage Nausea and Vomiting  Recent Flowsheet Documentation  Taken 5/23/2022 1954 by Sal Douglass  RN  Nausea/Vomiting Interventions:   antiemetic   nausea triggers minimized   sips of clear liquids given     Problem: Pain (Intestinal Obstruction)  Goal: Acceptable Pain Control  Outcome: Ongoing, Not Progressing  Intervention: Monitor and Manage Pain  Recent Flowsheet Documentation  Taken 5/24/2022 0442 by Sal Douglass, RN  Pain Management Interventions: medication (see MAR)  Taken 5/24/2022 0118 by Sal Douglass RN  Pain Management Interventions: medication (see MAR)  Taken 5/23/2022 2309 by Sal Douglass, RN  Pain Management Interventions: medication (see MAR)  Taken 5/23/2022 2113 by Sal Douglass, RN  Pain Management Interventions: medication (see MAR)  Taken 5/23/2022 1954 by Sal Douglass RN  Pain Management Interventions: medication (see MAR)

## 2022-05-24 NOTE — PROGRESS NOTES
:     Maricruz from [facility] had previously mentioned she could do an informational visit with patient at the hospital.   Call placed to [facility] and left a voicemail for Maricruz regarding this and patient discharge.      will continue to follow.     ERAN Rosa on 5/24/2022 at 11:23 AM

## 2022-05-25 NOTE — PROGRESS NOTES
Patient pain well controled with prn medications no issues with nausea, NG in place and draining dark brown bile. Patient was educated on how to care for NG at home and what to expect, patient was educated regarding pain management. Able to discharge to home with family support. [facility] notified of discharge and will follow up this afternoon.  All questions asked and answered. Tresa Doyle RN on 5/25/2022 at 10:29 AM

## 2022-05-25 NOTE — PHARMACY - DISCHARGE MEDICATION RECONCILIATION AND EDUCATION
Pharmacy:  Discharge Counseling and Medication Reconciliation    Nadya Flanagan  [address]   [facility] [address]  [phone] (home)   71 year old female  PCP: Meghan Pearce    Allergies: Metrizamide, Lisinopril, Bee venom, Pollen extract, and Sulfa drugs    Discharge Counseling:    Pharmacist met with patient (and/or family) today to review the medication portion of the After Visit Summary (with an emphasis on NEW medications) and to address patient's questions/concerns.    Summary of Education: patient is going to be discharged with hospice.  Per MD, no education is needed at this time.    Materials Provided:  MedCounselor sheets printed from Clinical Pharmacology on: NA, will have hospice services    Discharge Medication Reconciliation:    It has been determined that the patient has an adequate supply of medications available or which can be obtained from the patient's preferred pharmacy, which HE/SHE has confirmed as: [facility] [An updated medication list will be faxed to the patient's pharmacy.]    Thank you for the consult.    Emma Phillip RPH........May 25, 2022 8:29 AM

## 2022-05-25 NOTE — PLAN OF CARE
Problem: Plan of Care - These are the overarching goals to be used throughout the patient stay.    Goal: Absence of Hospital-Acquired Illness or Injury  Intervention: Identify and Manage Fall Risk  Recent Flowsheet Documentation  Taken 5/25/2022 0800 by Tresa Doyle RN  Safety Promotion/Fall Prevention: safety round/check completed  Intervention: Prevent Skin Injury  Recent Flowsheet Documentation  Taken 5/25/2022 0800 by Tresa Doyle RN  Body Position:   weight shifting   sitting up in bed  Intervention: Prevent and Manage VTE (Venous Thromboembolism) Risk  Recent Flowsheet Documentation  Taken 5/25/2022 0800 by Tresa Doyle RN  VTE Prevention/Management: SCDs (sequential compression devices) off     Problem: Pain Acute  Goal: Acceptable Pain Control and Functional Ability  Intervention: Prevent or Manage Pain  Recent Flowsheet Documentation  Taken 5/25/2022 0800 by Tresa Doyle RN  Medication Review/Management: medications reviewed   Goal Outcome Evaluation:        Patient able to discharge to home with family to assist. Tresa Doyle RN on 5/25/2022 at 2:28 PM

## 2022-05-25 NOTE — PLAN OF CARE
Problem: Plan of Care - These are the overarching goals to be used throughout the patient stay.    Goal: Absence of Hospital-Acquired Illness or Injury  Intervention: Identify and Manage Fall Risk  Recent Flowsheet Documentation  Taken 5/25/2022 0800 by Tresa Doyle RN  Safety Promotion/Fall Prevention: safety round/check completed  Intervention: Prevent Skin Injury  Recent Flowsheet Documentation  Taken 5/25/2022 0800 by Tresa Doyle RN  Body Position:   weight shifting   sitting up in bed  Intervention: Prevent and Manage VTE (Venous Thromboembolism) Risk  Recent Flowsheet Documentation  Taken 5/25/2022 0800 by Tresa Doyle RN  VTE Prevention/Management: SCDs (sequential compression devices) off     Problem: Pain Acute  Goal: Acceptable Pain Control and Functional Ability  Intervention: Prevent or Manage Pain  Recent Flowsheet Documentation  Taken 5/25/2022 0800 by Tresa Doyle RN  Medication Review/Management: medications reviewed   Goal Outcome Evaluation:

## 2022-05-25 NOTE — DISCHARGE SUMMARY
[facility]  Hospitalist Discharge Summary      Date of Admission:  5/22/2022  Date of Discharge:  5/25/2022  Discharging Provider: Janny Aguilar DO  Discharge Service: Hospitalist Service    Discharge Diagnoses   Small bowel obstruction  Malignant neoplasm of appendix  Severe protein calorie malnutrition  Hyponatremia  Anemia  Chronic kidney disease present on admission      Follow-ups Needed After Discharge   Follow-up Appointments     Follow-up and recommended labs and tests       Follow up with hospice             Unresulted Labs Ordered in the Past 30 Days of this Admission     Date and Time Order Name Status Description    5/22/2022  8:04 PM Blood Culture Peripheral Blood Preliminary     5/22/2022  8:04 PM Blood Culture Peripheral Blood Preliminary       These results will be followed up by PCP/hospice    Discharge Disposition   Discharged to home  Condition at discharge: Stable, terminal    Hospital Course            Nadya Flanagan is a 71 year old female who presents with small bowel obstruction with possible developing small bowel volvulus.    SBO (small bowel obstruction) (H). Hx of malignant neoplasm of appendix.  Patient does not want to consider palliative surgical interventions after discussion with Dr. Bella of surgical oncology on 5/20.  Her goal is to live until the weekend when her children from out of state are coming, she would like to return home when able on hospice and wants to pursue comfort care only going forward.  NG tube was placed for decompression and nausea.  She was given pain control and antiemetics at discharge.  Hospice will follow up with her this afternoon.    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   comfort care orderset       Severe protein-calorie malnutrition (H)    Assessment: ongoing and not able to tolerate po intake at this point and has deferred  TPN       Hyponatremia    Assessment: poa and likely from dehyrdation    Plan:   - no further monitoring       Anemia    Assessment: Slight macrocytosis, no evidence acute blood loss, likely from chronic disease    Plan:   -No further monitoring     CKD stage 3a   Assessment: Stable  Plan:  -Further monitoring      Consultations This Hospital Stay   SURGERY GENERAL IP CONSULT  SOCIAL WORK IP CONSULT  SOCIAL WORK IP CONSULT    Code Status   No CPR- Do NOT Intubate    Time Spent on this Encounter   I, Janny Aguilar DO, personally saw the patient today and spent greater than 30 minutes discharging this patient.       Janny Aguilar DO  [facility]  [address]  [place] [address]  Phone: [phone]  Fax: [phone]  ______________________________________________________________________    Physical Exam   Vital Signs: Temp: 98.6  F (37  C) Temp src: Tympanic BP: 135/64     Resp: 18 SpO2: 96 % O2 Device: None (Room air)    Weight: 125 lbs 9.6 oz  General Appearance:  AAO. Chronically ill. Thin. In no distress. NGT in place.   Respiratory: CTA B/L -w/r/r  Cardiovascular: RR  GI: distended  Skin: warm, dry       Primary Care Physician   Meghan Pearce    Discharge Orders      Reason for your hospital stay    Small bowel obstruction     Follow-up and recommended labs and tests     Follow up with hospice     Activity    Your activity upon discharge: activity as tolerated     Diet    Follow this diet upon discharge: as tolerated       Significant Results and Procedures   Most Recent 3 CBC's:Recent Labs   Lab Test 05/23/22  0609 05/22/22  1859 04/08/22  1201   WBC 11.2* 14.0* 7.7   HGB 9.3* 10.4* 11.9   * 104* 108*    496* 304     Most Recent 3 BMP's:Recent Labs   Lab Test 05/22/22  1859 04/08/22  1130 03/18/22  1131 10/01/21  1006 09/10/21  1027   * 135  --   --  138   POTASSIUM 4.6 4.6  --   --  4.4   CHLORIDE 97* 101  --   --  105   CO2 24 26  --   --  25   BUN  28* 16  --   --  16   CR 1.03 0.87 0.94   < > 0.98   ANIONGAP 11 8  --   --  8   STELLA 9.4 9.6  --   --  9.4   * 98  --   --  116*    < > = values in this interval not displayed.   ,   Results for orders placed or performed during the hospital encounter of 05/22/22   US Abdomen Limited    Narrative    PROCEDURE INFORMATION:   Exam: US Abdomen; Limited   Exam date and time: 5/22/2022 8:25 PM   Age: 71 years old   Clinical indication: Abdominal pain; Epigastric; Additional info: Ascites     TECHNIQUE:   Imaging protocol: US abdomen. Real time ultrasound with image documentation.   Limited exam focused on the region of clinical interest.     COMPARISON:   US ABDOMEN COMPLETE 4/5/2022 11:50 AM     FINDINGS:   Moderate amount of ascites is seen within all 4 quadrants.  Amount of ascites   is grossly unchanged when compared to prior CT scan.  Multiple fluid-filled loops of small bowel again noted.      Impression    IMPRESSION:   Moderate amount of ascites is grossly unchanged when compared to prior CT scan.    THIS DOCUMENT HAS BEEN ELECTRONICALLY SIGNED BY MONICA GUIDO MD   CT Abdomen Pelvis w Contrast    Addendum: 5/22/2022    Addendum created by Monica Guido MD on 5/22/2022 10:11:19 PM CDT:  THIS REPORT CONTAINS FINDINGS THAT MAY BE CRITICAL TO PATIENT CARE. The   findings were verbally communicated via telephone conference with CARMEN CALDWELL at 10:11 PM CDT on 5/22/2022. The findings were acknowledged and   understood.        Narrative    Initial report created on 5/22/2022 9:56:01 PM CDT:    PROCEDURE INFORMATION:   Exam: CT Abdomen And Pelvis With Contrast   Exam date and time: 5/22/2022 9:26 PM   Age: 71 years old   Clinical indication: Other: Ascites     TECHNIQUE:   Imaging protocol: Computed tomography of the abdomen and pelvis with contrast.   Radiation optimization: All CT scans at this facility use at least one of these   dose optimization techniques: automated exposure control; mA  and/or kV   adjustment per patient size (includes targeted exams where dose is matched to   clinical indication); or iterative reconstruction.   Contrast material: ISOVUE 370; Contrast volume: 100 ml; Contrast route:   INTRAVENOUS (IV);      COMPARISON:   CT ABDOMEN PELVIS W CONTRAST 4/13/2022 2:25 PM     FINDINGS:   Liver: Fatty metamorphosis of the liver is noted. No mass.   Gallbladder and bile ducts: Normal. No calcified stones. No ductal dilation.   Pancreas: Normal. No ductal dilation.   Spleen: Normal. No splenomegaly.   Adrenal glands: Normal. No mass.   Kidneys and ureters: New right-sided hydronephrosis. Etiology of the   hydronephrosis cannot be determined. Cannot exclude a stricture of the right   ureter. No left-sided hydronephrosis.   Stomach and bowel: Postoperative change of the right lower quadrant again   noted.Multiple air and fluid-filled loops of small bowel within the abdomen and   pelvis. The loops of small bowel measure up to 4.4 cm. The downstream loops of   small bowel are more normal size with a transition point seen in the right   lower quadrant in the area of prior surgery. Colon is normal size.   Appendix: No evidence of appendicitis.     Intraperitoneal space: Ascites within the upper abdomen and pelvis is slightly   worse compared to prior CT.   Vasculature: Alteration of the course of the superior mesenteric artery and   superior mesenteric vein on the current examination with a swirling type   pattern of the bowel mesentery. These findings are most easily appreciated on   the coronal images.   Lymph nodes: Unremarkable. No enlarged lymph nodes.   Urinary bladder: Unremarkable as visualized.   Reproductive: Unremarkable as visualized.   Bones/joints: Postoperative change of the right femur again noted.   Soft tissues: Unremarkable.       Impression    IMPRESSION:   Small bowel obstruction.  Probable small bowel volvulus is noted as described   above.  Recommend correlation with  patient's serum lactate levels to assess for   possible bowel ischemia.    Ascites has increased slightly compared to prior CT scan.    New right-sided hydronephrosis.  Cannot exclude a stricture of the right ureter.      THIS DOCUMENT HAS BEEN ELECTRONICALLY SIGNED BY MONICA LEWIS MD   XR Abdomen Port 1 View    Narrative    Exam: XR ABDOMEN PORT 1 VIEWS.    Exam Reason: tube placement    Comparison: 5/22/2022, 4/13/2022    FINDINGS: There has been placement of an enteric tube which traverses  into the stomach and crosses to the right of midline, with the tip of  the tube possibly in the proximal duodenum.    There are a few loops of dilated small bowel in the upper abdomen.     No acute osseous abnormality. Visualized lung bases are clear.      Impression    IMPRESSION:    There has been placement of an enteric tube which traverses the  stomach and crosses to the right of midline with the tip of the tube  possibly in the proximal duodenum.    There are a few loops of dilated small bowel in the upper abdomen,  compatible with small bowel obstruction.    MIRI STUART MD         SYSTEM ID:  [id number]       Discharge Medications   Current Discharge Medication List      START taking these medications    Details   !! morphine sulfate (ROXANOL) 20 mg/mL (HIGH CONC) soln Take 0.25-0.5 mLs (5-10 mg) by mouth every hour as needed for moderate to severe pain (dyspnea)  Qty: 30 mL, Refills: 0    Associated Diagnoses: SBO (small bowel obstruction) (H); Malignant neoplasm of appendix vermiformis (H)      !! morphine sulfate (ROXANOL) 20 mg/mL (HIGH CONC) soln Place 0.25-0.5 mLs (5-10 mg) under the tongue every hour as needed for breakthrough pain  Refills: 0      prochlorperazine (COMPAZINE) 25 MG suppository Place 0.5 suppositories (12.5 mg) rectally every 12 hours as needed for nausea or vomiting  Qty: 30 suppository, Refills: 0    Associated Diagnoses: SBO (small bowel obstruction) (H); Malignant neoplasm of appendix  vermiformis (H)      prochlorperazine (COMPAZINE) 5 MG tablet Take 1 tablet (5 mg) by mouth every 6 hours as needed for vomiting      scopolamine (TRANSDERM) 1 MG/3DAYS 72 hr patch Place 1 patch onto the skin every 72 hours  Qty: 3 patch, Refills: 0    Associated Diagnoses: SBO (small bowel obstruction) (H); Malignant neoplasm of appendix vermiformis (H)       !! - Potential duplicate medications found. Please discuss with provider.      CONTINUE these medications which have NOT CHANGED    Details   bisacodyl (DULCOLAX) 10 MG suppository Place 1 suppository (10 mg) rectally daily as needed for constipation  Qty: 16 suppository, Refills: 1    Associated Diagnoses: Drug-induced constipation      Lactobacillus (FLORAJEN ACIDOPHILUS) CAPS       levothyroxine (SYNTHROID/LEVOTHROID) 25 MCG tablet Take 1 tablet (25 mcg) by mouth in the morning.  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); Nausea      ondansetron (ZOFRAN-ODT) 4 MG ODT tab Take 1 tablet (4 mg) by mouth every 6 hours as needed for nausea  Qty: 25 tablet, Refills: 0    Associated Diagnoses: Nausea      oxyCODONE-acetaminophen (PERCOCET) 5-325 MG tablet Take 1-2 tablets by mouth every 6 hours as needed for pain Max Tylenol of 4000mg daily  Qty: 75 tablet, Refills: 0    Associated Diagnoses: 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 Patient  reports she woke up in ICU.     Lisinopril GI Disturbance     ? angioedema     Bee Venom      Edema     Pollen Extract      Runny nose     Sulfa Drugs Hives

## 2022-05-25 NOTE — PROGRESS NOTES
SAFETY CHECKLIST  ID Bands and Risk clasps correct and in place (DNR, Fall risk, Allergy, Latex, Limb):  Yes  All Lines Reconciled and labeled correctly: Yes  Whiteboard updated:Yes  Environmental interventions (bed/chair alarm on, call light, side rails, restraints, sitter....): Yes  Tresa Doyle RN on 5/25/2022 at 7:26 AM

## 2022-05-25 NOTE — PLAN OF CARE
"Pt is A/Ox 4, has had moderate pain this shift well managed by IV dilaudid. pt has had intermittent nausea, IV compazine has been used per patients request as she stated Zofran does not help. Pt has been in a generalized good mood. Currently has no concerns.    Goal Outcome Evaluation:    Plan of Care Reviewed With: patient     Overall Patient Progress: no change       Problem: Plan of Care - These are the overarching goals to be used throughout the patient stay.    Goal: Plan of Care Review/Shift Note  Description: The Plan of Care Review/Shift note should be completed every shift.  The Outcome Evaluation is a brief statement about your assessment that the patient is improving, declining, or no change.  This information will be displayed automatically on your shift note.  Outcome: Ongoing, Not Progressing  Flowsheets (Taken 5/25/2022 0515)  Plan of Care Reviewed With: patient  Overall Patient Progress: no change  Goal: Patient-Specific Goal (Individualized)  Description: You can add care plan individualizations to a care plan. Examples of Individualization might be:  \"Parent requests to be called daily at 9am for status\", \"I have a hard time hearing out of my right ear\", or \"Do not touch me to wake me up as it startles me\".  Outcome: Ongoing, Not Progressing  Goal: Absence of Hospital-Acquired Illness or Injury  Outcome: Ongoing, Not Progressing  Goal: Optimal Comfort and Wellbeing  Outcome: Ongoing, Not Progressing  Intervention: Monitor Pain and Promote Comfort  Recent Flowsheet Documentation  Taken 5/24/2022 2006 by Sal Douglass RN  Pain Management Interventions: medication (see MAR)  Goal: Readiness for Transition of Care  Outcome: Ongoing, Not Progressing     Problem: Pain Acute  Goal: Acceptable Pain Control and Functional Ability  Outcome: Ongoing, Not Progressing  Intervention: Develop Pain Management Plan  Recent Flowsheet Documentation  Taken 5/24/2022 2006 by Sal Douglass RN  Pain Management " Interventions: medication (see MAR)     Problem: Fluid Deficit (Intestinal Obstruction)  Goal: Fluid Balance  Outcome: Ongoing, Not Progressing     Problem: Infection (Intestinal Obstruction)  Goal: Absence of Infection Signs and Symptoms  Outcome: Ongoing, Not Progressing     Problem: Nausea and Vomiting (Intestinal Obstruction)  Goal: Nausea and Vomiting Relief  Outcome: Ongoing, Not Progressing     Problem: Pain (Intestinal Obstruction)  Goal: Acceptable Pain Control  Outcome: Ongoing, Not Progressing  Intervention: Monitor and Manage Pain  Recent Flowsheet Documentation  Taken 5/24/2022 2006 by Sal Douglass, RN  Pain Management Interventions: medication (see MAR)

## 2022-05-25 NOTE — PLAN OF CARE
Problem: Plan of Care - These are the overarching goals to be used throughout the patient stay.    Goal: Absence of Hospital-Acquired Illness or Injury  Intervention: Identify and Manage Fall Risk  Recent Flowsheet Documentation  Taken 5/25/2022 0800 by Tresa Doyle RN  Safety Promotion/Fall Prevention: safety round/check completed  Intervention: Prevent Skin Injury  Recent Flowsheet Documentation  Taken 5/25/2022 0800 by Tresa Doyle RN  Body Position:   weight shifting   sitting up in bed  Intervention: Prevent and Manage VTE (Venous Thromboembolism) Risk  Recent Flowsheet Documentation  Taken 5/25/2022 0800 by Tresa Doyle RN  VTE Prevention/Management: SCDs (sequential compression devices) off     Problem: Pain Acute  Goal: Acceptable Pain Control and Functional Ability  Intervention: Prevent or Manage Pain  Recent Flowsheet Documentation  Taken 5/25/2022 0800 by Tresa Doyle RN  Medication Review/Management: medications reviewed   Goal Outcome Evaluation:           Patient

## 2022-05-26 NOTE — PROGRESS NOTES
Clinic Care Coordination Contact  Transitional Care Management    Patient discharged with orders for hospice. Hospice was admitting within 48 hours.   No TCM call required per policy.   Confirmed with [facility], pt was admitted to services late yesterday afternoon.  Dora Mancilla RN ,....................  5/26/2022   8:47 AM

## 2022-05-31 NOTE — PROVIDER NOTIFICATION
05/31/22 1504   Valuables   Patient Belongings remains with patient   Patient Belongings Remaining with Patient clothing;glasses;shoes;jewelry   Did you bring any home meds/supplements to the hospital?  No     A               Admission:  I am responsible for any personal items that are not sent to the safe or pharmacy.  [facility] is not responsible for loss, theft or damage of any property in my possession.    Signature:  _________________________________ Date: _______  Time: _____                                              Staff Signature:  ____________________________ Date: ________  Time: _____      2nd Staff person, if patient is unable/unwilling to sign:    Signature: ________________________________ Date: ________  Time: _____     Discharge:  [facility] has returned all of my personal belongings:    Signature: _________________________________ Date: ________  Time: _____                                          Staff Signature:  ____________________________ Date: ________  Time: _____

## 2022-05-31 NOTE — PROGRESS NOTES
Report given from Ti PÉREZ from Hospice. She also came to see patient. Lamar Faustin RN 5/31/2022 5:12 PM

## 2022-05-31 NOTE — PROGRESS NOTES
" ADMISSION NOTE    Patient admitted to room 334 at approximately 1515 via wheel chair from home. Patient was accompanied by spouse.     Verbal SBAR report received from April PÉREZ prior to patient arrival.     Patient ambulated to bed independently. Patient alert and oriented X 3. Pain is controlled with current analgesics.  Medication(s) being used: narcotic analgesics including dilaudid.  . Admission vital signs: Blood pressure 124/80, pulse 98, temperature 98.2  F (36.8  C), temperature source Tympanic, resp. rate 18, height 1.6 m (5' 3\"), weight 55.7 kg (122 lb 11.2 oz), SpO2 96 %, not currently breastfeeding. Patient was oriented to plan of care, call light, bed controls, tv, telephone, bathroom and visiting hours.     Risk Assessment    The following safety risks were identified during admission: fall. Yellow risk band applied: YES.       Education    Patient has a [place] to Observation order: No  Observation education completed and documented: N/A      Elvie Hutson RN    "

## 2022-05-31 NOTE — PHARMACY-ADMISSION MEDICATION HISTORY
Pharmacy -- Admission Medication Reconciliation    Prior to admission (PTA) medications were reviewed and the patient's PTA medication list was updated.    Sources Consulted: [facility] medication list, chart review    The reliability of this Medication Reconciliation is: Reliability: Reliable    The following significant changes were made:  Removed: Morphine   Ondansetron 4 mg   Oxycodone-acetaminophen   Polyethylene glycol   Prochlorperazine    Added: Hydromorphone   Haloperidol   Senna   Lorazepam   Hyoscyamine   Artifical tears   Milk of Magnesia    Updated: lactobacillus.    In addition, the patient's allergies were reviewed and not updated as follows:   Allergies: Metrizamide, Lisinopril, Bee venom, Pollen extract, and Sulfa drugs    The pharmacist has reviewed with the patient that all personal medications should be removed from the building or locked in the belongings safe.  Patient shall only take medications ordered by the physician and administered by the nursing staff.       Medication barriers identified: none   Medication adherence concerns: none   Understanding of emergency medications: no naloxone, did not educate, patient on comfort cares/hospice.    Kamryn Manuel, 5/31/2022,  4:22 PM
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
## 2022-05-31 NOTE — PLAN OF CARE
To floor at 1515. Alert and oriented, VSS. Afebrile. Complains of 1-3/10 pain to epigastric, denies intervention, states she took pain medication before arriving. Suction applied to NG, low continuous per order, 175 mls out. Port accessed, SL. Lungs clear bilaterally. Bowel sounds active, abdomen distended. Calls appropriately.     Elvie Hutson RN on 5/31/2022 at 5:57 PM

## 2022-06-01 NOTE — PROGRESS NOTES
[facility] DISCHARGE NOTE    Patient discharged to home at 4:35 PM via wheel chair. Accompanied by daughter and staff. Discharge instructions reviewed with patient and daughter, opportunity offered to ask questions. Prescriptions sent to patients preferred pharmacy. All belongings sent with patient.    Ana Martinez RN

## 2022-06-01 NOTE — PLAN OF CARE
Patient able to make needs known,  denies nausea/vomiting.  Pain rated 3-7/10 over night, using medication PRN to treat pain with success.  Patient able to verbalize needs and uses call light appropriately.  Ambulates to bathroom with SBA, is a little unsteady on her feet.      Problem: Palliative Care  Goal: Enhanced Quality of Life  Outcome: Ongoing, Progressing  Intervention: Maximize Comfort  Recent Flowsheet Documentation  Taken 6/1/2022 0544 by Agata Chandra, RN  Pain Management Interventions: medication (see MAR)

## 2022-06-01 NOTE — PROGRESS NOTES
[facility]    Hospitalist Progress Note      Assessment & Plan   Nadya Flanagan is a 71 year old female who was admitted on 5/31/2022.     Clinically Significant Risk Factors Present on Admission                        SBO (small bowel obstruction) (H)    Assessment: Improved symptoms today.  Likely more of an incomplete bowel obstruction picture and continues to pass gas with last bowel movement this morning.  Had NG tube removed for 48 hours and had significant worsening symptoms however and currently wants to continue with this. Unlikely to have significant symptom exacerbation from ascites.    Plan:   -Oral Dilaudid today  -Hold on further scopolamine patch use given severity of her dry mouth  -Ativan as needed for anxiety and refractory nausea  -Schedule Zofran ODT  -Continue with senna twice daily  -Once MiraLAX instead of milk of magnesia    Diet: Regular Diet Adult    DVT Prophylaxis: Low Risk/Ambulatory with no VTE prophylaxis indicated  Nava Catheter: Not present  Code Status: No CPR- Do NOT Intubate           Disposition Plan   Expected Discharge:  pending  Anticipated discharge location: home with family    Delays:     none     Entered: Nolan Guerin MD 06/01/2022, 12:15 PM       The patient's care was discussed with the Bedside Nurse and Patient.    Nolan Guerin MD  Hospitalist Service  [facility]  Contact information available via [facility] Paging/Directory    ______________________________________________________________________    Interval History   CC: Abdominal pain    Overnight no acute events and afebrile, nausea significantly improved with adding scheduled Zofran, she has not passed gas or moved her bowels since yesterday, pain is well controlled and she would like to stick with an oral regiment, NG remains comfortable, she is eating some, no other new complaints.    -Data reviewed today: I reviewed all new labs and imaging results over the last 24 hours.  I personally reviewed no images or EKG's today.    Physical Exam   Temp: 98.1  F (36.7  C) Temp src: Tympanic BP: 135/80 Pulse: 88   Resp: 16 SpO2: 95 % O2 Device: None (Room air)    Vitals:    05/31/22 1502   Weight: 55.7 kg (122 lb 11.2 oz)     Vital Signs with Ranges  Temp:  [98.1  F (36.7  C)-98.2  F (36.8  C)] 98.1  F (36.7  C)  Pulse:  [88-98] 88  Resp:  [16-18] 16  BP: (124-135)/(80) 135/80  SpO2:  [95 %-96 %] 95 %  I/O last 3 completed shifts:  In: 290 [P.O.:240; NG/GT:50]  Out: 900 [Urine:325; Emesis/NG output:575]    Exam:   GENERAL: Talkative, sitting up in bed, in no apparent distress.  HEENT: NG tube in place with scant gastric secretions  CARDIOVASCULAR: regular rate and rhythm, no murmurs, rubs, or gallops. Normal S1/S2. No lower extremity edema.   RESPIRATORY: clear to auscultation bilaterally, no wheezes, no crackles.   GI: soft, mild diffuse distention but improved from yesterday, normoactive bowel sounds, nontender with light palpation all quadrants.    MUSCULOSKELETAL: warm and well perfused, 2+ dorsalis pedis pulses bilaterally.    SKIN: no pallor,jaundice, or rashes    Medications       levothyroxine  25 mcg Oral Daily     ondansetron  4 mg Oral Q4H     polyethylene glycol  17 g Oral Daily     sennosides  1 tablet Oral BID     sodium chloride (PF)  3 mL Intravenous Q8H       Data   No lab results found in last 7 days.    No results found for this or any previous visit (from the past 24 hour(s)).

## 2022-06-01 NOTE — PROGRESS NOTES
:    Phone call to [facility] for coordination of care and discharge planning.  Spoke with Raymundo from Hospice.  She stated that patient would like to return home once she has electricity.  She will stop in tomorrow morning to see patient again.     APARNA Amaro on 6/1/2022 at 2:18 PM

## 2022-06-01 NOTE — PHARMACY - DISCHARGE MEDICATION RECONCILIATION AND EDUCATION
Pharmacy:  Discharge Counseling and Medication Reconciliation    Nadya Flanagan  [address]   [facility] [address]  [phone] (home)   71 year old female  PCP: Meghan Pearce    Allergies: Metrizamide, Lisinopril, Bee venom, Pollen extract, and Sulfa drugs    Discharge Counseling:    Pharmacist called patient's room and spoke with her regarding plan for medications upon discharge.    Summary of Education: Informed patient that there were no changes made to medication list upon discharge, and patient was not prescribed any new additional medications.    Materials Provided:  MedCounselor sheets printed from Clinical Pharmacology on: N/A    Discharge Medication Reconciliation:    It has been determined that the patient has an adequate supply of medications available or which can be obtained from the patient's preferred pharmacy.  Thank you for the consult.    Ervin Stock RPH........June 1, 2022 6:54 PM
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
## 2022-06-01 NOTE — PLAN OF CARE
Goal Outcome Evaluation: Patient's power restored. SBA. Vitally stable. Nausea improved. NG in place. Patient on hospice. Patient being discharged to home with family.    Plan of Care Reviewed With: patient     Overall Patient Progress: improving

## 2022-06-27 NOTE — PROGRESS NOTES
"Infusion Nursing Note:  Nadya Flanagan presents today for FOLFOX infusion cycle 5, day 1.    Patient seen by provider today: No   present during visit today: Not Applicable.    Note: N/A.    Intravenous Access:  Labs drawn without difficulty.  Implanted Port.    Treatment Conditions:  Lab Results   Component Value Date    HGB 11.1 04/16/2019     Lab Results   Component Value Date    WBC 4.1 04/16/2019      Lab Results   Component Value Date    ANEU 1.3 04/16/2019     Lab Results   Component Value Date     04/16/2019      Results reviewed, labs MET treatment parameters, ok to proceed with treatment.    Post Infusion Assessment:  Patient tolerated infusion without incident.  Blood return noted pre and post infusion.  Site patent and intact, free from redness, edema or discomfort.  No evidence of extravasation.    Prior to discharge: Port is secured in place with tegaderm and flushed with 10cc NS with positive blood return noted.  Continuous home infusion CADD pump connected.    All connectors secured in place and clamps taped open.    Pump started, \"running\" noted on display (CADD): YES.  Patient instructed to call our clinic or [facility] with any questions or concerns at home.  Patient verbalized understanding.    Patient set up for pump disconnect at our clinic on 4/18/19.      EDUCATION POST BIOLOGICAL/CHEMOTHERAPY INFUSION  Call the triage nurse at your clinic or seek medical attention if you have chills and/or temperature greater than or equal to 100.5, uncontrolled nausea/vomiting, diarrhea, constipation, dizziness, shortness of breath, chest pain, heart palpitations, weakness or any other new or concerning symptoms, questions or concerns.  You can not have any live virus vaccines prior to or during treatment or up to 6 months post infusion.  If you have an upcoming surgery, medical procedure or dental procedure during treatment, this should be discussed with your ordering " From: Adonis Martinez  To: Ju Le  Sent: 6/26/2022 3:03 PM CDT  Subject: Cyst    Hello!  I’m sending this to the surgeon I saw last year also, but it looks like the cyst is back on my groin. See the attached pictures. It is very bothersome and doesn’t look great. Any thoughts? Will this need to be surgically removed again?  Thanks    physician and your surgeon/dentist.  If you are having any concerning symptom, if you are unsure if you should get your next infusion or wish to speak to a provider before your next infusion, please call your care coordinator or triage nurse at your clinic to notify them so we can adequately serve you.    Discharge Plan:   Copy of AVS reviewed with patient and/or family. Patient discharged in stable condition accompanied by: self.  Departure Mode: Ambulatory.    Brenda J. Goodell, RN

## 2022-07-11 PROBLEM — T79.A3XA ABDOMINAL COMPARTMENT SYNDROME (H): Status: ACTIVE | Noted: 2022-01-01

## 2022-07-11 NOTE — PROGRESS NOTES
Call received from [facility] hospice nurse, update given. Whitney Menendez RN on 7/11/2022 at 6:45 AM

## 2022-07-11 NOTE — PROGRESS NOTES
:     Patient is from home with spouse. Patient is currently receiving services from [facility]. Call placed to Maricruz at [facility] to discuss patients discharge planning needs. No answer. Left voicemail.      will continue to follow for discharge planning needs.     ERAN Rosa on 7/11/2022 at 12:48 PM

## 2022-07-11 NOTE — PROVIDER NOTIFICATION
07/11/22 0343   Valuables   Patient Belongings remains with patient   Patient Belongings Remaining with Patient ring;necklace;earrings;clothing;shoes;glasses   Did you bring any home meds/supplements to the hospital?  No   A               Admission:  I am responsible for any personal items that are not sent to the safe or pharmacy.  [facility] is not responsible for loss, theft or damage of any property in my possession.    Signature:  _________________________________ Date: _______  Time: _____                                              Staff Signature:  ____________________________ Date: ________  Time: _____      2nd Staff person, if patient is unable/unwilling to sign:    Signature: ________________________________ Date: ________  Time: _____     Discharge:  [facility] has returned all of my personal belongings:    Signature: _________________________________ Date: ________  Time: _____                                          Staff Signature:  ____________________________ Date: ________  Time: _____

## 2022-07-11 NOTE — PHARMACY-ADMISSION MEDICATION HISTORY
Pharmacy -- Admission Medication Reconciliation    Prior to admission (PTA) medications were reviewed and the patient's PTA medication list was updated.    Sources Consulted: Sure scripts, Care Everywhere, [facility] list, [facility] hospice list, patient    The reliability of this Medication Reconciliation is: Reliability: Borderline reliable    The following significant changes were made:  Updated preferred pharmacy to [facility]  Updated last doses as reported by patient (she was very unsure)  Removed mom  Removed ondansetron 8 mg po (added 4 mg odt)  Removed scopolamine  Added acetaminophen  Reentered bisacodyl with updated directions  Added buprenorphine 20 mcg/hr patch (note patient says she has 2 on, probably 2x10 mcg due to recent dose increase)  Added calcium carbonate  Added dexamethasone po  Added diclofenac gel  Added bdr supp  Added fluocinonide gel  Added heparin flush  Added methylphenidate  Added metoclopramide  Added ondansetron odt   Added witch hazel    In addition, the patient's allergies were reviewed with the patient and updated as follows:   Allergies: Metrizamide, Bee venom, Lisinopril, Sulfa drugs, and Pollen extract    The pharmacist has reviewed with the patient that all personal medications should be removed from the building or locked in the belongings safe.  Patient shall only take medications ordered by the physician and administered by the nursing staff.     Medication barriers identified: multiple recent changes, patient not feeling well, [facility] hospice supports, notes say blister pack and    Medication adherence concerns: none noted   Understanding of emergency medications: reports using prn nausea medications, no naloxone/hospice    Selin Beatty [facility], 7/11/2022,  10:35 AM   Selin Beatty RP on 7/11/2022 at 12:52 PM

## 2022-07-11 NOTE — H&P
[facility]    History and Physical  Hospitalist       Date of Admission:  7/10/2022    Assessment & Plan   Nadya Flanagan is a 71 year old female who presents with tense ascites secondary to stage IV goblet cell appendiceal carcinoma.    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  Patient otherwise remains on hospice and wants to continue with comfort care only going forward.    Plan:   -Pain control as needed  -Continue to monitor for more frequent paracenteses  -Resume stool softeners  -Advance diet as tolerated  -Discontinue surgical consult given she does not want to consider any surgical options  -Will not continue antibiotics given no clinical evidence of dolores pneumonia at this point.    Active Problems:    Chronic kidney disease, stage 3a (H)    Assessment: Acute on chronic likely prerenal from poor oral intake    Plan:   -Complete current IV fluid bag while here and transition to oral intake  -No further monitoring      SBO (small bowel obstruction) (H)    Assessment: Previous refractory SBO requiring NG tube placement which has been removed since early June    Plan:   -Continue bowel regimen      Severe protein-calorie malnutrition (H)    Assessment: Ongoing poor oral intake with cachexia    Plan:    -Advance diet as tolerated    DVT Prophylaxis: N/A-comfort care  Code Status: No CPR- Do NOT Intubate    Nolan Guerin MD    Primary Care Physician   Meghan Pearce    Chief Complaint   Abdominal pain    History is obtained from the patient and chart review.    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    Patient was last in the hospital about a month and a half ago with recurrent small bowel obstruction symptoms, since that time she is continued with hospice and does not have an NG tube that she has continued with.  She is eaten and drank very little in the last 2 weeks no progressive abdominal pain that was uncontrollable with her current regiment.    She is brought to the ER, there underwent CT scan notable for significant ascites with intra-abdominal mass-effect, underwent palliative paracentesis, felt significantly improved and was subsequently admitted to the hospital for further management.      Past Medical History    I have reviewed this patient's medical history and updated it with pertinent information if needed.   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  Past Surgical History:   Procedure Laterality Date     FRACTURE TX, HIP RT/LT Right 2013     INSERT PORT VASCULAR ACCESS N/A 2/7/2019    Procedure: INSERT PORT VASCULAR ACCESS;  Surgeon: Tresa Evangelista MD;  Location:  OR     INSERT PORT VASCULAR ACCESS Right 7/22/2020    Procedure: power PORT placement;  Surgeon: Tresa Evangelista MD;  Location:  OR     LAPAROSCOPY DIAGNOSTIC (GENERAL) N/A 7/8/2020    Procedure: LAPAROSCOPY, DIAGNOSTIC, BY GENERAL SURGERY;  Surgeon: Tresa Evangelista MD;  Location:  OR     LAPAROTOMY EXPLORATORY N/A 12/18/2018    Procedure: Exploratory Laparotomy with right hemicolectomy;  Surgeon: Tresa Evangelista MD;  Location:  OR     REMOVE CATHETER VASCULAR ACCESS N/A 10/9/2019    Procedure: Remove Port;  Surgeon: Tresa Evangelista MD;  Location:  OR     TONSILLECTOMY  1962     TUBAL LIGATION  1979       Prior to Admission Medications   Prior to Admission Medications   Prescriptions Last Dose Informant Patient Reported? Taking?   HYDROmorphone (DILAUDID) 2 MG tablet Unknown at Unknown time  Yes Yes   Sig: Take 2 mg by mouth every hour as needed for pain   LORazepam (ATIVAN) 0.5 MG tablet 7/10/2022 at 0830  Yes Yes   Sig: Take 0.25 mg by mouth every 4 hours as needed for anxiety   Lactobacillus (FLORAJEN ACIDOPHILUS) CAPS More than a month at Unknown time  Yes Yes   Sig: Take 1 capsule by mouth At Bedtime   acetaminophen (TYLENOL) 500 MG tablet More than a month at Unknown time  Yes Yes   Sig: Take 500-1,000 mg by mouth every 6 hours as needed for fever or pain   bisacodyl (DULCOLAX) 10 MG suppository Past Month at Unknown time  Yes Yes   Sig: Place 10 mg rectally daily as needed for constipation (if no bowel movement after 48 hours. Use daily until bowel movement occurs.)   buprenorphine (BUTRANS) 20 MCG/HR WK patch 7/7/2022 at am  Yes Yes   Sig: Place 1 patch onto the skin every 7 days   calcium carbonate (TUMS) 500 MG chewable tablet Unknown at Unknown time  Yes Yes   Sig: Take 1 chew tab by  mouth 2 times daily as needed for heartburn   dexamethasone (DECADRON) 4 MG tablet Past Week at Unknown time  Yes Yes   Sig: Take 4 mg by mouth daily (with breakfast) Per rectum for nausea   diclofenac (VOLTAREN) 1 % topical gel 7/10/2022 at Unknown time  Yes Yes   Sig: Apply 2 g topically 2 times daily as needed for other (pain) Apply small amount to legs and across abdomen as needed for pain.   diphenhydrAMINE, dexamethasone, metoclopramide (BDR) SUPP suppository 7/10/2022 at Unknown time  Yes Yes   Sig: Place 1 suppository rectally every 6 hours as needed (nausea)   fluocinonide (LIDEX) 0.05 % external gel More than a month at Unknown time  Yes Yes   Sig: Apply topically as needed (Apply one drop to qtip and apply to oral sores as needed for pain)   haloperidol (HALDOL) 0.5 MG tablet 7/10/2022 at Unknown time  Yes Yes   Sig: Take 0.5 mg by mouth every 4 hours as needed   heparin 100 UNIT/ML SOLN injection 2022  Yes Yes   Si Units by Intracatheter route every 30 days Flush port with 20 ml saline followed by 5 ml (500 units) heparin monthly   hyoscyamine (LEVSIN) 0.125 MG tablet Unknown at Unknown time  Yes Yes   Sig: Take 0.125 mg by mouth every 4 hours as needed for other   hypromellose-dextran (ARTIFICAL TEARS) 0.1-0.3 % ophthalmic solution Unknown at Unknown time  Yes Yes   Sig: Place 1 drop into both eyes as needed for dry eyes   levothyroxine (SYNTHROID/LEVOTHROID) 25 MCG tablet 7/10/2022 at 0830  No Yes   Sig: Take 1 tablet (25 mcg) by mouth in the morning.   methylphenidate (RITALIN) 5 MG tablet 2022 at am  Yes Yes   Sig: Take 5 mg by mouth 2 times daily as needed (fatigue, take in morning and around noon for tiredness)   metoclopramide (REGLAN) 10 MG tablet 7/10/2022 at pm  Yes Yes   Sig: Take 10 mg by mouth 3 times daily   ondansetron (ZOFRAN ODT) 4 MG ODT tab 7/10/2022 at Unknown time  Yes Yes   Sig: Take 4 mg by mouth every 6 hours as needed for nausea (use if metoclopramide ineffective)    sennosides (SENOKOT) 8.6 MG tablet 7/10/2022 at pm  Yes Yes   Sig: Take 1 tablet by mouth 2 times daily   witch hazel-glycerin (TUCKS) pad 7/10/2022 at Unknown time  Yes Yes   Sig: Apply topically as needed for pain      Facility-Administered Medications: None     Allergies   Allergies   Allergen Reactions     Metrizamide Anaphylaxis and Unknown     Had contrast dye. Patient reports she woke up in ICU.     Bee Venom Angioedema     Edema     Lisinopril Angioedema, Nausea and Vomiting and GI Disturbance     ? Angioedema per Mari care everywhere and patient     Sulfa Drugs Hives     More severe per patient     Pollen Extract Other (See Comments)     Runny nose       Social History   I have reviewed this patient's social history and updated it with pertinent information if needed. Nadya Flanagan  reports that she is a non-smoker but has been exposed to tobacco smoke. She has never used smokeless tobacco. She reports that she does not drink alcohol and does not use drugs.    Family History   I have reviewed this patient's family history and updated it with pertinent information if needed.   Family History   Problem Relation Age of Onset     Tuberculosis Mother      Chronic Obstructive Pulmonary Disease Mother      Heart Disease Father         massive MI     Heart Disease Brother         stents/CABG     Glaucoma Brother      Lung Cancer Brother         mesothelioma     Hypertension Brother        Review of Systems     Constitutional: Generally poor energy and appetite, no recent sick contacts, no fevers or COVID-related symptoms.  Ears, nose, mouth, throat, and face: no mouth sores, dysphagia, or odynophagia  Respiratory: no shortness of breath, cough, or wheezing.   Cardiovascular: no chest pain, palpitations, orthopnea, increased lower extremity edema, or syncope.   Gastrointestinal: Has not moved her bowels in 2 days, no current nausea or vomiting, abdominal pain is minimal with mostly epigastric  discomfort.  Genitourinary: no dysuria, hematuria, urgency or frequency.   Hematologic/lymphatic: Ongoing unintentional weight loss.  Musculoskeletal: no pain to extremities or falls.   Neurological: no new weakness, tingling, numbness.   Endocrine: not a known diabetic.     Physical Exam   Temp: (!) 96.6  F (35.9  C) Temp src: Tympanic BP: 119/76 Pulse: 85   Resp: 16 SpO2: 95 % O2 Device: None (Room air)    Vital Signs with Ranges  Temp:  [96.6  F (35.9  C)-99.8  F (37.7  C)] 96.6  F (35.9  C)  Pulse:  [] 85  Resp:  [11-25] 16  BP: (107-144)/(69-97) 119/76  SpO2:  [91 %-96 %] 95 %  122 lbs 0 oz    Exam:  GENERAL: Talkative, laying in bed, in no apparent distress.  Head: normocephalic and atraumatic, temporal wasting.    Eyes: anicteric and non-injected sclera  Nose: no rhinorrhea or epistaxis.   Throat: Dry mucous membranes with no active oral lesions.  NECK: Supple, jugular venous distension not present.  CARDIOVASCULAR: regular rate and rhythm, no murmurs, rubs, or gallops. Normal S1/S2. No lower extremity edema.   RESPIRATORY: clear to auscultation bilaterally, no wheezes, no crackles.    GI: soft, non-distended, epigastric pain but no peritoneal signs of rebound or guarding, normoactive bowel sounds.  Paracentesis site in left lower quadrant covered with a Band-Aid, clean dry and intact, no active leaking.  MUSCULOSKELETAL: warm and well perfused, 2+ dorsalis pedis pulses.    SKIN: no other pallor, jaundice or rashes.  NEUROLOGY: AAOx3, follows commands, speech and language without focal deficits.        Data   Data reviewed today:  I personally reviewed no images or EKG's today.  Recent Labs   Lab 07/10/22  1943   WBC 13.6*   HGB 10.4*   MCV 94         POTASSIUM 4.6   CHLORIDE 97*   CO2 22   BUN 35*   CR 1.93*   ANIONGAP 15*   STELLA 9.6   *   ALBUMIN 3.3*   PROTTOTAL 6.7   BILITOTAL 0.8   ALKPHOS 140*   ALT 21   AST 28   LIPASE 31       Recent Results (from the past 24 hour(s))   CT  Abdomen Pelvis w/o Contrast    Addendum: 7/10/2022    Addendum created by Mali Elizabeth MD on 7/10/2022 9:51:44 PM CDT:  THIS REPORT CONTAINS FINDINGS THAT MAY BE CRITICAL TO PATIENT CARE. The   findings were verbally communicated by Dr. Mali Elizabeth to Dr. Marrero   via telephone conference at 9:46 PM CDT on 7/10/2022. The findings were   acknowledged and understood.        Narrative    Initial report created on 7/10/2022 9:43:04 PM CDT:    PROCEDURE INFORMATION:   Exam: CT Abdomen And Pelvis Without Contrast   Exam date and time: 7/10/2022 8:15 PM   Age: 71 years old   Clinical indication: Abdominal tenderness and bloating and nausea and vomiting;   Prior surgery; Surgery date: 6+ months; Surgery type: Appendectomy,   cholecystectomy; Patient HX: Has a known bowel obstruction and had an ngt for   about a week in May. Was removed and had worsening of nausea and vomiting, but   wanted to continue without ngt; Additional info: Sbo, intractable vomiting     TECHNIQUE:   Imaging protocol: Computed tomography of the abdomen and pelvis without   contrast.   Radiation optimization: All CT scans at this facility use at least one of these   dose optimization techniques: automated exposure control; mA and/or kV   adjustment per patient size (includes targeted exams where dose is matched to   clinical indication); or iterative reconstruction.     COMPARISON:   CT ABDOMEN PELVIS W CONTRAST 5/22/2022 9:08 PM     FINDINGS:   Lungs: Heterogeneous consolidative airspace opacities in the left lung base.   Diaphragm:  Small hiatal hernia filled with fluid, posing increased risk of   aspiration.     Liver: Unremarkable.   Gallbladder and bile ducts: Gallbladder is filled with hyperattenuating   material compatible with biliary sludge. No intra- or extra-hepatic biliary   ductal dilatation.   Pancreas: Unremarkable.   Spleen: Unremarkable.   Adrenal glands: Unremarkable.   Kidneys and ureters: Minor pelviectasis in the right  kidney likely on account   of extrinsic compression from large volume ascites. No evidence of obstructing   stone. Left kidney is unremarkable.   Stomach and bowel: No evidence of small bowel obstruction.   Appendix: Status post appendectomy.     Intraperitoneal space: Large volume ascites with evidence of extensive mass   effect on the internal organs, concerning for potential abdominal compartment   syndrome.  No pneumoperitoneum.  Vasculature: Unremarkable.   Lymph nodes: Unremarkable.    Urinary bladder: Unremarkable.   Reproductive: Unremarkable.   Bones/joints: Stable post-operative appearance of prior proximal right femur   fixation. No acute osseous abnormality.   Soft tissues:  Unremarkable.      Impression    IMPRESSION:   1. Large volume ascites with evidence of extensive mass effect on the internal   organs, concerning for potential abdominal compartment syndrome.  Recommend   urgent paracentesis.  2. Heterogeneous consolidative airspace opacities in the left lung base,   concerning for aspiration pneumonitis and/or pneumonia.   3. Small hiatal hernia filled with fluid, posing increased risk of aspiration.     THIS DOCUMENT HAS BEEN ELECTRONICALLY SIGNED BY JOHN MOORE MD   POC US ABDOMEN LIMITED    Impression    Large pocket of ascites was identified in LLQ. Ultrasound was used to guide arthrocentesis

## 2022-07-11 NOTE — ED TRIAGE NOTES
Pt arrives to the ER from home with her . Pt reports she has a rare form of cancer. She is currently on hospice. She reports she that has had abdominal pain and severe nausea and vomiting for the last couple of days. She hasn't been able to keep anything down. She comes in for symptom control, per her hospice nurse recommendation.      Triage Assessment     Row Name 07/10/22 [id number]       Triage Assessment (Adult)    Airway WDL WDL       Respiratory WDL    Respiratory WDL WDL       Skin Circulation/Temperature WDL    Skin Circulation/Temperature WDL WDL       Cardiac WDL    Cardiac WDL WDL       Peripheral/Neurovascular WDL    Peripheral Neurovascular WDL WDL       Cognitive/Neuro/Behavioral WDL    Cognitive/Neuro/Behavioral WDL WDL

## 2022-07-11 NOTE — ED NOTES
Fatoumata with hospice called and she did not think a paracentesis would affect patient's hospice status.  She is checking with her provider and will call back.

## 2022-07-11 NOTE — ED NOTES
Provider performed paracentesis at the bedside.  Site cleaned using iodine and sterile technique.  4 liters removed from abdomen.  Sample sent to lab for analysis.  Patient tolerated well.

## 2022-07-11 NOTE — PLAN OF CARE
"Pt A&Ox4. VSS. Admitted to [facility] from ER. Has had n/v for the last few days s/t cancer dx. Came in for symptom management. LS clear, diminished. BS present. Had paracentesis this morning, removed 4 liters. Pt able to keep apple juice down now.  Pain controlled with PRN medications, see MAR.    BP (!) 144/80 (BP Location: Left arm, Patient Position: Sitting, Cuff Size: Adult Small)   Pulse 95   Temp 98.1  F (36.7  C) (Tympanic)   Resp 12   Wt 55.3 kg (122 lb)   SpO2 92%   BMI 21.61 kg/m       Whitney Menendez RN on 7/11/2022 at 4:37 AM       Problem: Plan of Care - These are the overarching goals to be used throughout the patient stay.    Goal: Patient-Specific Goal (Individualized)  Description: You can add care plan individualizations to a care plan. Examples of Individualization might be:  \"Parent requests to be called daily at 9am for status\", \"I have a hard time hearing out of my right ear\", or \"Do not touch me to wake me up as it startles me\".  Outcome: Ongoing, Progressing  Flowsheets (Taken 7/11/2022 0343)  Individualized Care Needs: pain control  Anxieties, Fears or Concerns: pain management     Problem: Plan of Care - These are the overarching goals to be used throughout the patient stay.    Goal: Optimal Comfort and Wellbeing  Outcome: Ongoing, Progressing   Goal Outcome Evaluation:    Plan of Care Reviewed With: patient     Overall Patient Progress: no change           "

## 2022-07-11 NOTE — ED NOTES
Fatoumata with [facility] talked with her provider and said a paracentesis is considered palliative care but any kind of surgery would mean a change in her status with hospice.

## 2022-07-11 NOTE — PROGRESS NOTES
[facility] ADMISSION NOTE    Patient admitted to room 311 at approximately 0330 via bed from emergency room. Patient was accompanied by nurse.     Verbal SBAR report received from ELISA Butler prior to patient arrival.     Patient trasferred to bed via slip. Patient alert and oriented X 3. Pain is controlled with current analgesics.  Medication(s) being used: narcotic analgesics including Dilaudid.  . Admission vital signs: Blood pressure (!) 144/80, pulse 95, temperature 98.1  F (36.7  C), temperature source Tympanic, resp. rate 12, weight 55.3 kg (122 lb), SpO2 92 %, not currently breastfeeding. Patient was oriented to plan of care, call light, bed controls, tv, telephone, bathroom and visiting hours.     Risk Assessment    The following safety risks were identified during admission: fall. Yellow risk band applied: YES.     Skin Initial Assessment    This writer admitted this patient and completed a full skin assessment and Miko score in the Adult PCS flowsheet. Appropriate interventions initiated as needed.       Miko Risk Assessment  Sensory Perception: 4-->no impairment  Moisture: 4-->rarely moist  Activity: 3-->walks occasionally  Mobility: 3-->slightly limited  Nutrition: 2-->probably inadequate  Friction and Shear: 3-->no apparent problem  Miko Score: 19  Nutrition Interventions: Nutrition consult  Mattress: Standard Hospital Mattress (Foam)  Bed Frame: Standard width and length    Education    Patient has a [place] to Observation order: No  Observation education completed and documented: N/A      Whitney Menendez RN

## 2022-07-12 NOTE — PROGRESS NOTES
Patient continues to be nauseated through out shift. Was given prn medications patient also contacted her hospice nurse and questioned them re: medications. Nurse was able to speak to hospice and was able to get everything figured out. Patient was able to rest for a few hours tonight. Tresa Doyle RN on 7/12/2022 at 5:39 AM

## 2022-07-12 NOTE — PLAN OF CARE
"Patient had an episode of increased nausea with vomiting this morning.  Meds administered IV with relief.  Will follow.    Problem: Plan of Care - These are the overarching goals to be used throughout the patient stay.    Goal: Patient-Specific Goal (Individualized)  Description: You can add care plan individualizations to a care plan. Examples of Individualization might be:  \"Parent requests to be called daily at 9am for status\", \"I have a hard time hearing out of my right ear\", or \"Do not touch me to wake me up as it startles me\".  Flowsheets (Taken 7/12/2022 1742)  Individualized Care Needs: control nausea/vomitting  Goal: Absence of Hospital-Acquired Illness or Injury  Intervention: Identify and Manage Fall Risk  Recent Flowsheet Documentation  Taken 7/12/2022 1500 by Veronica Osei RN  Safety Promotion/Fall Prevention: safety round/check completed  Taken 7/12/2022 0826 by Veronica Osei RN  Safety Promotion/Fall Prevention: safety round/check completed     Problem: Pain Acute  Goal: Acceptable Pain Control and Functional Ability  Intervention: Prevent or Manage Pain  Recent Flowsheet Documentation  Taken 7/12/2022 1500 by Veronica Osei RN  Medication Review/Management: medications reviewed  Taken 7/12/2022 0826 by Veronica Osei RN  Medication Review/Management: medications reviewed     Problem: Pain Acute (Oncology Care)  Goal: Optimal Pain Control  Intervention: Prevent or Manage Pain  Recent Flowsheet Documentation  Taken 7/12/2022 1500 by Veronica Osei RN  Medication Review/Management: medications reviewed  Taken 7/12/2022 0826 by Veronica Osei RN  Medication Review/Management: medications reviewed   Goal Outcome Evaluation:                      "

## 2022-07-12 NOTE — PROGRESS NOTES
:     Call placed placed to Janna at . Updated her that patient will be staying another night in the hospital. She stated that she will continue to make daily visits to see patient.     ERAN Rosa on 7/12/2022 at 1:02 PM

## 2022-07-12 NOTE — PROGRESS NOTES
[facility]    Hospitalist Progress Note      Assessment & Plan   Nadya Flanagan is a 71 year old female who was admitted on 7/10/2022.     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  Patient otherwise remains on hospice and wants to continue with comfort care only going forward.  Ongoing intractable nausea and vomiting today.    Plan:   -Pain control as needed  -Continue to monitor for more frequent paracenteses and hospice medical director will place referral if needed  -Resume stool softeners  -Advance diet as tolerated  -Scheduled Reglan 3 times daily  -IV Decadron 4 mg x 1 today  -If continuing to have intractable nausea and vomiting give Haldol 5 mg x 1  -Compazine and Zofran and Ativan as needed     Active Problems:    Chronic kidney disease, stage 3a (H)    Assessment: Acute on chronic likely prerenal from poor oral intake    Plan:   -No further monitoring       SBO (small bowel obstruction) (H)    Assessment: Previous refractory SBO requiring NG tube placement which has been removed since early June    Plan:   -Continue bowel regimen and monitor for recurrent bowel obstruction symptoms       Severe protein-calorie malnutrition (H)    Assessment: Ongoing poor oral intake with cachexia    Plan:   -Advance diet as tolerated    Diet: Regular Diet Adult    DVT Prophylaxis: Low Risk/Ambulatory with no VTE prophylaxis indicated  Nava Catheter: Not present  Code Status: No CPR- Do NOT Intubate           Disposition Plan      Entered: Nolan Guerni MD 07/12/2022, 2:09 PM       The patient's care was discussed with the Patient and Patient's Family.  Discussed patient's clinical course with  " and patient, all questions answered best ability and very appreciative of care.    Nolan Guerin MD  Hospitalist Service  [facility]  Contact information available via [facility] Paging/Directory    ______________________________________________________________________    Interval History   CC: Abdominal pain    Overnight recurrent nausea and vomiting, received Zofran and Compazine with little relief, did not sleep at all, ongoing diffuse abdominal pain which is unchanged from prior, passing normal gas, has not moved her bowels, is not eaten anything of significance in the last 2 weeks, vomitus is mostly bile, no blood.  Not feeling anxious she is feeling miserable and \"ready for the end\".    -Data reviewed today: I reviewed all new labs and imaging results over the last 24 hours. I personally reviewed no images or EKG's today.    Physical Exam   Temp: 97.1  F (36.2  C) Temp src: Tympanic BP: 128/78 Pulse: 85   Resp: 16 SpO2: 94 % O2 Device: None (Room air)    Vitals:    07/10/22 2023 07/12/22 0713   Weight: 55.3 kg (122 lb) 52.2 kg (115 lb)     Vital Signs with Ranges  Temp:  [96.7  F (35.9  C)-99.6  F (37.6  C)] 97.1  F (36.2  C)  Pulse:  [83-90] 85  Resp:  [16-20] 16  BP: (112-128)/(62-78) 128/78  SpO2:  [92 %-96 %] 94 %  I/O last 3 completed shifts:  In: 300 [P.O.:300]  Out: 200 [Urine:200]    Constitutional: Laying in bed, frail and cachectic appearing but in no apparent distress  Cardiovascular: Regular rate and rhythm  Pulmonary: Diffuse air movement in bilateral lung fields, no wheezes or crackles GI:  Minimal diffuse distention, abdomen is otherwise soft without tympany with normoactive bowel sounds.    Medications       buprenorphine  2 patch Transdermal Weekly     [START ON 7/14/2022] buprenorphine  1 patch Transdermal Q7 Days     buprenorphine   Transdermal Q8H [facility]     dexamethasone  4 mg Intravenous Q24H     levothyroxine  25 mcg Oral Daily     metoclopramide  10 mg Oral TID     " sennosides  1 tablet Oral BID       Data   Recent Labs   Lab 07/10/22  1943   WBC 13.6*   HGB 10.4*   MCV 94         POTASSIUM 4.6   CHLORIDE 97*   CO2 22   BUN 35*   CR 1.93*   ANIONGAP 15*   STELLA 9.6   *   ALBUMIN 3.3*   PROTTOTAL 6.7   BILITOTAL 0.8   ALKPHOS 140*   ALT 21   AST 28   LIPASE 31       No results found for this or any previous visit (from the past 24 hour(s)).

## 2022-07-12 NOTE — PROGRESS NOTES
SAFETY CHECKLIST  ID Bands and Risk clasps correct and in place (DNR, Fall risk, Allergy, Latex, Limb):  Yes  All Lines Reconciled and labeled correctly: Yes  Whiteboard updated:Yes  Environmental interventions (bed/chair alarm on, call light, side rails, restraints, sitter....): Yes  Tresa Doyle RN on 7/11/2022 at 9:02 PM

## 2022-07-13 NOTE — PROGRESS NOTES
[facility]    Hospitalist Progress Note      Assessment & Plan   Nadya Flanagan is a 71 year old female who was admitted on 7/10/2022.     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  Patient otherwise remains on hospice and wants to continue with comfort care only going forward.  Resolved intractable nausea and vomiting today.  Now suffering from intractable hiccups.      Plan:   -Add scheduled baclofen 10 tid for hiccups  -ppi daily  -Pain control as needed  -Continue to monitor for more frequent paracenteses and hospice medical director will place referral if needed  -Resume stool softeners  -Advance diet as tolerated  -Scheduled po Reglan  -po Decadron 4 mg daily  -If continuing to have intractable nausea and vomiting give Haldol 5 mg x 1  -Compazine and Zofran and Ativan as needed     Active Problems:    Chronic kidney disease, stage 3a (H)    Assessment: Acute on chronic likely prerenal from poor oral intake    Plan:   -No further monitoring       SBO (small bowel obstruction) (H)    Assessment: Previous refractory SBO requiring NG tube placement which has been removed since early June    Plan:   -Continue bowel regimen and monitor for recurrent bowel obstruction symptoms       Severe protein-calorie malnutrition (H)    Assessment: Ongoing poor oral intake with cachexia    Plan:   -Advance diet as tolerated    Diet: Regular Diet Adult    DVT Prophylaxis: Low Risk/Ambulatory with no VTE prophylaxis indicated  Nava Catheter: Not present  Code Status: No CPR- Do NOT Intubate           Disposition Plan      Entered: Nolan Guerin MD 07/13/2022, 2:31 PM       The patient's care was  discussed with the Patient and Patient's Family.  Again discussed patient's clinical course with  and patient, all questions answered best ability and very appreciative of care.    Nolan Guerin MD  Hospitalist Service  [facility]  Contact information available via [facility] Paging/Directory    ______________________________________________________________________    Interval History   CC: Abdominal pain    Overnight no acute events and afebrile, nausea and vomiting is finally resolved, she is tolerating more oral intake she been having intractable hiccups for the last 24 hours, no significant abdominal pain, received a suppository with small amount of output and passing gas now, no increased abdominal distention, no other complaints.    -Data reviewed today: I reviewed all new labs and imaging results over the last 24 hours. I personally reviewed no images or EKG's today.    Physical Exam   Temp: 97.4  F (36.3  C) Temp src: Tympanic BP: 130/83 Pulse: 81   Resp: 18 SpO2: 94 % O2 Device: None (Room air)    Vitals:    07/10/22 2023 07/12/22 0713   Weight: 55.3 kg (122 lb) 52.2 kg (115 lb)     Vital Signs with Ranges  Temp:  [97.4  F (36.3  C)-97.9  F (36.6  C)] 97.4  F (36.3  C)  Pulse:  [81-84] 81  Resp:  [16-18] 18  BP: (130-135)/(79-83) 130/83  SpO2:  [94 %-98 %] 94 %  I/O last 3 completed shifts:  In: -   Out: 300 [Urine:300]    Constitutional: Laying in bed, frail and cachectic appearing but in no apparent distress  Cardiovascular: Regular rate and rhythm  Pulmonary: Diffuse air movement in bilateral lung fields, no wheezes or crackles   GI: Minimal diffuse distention, abdomen is otherwise soft without tympany with normoactive bowel sounds.    Medications       baclofen  10 mg Oral TID     buprenorphine  2 patch Transdermal Weekly     [START ON 7/14/2022] buprenorphine  1 patch Transdermal Q7 Days     buprenorphine   Transdermal Q8H [facility]     dexamethasone  4 mg Intravenous Q24H      levothyroxine  25 mcg Oral Daily     metoclopramide  5 mg Intravenous TID     sennosides  1 tablet Oral BID       Data   Recent Labs   Lab 07/10/22  1943   WBC 13.6*   HGB 10.4*   MCV 94         POTASSIUM 4.6   CHLORIDE 97*   CO2 22   BUN 35*   CR 1.93*   ANIONGAP 15*   STELLA 9.6   *   ALBUMIN 3.3*   PROTTOTAL 6.7   BILITOTAL 0.8   ALKPHOS 140*   ALT 21   AST 28   LIPASE 31       No results found for this or any previous visit (from the past 24 hour(s)).

## 2022-07-13 NOTE — PROGRESS NOTES
:     Met with patients hospice nurse, Rosetta, to discuss discharge planning. [facility] hospice will continue to follow patient during her stay in the hospital.      will continue to follow patient for discharge planning needs.     ERAN Rosa on 7/13/2022 at 10:17 AM

## 2022-07-13 NOTE — PROGRESS NOTES
Patient appeared to be sleeping through the night. Patient was up to bathroom void, no issues with nausea did c/o hiccups.  Patient had no further issues through out shift. Tresa Doyle RN on 7/13/2022 at 5:32 AM

## 2022-07-13 NOTE — PROGRESS NOTES
SAFETY CHECKLIST  ID Bands and Risk clasps correct and in place (DNR, Fall risk, Allergy, Latex, Limb):  Yes  All Lines Reconciled and labeled correctly: Yes  Whiteboard updated:Yes  Environmental interventions (bed/chair alarm on, call light, side rails, restraints, sitter....): Yes  Tresa Doyle RN on 7/12/2022 at 7:39 PM

## 2022-07-13 NOTE — PROGRESS NOTES
Patient Up to bathroom void, patient continues to be nauseated with medications on board, Patient ended up vomiting up her Haldol nurse was able to visualize the pill in her kleenex. Patient was not given another dose at this time. Tresa Doyle RN on 7/12/2022 at 10:42 PM

## 2022-07-13 NOTE — PLAN OF CARE
"Patient admitted for cancer of appendix and is on comfort cares. VSS - Room air and afebrile. Poor appetite. Continues with intermittent nausea, pain and anxiety. PRN medications used effectively. Constipation, self reported removing 2 small, hard stool from rectum. Educated on the risks of self disimpaction. Gave PRN suppository with small hard results. Declined further intervention at this tme  Continues to have the hiccups - started baclofen which as been effective after 2 doses. However, this afternoon patient was woken for scheduled reglan and she was disoriented x 3 - place, time, situation. She was unaware of her cancer dx and does not recall the past three day events. Reoriented to environment. O2 98% on room air, Afebrile 97.9 and Pulse 98.  MD updated of condition change and to stop baclofen.  updated on change of condition.  Goal is to discharge home with hospice.       Problem: Plan of Care - These are the overarching goals to be used throughout the patient stay.    Goal: Plan of Care Review/Shift Note  Description: The Plan of Care Review/Shift note should be completed every shift.  The Outcome Evaluation is a brief statement about your assessment that the patient is improving, declining, or no change.  This information will be displayed automatically on your shift note.  Outcome: Ongoing, Progressing  Goal: Patient-Specific Goal (Individualized)  Description: You can add care plan individualizations to a care plan. Examples of Individualization might be:  \"Parent requests to be called daily at 9am for status\", \"I have a hard time hearing out of my right ear\", or \"Do not touch me to wake me up as it startles me\".  Outcome: Ongoing, Progressing  Goal: Absence of Hospital-Acquired Illness or Injury  Outcome: Ongoing, Progressing  Intervention: Identify and Manage Fall Risk  Recent Flowsheet Documentation  Taken 7/13/2022 2817 by Josefina Bassett RN  Safety Promotion/Fall Prevention:    " assistive device/personal items within reach    clutter free environment maintained    fall prevention program maintained    nonskid shoes/slippers when out of bed    patient and family education    treat reversible contributory factors    treat underlying cause  Intervention: Prevent Skin Injury  Recent Flowsheet Documentation  Taken 7/13/2022 0817 by Josefina Bassett RN  Body Position: position changed independently  Intervention: Prevent and Manage VTE (Venous Thromboembolism) Risk  Recent Flowsheet Documentation  Taken 7/13/2022 0817 by Josefina Bassett RN  VTE Prevention/Management: SCDs (sequential compression devices) off  Activity Management:    activity adjusted per tolerance    ambulated to bathroom    up ad francis  Goal: Optimal Comfort and Wellbeing  Outcome: Ongoing, Progressing  Goal: Readiness for Transition of Care  Outcome: Ongoing, Progressing     Problem: Pain Acute  Goal: Acceptable Pain Control and Functional Ability  Outcome: Ongoing, Progressing  Intervention: Prevent or Manage Pain  Recent Flowsheet Documentation  Taken 7/13/2022 0817 by Josefina Bassett RN  Medication Review/Management: medications reviewed     Problem: Coping Ineffective (Oncology Care)  Goal: Effective Coping  Outcome: Ongoing, Progressing     Problem: Fatigue (Oncology Care)  Goal: Improved Activity Tolerance  Outcome: Ongoing, Progressing  Intervention: Promote Improved Energy  Recent Flowsheet Documentation  Taken 7/13/2022 0817 by Josefina Bassett RN  Activity Management:    activity adjusted per tolerance    ambulated to bathroom    up ad francis     Problem: Oral Intake Altered (Oncology Care)  Goal: Optimal Oral Intake  Outcome: Ongoing, Progressing     Problem: Oral Mucositis (Oncology Care)  Goal: Improved Oral Mucous Membrane Integrity  Outcome: Ongoing, Progressing     Problem: Pain Acute (Oncology Care)  Goal: Optimal Pain Control  Outcome: Ongoing, Progressing  Intervention: Prevent or Manage  Pain  Recent Flowsheet Documentation  Taken 7/13/2022 0817 by Josefina Bassett RN  Medication Review/Management: medications reviewed   Goal Outcome Evaluation:

## 2022-07-14 NOTE — PHARMACY - DISCHARGE MEDICATION RECONCILIATION AND EDUCATION
Pharmacy:  Discharge Counseling and Medication Reconciliation    Nadya Flanagan  [address]   [place] [address]  [phone] (home)   71 year old female  PCP: Meghan Pearce    Allergies: Metrizamide, Bee venom, Lisinopril, Sulfa drugs, and Pollen extract    Discharge Counseling:    Pharmacist met with patient today to review the medication portion of the After Visit Summary (with an emphasis on NEW medications) and to address patient's questions/concerns.    Summary of Education: Counseled patient on new medication of Baclofen, including indication, administration, and possible common side effects. Patient was initially concerned about medication, as she did not like the way Baclofen made her feel- per 7/13 nursing note, caused confusion. Advised patient that this medication is only as needed if hiccups are to re-occur- patient could also cut tablets in half if she desires. Also advised her that OTC Omeprazole could be taken if hiccups do re-occur.    Materials Provided:  MedCounselor sheets printed from Clinical Pharmacology on: Baclofen    Discharge Medication Reconciliation:    It has been determined that the patient has an adequate supply of medications available or which can be obtained from the patient's preferred pharmacy, which she has confirmed as: [facility] [An updated medication list will be faxed to the patient's pharmacy.]    Thank you for the consult.    Ervin Stock RP........July 14, 2022 10:09 AM

## 2022-07-14 NOTE — PROGRESS NOTES
:     Patient will discharge today with hospice services in place. Discharge summary faxed to [facility].     No more needs at this time.     ERNA Rosa on 7/14/2022 at 10:36 AM

## 2022-07-14 NOTE — PLAN OF CARE
"Patient admitted for Cancer. She is on comfort cares. Today she is very tired and sleepy. She remains confused to place, time and occasionally situation.  She has refused to take her medication, eat or drink fluids. She is discharging home today with Hospice services    Problem: Plan of Care - These are the overarching goals to be used throughout the patient stay.    Goal: Plan of Care Review/Shift Note  Description: The Plan of Care Review/Shift note should be completed every shift.  The Outcome Evaluation is a brief statement about your assessment that the patient is improving, declining, or no change.  This information will be displayed automatically on your shift note.  Outcome: Adequate for Care Transition  Goal: Patient-Specific Goal (Individualized)  Description: You can add care plan individualizations to a care plan. Examples of Individualization might be:  \"Parent requests to be called daily at 9am for status\", \"I have a hard time hearing out of my right ear\", or \"Do not touch me to wake me up as it startles me\".  Outcome: Adequate for Care Transition  Goal: Absence of Hospital-Acquired Illness or Injury  Outcome: Adequate for Care Transition  Goal: Optimal Comfort and Wellbeing  Outcome: Adequate for Care Transition  Goal: Readiness for Transition of Care  Outcome: Adequate for Care Transition     Problem: Pain Acute  Goal: Acceptable Pain Control and Functional Ability  Outcome: Adequate for Care Transition     Problem: Coping Ineffective (Oncology Care)  Goal: Effective Coping  Outcome: Adequate for Care Transition     Problem: Fatigue (Oncology Care)  Goal: Improved Activity Tolerance  Outcome: Adequate for Care Transition     Problem: Oral Intake Altered (Oncology Care)  Goal: Optimal Oral Intake  Outcome: Adequate for Care Transition     Problem: Oral Mucositis (Oncology Care)  Goal: Improved Oral Mucous Membrane Integrity  Outcome: Adequate for Care Transition     Problem: Pain Acute (Oncology " Care)  Goal: Optimal Pain Control  Outcome: Adequate for Care Transition   Goal Outcome Evaluation:

## 2022-07-14 NOTE — DISCHARGE SUMMARY
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     Patient was last in the hospital about a month and a half ago with recurrent small bowel obstruction symptoms, since that time she is continued with hospice and does not have an NG tube that she has continued with.  She is eaten and drank very little in the last 2 weeks no progressive abdominal pain that was uncontrollable with her current regiment.     She is brought to the ER, there underwent CT scan notable for significant ascites with intra-abdominal mass-effect, underwent palliative paracentesis, felt significantly improved and was subsequently admitted to the hospital for further management.     Hospital Course   Nadya Flanagan was admitted on 7/10/2022.  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  Moving her bowels day prior to discharge, tolerating increased oral intake and wanting to go home on hospice.  Going forward she will continue Reglan scheduled 3 times daily,  p.o./ME Decadron 4 mg daily, as needed Zofran and Ativan and Haldol for ongoing refractory nausea vomiting.  She will also continue a scheduled bowel regiment to prevent obstipation.  If she is needing further paracenteses hospice medical director worse we will either perform paracentesis or refer to radiology and will be coordinated by hospice.    Intractable hiccups: After resolution of her nausea and vomiting she started on baclofen 10 mg 3 times daily with complete resolution.  She did have some encephalopathy night prior to discharge likely from baclofen, evening dose was held and her mental status returned to baseline was awake alert and oriented x3 and at discharge.  She will continue with baclofen 10 mg 3 times daily as needed for hiccups.    Nolan Guerin MD    Significant Results and Procedures   none    Pending Results   These results will be followed up by hospice  Unresulted Labs Ordered in the Past 30 Days of this Admission     Date and Time Order Name Status Description    7/11/2022  3:11 AM Peritoneal Fluid Aerobic Bacterial Culture Routine with Gram Stain Preliminary           Code Status   Comfort Care       Primary Care Physician   Meghan Pearce    Physical Exam   Temp: 97.9  F (36.6  C) Temp src: Tympanic   Pulse: 98     SpO2: 96 % O2 Device: None (Room air)    Vitals:    07/10/22 2023 07/12/22 0713   Weight: 55.3 kg (122 lb) 52.2 kg (115 lb)     Vital Signs with Ranges  Temp:  [97.9  F (36.6  C)] 97.9  F (36.6  C)  Pulse:  [98] 98  SpO2:  [96 %] 96 %  No intake/output data recorded.    Exam on day of discharge:   GENERAL: Talkative, sitting up in bed, in no apparent distress.  CARDIOVASCULAR: regular rate and rhythm, no murmurs, rubs, or gallops. Normal S1/S2. No lower extremity edema.   RESPIRATORY: clear to auscultation bilaterally, no wheezes, no crackles.   GI: soft, non-tender, mild diffuse distention, normoactive bowel sounds.  No shifting dullness.        Discharge Disposition   Discharged  to home  Condition at discharge: Terminal    Consultations This Hospital Stay   SURGERY GENERAL IP CONSULT  SOCIAL WORK IP CONSULT    Time Spent on this Encounter   I, Nolan Guerin MD, personally saw the patient today and spent greater than 30 minutes discharging this patient.  I also personally updated patient's  Cheng on day of discharge, questions answered the best of ability and very appreciative care.    Discharge Orders      Reason for your hospital stay    Intractable nausea and vomiting and intractable hiccups     Follow-up and recommended labs and tests     Follow-up with the hospice nurses later today     Activity    Your activity upon discharge: activity as tolerated     Discharge Instructions    - Take Reglan at breakfast lunch and dinner to help with nausea  -Take the Decadron once daily by mouth or per rectum for nausea  -Use Zofran as needed and Ativan as needed for nausea  -Use the baclofen 10 mg as needed for hiccups  -Use the Dilaudid for pain  -Continue with the stool softeners to prevent constipation     When to contact your care team    Call the hospice nurses if you are having any symptoms that are worsening or failing to improve despite all the medications you have on hand     Diet    Follow this diet upon discharge: Orders Placed This Encounter      Regular Diet Adult     Discharge Medications   Current Discharge Medication List      START taking these medications    Details   baclofen (LIORESAL) 10 MG tablet Take 1 tablet (10 mg) by mouth 3 times daily as needed for other (hiccups)  Qty: 15 tablet, Refills: 0    Associated Diagnoses: Non-intractable vomiting with nausea, unspecified vomiting type         CONTINUE these medications which have CHANGED    Details   dexamethasone (DECADRON) 4 MG tablet Take 1 tablet (4 mg) by mouth daily (with breakfast) By mouth or per rectum for nausea         CONTINUE these medications which have NOT CHANGED    Details   acetaminophen (TYLENOL) 500 MG  tablet Take 500-1,000 mg by mouth every 6 hours as needed for fever or pain      bisacodyl (DULCOLAX) 10 MG suppository Place 10 mg rectally daily as needed for constipation (if no bowel movement after 48 hours. Use daily until bowel movement occurs.)      buprenorphine (BUTRANS) 20 MCG/HR WK patch Place 1 patch onto the skin every 7 days      calcium carbonate (TUMS) 500 MG chewable tablet Take 1 chew tab by mouth 2 times daily as needed for heartburn      diclofenac (VOLTAREN) 1 % topical gel Apply 2 g topically 2 times daily as needed for other (pain) Apply small amount to legs and across abdomen as needed for pain.      diphenhydrAMINE, dexamethasone, metoclopramide (BDR) SUPP suppository Place 1 suppository rectally every 6 hours as needed (nausea)      fluocinonide (LIDEX) 0.05 % external gel Apply topically as needed (Apply one drop to qtip and apply to oral sores as needed for pain)      haloperidol (HALDOL) 0.5 MG tablet Take 0.5 mg by mouth every 4 hours as needed      heparin 100 UNIT/ML SOLN injection 500 Units by Intracatheter route every 30 days Flush port with 20 ml saline followed by 5 ml (500 units) heparin monthly      HYDROmorphone (DILAUDID) 2 MG tablet Take 2 mg by mouth every hour as needed for pain      hyoscyamine (LEVSIN) 0.125 MG tablet Take 0.125 mg by mouth every 4 hours as needed for other      hypromellose-dextran (ARTIFICAL TEARS) 0.1-0.3 % ophthalmic solution Place 1 drop into both eyes as needed for dry eyes      Lactobacillus (FLORAJEN ACIDOPHILUS) CAPS Take 1 capsule by mouth At Bedtime      levothyroxine (SYNTHROID/LEVOTHROID) 25 MCG tablet Take 1 tablet (25 mcg) by mouth in the morning.  Qty: 90 tablet, Refills: 1    Associated Diagnoses: Hypothyroidism, unspecified type      LORazepam (ATIVAN) 0.5 MG tablet Take 0.25 mg by mouth every 4 hours as needed for anxiety      methylphenidate (RITALIN) 5 MG tablet Take 5 mg by mouth 2 times daily as needed (fatigue, take in morning and  around noon for tiredness)      metoclopramide (REGLAN) 10 MG tablet Take 10 mg by mouth 3 times daily      ondansetron (ZOFRAN ODT) 4 MG ODT tab Take 4 mg by mouth every 6 hours as needed for nausea (use if metoclopramide ineffective)      sennosides (SENOKOT) 8.6 MG tablet Take 1 tablet by mouth 2 times daily      witch hazel-glycerin (TUCKS) pad Apply topically as needed for pain         STOP taking these medications       magnesium hydroxide (MILK OF MAGNESIA) 400 MG/5ML suspension Comments:   Reason for Stopping:             Allergies   Allergies   Allergen Reactions     Metrizamide Anaphylaxis and Unknown     Had contrast dye. Patient reports she woke up in ICU.     Bee Venom Angioedema     Edema     Lisinopril Angioedema, Nausea and Vomiting and GI Disturbance     ? Angioedema per Mari care everywhere and patient     Sulfa Drugs Hives     More severe per patient     Pollen Extract Other (See Comments)     Runny nose     Data   Most Recent 3 CBC's:  Recent Labs   Lab Test 07/10/22  1943 05/23/22  0609 05/22/22  1859   WBC 13.6* 11.2* 14.0*   HGB 10.4* 9.3* 10.4*   MCV 94 106* 104*    433 496*      Most Recent 3 BMP's:  Recent Labs   Lab Test 07/10/22  1943 05/22/22  1859 04/08/22  1130    132* 135   POTASSIUM 4.6 4.6 4.6   CHLORIDE 97* 97* 101   CO2 22 24 26   BUN 35* 28* 16   CR 1.93* 1.03 0.87   ANIONGAP 15* 11 8   STELLA 9.6 9.4 9.6   * 106* 98     Most Recent 2 LFT's:  Recent Labs   Lab Test 07/10/22  1943 05/22/22  1859   AST 28 22   ALT 21 9   ALKPHOS 140* 118*   BILITOTAL 0.8 0.5     Most Recent INR's and Anticoagulation Dosing History:  Anticoagulation Dose History    There is no flowsheet data to display.       Most Recent 3 Troponin's:No lab results found.  Most Recent Cholesterol Panel:  Recent Labs   Lab Test 09/10/21  1027   CHOL 229*   *   HDL 64   TRIG 135     Most Recent 6 Bacteria Isolates From Any Culture (See EPIC Reports for Culture Details):No lab results  found.  Most Recent TSH, T4 and A1c Labs:  Recent Labs   Lab Test 04/08/22  1130 03/18/22  1133 11/11/19  1032   TSH 9.91*   < >  --    T4 0.83   < >  --    A1C  --   --  5.9    < > = values in this interval not displayed.     Results for orders placed or performed during the hospital encounter of 07/10/22   CT Abdomen Pelvis w/o Contrast    Addendum: 7/10/2022    Addendum created by Mali Elizabeth MD on 7/10/2022 9:51:44 PM CDT:  THIS REPORT CONTAINS FINDINGS THAT MAY BE CRITICAL TO PATIENT CARE. The   findings were verbally communicated by Dr. Mali Elizabeth to Dr. Marrero   via telephone conference at 9:46 PM CDT on 7/10/2022. The findings were   acknowledged and understood.        Narrative    Initial report created on 7/10/2022 9:43:04 PM CDT:    PROCEDURE INFORMATION:   Exam: CT Abdomen And Pelvis Without Contrast   Exam date and time: 7/10/2022 8:15 PM   Age: 71 years old   Clinical indication: Abdominal tenderness and bloating and nausea and vomiting;   Prior surgery; Surgery date: 6+ months; Surgery type: Appendectomy,   cholecystectomy; Patient HX: Has a known bowel obstruction and had an ngt for   about a week in May. Was removed and had worsening of nausea and vomiting, but   wanted to continue without ngt; Additional info: Sbo, intractable vomiting     TECHNIQUE:   Imaging protocol: Computed tomography of the abdomen and pelvis without   contrast.   Radiation optimization: All CT scans at this facility use at least one of these   dose optimization techniques: automated exposure control; mA and/or kV   adjustment per patient size (includes targeted exams where dose is matched to   clinical indication); or iterative reconstruction.     COMPARISON:   CT ABDOMEN PELVIS W CONTRAST 5/22/2022 9:08 PM     FINDINGS:   Lungs: Heterogeneous consolidative airspace opacities in the left lung base.   Diaphragm:  Small hiatal hernia filled with fluid, posing increased risk of   aspiration.     Liver:  Unremarkable.   Gallbladder and bile ducts: Gallbladder is filled with hyperattenuating   material compatible with biliary sludge. No intra- or extra-hepatic biliary   ductal dilatation.   Pancreas: Unremarkable.   Spleen: Unremarkable.   Adrenal glands: Unremarkable.   Kidneys and ureters: Minor pelviectasis in the right kidney likely on account   of extrinsic compression from large volume ascites. No evidence of obstructing   stone. Left kidney is unremarkable.   Stomach and bowel: No evidence of small bowel obstruction.   Appendix: Status post appendectomy.     Intraperitoneal space: Large volume ascites with evidence of extensive mass   effect on the internal organs, concerning for potential abdominal compartment   syndrome.  No pneumoperitoneum.  Vasculature: Unremarkable.   Lymph nodes: Unremarkable.    Urinary bladder: Unremarkable.   Reproductive: Unremarkable.   Bones/joints: Stable post-operative appearance of prior proximal right femur   fixation. No acute osseous abnormality.   Soft tissues:  Unremarkable.      Impression    IMPRESSION:   1. Large volume ascites with evidence of extensive mass effect on the internal   organs, concerning for potential abdominal compartment syndrome.  Recommend   urgent paracentesis.  2. Heterogeneous consolidative airspace opacities in the left lung base,   concerning for aspiration pneumonitis and/or pneumonia.   3. Small hiatal hernia filled with fluid, posing increased risk of aspiration.     THIS DOCUMENT HAS BEEN ELECTRONICALLY SIGNED BY JOHN MOORE MD   POC US ABDOMEN LIMITED    Impression    Large pocket of ascites was identified in LLQ. Ultrasound was used to guide arthrocentesis

## 2022-07-14 NOTE — PLAN OF CARE
Patient appears to be sleeping. More confused through out shift. Having increased difficulty with word finding. Able to make needs known, no issues with pain this shift. Did c/o nausea and having the hiccups. Patient rested off and on. Abdomen continues to be distended. Tresa Doyle RN on 7/14/2022 at 3:09 AM   Problem: Plan of Care - These are the overarching goals to be used throughout the patient stay.    Goal: Absence of Hospital-Acquired Illness or Injury  Intervention: Identify and Manage Fall Risk  Recent Flowsheet Documentation  Taken 7/14/2022 0200 by Tresa Doyle, RN  Safety Promotion/Fall Prevention:   assistive device/personal items within reach   clutter free environment maintained   fall prevention program maintained   nonskid shoes/slippers when out of bed   patient and family education   treat reversible contributory factors   treat underlying cause  Taken 7/13/2022 1900 by Tresa Doyle, RN  Safety Promotion/Fall Prevention:   assistive device/personal items within reach   clutter free environment maintained   fall prevention program maintained   nonskid shoes/slippers when out of bed   patient and family education   treat reversible contributory factors   treat underlying cause   Goal Outcome Evaluation:

## 2022-07-14 NOTE — PROGRESS NOTES
[facility] DISCHARGE NOTE    Patient discharged to home with  Hospice services at 12:09 PM via wheel chair. Accompanied by spouse and staff. Discharge instructions reviewed with patient and spouse, opportunity offered to ask questions. Prescriptions sent to patients preferred pharmacy. All belongings sent with patient.    Josefina Bassett RN

## 2022-07-15 NOTE — TELEPHONE ENCOUNTER
Patient daughter Barbi called and wants to know if the appointment for 7/19/22 can be a telephone visit or may it be cancelled . Nadya is too ill to come in to the clinic. Hospice will also be there.    Anna Weller on 7/15/2022 at 9:06 AM

## 2022-07-15 NOTE — TELEPHONE ENCOUNTER
"Returned call to inform patient's family that appointment could be changed from office visit to telephone visit.  Daughter Barbi is who placed the original call/request, but she is not listed on the PHI, so I requested to speak to RICHI or Velasqeuz, from whoever answered phone, after identifying myself, as they are listed on the PHI.  LL came to the phone and I again identified myself and asked for patient's date of birth and LL got angry and stated \"who are you and what is your date of birth?\"   I again stated that I was returning a call from the daughter and needed to verify the birth date in order to proceed with the call.  LL let out a long sigh and didn't say anything, so I said ok and thank you and thomas.    Heavenly Mortensen LPN on 7/15/2022 at 1:45 PM    "

## 2022-07-15 NOTE — TELEPHONE ENCOUNTER
Called patient's  who requested we cancel the appointment all together.    ALBERT Méndez, AGNP-C  Internal Medicine  07/15/2022 1:51 PM

## 2022-07-15 NOTE — PROGRESS NOTES
Clinic Care Coordination Contact  Transitional Care Management    Patient discharged with orders for hospice. Hospice was admitting within 48 hours. No TCM call required per policy.   Resuming hospice, Lance.  Dora Mancilla RN ,....................  7/15/2022   1:45 PM

## 2022-09-26 DIAGNOSIS — E03.9 HYPOTHYROIDISM, UNSPECIFIED TYPE: ICD-10-CM

## 2022-09-26 RX ORDER — LEVOTHYROXINE SODIUM 25 UG/1
25 TABLET ORAL DAILY
Qty: 90 TABLET | Refills: 1 | OUTPATIENT
Start: 2022-09-26

## 2023-11-30 NOTE — PROGRESS NOTES
This is a recent snapshot of the patient's [facility] medical record.  For current drug dose and complete information and questions, call [phone] or In Basket pool, [facility] home infusion ([id number])  CSN Number:  [id number]       abdominal pain

## 2023-12-12 NOTE — TELEPHONE ENCOUNTER
Call to patient to inform her that Diana has left for the day. She states that Tuesday next week will be the best day to reach her. Bella Campuzano RN on 3/5/2020 at 4:28 PM      Please fill or refuse as appropriate.     LOV: 7/17/23    NOV: 1/16/24    Last Refill: 6/20/23

## 2024-01-27 NOTE — PROGRESS NOTES
Clinic Care Coordination Contact  Transitional Care Management    Patient discharged with hospice. Hospice continued, pt admitted due to no power at home. No TCM call required per policy.   Dora Mancilla RN ,....................  6/2/2022   8:14 AM       No indicators present

## 2024-04-24 NOTE — TELEPHONE ENCOUNTER
Patient called back to review x ray results. Warm transferred to [facility].    Patient phoned to report that symptoms of numbness and tingling continue and worsens at night.   Patient alerted that oncologist out and this nurse could send reported information to another MD.  Patient declined and verbalized she will send a my chart note to her primary.  Educated patient to seek medical attention if worsens.  Patient verbalized its more annoying than anything.  Message left on oncology Steffany Urrutia RN on 8/2/2019 at 2:34 PM

## 2024-05-03 NOTE — ANESTHESIA CARE TRANSFER NOTE
Patient: Nadya Flanagan    Procedure(s):  Remove Port    Diagnosis: done with chemotherapy  Diagnosis Additional Information: No value filed.    Anesthesia Type:   MAC     Note:  Airway :Room Air  Patient transferred to:Phase II  Handoff Report: Identifed the Patient, Identified the Reponsible Provider, Reviewed the pertinent medical history, Discussed the surgical course, Reviewed Intra-OP anesthesia mangement and issues during anesthesia, Set expectations for post-procedure period and Allowed opportunity for questions and acknowledgement of understanding      Vitals: (Last set prior to Anesthesia Care Transfer)    CRNA VITALS  10/9/2019 1046 - 10/9/2019 1121      10/9/2019             NIBP:  (!) 88/52    Pulse:  68    NIBP Mean:  65    Ht Rate:  68    SpO2:  96 %    Resp Rate (set):  10                Electronically Signed By: ALBERT SHEIKH CRNA  October 9, 2019  11:21 AM   2 seconds or less

## 2024-09-06 NOTE — OP NOTE
Preoperative Diagnosis: history of cancer, implanted port, done with chemotherapy    Postoperative Diagnosis: same    Procedure planned: Port removal    Procedure performed: left subclavian port removal    Surgeon: Tresa Evangelista MD   Circulator: Ally Seth RN  Scrub Person: Bella Sullivan  Pre-Op Nurse: Ruth Ann Reese RN    Anesthesia: Monitored anesthesia care, local     Specimen: none    Estimated Blood Loss: Less than 10 ml     INDICATIONS     Please see the H&P. The patient has completed chemotherapy and wants the port removed. The risks, benefits and alternatives to removal of implanted port were discussed with the patient. We specifically discussed the risks of infection, bleeding, injury to blood vessels and lung, blood clot and the possible need for further procedures. The patient expressed understanding and questions were answered. Informed consent paperwork was completed.     DESCRIPTION OF PROCEDURE     The patient was brought to the operating room and placed in a supine position on the operating table. Appropriate monitors were attached. The patient received IV antibiotics preoperatively. After sedation was administered, the patient was positioned, prepped and draped in the standard fashion. Time out was performed confirming the patient's identity and procedure to be performed.   Local anesthetic was infiltrated in the skin and subcutaneous tissue in the area of the scar below the left clavicle. Skin incision was made sharply and carried down to the subcutaneous tissue, the scar was excised. Hemostasis was excellent. The capsule around the post was entered. The stiches securing the port were cut. A Monocryl figure of eight stitch was placed around the catheter exit tract. The catheter was withdrawn and the suture secured. Hemostasis was excellent. The port was explanted and found to be intact. The capsule was partially excised. Hemostasis was excellent. Further local anesthetic was  infiltrated for post operative pain control. Skin edges were approximated with Monocryl suture.   Sterile dressings were applied. The patient was then awakened from sedation and taken to post anesthesia recovery in stable condition. All needle, sponge and instrument counts were reported as correct at the conclusion of the case. The patient tolerated the procedure with no immediately apparent complications.    4 = No assist / stand by assistance

## (undated) DEVICE — PROBE COVER SAFERSONIC LONG 50/BX B204538

## (undated) DEVICE — GOWN XLG/LONG ISOLATION MICROCOOL DISP 92353

## (undated) DEVICE — PREP CHLORAPREP 26ML TINTED ORANGE  260815

## (undated) DEVICE — SUCTION STRYKERFLOW II 250-070-500

## (undated) DEVICE — STPL SKIN SUBCUTICULAR INSORB  2030

## (undated) DEVICE — DRSG MEDIPORE 3 1/2X8" 3570

## (undated) DEVICE — SU SILK 3-0 SH CR 8X18" C013D

## (undated) DEVICE — DRSG STERI STRIP 1/2X4" R1547

## (undated) DEVICE — SU VICRYL 3-0 CT-3 27" J327H

## (undated) DEVICE — ESU GROUND PAD ADULT W/CORD E7507

## (undated) DEVICE — SPONGE LAP 18X18" X8435

## (undated) DEVICE — COVER LIGHT HANDLE LT-F02

## (undated) DEVICE — GLOVE PROTEXIS BLUE W/NEU-THERA 6.5  2D73EB65

## (undated) DEVICE — LIGHT HANDLES PLASTIC

## (undated) DEVICE — STPL POWERED ECHELON 45MM PSEE45A

## (undated) DEVICE — SUTURE 2-0 SILK C016T SH

## (undated) DEVICE — GLOVE PROTEXIS POWDER FREE SMT 6.5  2D72PT65X

## (undated) DEVICE — ENDO TROCAR FIRST ENTRY KII FIOS Z-THRD 12X100MM CTF73

## (undated) DEVICE — PACK MAJOR LAPAROTOMY LF SBA15MLFCA

## (undated) DEVICE — SOL WATER 1500ML

## (undated) DEVICE — SYR 10ML LL W/O NDL

## (undated) DEVICE — TUBING INSUFFLATOR W/FILTER OLYMPUS WA95005A

## (undated) DEVICE — DRSG MEDIPORE 2X2 3/4" 3562

## (undated) DEVICE — DRAPE C-ARM PACK 9"

## (undated) DEVICE — ESU ELEC BLADE 2.75" COATED/INSULATED E1455

## (undated) DEVICE — NDL 27GA 1 1/2" 1188827112

## (undated) DEVICE — SU MONOCRYL 4-0 PS-2 27" UND Y426H

## (undated) DEVICE — SU PDS II 0 CTX 60" Z990G

## (undated) DEVICE — PACK LAPAROSCOPY LF SBA15LPFCA

## (undated) DEVICE — SU MONOCRYL 3-0 SH 27" UND Y416H

## (undated) DEVICE — SPONGE PEANUT

## (undated) DEVICE — CLIP APPLIER ENDO ROTATING 10MM MED/LG ER320

## (undated) DEVICE — DRAPE LAPAROSCOPIC ABDOMINAL ASTOUND 106X124" LF 9438

## (undated) DEVICE — ENDO POUCH UNIV RETRIEVAL SYSTEM INZII 10MM CD001

## (undated) DEVICE — ESU LIGASURE IMPACT OPEN SEALER/DVDR CVD LG JAW LF4418

## (undated) DEVICE — VERRES NEEDLE 120MM DISPOSABLE 12/BX

## (undated) DEVICE — SYR 10ML LL W/O NDL 302995

## (undated) DEVICE — SLEEVE COMPRESSION SCD KNEE MED 74022

## (undated) DEVICE — SU VICRYL 3-0 SH 27" UND J416H

## (undated) DEVICE — DRAPE MAYO STAND 23X54 8337

## (undated) DEVICE — SUCTION TIP YANKAUER W/O VENT K86

## (undated) DEVICE — STPL RELOAD LINEAR CUT 55MM TCR55

## (undated) DEVICE — SPECIMEN CONTAINER 4OZ

## (undated) DEVICE — SUTURE 2-0 VICRYL TIES J911T

## (undated) DEVICE — SU VICRYL 2-0 CT-1 36" UND J945H

## (undated) DEVICE — ESU CORD MONOPOLAR 10'  E0510

## (undated) DEVICE — TRAY 16FR CATH W/URINE METER SILVER 903116

## (undated) DEVICE — ENDO TROCAR SLEEVE KII 5X100MM CTR03

## (undated) DEVICE — SPECIMEN TRAP

## (undated) DEVICE — ENDO TROCAR SLEEVE KII Z-THREADED 05X100MM CTS02

## (undated) DEVICE — SU VICRYL 0 UR-6 27" J603H

## (undated) DEVICE — STPL LINEAR CUT 55MM TLC55

## (undated) RX ORDER — SODIUM CHLORIDE, SODIUM LACTATE, POTASSIUM CHLORIDE, CALCIUM CHLORIDE 600; 310; 30; 20 MG/100ML; MG/100ML; MG/100ML; MG/100ML
INJECTION, SOLUTION INTRAVENOUS
Status: DISPENSED
Start: 2022-01-01

## (undated) RX ORDER — PROPOFOL 10 MG/ML
INJECTION, EMULSION INTRAVENOUS
Status: DISPENSED
Start: 2018-12-18

## (undated) RX ORDER — VECURONIUM BROMIDE 1 MG/ML
INJECTION, POWDER, LYOPHILIZED, FOR SOLUTION INTRAVENOUS
Status: DISPENSED
Start: 2018-12-18

## (undated) RX ORDER — DEXAMETHASONE SODIUM PHOSPHATE 4 MG/ML
INJECTION, SOLUTION INTRA-ARTICULAR; INTRALESIONAL; INTRAMUSCULAR; INTRAVENOUS; SOFT TISSUE
Status: DISPENSED
Start: 2020-07-08

## (undated) RX ORDER — LIDOCAINE HYDROCHLORIDE 20 MG/ML
INJECTION, SOLUTION EPIDURAL; INFILTRATION; INTRACAUDAL; PERINEURAL
Status: DISPENSED
Start: 2019-02-07

## (undated) RX ORDER — ONDANSETRON 2 MG/ML
INJECTION INTRAMUSCULAR; INTRAVENOUS
Status: DISPENSED
Start: 2019-10-09

## (undated) RX ORDER — KETAMINE HYDROCHLORIDE 50 MG/ML
INJECTION, SOLUTION INTRAMUSCULAR; INTRAVENOUS
Status: DISPENSED
Start: 2018-12-18

## (undated) RX ORDER — BUPIVACAINE HYDROCHLORIDE AND EPINEPHRINE 5; 5 MG/ML; UG/ML
INJECTION, SOLUTION EPIDURAL; INTRACAUDAL; PERINEURAL
Status: DISPENSED
Start: 2019-02-07

## (undated) RX ORDER — KETOROLAC TROMETHAMINE 30 MG/ML
INJECTION, SOLUTION INTRAMUSCULAR; INTRAVENOUS
Status: DISPENSED
Start: 2020-07-08

## (undated) RX ORDER — FENTANYL CITRATE 50 UG/ML
INJECTION, SOLUTION INTRAMUSCULAR; INTRAVENOUS
Status: DISPENSED
Start: 2018-12-18

## (undated) RX ORDER — ACETAMINOPHEN 325 MG/1
TABLET ORAL
Status: DISPENSED
Start: 2020-07-08

## (undated) RX ORDER — FENTANYL CITRATE 50 UG/ML
INJECTION, SOLUTION INTRAMUSCULAR; INTRAVENOUS
Status: DISPENSED
Start: 2020-07-08

## (undated) RX ORDER — GLYCOPYRROLATE 0.2 MG/ML
INJECTION, SOLUTION INTRAMUSCULAR; INTRAVENOUS
Status: DISPENSED
Start: 2020-07-08

## (undated) RX ORDER — MORPHINE SULFATE 2 MG/ML
INJECTION, SOLUTION INTRAMUSCULAR; INTRAVENOUS
Status: DISPENSED
Start: 2022-01-01

## (undated) RX ORDER — LIDOCAINE HYDROCHLORIDE 20 MG/ML
INJECTION, SOLUTION EPIDURAL; INFILTRATION; INTRACAUDAL; PERINEURAL
Status: DISPENSED
Start: 2019-10-09

## (undated) RX ORDER — PROPOFOL 10 MG/ML
INJECTION, EMULSION INTRAVENOUS
Status: DISPENSED
Start: 2019-02-07

## (undated) RX ORDER — AMPICILLIN AND SULBACTAM 2; 1 G/1; G/1
INJECTION, POWDER, FOR SOLUTION INTRAMUSCULAR; INTRAVENOUS
Status: DISPENSED
Start: 2022-01-01

## (undated) RX ORDER — DEXAMETHASONE SODIUM PHOSPHATE 4 MG/ML
INJECTION, SOLUTION INTRA-ARTICULAR; INTRALESIONAL; INTRAMUSCULAR; INTRAVENOUS; SOFT TISSUE
Status: DISPENSED
Start: 2018-12-18

## (undated) RX ORDER — CEFTRIAXONE SODIUM 1 G/50ML
INJECTION, SOLUTION INTRAVENOUS
Status: DISPENSED
Start: 2022-01-01

## (undated) RX ORDER — FENTANYL CITRATE 50 UG/ML
INJECTION, SOLUTION INTRAMUSCULAR; INTRAVENOUS
Status: DISPENSED
Start: 2020-07-22

## (undated) RX ORDER — CEFOXITIN 2 G/1
INJECTION, POWDER, FOR SOLUTION INTRAVENOUS
Status: DISPENSED
Start: 2018-12-19

## (undated) RX ORDER — PROPOFOL 10 MG/ML
INJECTION, EMULSION INTRAVENOUS
Status: DISPENSED
Start: 2019-10-09

## (undated) RX ORDER — FENTANYL CITRATE 50 UG/ML
INJECTION, SOLUTION INTRAMUSCULAR; INTRAVENOUS
Status: DISPENSED
Start: 2019-10-09

## (undated) RX ORDER — CEFAZOLIN SODIUM 2 G/100ML
INJECTION, SOLUTION INTRAVENOUS
Status: DISPENSED
Start: 2020-07-22

## (undated) RX ORDER — CEFAZOLIN SODIUM 2 G/100ML
INJECTION, SOLUTION INTRAVENOUS
Status: DISPENSED
Start: 2020-07-08

## (undated) RX ORDER — FENTANYL CITRATE 50 UG/ML
INJECTION, SOLUTION INTRAMUSCULAR; INTRAVENOUS
Status: DISPENSED
Start: 2019-02-07

## (undated) RX ORDER — SCOLOPAMINE TRANSDERMAL SYSTEM 1 MG/1
PATCH, EXTENDED RELEASE TRANSDERMAL
Status: DISPENSED
Start: 2022-01-01

## (undated) RX ORDER — ACETAMINOPHEN 10 MG/ML
INJECTION, SOLUTION INTRAVENOUS
Status: DISPENSED
Start: 2018-12-18

## (undated) RX ORDER — BUPIVACAINE HYDROCHLORIDE AND EPINEPHRINE 5; 5 MG/ML; UG/ML
INJECTION, SOLUTION EPIDURAL; INTRACAUDAL; PERINEURAL
Status: DISPENSED
Start: 2020-07-08

## (undated) RX ORDER — ONDANSETRON 2 MG/ML
INJECTION INTRAMUSCULAR; INTRAVENOUS
Status: DISPENSED
Start: 2019-02-07

## (undated) RX ORDER — LIDOCAINE HYDROCHLORIDE 20 MG/ML
INJECTION, SOLUTION EPIDURAL; INFILTRATION; INTRACAUDAL; PERINEURAL
Status: DISPENSED
Start: 2018-12-18

## (undated) RX ORDER — LIDOCAINE HYDROCHLORIDE 20 MG/ML
INJECTION, SOLUTION EPIDURAL; INFILTRATION; INTRACAUDAL; PERINEURAL
Status: DISPENSED
Start: 2020-07-08

## (undated) RX ORDER — BUPIVACAINE HYDROCHLORIDE AND EPINEPHRINE 5; 5 MG/ML; UG/ML
INJECTION, SOLUTION EPIDURAL; INTRACAUDAL; PERINEURAL
Status: DISPENSED
Start: 2018-12-18

## (undated) RX ORDER — ONDANSETRON 2 MG/ML
INJECTION INTRAMUSCULAR; INTRAVENOUS
Status: DISPENSED
Start: 2018-12-18

## (undated) RX ORDER — SODIUM CHLORIDE, SODIUM LACTATE, POTASSIUM CHLORIDE, CALCIUM CHLORIDE 600; 310; 30; 20 MG/100ML; MG/100ML; MG/100ML; MG/100ML
INJECTION, SOLUTION INTRAVENOUS
Status: DISPENSED
Start: 2018-12-18

## (undated) RX ORDER — ONDANSETRON 2 MG/ML
INJECTION INTRAMUSCULAR; INTRAVENOUS
Status: DISPENSED
Start: 2020-07-08

## (undated) RX ORDER — ACETAMINOPHEN 325 MG/1
TABLET ORAL
Status: DISPENSED
Start: 2019-10-09

## (undated) RX ORDER — DEXAMETHASONE SODIUM PHOSPHATE 4 MG/ML
INJECTION, SOLUTION INTRA-ARTICULAR; INTRALESIONAL; INTRAMUSCULAR; INTRAVENOUS; SOFT TISSUE
Status: DISPENSED
Start: 2019-02-07

## (undated) RX ORDER — KETOROLAC TROMETHAMINE 15 MG/ML
INJECTION, SOLUTION INTRAMUSCULAR; INTRAVENOUS
Status: DISPENSED
Start: 2020-07-08

## (undated) RX ORDER — HEPARIN SODIUM (PORCINE) LOCK FLUSH IV SOLN 100 UNIT/ML 100 UNIT/ML
SOLUTION INTRAVENOUS
Status: DISPENSED
Start: 2019-08-26

## (undated) RX ORDER — PROPOFOL 10 MG/ML
INJECTION, EMULSION INTRAVENOUS
Status: DISPENSED
Start: 2020-07-22

## (undated) RX ORDER — FENTANYL CITRATE 50 UG/ML
INJECTION, SOLUTION INTRAMUSCULAR; INTRAVENOUS
Status: DISPENSED
Start: 2020-10-13

## (undated) RX ORDER — PHENYLEPHRINE HCL IN 0.9% NACL 1 MG/10 ML
SYRINGE (ML) INTRAVENOUS
Status: DISPENSED
Start: 2018-12-18

## (undated) RX ORDER — GLYCOPYRROLATE 0.2 MG/ML
INJECTION, SOLUTION INTRAMUSCULAR; INTRAVENOUS
Status: DISPENSED
Start: 2018-12-18

## (undated) RX ORDER — SODIUM CHLORIDE 9 MG/ML
INJECTION, SOLUTION INTRAVENOUS
Status: DISPENSED
Start: 2022-01-01

## (undated) RX ORDER — BUPIVACAINE HYDROCHLORIDE AND EPINEPHRINE 5; 5 MG/ML; UG/ML
INJECTION, SOLUTION EPIDURAL; INTRACAUDAL; PERINEURAL
Status: DISPENSED
Start: 2020-07-22

## (undated) RX ORDER — HYDROMORPHONE HYDROCHLORIDE 2 MG/ML
INJECTION, SOLUTION INTRAMUSCULAR; INTRAVENOUS; SUBCUTANEOUS
Status: DISPENSED
Start: 2018-12-18

## (undated) RX ORDER — HEPARIN SODIUM (PORCINE) LOCK FLUSH IV SOLN 100 UNIT/ML 100 UNIT/ML
SOLUTION INTRAVENOUS
Status: DISPENSED
Start: 2019-06-06

## (undated) RX ORDER — ONDANSETRON 2 MG/ML
INJECTION INTRAMUSCULAR; INTRAVENOUS
Status: DISPENSED
Start: 2022-01-01

## (undated) RX ORDER — ACETAMINOPHEN 325 MG/1
TABLET ORAL
Status: DISPENSED
Start: 2020-07-22

## (undated) RX ORDER — ONDANSETRON 2 MG/ML
INJECTION INTRAMUSCULAR; INTRAVENOUS
Status: DISPENSED
Start: 2020-10-13

## (undated) RX ORDER — EPHEDRINE SULFATE 50 MG/ML
INJECTION, SOLUTION INTRAVENOUS
Status: DISPENSED
Start: 2020-07-22

## (undated) RX ORDER — HYDROMORPHONE HYDROCHLORIDE 1 MG/ML
INJECTION, SOLUTION INTRAMUSCULAR; INTRAVENOUS; SUBCUTANEOUS
Status: DISPENSED
Start: 2022-01-01

## (undated) RX ORDER — LIDOCAINE HYDROCHLORIDE 20 MG/ML
INJECTION, SOLUTION EPIDURAL; INFILTRATION; INTRACAUDAL; PERINEURAL
Status: DISPENSED
Start: 2020-10-13

## (undated) RX ORDER — KETOROLAC TROMETHAMINE 30 MG/ML
INJECTION, SOLUTION INTRAMUSCULAR; INTRAVENOUS
Status: DISPENSED
Start: 2020-07-22

## (undated) RX ORDER — PROPOFOL 10 MG/ML
INJECTION, EMULSION INTRAVENOUS
Status: DISPENSED
Start: 2020-07-08

## (undated) RX ORDER — NEOSTIGMINE METHYLSULFATE 0.5 MG/ML
INJECTION INTRAVENOUS
Status: DISPENSED
Start: 2018-12-18

## (undated) RX ORDER — DEXAMETHASONE SODIUM PHOSPHATE 4 MG/ML
INJECTION, SOLUTION INTRA-ARTICULAR; INTRALESIONAL; INTRAMUSCULAR; INTRAVENOUS; SOFT TISSUE
Status: DISPENSED
Start: 2020-07-22

## (undated) RX ORDER — DEXAMETHASONE SODIUM PHOSPHATE 4 MG/ML
INJECTION, SOLUTION INTRA-ARTICULAR; INTRALESIONAL; INTRAMUSCULAR; INTRAVENOUS; SOFT TISSUE
Status: DISPENSED
Start: 2020-10-13

## (undated) RX ORDER — KETOROLAC TROMETHAMINE 30 MG/ML
INJECTION, SOLUTION INTRAMUSCULAR; INTRAVENOUS
Status: DISPENSED
Start: 2018-12-18

## (undated) RX ORDER — LIDOCAINE HYDROCHLORIDE 20 MG/ML
JELLY TOPICAL
Status: DISPENSED
Start: 2020-07-08

## (undated) RX ORDER — LIDOCAINE HYDROCHLORIDE 20 MG/ML
INJECTION, SOLUTION EPIDURAL; INFILTRATION; INTRACAUDAL; PERINEURAL
Status: DISPENSED
Start: 2020-07-22

## (undated) RX ORDER — ONDANSETRON 2 MG/ML
INJECTION INTRAMUSCULAR; INTRAVENOUS
Status: DISPENSED
Start: 2020-07-22

## (undated) RX ORDER — PROPOFOL 10 MG/ML
INJECTION, EMULSION INTRAVENOUS
Status: DISPENSED
Start: 2020-10-13

## (undated) RX ORDER — KETOROLAC TROMETHAMINE 30 MG/ML
INJECTION, SOLUTION INTRAMUSCULAR; INTRAVENOUS
Status: DISPENSED
Start: 2019-02-07

## (undated) RX ORDER — CEFAZOLIN SODIUM 1 G/3ML
INJECTION, POWDER, FOR SOLUTION INTRAMUSCULAR; INTRAVENOUS
Status: DISPENSED
Start: 2019-02-07